# Patient Record
Sex: MALE | Race: WHITE | Employment: FULL TIME | URBAN - METROPOLITAN AREA
[De-identification: names, ages, dates, MRNs, and addresses within clinical notes are randomized per-mention and may not be internally consistent; named-entity substitution may affect disease eponyms.]

---

## 2019-12-30 ENCOUNTER — HOSPITAL ENCOUNTER (OUTPATIENT)
Dept: ULTRASOUND IMAGING | Age: 49
Discharge: HOME | End: 2019-12-30
Attending: INTERNAL MEDICINE
Payer: COMMERCIAL

## 2019-12-30 DIAGNOSIS — N18.4 CHRONIC KIDNEY DISEASE, STAGE IV (SEVERE) (HCC): ICD-10-CM

## 2019-12-30 PROCEDURE — 76770 US EXAM ABDO BACK WALL COMP: CPT

## 2019-12-30 RX ORDER — SODIUM CHLORIDE 9 MG/ML
25 INJECTION, SOLUTION INTRAVENOUS CONTINUOUS
Status: DISPENSED | OUTPATIENT
Start: 2020-01-06 | End: 2020-01-06

## 2020-01-06 ENCOUNTER — HOSPITAL ENCOUNTER (OUTPATIENT)
Dept: INFUSION THERAPY | Age: 50
Discharge: HOME | End: 2020-01-06
Payer: COMMERCIAL

## 2020-01-07 RX ORDER — SODIUM CHLORIDE 9 MG/ML
25 INJECTION, SOLUTION INTRAVENOUS CONTINUOUS
Status: DISCONTINUED | OUTPATIENT
Start: 2020-01-08 | End: 2020-01-09 | Stop reason: HOSPADM

## 2020-01-08 ENCOUNTER — HOSPITAL ENCOUNTER (OUTPATIENT)
Dept: INFUSION THERAPY | Age: 50
Discharge: HOME | End: 2020-01-08
Payer: COMMERCIAL

## 2020-01-08 VITALS
DIASTOLIC BLOOD PRESSURE: 87 MMHG | SYSTOLIC BLOOD PRESSURE: 177 MMHG | HEART RATE: 77 BPM | TEMPERATURE: 98 F | RESPIRATION RATE: 18 BRPM

## 2020-01-08 PROCEDURE — 96365 THER/PROPH/DIAG IV INF INIT: CPT

## 2020-01-08 PROCEDURE — 74011250636 HC RX REV CODE- 250/636: Performed by: INTERNAL MEDICINE

## 2020-01-08 RX ADMIN — IRON SUCROSE 300 MG: 20 INJECTION, SOLUTION INTRAVENOUS at 14:38

## 2020-01-08 RX ADMIN — SODIUM CHLORIDE 25 ML/HR: 900 INJECTION, SOLUTION INTRAVENOUS at 14:38

## 2020-01-08 NOTE — PROGRESS NOTES
1350 Pt admit to [facility] for Venofer ambulatory in stable condition. Assessment completed. No new concerns voiced. Peripheral IV established with positive blood return. Visit Vitals  /87   Pulse 77   Temp 98 °F (36.7 °C)   Resp 18       Medications:  Medications Administered     0.9% sodium chloride infusion     Admin Date  01/08/2020 Action  New Bag Dose  25 mL/hr Rate  25 mL/hr Route  IntraVENous Administered By  Vaibhav Dumont RN          iron sucrose (VENOFER) 300 mg in 0.9% sodium chloride 250 mL IVPB, overfill volume 25 mL     Admin Date  01/08/2020 Action  New Bag Dose  300 mg Rate  193.3 mL/hr Route  IntraVENous Administered By  Vaibhav Dumont, AKASH                  1630 Pt tolerated treatment well. Peripheral IV maintained positive blood return throughout treatment, flushed with positive blood return and removed at conclusion. D/c home ambulatory in no distress. Pt aware of next [facility] appointment scheduled for 1/20/20.

## 2020-01-17 RX ORDER — SODIUM CHLORIDE 9 MG/ML
25 INJECTION, SOLUTION INTRAVENOUS CONTINUOUS
Status: DISCONTINUED | OUTPATIENT
Start: 2020-01-22 | End: 2020-01-23 | Stop reason: HOSPADM

## 2020-01-20 ENCOUNTER — HOSPITAL ENCOUNTER (OUTPATIENT)
Dept: INFUSION THERAPY | Age: 50
End: 2020-01-20
Payer: COMMERCIAL

## 2020-01-22 ENCOUNTER — HOSPITAL ENCOUNTER (OUTPATIENT)
Dept: INFUSION THERAPY | Age: 50
Discharge: HOME | End: 2020-01-22
Payer: COMMERCIAL

## 2020-01-22 VITALS
SYSTOLIC BLOOD PRESSURE: 130 MMHG | HEART RATE: 86 BPM | DIASTOLIC BLOOD PRESSURE: 72 MMHG | RESPIRATION RATE: 18 BRPM | TEMPERATURE: 98.2 F

## 2020-01-22 PROCEDURE — 74011250636 HC RX REV CODE- 250/636: Performed by: INTERNAL MEDICINE

## 2020-01-22 PROCEDURE — 96365 THER/PROPH/DIAG IV INF INIT: CPT

## 2020-01-22 RX ADMIN — SODIUM CHLORIDE 25 ML/HR: 900 INJECTION, SOLUTION INTRAVENOUS at 15:30

## 2020-01-22 RX ADMIN — IRON SUCROSE 300 MG: 20 INJECTION, SOLUTION INTRAVENOUS at 15:40

## 2020-01-22 NOTE — PROGRESS NOTES
Outpatient Infusion Center Progress Note    1500 Pt admit to [facility] for Venofer ambulatory in stable condition. Assessment completed. No new concerns voiced, PIV started in right [facility] with good blood return. Visit Vitals  /72   Pulse 86   Temp 98.2 °F (36.8 °C)   Resp 18       Medications:  Medications Administered     0.9% sodium chloride infusion     Admin Date  01/22/2020 Action  New Bag Dose  25 mL/hr Rate  25 mL/hr Route  IntraVENous Administered By  Lyly CASTANEDA          iron sucrose (VENOFER) 300 mg in 0.9% sodium chloride 250 mL IVPB     Admin Date  01/22/2020 Action  New Bag Dose  300 mg Rate  193.3 mL/hr Route  IntraVENous Administered By  Dominic Blind Pt tolerated treatment well, pt declines post infusion observation, PIV flushed and removed, D/c home ambulatory in no distress. Pt aware of next appointment scheduled for 2/5/20.

## 2020-01-31 RX ORDER — SODIUM CHLORIDE 9 MG/ML
25 INJECTION, SOLUTION INTRAVENOUS CONTINUOUS
Status: DISCONTINUED | OUTPATIENT
Start: 2020-02-05 | End: 2020-02-06 | Stop reason: HOSPADM

## 2020-02-03 ENCOUNTER — APPOINTMENT (OUTPATIENT)
Dept: INFUSION THERAPY | Age: 50
End: 2020-02-03
Payer: COMMERCIAL

## 2020-02-05 ENCOUNTER — HOSPITAL ENCOUNTER (OUTPATIENT)
Dept: INFUSION THERAPY | Age: 50
Discharge: HOME | End: 2020-02-05
Payer: COMMERCIAL

## 2020-02-12 ENCOUNTER — HOSPITAL ENCOUNTER (OUTPATIENT)
Dept: INFUSION THERAPY | Age: 50
Discharge: HOME | End: 2020-02-12
Payer: COMMERCIAL

## 2020-02-12 VITALS
SYSTOLIC BLOOD PRESSURE: 158 MMHG | HEART RATE: 81 BPM | TEMPERATURE: 98.2 F | DIASTOLIC BLOOD PRESSURE: 92 MMHG | RESPIRATION RATE: 18 BRPM

## 2020-02-12 PROCEDURE — 96365 THER/PROPH/DIAG IV INF INIT: CPT

## 2020-02-12 PROCEDURE — 74011250636 HC RX REV CODE- 250/636: Performed by: INTERNAL MEDICINE

## 2020-02-12 PROCEDURE — 96366 THER/PROPH/DIAG IV INF ADDON: CPT

## 2020-02-12 RX ADMIN — IRON SUCROSE 400 MG: 20 INJECTION, SOLUTION INTRAVENOUS at 15:26

## 2020-02-12 NOTE — PROGRESS NOTES
Outpatient Infusion Center Progress Note    1440 Pt admit to [facility] for Venofer 3/3 ambulatory in stable condition. Assessment completed. No new concerns voiced. Patient reports a significant improvement in energy level since starting the iron infusions. Peripheral IV established in the LEFT AC with positive blood return. Medications ordered from pharmacy and IV capped while waiting for medications. Visit Vitals  /82   Pulse 82   Temp 98.2 °F (36.8 °C)   Resp 18       Medications Administered     iron sucrose (VENOFER) 400 mg in 0.9% sodium chloride 250 mL IVPB w/ 25 mL overfill volume      Admin Date  02/12/2020 Action  New Bag Dose  400 mg Rate  118 mL/hr Route  IntraVENous Administered By  Memory Listen                1800 Pt tolerated treatment well. He refused the 30 minute post infusion observation period. IV maintained blood return throughout the course of treatment and was removed at the conclusion. D/c home ambulatory in no distress. No  Further appointments as this is last iron infusion for patient.       Jessica Laguna RN

## 2020-02-19 ENCOUNTER — HOSPITAL ENCOUNTER (OUTPATIENT)
Dept: INFUSION THERAPY | Age: 50
End: 2020-02-19
Payer: COMMERCIAL

## 2020-08-19 ENCOUNTER — OFFICE VISIT (OUTPATIENT)
Dept: SURGERY | Age: 50
End: 2020-08-19
Payer: COMMERCIAL

## 2020-08-19 VITALS
BODY MASS INDEX: 38.36 KG/M2 | SYSTOLIC BLOOD PRESSURE: 163 MMHG | RESPIRATION RATE: 18 BRPM | WEIGHT: 315 LBS | TEMPERATURE: 97.2 F | DIASTOLIC BLOOD PRESSURE: 87 MMHG | HEART RATE: 51 BPM | OXYGEN SATURATION: 96 % | HEIGHT: 76 IN

## 2020-08-19 DIAGNOSIS — E66.01 OBESITY, MORBID (HCC): ICD-10-CM

## 2020-08-19 DIAGNOSIS — N18.6 CKD (CHRONIC KIDNEY DISEASE) STAGE V REQUIRING CHRONIC DIALYSIS (HCC): Primary | ICD-10-CM

## 2020-08-19 DIAGNOSIS — Z99.2 CKD (CHRONIC KIDNEY DISEASE) STAGE V REQUIRING CHRONIC DIALYSIS (HCC): Primary | ICD-10-CM

## 2020-08-19 PROCEDURE — 99202 OFFICE O/P NEW SF 15 MIN: CPT | Performed by: SURGERY

## 2020-08-19 RX ORDER — CARVEDILOL 25 MG/1
25 TABLET ORAL 2 TIMES DAILY WITH MEALS
COMMUNITY
End: 2020-09-23

## 2020-08-19 RX ORDER — OMEPRAZOLE 40 MG/1
40 CAPSULE, DELAYED RELEASE ORAL DAILY
COMMUNITY

## 2020-08-19 RX ORDER — DULAGLUTIDE 0.75 MG/.5ML
0.75 INJECTION, SOLUTION SUBCUTANEOUS
COMMUNITY

## 2020-08-19 RX ORDER — INSULIN GLARGINE 300 U/ML
40 INJECTION, SOLUTION SUBCUTANEOUS DAILY
Status: ON HOLD | COMMUNITY
End: 2020-09-22 | Stop reason: SDUPTHER

## 2020-08-19 RX ORDER — BUMETANIDE 2 MG/1
2 TABLET ORAL 2 TIMES DAILY
COMMUNITY

## 2020-08-19 RX ORDER — LANOLIN ALCOHOL/MO/W.PET/CERES
400 CREAM (GRAM) TOPICAL DAILY
COMMUNITY
End: 2021-11-30

## 2020-08-19 NOTE — PROGRESS NOTES
Surgery History and Physical    Subjective:      Mala Silva is a 53 y.o. male who is referred by Dr. Gwen Abraham regarding peritoneal dialysis access. The patient is not presently on dialysis. The patient does notice quite a bit of leg swelling. He does take Bumex and has fluid restriction. Patient has history of laparotomy for perforated appendicitis in the early 2000's. He has had no other abdominal surgery. The patient has history of cardiomyopathy and reports his ejection fraction is around 30%. He is able to lie in bed at night on his side without being short of breath. He has no history of MI or coronary intervention. 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 He does find he gets fatigued more easily now. Patient has history of diabetes mellitus. Blood sugars are well controlled. Medications include aspirin. Past Medical History:   Diagnosis Date    Allergic rhinitis 11/19/2009    Anemia     CAD (coronary artery disease)     Chronic kidney disease     Congestive heart failure ([facility] [address]     DM (diabetes mellitus) ([facility] 11/19/2009    GERD (gastroesophageal reflux disease)     HTN (hypertension), benign 11/19/2009    Hypercholesteremia 11/19/2009    Obesity 11/19/2009    Tobacco abuse 11/19/2009     Past Surgical History:   Procedure Laterality Date    HX APPENDECTOMY        Family History   Problem Relation Age of Onset    Hypertension Father     Heart Disease Father     Hypertension Mother      Social History     Tobacco Use    Smoking status: Current Every Day Smoker     Packs/day: 1.50     Years: 25.00     Pack years: 37.50     Types: Cigarettes    Smokeless tobacco: Former User   Substance Use Topics    Alcohol use: Not Currently     Alcohol/week: 35.0 standard drinks     Types: 42 Cans of beer per week     Comment: patient says he quit a year ago      Prior to Admission medications    Medication Sig Start Date End Date Taking?  Authorizing Provider   omeprazole (PRILOSEC) 40 mg capsule Take 40 mg by mouth daily. Yes Provider, Historical   carvediloL (COREG) 25 mg tablet Take 25 mg by mouth two (2) times daily (with meals). Yes Provider, Historical   insulin glargine U-300 conc (Toujeo Max U-300 SoloStar) 300 unit/mL (3 mL) inpn by SubCUTAneous route. Yes Provider, Historical   dulaglutide (Trulicity) 3.05 PJ/1.9 mL sub-q pen 0.75 mg by SubCUTAneous route every seven (7) days. Yes Provider, Historical   bumetanide (BUMEX) 2 mg tablet Take 2 mg by mouth daily. Yes Provider, Historical   magnesium oxide (MAG-OX) 400 mg tablet Take 400 mg by mouth daily. Yes Provider, Historical   fenofibrate (TRICOR) 160 mg tablet TAKE ONE TABLET BY MOUTH EVERY DAY 3/31/13  Yes Jane Millan MD   Insulin Needles, Disposable, (BD INSULIN PEN NEEDLE UF SHORT) 31 X 5/16 \" Ndle Use as directed with insulin pen 3/15/13  Yes Jane Millan MD   Insulin Lisp & Lisp Prot, Hum, (HUMALOG MIX 75-25 KWIKPEN) 100 unit/mL (75-25) flexpen Inject  38 units subcutaneously at breakfast and  Inject 30 units subcutaneously at dinner 3/4/13  Yes Jane Millan MD   Blood-Glucose Meter monitoring kit Test blood sugar four times daily. 12/12/12  Yes Jane Millan MD   glucose blood VI test strips (ASCENSIA AUTODISC VI, ONE TOUCH ULTRA TEST VI) strip Test four times daily 12/12/12  Yes Jane Millan MD   Lancets Misc Test four times daily 12/12/12  Yes Jane Millan MD   rosuvastatin (CRESTOR) 10 mg tablet Take 1 Tab by mouth nightly. 3/4/13   Jane Millan MD   oxyCODONE-acetaminophen (PERCOCET 10)  mg per tablet Take 1 Tab by mouth every six (6) hours as needed for Pain. 2/22/13   Elisabet Farmer MD   [facility] ACID/EPA (FISH OIL PO) Take  by mouth daily. Provider, Historical   benazepril (LOTENSIN) 40 mg tablet Take 1 Tab by mouth daily.  12/20/12   Jane Millan MD      Allergies   Allergen Reactions    Amlodipine Other (comments) Bloated      Crestor [Rosuvastatin] Myalgia    Lotensin Hct [Benazepril-Hydrochlorothiazide] Other (comments)     Dizziness with increased dose of 2 pills daily - some dizziness on 1 tab but not as bad as taking 2 tabs    Prednisone Other (comments)     Cramps in legs       Review of Systems:  Pertinent items are noted in the History of Present Illness. Objective:         Physical Exam:  The patient is an overweight male in no acute distress. Weight 358 pounds, height 64 inches  Head and neck examination no jaundice mass or thyromegaly. Lungs are clear bilaterally without rales rhonchi or wheezes. Heart is regular without murmur gallop or rub. The patient is alert and oriented. He moves all extremities equally. Facial movement is symmetrical.  Speech is normal.  The patient's abdomen is soft and nontender. He wears his belt approximately 5 inches below the umbilicus. There is a healed midline abdominal scar. No hernias noted. Lower extremities have 1-2+ edema bilaterally. Assessment:     CKD 5    Plan:     I explained to the patient that his prior laparotomy may potentially interfere with our ability to place a peritoneal dialysis catheter. I reviewed with the patient the typical operative and postoperative course for laparoscopic insertion of peritoneal dialysis catheter. I reviewed with the patient the risks versus benefits of laparoscopic insertion of peritoneal dialysis catheter under general anesthesia as an outpatient. Risks would include bleeding, infection, failure of catheter function, injury to abdominal organs, risks associated with general anesthesia, etc.    I explained to the patient that if he were to contract COVID-19 in the perioperative period, he could have a more severe case of the illness then would have occurred if he had not had surgery. The patient understands and wishes to proceed.       Atiya Malone MD      Copy:    Dr. Urias Beams

## 2020-08-19 NOTE — PROGRESS NOTES
Surgery Instruction Sheet    You have been scheduled for surgery on Tuesday 09/01/2020 at 7:30 am at [facility]. Please report to the Surgery Center at 5:45 am, this is approximately 2 hours prior to your surgery time. The Surgery Center is located on the [facility] [address] side of the hospital, just next to the Emergency Room. Reserved parking is available and  parking if lot is full. You will not need to have a pre-op visit before surgery, but the Pre-op Nurse will call you before surgery. The Pre-op nurse will review your medical history, medications and give you additional instructions. They will also confirm your arrival time. Call your physician immediately if you notice a change in your health between the time you saw your physician and the day of surgery. If you take a blood thinner, please let us know. Call your ordering Doctor to make sure you can stop taking it prior to your surgery. STOP YOUR ASPIRIN 5 DAYS PRIOR TO SURGERY. TAKE LAST DOSE ASPIRIN 08/26/2020    DO NOT TAKE  IBUPROFEN, ADVIL, MOTRIN, ALEVE, EXCEDRIN, BC POWDER, GOODIES, FISH OIL OR ANY MEDICATION CONTAINING ASPIRIN 5 DAYS PRIOR TO YOUR SURGERY. MAY TAKE TYLENOL. Eat a light dinner the evening before your surgery. DO NOT EAT OR DRINK ANYTHING AFTER MIDNIGHT THE NIGHT BEFORE YOUR SURGERY. This includes water, chewing gum, lifesavers, etc.  The Pre op nurse will check with you about any medication that you may need to take the morning of surgery. Shower with a new bar of anti-bacterial soap (Dial, Safeguard) or solution given to you by Pre-op, the night before surgery. Do not use lotion, powder, deodorant on the skin after showering.   Wear loose, comfortable clothing the day of surgery and bring a container to store your contacts, eyeglasses, dentures, hearing aid, etc.  Do not bring money, valuables, jewelry, etc. to the hospital.      If you are having outpatient surgery, someone must come with you the morning of surgery to drive you home. You can not drive for 24 hours after any anesthesia. Sometimes it is necessary to stay overnight and leave the next morning. This is still considered outpatient for most insurance deductibles. Someone will still need to drive you home. If you have questions or concerns, please feel free to call Dr Drew Kumar at [phone]. If you need to cancel your surgery, please call as soon as possible.

## 2020-08-19 NOTE — PROGRESS NOTES
Chief Complaint   Patient presents with    New Patient     seen as requested by Dr. Paco Thurman for discussion of PD cath placement

## 2020-08-24 NOTE — PERIOP NOTES
TNAA for Hammad Mckeon and Dr. Estela Álvarez office re: patient does not have an EKG and asked for her to please put in an OP order and have the patient go and get one done.

## 2020-08-25 RX ORDER — SACUBITRIL AND VALSARTAN 49; 51 MG/1; MG/1
1 TABLET, FILM COATED ORAL 2 TIMES DAILY
COMMUNITY
End: 2021-11-30

## 2020-08-25 RX ORDER — CYANOCOBALAMIN (VITAMIN B-12) 1000 MCG
1 TABLET, EXTENDED RELEASE ORAL DAILY
COMMUNITY
End: 2021-11-30

## 2020-08-25 RX ORDER — ASPIRIN 81 MG/1
81 TABLET ORAL DAILY
COMMUNITY

## 2020-08-25 RX ORDER — ALLOPURINOL 100 MG/1
300 TABLET ORAL DAILY
COMMUNITY
End: 2021-11-30

## 2020-08-25 NOTE — ADVANCED PRACTICE NURSE
MARVIN 6 in PAT phone assessment. Pt admits to loud snoring, witnessed apnea while sleeping but has not previously been evaluated for sleep apnea. BMI 41, neck size > 17\". + HTN, CKD 4. Results faxed to [facility] for FU/further recommendations regarding sleep apnea evaluation.

## 2020-08-25 NOTE — PERIOP NOTES
[facility]  Preoperative Instructions        Surgery Date 09/01/20          Time of Arrival 0585    1. On the day of your surgery, please report to the Surgical Services Registration Desk and sign in at your designated time. The Surgery Center is located to the right of the Emergency Room. 2. You must have someone with you to drive you home. You should not drive a car for 24 hours following surgery. Please make arrangements for a friend or family member to stay with you for the first 24 hours after your surgery. 3. Do not have anything to eat or drink (including water, gum, mints, coffee, juice) after midnight 08/31/20. ? This may not apply to medications prescribed by your physician. ?(Please note below the special instructions with medications to take the morning of your procedure.)    4. We recommend you do not drink any alcoholic beverages for 24 hours before and after your surgery. 5. Contact your surgeons office for instructions on the following medications: non-steroidal anti-inflammatory drugs (i.e. Advil, Aleve), vitamins, and supplements. (Some surgeons will want you to stop these medications prior to surgery and others may allow you to take them)  **If you are currently taking Plavix, Coumadin, Aspirin and/or other blood-thinning agents, contact your surgeon for instructions. ** Your surgeon will partner with the physician prescribing these medications to determine if it is safe to stop or if you need to continue taking. Please do not stop taking these medications without instructions from your surgeon    6. Wear comfortable clothes. Wear glasses instead of contacts. Do not bring any money or jewelry. Please bring picture ID, insurance card, and any prearranged co-payment or hospital payment. Do not wear make-up, particularly mascara the morning of your surgery. Do not wear nail polish, particularly if you are having foot /hand surgery.   Wear your hair loose or down, no ponytails, buns, tamara pins or clips. All body piercings must be removed. Please shower with antibacterial soap for three consecutive days before and on the morning of surgery, but do not apply any lotions, powders or deodorants after the shower on the day of surgery. Please use a fresh towels after each shower. Please sleep in clean clothes and change bed linens the night before surgery. Please do not shave for 48 hours prior to surgery. Shaving of the face is acceptable. 7. You should understand that if you do not follow these instructions your surgery may be cancelled. If your physical condition changes (I.e. fever, cold or flu) please contact your surgeon as soon as possible. 8. It is important that you be on time. If a situation occurs where you may be late, please call [phone] (OR Holding Area). 9. If you have any questions and or problems, please call [phone] (Pre-admission Testing). 10. Your surgery time may be subject to change. You will receive a phone call the evening prior if your time changes. 11.  If having outpatient surgery, you must have someone to drive you here, stay with you during the duration of your stay, and to drive you home at time of discharge. Special Instructions:     TAKE ALL MEDICATIONS DAY OF SURGERY EXCEPT: diabetic meds      I understand a pre-operative phone call will be made to verify my surgery time. In the event that I am not available, I give permission for a message to be left on my answering service and/or with another person?   Yes [phone]         ___________________      __________   _________    (Signature of Patient)             (Witness)                (Date and Time)

## 2020-08-25 NOTE — PERIOP NOTES
Gave Darwin Ward, NP, pts chart to review cardiac notes,EKG and made her aware of MARVIN score of 6 and not treated for sleep apnea, educated pt about potential damage/complications that can occur if he has sleep apnea and not treating it. Pt verbalizes understanding. Called for pulm. Notes from Dr. Leroy Lane and labwork that pt stated he had done a couple of weeks ago.

## 2020-08-25 NOTE — PROGRESS NOTES
LM for Dr Rosalind Bazzi office for them to fax last office note and most recent lab work. Fax and phone number provided.

## 2020-08-28 ENCOUNTER — HOSPITAL ENCOUNTER (OUTPATIENT)
Dept: PREADMISSION TESTING | Age: 50
Discharge: HOME | End: 2020-08-28
Payer: COMMERCIAL

## 2020-08-28 PROCEDURE — 87635 SARS-COV-2 COVID-19 AMP PRB: CPT

## 2020-08-29 LAB
HEALTH STATUS, XMCV2T: NORMAL
SARS-COV-2, COV2NT: NOT DETECTED
SOURCE, COVRS: NORMAL
SPECIMEN SOURCE, FCOV2M: NORMAL
SPECIMEN TYPE, XMCV1T: NORMAL

## 2020-08-31 ENCOUNTER — ANESTHESIA EVENT (OUTPATIENT)
Dept: SURGERY | Age: 50
End: 2020-08-31
Payer: COMMERCIAL

## 2020-08-31 NOTE — ANESTHESIA PREPROCEDURE EVALUATION
Relevant Problems   No relevant active problems       Anesthetic History   No history of anesthetic complications            Review of Systems / Medical History  Patient summary reviewed, nursing notes reviewed and pertinent labs reviewed    Pulmonary    COPD    Sleep apnea  Shortness of breath and undiagnosed apnea    Pertinent negatives: No smoker     Neuro/Psych   Within defined limits           Cardiovascular    Hypertension      CHF: NYHA Classification III, orthopnea, dyspnea on exertion  Dysrhythmias   CAD and hyperlipidemia    Exercise tolerance: <4 METS  Comments:   LBBB    TTE:  LVEF 30% reportedly   GI/Hepatic/Renal     GERD    Renal disease: ESRD    Pertinent negatives: No liver disease   Endo/Other    Diabetes: using insulin    Morbid obesity    Comments: polycythemia Other Findings   Comments: Previous alcohol abuse         Physical Exam    Airway  Mallampati: III  TM Distance: 4 - 6 cm  Neck ROM: normal range of motion       Comments: Small mouth opening Cardiovascular    Rhythm: regular  Rate: normal         Dental  No notable dental hx       Pulmonary  Breath sounds clear to auscultation            Pertinent negatives: No rhonchi and decreased breath sounds   Abdominal  GI exam deferred       Other Findings            Anesthetic Plan    ASA: 4  Anesthesia type: general    Monitoring Plan: BIS      Induction: Intravenous  Anesthetic plan and risks discussed with: Patient

## 2020-09-01 ENCOUNTER — ANESTHESIA (OUTPATIENT)
Dept: SURGERY | Age: 50
End: 2020-09-01
Payer: COMMERCIAL

## 2020-09-01 ENCOUNTER — HOSPITAL ENCOUNTER (OUTPATIENT)
Age: 50
Setting detail: OUTPATIENT SURGERY
Discharge: HOME | End: 2020-09-01
Attending: SURGERY | Admitting: SURGERY
Payer: COMMERCIAL

## 2020-09-01 VITALS
TEMPERATURE: 97.7 F | WEIGHT: 315 LBS | RESPIRATION RATE: 18 BRPM | OXYGEN SATURATION: 94 % | SYSTOLIC BLOOD PRESSURE: 153 MMHG | DIASTOLIC BLOOD PRESSURE: 45 MMHG | HEIGHT: 76 IN | HEART RATE: 85 BPM | BODY MASS INDEX: 38.36 KG/M2

## 2020-09-01 DIAGNOSIS — N18.5 STAGE 5 CHRONIC KIDNEY DISEASE NOT ON CHRONIC DIALYSIS (HCC): ICD-10-CM

## 2020-09-01 DIAGNOSIS — L76.82 PAIN AT SURGICAL INCISION: Primary | ICD-10-CM

## 2020-09-01 LAB
ANION GAP BLD CALC-SCNC: 16 MMOL/L (ref 10–20)
BUN BLD-MCNC: 97 MG/DL (ref 9–20)
CA-I BLD-MCNC: 1.16 MMOL/L (ref 1.12–1.32)
CHLORIDE BLD-SCNC: 112 MMOL/L (ref 98–107)
CO2 BLD-SCNC: 18 MMOL/L (ref 21–32)
CREAT BLD-MCNC: 4.7 MG/DL (ref 0.6–1.3)
GLUCOSE BLD STRIP.AUTO-MCNC: 175 MG/DL (ref 65–100)
GLUCOSE BLD-MCNC: 157 MG/DL (ref 65–100)
HCT VFR BLD CALC: 24 % (ref 36.6–50.3)
POTASSIUM BLD-SCNC: 5.3 MMOL/L (ref 3.5–5.1)
SERVICE CMNT-IMP: ABNORMAL
SERVICE CMNT-IMP: ABNORMAL
SODIUM BLD-SCNC: 139 MMOL/L (ref 136–145)

## 2020-09-01 PROCEDURE — 76010000131 HC OR TIME 2 TO 2.5 HR: Performed by: SURGERY

## 2020-09-01 PROCEDURE — 74011250637 HC RX REV CODE- 250/637: Performed by: SURGERY

## 2020-09-01 PROCEDURE — 77030002986 HC SUT PROL J&J -A: Performed by: SURGERY

## 2020-09-01 PROCEDURE — 76210000006 HC OR PH I REC 0.5 TO 1 HR: Performed by: SURGERY

## 2020-09-01 PROCEDURE — 74011250636 HC RX REV CODE- 250/636: Performed by: ANESTHESIOLOGY

## 2020-09-01 PROCEDURE — 76210000021 HC REC RM PH II 0.5 TO 1 HR: Performed by: SURGERY

## 2020-09-01 PROCEDURE — 49324 LAP INSERT TUNNEL IP CATH: CPT | Performed by: SURGERY

## 2020-09-01 PROCEDURE — 77030018673: Performed by: SURGERY

## 2020-09-01 PROCEDURE — 82962 GLUCOSE BLOOD TEST: CPT

## 2020-09-01 PROCEDURE — 77030009932 HC PRB FT CTRL J&J -B: Performed by: SURGERY

## 2020-09-01 PROCEDURE — 76060000035 HC ANESTHESIA 2 TO 2.5 HR: Performed by: SURGERY

## 2020-09-01 PROCEDURE — 77030012770 HC TRCR OPT FX AMR -B: Performed by: SURGERY

## 2020-09-01 PROCEDURE — 77030008606 HC TRCR ENDOSC KII AMR -B: Performed by: SURGERY

## 2020-09-01 PROCEDURE — 77030011640 HC PAD GRND REM COVD -A: Performed by: SURGERY

## 2020-09-01 PROCEDURE — 77030040361 HC SLV COMPR DVT MDII -B: Performed by: SURGERY

## 2020-09-01 PROCEDURE — 74011250636 HC RX REV CODE- 250/636: Performed by: NURSE ANESTHETIST, CERTIFIED REGISTERED

## 2020-09-01 PROCEDURE — 77030008684 HC TU ET CUF COVD -B: Performed by: ANESTHESIOLOGY

## 2020-09-01 PROCEDURE — 74011000250 HC RX REV CODE- 250: Performed by: NURSE ANESTHETIST, CERTIFIED REGISTERED

## 2020-09-01 PROCEDURE — 77030020255 HC SOL INJ LR 1000ML BG: Performed by: SURGERY

## 2020-09-01 PROCEDURE — 77030020829: Performed by: SURGERY

## 2020-09-01 PROCEDURE — 77030002933 HC SUT MCRYL J&J -A: Performed by: SURGERY

## 2020-09-01 PROCEDURE — 77030011280 HC ELECTRD MPLR J&J -B: Performed by: SURGERY

## 2020-09-01 PROCEDURE — 77030004495 HC ADPT PERI DLYS BAXT -C: Performed by: SURGERY

## 2020-09-01 PROCEDURE — 77030040922 HC BLNKT HYPOTHRM STRY -A

## 2020-09-01 PROCEDURE — 80047 BASIC METABLC PNL IONIZED CA: CPT

## 2020-09-01 PROCEDURE — 77030031139 HC SUT VCRL2 J&J -A: Performed by: SURGERY

## 2020-09-01 PROCEDURE — 74011250636 HC RX REV CODE- 250/636: Performed by: SURGERY

## 2020-09-01 PROCEDURE — 74011000258 HC RX REV CODE- 258: Performed by: NURSE ANESTHETIST, CERTIFIED REGISTERED

## 2020-09-01 PROCEDURE — C1750 CATH, HEMODIALYSIS,LONG-TERM: HCPCS | Performed by: SURGERY

## 2020-09-01 PROCEDURE — 77030002888 HC SUT CHRMC J&J -A: Performed by: SURGERY

## 2020-09-01 PROCEDURE — 74011250637 HC RX REV CODE- 250/637

## 2020-09-01 DEVICE — IMPLANTABLE DEVICE: Type: IMPLANTABLE DEVICE | Site: ABDOMEN | Status: FUNCTIONAL

## 2020-09-01 RX ORDER — SODIUM CHLORIDE 0.9 % (FLUSH) 0.9 %
5-40 SYRINGE (ML) INJECTION AS NEEDED
Status: DISCONTINUED | OUTPATIENT
Start: 2020-09-01 | End: 2020-09-01 | Stop reason: HOSPADM

## 2020-09-01 RX ORDER — SODIUM CHLORIDE 0.9 % (FLUSH) 0.9 %
5-40 SYRINGE (ML) INJECTION EVERY 8 HOURS
Status: DISCONTINUED | OUTPATIENT
Start: 2020-09-01 | End: 2020-09-01 | Stop reason: HOSPADM

## 2020-09-01 RX ORDER — EPHEDRINE SULFATE/0.9% NACL/PF 50 MG/5 ML
SYRINGE (ML) INTRAVENOUS AS NEEDED
Status: DISCONTINUED | OUTPATIENT
Start: 2020-09-01 | End: 2020-09-01 | Stop reason: HOSPADM

## 2020-09-01 RX ORDER — ALBUTEROL SULFATE 0.83 MG/ML
SOLUTION RESPIRATORY (INHALATION) AS NEEDED
Status: DISCONTINUED | OUTPATIENT
Start: 2020-09-01 | End: 2020-09-01 | Stop reason: HOSPADM

## 2020-09-01 RX ORDER — ONDANSETRON 2 MG/ML
INJECTION INTRAMUSCULAR; INTRAVENOUS AS NEEDED
Status: DISCONTINUED | OUTPATIENT
Start: 2020-09-01 | End: 2020-09-01 | Stop reason: HOSPADM

## 2020-09-01 RX ORDER — MIDAZOLAM HYDROCHLORIDE 1 MG/ML
INJECTION, SOLUTION INTRAMUSCULAR; INTRAVENOUS AS NEEDED
Status: DISCONTINUED | OUTPATIENT
Start: 2020-09-01 | End: 2020-09-01 | Stop reason: HOSPADM

## 2020-09-01 RX ORDER — HYDROCODONE BITARTRATE AND ACETAMINOPHEN 5; 325 MG/1; MG/1
TABLET ORAL
Status: COMPLETED
Start: 2020-09-01 | End: 2020-09-01

## 2020-09-01 RX ORDER — PROPOFOL 10 MG/ML
INJECTION, EMULSION INTRAVENOUS AS NEEDED
Status: DISCONTINUED | OUTPATIENT
Start: 2020-09-01 | End: 2020-09-01 | Stop reason: HOSPADM

## 2020-09-01 RX ORDER — PHENYLEPHRINE HCL IN 0.9% NACL 0.4MG/10ML
SYRINGE (ML) INTRAVENOUS AS NEEDED
Status: DISCONTINUED | OUTPATIENT
Start: 2020-09-01 | End: 2020-09-01 | Stop reason: HOSPADM

## 2020-09-01 RX ORDER — HYDROCODONE BITARTRATE AND ACETAMINOPHEN 5; 325 MG/1; MG/1
1 TABLET ORAL
Qty: 18 TAB | Refills: 0 | Status: SHIPPED | OUTPATIENT
Start: 2020-09-01 | End: 2020-09-04

## 2020-09-01 RX ORDER — FENTANYL CITRATE 50 UG/ML
INJECTION, SOLUTION INTRAMUSCULAR; INTRAVENOUS AS NEEDED
Status: DISCONTINUED | OUTPATIENT
Start: 2020-09-01 | End: 2020-09-01 | Stop reason: HOSPADM

## 2020-09-01 RX ORDER — NEOSTIGMINE METHYLSULFATE 1 MG/ML
INJECTION, SOLUTION INTRAVENOUS AS NEEDED
Status: DISCONTINUED | OUTPATIENT
Start: 2020-09-01 | End: 2020-09-01 | Stop reason: HOSPADM

## 2020-09-01 RX ORDER — HYDROCODONE BITARTRATE AND ACETAMINOPHEN 5; 325 MG/1; MG/1
1 TABLET ORAL ONCE
Status: COMPLETED | OUTPATIENT
Start: 2020-09-01 | End: 2020-09-01

## 2020-09-01 RX ORDER — HYDROCODONE BITARTRATE AND ACETAMINOPHEN 5; 325 MG/1; MG/1
TABLET ORAL
Status: ON HOLD | COMMUNITY
End: 2020-09-01 | Stop reason: SDUPTHER

## 2020-09-01 RX ORDER — PHENYLEPHRINE 10 MG/250 ML(40 MCG/ML)IN 0.9 % SOD.CHLORIDE INTRAVENOUS
Status: DISCONTINUED | OUTPATIENT
Start: 2020-09-01 | End: 2020-09-01 | Stop reason: HOSPADM

## 2020-09-01 RX ORDER — LIDOCAINE HYDROCHLORIDE 20 MG/ML
INJECTION, SOLUTION EPIDURAL; INFILTRATION; INTRACAUDAL; PERINEURAL AS NEEDED
Status: DISCONTINUED | OUTPATIENT
Start: 2020-09-01 | End: 2020-09-01 | Stop reason: HOSPADM

## 2020-09-01 RX ORDER — FENTANYL CITRATE 50 UG/ML
25 INJECTION, SOLUTION INTRAMUSCULAR; INTRAVENOUS
Status: COMPLETED | OUTPATIENT
Start: 2020-09-01 | End: 2020-09-01

## 2020-09-01 RX ORDER — PREDNISONE 10 MG/1
TABLET ORAL SEE ADMIN INSTRUCTIONS
COMMUNITY
End: 2021-11-30

## 2020-09-01 RX ORDER — OXYCODONE HYDROCHLORIDE 5 MG/1
5 TABLET ORAL AS NEEDED
Status: DISCONTINUED | OUTPATIENT
Start: 2020-09-01 | End: 2020-09-01 | Stop reason: HOSPADM

## 2020-09-01 RX ORDER — ROCURONIUM BROMIDE 10 MG/ML
INJECTION, SOLUTION INTRAVENOUS AS NEEDED
Status: DISCONTINUED | OUTPATIENT
Start: 2020-09-01 | End: 2020-09-01 | Stop reason: HOSPADM

## 2020-09-01 RX ORDER — SODIUM CHLORIDE 9 MG/ML
25 INJECTION, SOLUTION INTRAVENOUS CONTINUOUS
Status: DISCONTINUED | OUTPATIENT
Start: 2020-09-01 | End: 2020-09-01 | Stop reason: HOSPADM

## 2020-09-01 RX ORDER — GLYCOPYRROLATE 0.2 MG/ML
INJECTION INTRAMUSCULAR; INTRAVENOUS AS NEEDED
Status: DISCONTINUED | OUTPATIENT
Start: 2020-09-01 | End: 2020-09-01 | Stop reason: HOSPADM

## 2020-09-01 RX ORDER — ONDANSETRON 2 MG/ML
4 INJECTION INTRAMUSCULAR; INTRAVENOUS AS NEEDED
Status: DISCONTINUED | OUTPATIENT
Start: 2020-09-01 | End: 2020-09-01 | Stop reason: HOSPADM

## 2020-09-01 RX ADMIN — Medication 12.5 MG: at 07:36

## 2020-09-01 RX ADMIN — CALCIUM CHLORIDE 1 G: 100 INJECTION, SOLUTION INTRAVENOUS at 09:34

## 2020-09-01 RX ADMIN — FENTANYL CITRATE 25 MCG: 50 INJECTION, SOLUTION INTRAMUSCULAR; INTRAVENOUS at 10:20

## 2020-09-01 RX ADMIN — ALBUTEROL SULFATE 1.25 MG: 2.5 SOLUTION RESPIRATORY (INHALATION) at 07:32

## 2020-09-01 RX ADMIN — Medication 5 MG: at 09:11

## 2020-09-01 RX ADMIN — ONDANSETRON HYDROCHLORIDE 8 MG: 2 INJECTION, SOLUTION INTRAMUSCULAR; INTRAVENOUS at 09:05

## 2020-09-01 RX ADMIN — SODIUM CHLORIDE 25 ML/HR: 900 INJECTION, SOLUTION INTRAVENOUS at 07:00

## 2020-09-01 RX ADMIN — FENTANYL CITRATE 25 MCG: 50 INJECTION, SOLUTION INTRAMUSCULAR; INTRAVENOUS at 10:15

## 2020-09-01 RX ADMIN — HYDROCODONE BITARTRATE AND ACETAMINOPHEN 1 TABLET: 5; 325 TABLET ORAL at 10:36

## 2020-09-01 RX ADMIN — FENTANYL CITRATE 25 MCG: 50 INJECTION, SOLUTION INTRAMUSCULAR; INTRAVENOUS at 10:30

## 2020-09-01 RX ADMIN — LIDOCAINE HYDROCHLORIDE 100 MG: 20 INJECTION, SOLUTION INTRAVENOUS at 07:34

## 2020-09-01 RX ADMIN — GLYCOPYRROLATE 1 MG: 0.2 INJECTION, SOLUTION INTRAMUSCULAR; INTRAVENOUS at 09:11

## 2020-09-01 RX ADMIN — PROPOFOL 200 MG: 10 INJECTION, EMULSION INTRAVENOUS at 07:34

## 2020-09-01 RX ADMIN — Medication 12.5 MG: at 07:34

## 2020-09-01 RX ADMIN — ROCURONIUM BROMIDE 30 MG: 10 INJECTION INTRAVENOUS at 07:34

## 2020-09-01 RX ADMIN — Medication 3 AMPULE: at 07:15

## 2020-09-01 RX ADMIN — FENTANYL CITRATE 50 MCG: 50 INJECTION, SOLUTION INTRAMUSCULAR; INTRAVENOUS at 07:53

## 2020-09-01 RX ADMIN — Medication 12.5 MG: at 09:14

## 2020-09-01 RX ADMIN — PHENYLEPHRINE HYDROCHLORIDE 40 MCG/MIN: 10 INJECTION INTRAMUSCULAR; INTRAVENOUS; SUBCUTANEOUS at 07:42

## 2020-09-01 RX ADMIN — MIDAZOLAM HYDROCHLORIDE 2 MG: 1 INJECTION, SOLUTION INTRAMUSCULAR; INTRAVENOUS at 07:24

## 2020-09-01 RX ADMIN — FENTANYL CITRATE 50 MCG: 50 INJECTION, SOLUTION INTRAMUSCULAR; INTRAVENOUS at 08:25

## 2020-09-01 RX ADMIN — ALBUTEROL SULFATE 2.5 MG: 2.5 SOLUTION RESPIRATORY (INHALATION) at 07:33

## 2020-09-01 RX ADMIN — CEFAZOLIN 3 G: 10 INJECTION, POWDER, FOR SOLUTION INTRAVENOUS; PARENTERAL at 07:48

## 2020-09-01 RX ADMIN — FENTANYL CITRATE 25 MCG: 50 INJECTION, SOLUTION INTRAMUSCULAR; INTRAVENOUS at 10:25

## 2020-09-01 RX ADMIN — Medication 40 MCG: at 09:14

## 2020-09-01 RX ADMIN — VASOPRESSIN 0.4 UNITS: 20 INJECTION INTRAVENOUS at 09:44

## 2020-09-01 NOTE — BRIEF OP NOTE
Brief Postoperative Note    Patient: Panda Blunt  YOB: 1970  MRN: [id number]    Date of Procedure: 9/1/2020     Pre-Op Diagnosis: CHRONIC KIDNEY DISEASE STAGE 5    Post-Op Diagnosis: Same as preoperative diagnosis. Procedure(s):  LAPAROSCOPIC INSERTION OF PERITONEAL DIALYSIS CATHETER    Surgeon(s):  Cristiana Hernandez MD    Surgical Assistant: None    Anesthesia: General     Estimated Blood Loss (mL): 15 ml    Complications: None    Specimens: * No specimens in log *     Implants:   Implant Name Type Inv. Item Serial No.  Lot No. LRB No. Used Action   CATH PD 14FR PLST ADLT LT SWAN - SNA  CATH PD 14FR PLST ADLT LT SWAN [facility] [id number] N/A 1 Implanted       Drains: * No LDAs found *    Findings: Extensive adhesions in mid abdomen. Left abdomen and pelvis open. PD catheter placed in mid lower pelvis.     Electronically Signed by Byron Lamas MD on 9/1/2020 at 9:44 AM

## 2020-09-01 NOTE — PROGRESS NOTES
Patient discharged to home. Iv removed. Discharge instructions, scripts, and medication education done with patient and significant other.

## 2020-09-01 NOTE — DISCHARGE INSTRUCTIONS
Discharge Instructions After Insertion of Peritoneal Dialysis Catheter        Keep dressing in place until first office visit. Keep surgical site dry for two weeks. No lifting over fifteen pounds for 1 month. See Dr Stephen Dela Cruz in the office on Thursday 9/3/2020  -  call to confirm appointment  -  [phone]    Use pain medication as prescribed for pain or use Tylenol. Do not drive for three days. Do not drive while taking narcotic pain medication. For any problems call Dr. Stephen Dela Cruz    [phone] or [phone] (after office hours). Cecil Regan MD        TO PREVENT AN INFECTION      1. [address] YOUR HANDS     To prevent infection, good handwashing is the most important thing you or your caregiver can do.  Wash your hands with soap and water or use the hand  we gave you before you touch any wounds. 2. SHOWER     Use the antibacterial soap we gave you when you take a shower.  Shower with this soap until your wounds are healed.  To reach all areas of your body, you may need someone to help you.  Dont forget to clean your belly button with every shower. 3.  USE CLEAN SHEETS     Use freshly cleaned sheets on your bed after surgery.  To keep the surgery site clean, do not allow pets to sleep with you while your wound is still healing. 4. STOP SMOKING     Stop smoking, or at least cut back on smoking     Smoking slows your healing. 5.  CONTROL YOUR BLOOD SUGAR     High blood sugars slow wound healing. If you are diabetic, control your blood sugar levels before and after your surgery.  DISCHARGE SUMMARY from Nurse    The following personal items are in your possession at time of discharge:    Dental Appliances: None  Visual Aid: None  Home Medications: None  Jewelry: None  Clothing: None (left in in pt. room)  Other Valuables: None             PATIENT INSTRUCTIONS:    After general anesthesia or intravenous sedation, for 24 hours or while taking prescription Narcotics:  Limit your activities  Do not drive and operate hazardous machinery  Do not make important personal or business decisions  Do  not drink alcoholic beverages  If you have not urinated within 8 hours after discharge, please contact your surgeon on call. Report the following to your surgeon:  Excessive pain, swelling, redness or odor of or around the surgical area  Temperature over 100.5  Nausea and vomiting lasting longer than 4 hours or if unable to take medications  Any signs of decreased circulation or nerve impairment to extremity: change in color, persistent  numbness, tingling, coldness or increase pain  Any questions    To prevent infection remember to refer to your handout on handwashing given to you by your nurse. *  Please give a list of your current medications to your Primary Care Provider. *  Please update this list whenever your medications are discontinued, doses are      changed, or new medications (including over-the-counter products) are added. *  Please carry medication information at all times in case of emergency situations. These are general instructions for a healthy lifestyle:    No smoking/ No tobacco products/ Avoid exposure to second hand smoke    Surgeon General's Warning:  Quitting smoking now greatly reduces serious risk to your health. Obesity, smoking, and sedentary lifestyle greatly increases your risk for illness    A healthy diet, regular physical exercise & weight monitoring are important for maintaining a healthy lifestyle    You may be retaining fluid if you have a history of heart failure or if you experience any of the following symptoms:  Weight gain of 3 pounds or more overnight or 5 pounds in a week, increased swelling in our hands or feet or shortness of breath while lying flat in bed. Please call your doctor as soon as you notice any of these symptoms; do not wait until your next office visit.     Recognize signs and symptoms of STROKE:    F-face looks uneven    A-arms unable to move or move unevenly    S-speech slurred or non-existent    T-time-call 911 as soon as signs and symptoms begin-DO NOT go       Back to bed or wait to see if you get better-TIME IS BRAIN. The discharge information has been reviewed with the patient. The patient verbalized understanding. Patient Education      Hydrocodone/Acetaminophen (Vicodin, Norco, Lortab) - (By mouth)   Why this medicine is used:   Treats pain. Contact a nurse or doctor right away if you have:  Blistering, peeling, red skin rash  Fast or slow heartbeat, shallow breathing, blue lips, fingernails, or skin  Anxiety, restlessness, muscle spasms, twitching, seeing or hearing things that are not there  Dark urine or pale stools, yellow skin or eyes  Extreme weakness, sweating, seizures, cold or clammy skin  Lightheadedness, dizziness, fainting, fever, sweating     Common side effects:  Constipation, nausea, vomiting, loss of appetite, stomach pain  Tiredness or sleepiness  © 2017 [employer] Information is for End User's use only and may not be sold, redistributed or otherwise used for commercial purposes.

## 2020-09-01 NOTE — ROUTINE PROCESS
Patient: Thom Villa MRN: [id number]  SSN: [id number] YOB: 1970  Age: 53 y.o. Sex: male Patient is status post Procedure(s): LAPAROSCOPIC INSERTION OF PERITONEAL DIALYSIS CATHETER. Surgeon(s) and Role: Carmita Schulte MD - Primary Local/Dose/Irrigation:  none Peripheral IV 09/01/20 Left Hand (Active) Site Assessment Clean, dry, & intact 09/01/20 0185 Phlebitis Assessment 0 09/01/20 0717 Infiltration Assessment 0 09/01/20 0717 Dressing Status Clean, dry, & intact 09/01/20 0115 Dressing Type Tape;Transparent 09/01/20 0717 Hub Color/Line Status Pink; Infusing 09/01/20 0717 Airway - Endotracheal Tube 09/01/20 Oral (Active) Line Zeyad Lips 09/01/20 0000 Dressing/Packing:  Wound Abdomen Left-Dressing Type: 4 x 4;Adhesive wound closure strips (Steri-Strips); Adhesive wound dressing (Mastisol)(hypafix tape) (09/01/20 0844) Splint/Cast:  ] Other:  none

## 2020-09-01 NOTE — ANESTHESIA POSTPROCEDURE EVALUATION
Procedure(s):  LAPAROSCOPIC INSERTION OF PERITONEAL DIALYSIS CATHETER. general    Anesthesia Post Evaluation      Multimodal analgesia: multimodal analgesia used between 6 hours prior to anesthesia start to PACU discharge  Patient location during evaluation: bedside  Patient participation: complete - patient participated  Level of consciousness: awake  Pain management: adequate  Airway patency: patent  Anesthetic complications: no  Cardiovascular status: acceptable  Respiratory status: acceptable  Hydration status: acceptable  Post anesthesia nausea and vomiting:  controlled  Final Post Anesthesia Temperature Assessment:  Normothermia (36.0-37.5 degrees C)      INITIAL Post-op Vital signs:   Vitals Value Taken Time   /46 9/1/2020 10:30 AM   Temp 36.5 °C (97.7 °F) 9/1/2020 10:01 AM   Pulse 89 9/1/2020 10:40 AM   Resp 19 9/1/2020 10:40 AM   SpO2 92 % 9/1/2020 10:40 AM   Vitals shown include unvalidated device data.

## 2020-09-01 NOTE — PERIOP NOTES
Handoff Report from Operating Room to PACU    Report received from ROJELIO Isabel CRNA and Royer Cazares RN regarding Jose Alberto Solares. Surgeon(s):  Wilton Anderson MD  And Procedure(s) (LRB):  LAPAROSCOPIC INSERTION OF PERITONEAL DIALYSIS CATHETER (N/A)  confirmed   with allergies, drains and dressings discussed. Anesthesia type, drugs, patient history, complications, estimated blood loss, vital signs, intake and output, and last pain medication, lines, reversal medications and temperature were reviewed.

## 2020-09-02 PROBLEM — N18.5 STAGE 5 CHRONIC KIDNEY DISEASE NOT ON CHRONIC DIALYSIS (HCC): Status: ACTIVE | Noted: 2020-09-02

## 2020-09-02 NOTE — OP NOTES
Operative Report    CPT [id number]        Laparoscopic Insertion of Peritoneal Dialysis Catheter        Patient:  Izabel Dexter    Nephrologist:  Dr Jaci Oneill    Date of Surgery:  9/2/2020    Preoperative diagnosis: CKD 5    Postoperative Diagnosis: Same    Anesthesia:  General    Surgeon:  Sabas Dumont     Procedure:  Laparoscopic Insertion of Peritoneal Dialysis Catheter    Operative Summary:   The patient was taken to the operating room and placed on the operating table in the supine position. General anesthesia was induced. The patient's abdomen was prepared and steriley draped. The patient had been noted preoperatively to wear his waistline at a level 5 inches below the umbilicus. The patient had a history of appendicitis in the past and had had a lower midline incision. An incision was made above and to the left of the umbilicus. This incision was carried down through the subcutaneous tissue to the fascia. The anterior rectus sheath was opened. There was fat in th erectus sheath. The underlying posterior sheath was opened for about 1 cm. The underlying layer looked thicker than usual, and it was not opened. A site was selected to have centimeters below the costal margin at the mid left clavicular line and a 2 half centimeter longitudinal incision was made. This was carried down through subcutaneous tissue to the rectus sheath. The rectus sheath was opened longitudinally and underlying rectus muscle was split. The underlying posterior rectus sheath was opened as was the underlying peritoneum for about 1 cm. A 5 mm port with balloon was placed there. The patient's abdomen was then insufflated with carbon dioxide and a 5 mm laparoscope was introduced. It was noted that there were extensive adhesions in the midline of the abdomen but there was sparing of the left abdomen and of the pelvis.     It was seen that the site where the original incision had been made was free of any adhesions. The patient's abdomen was then deflated. I then opened up the original incision site the somewhat thickened peritoneum for 1 cm distance and placed the pursestring suture of 4-0 Prolene. Under visualization and using a long straight catheter guide a [place] Curl Cath peritoneal dialysis catheter was passed into the abdominal cavity and positioned in the pelvis. The guide was removed. The abdomen was then deflated. Inflow and oputflow through the catheter were assessed and were good. The deeper of the dacron cuffs lay within the rectus sheath. The purse string suture was tied just below the deeper cuff. The anterior rectus sheath was then closed with running 2-0 prolene. A separate stab wound was made to the side and slightly below the skin incision and the catheter was brought out through this site. The more superficial cuff lay within the subcutaneous tissue. The metal adaptor was attached to the catheter and the catheter was flushed with heparinized saline. There was easy inflow and outflow from the catheter. The catheter was flushed with heparinized saline and capped. The 5 mm port in the left upper quadrant was removed after deflation of the abdominal cavity. The poasterior rectus sheath there was closed with 2-0 Vicryl. The anterior sheath there was closed with 2-0 Vicryl. The subcutaneous tissue at each incision was closed with 3-0 chromic and the skin was closed with intracuticular 4-0 Monocryl. Steri strips and dressings were applied. The patient tolerated the procedure well and was taken to the recovery room in stable condition. Specimen - none    Drain - none    Estimated blood loss - 15 ml    Complications - none    Implants:   Implant Name Type Inv.  Item Serial No.  Lot No. LRB No. Used Action   CATH PD 14FR PLST ADLT LT SWAN - SNA   CATH PD 14FR PLST ADLT LT Ignacio Rosen MD

## 2020-09-03 ENCOUNTER — OFFICE VISIT (OUTPATIENT)
Dept: SURGERY | Age: 50
End: 2020-09-03
Payer: COMMERCIAL

## 2020-09-03 DIAGNOSIS — Z09 POSTOPERATIVE EXAMINATION: ICD-10-CM

## 2020-09-03 DIAGNOSIS — L76.82 PAIN AT SURGICAL INCISION: Primary | ICD-10-CM

## 2020-09-03 PROCEDURE — 99024 POSTOP FOLLOW-UP VISIT: CPT | Performed by: SURGERY

## 2020-09-03 RX ORDER — OXYCODONE AND ACETAMINOPHEN 5; 325 MG/1; MG/1
1 TABLET ORAL
Qty: 25 TAB | Refills: 0 | Status: SHIPPED | OUTPATIENT
Start: 2020-09-03 | End: 2020-09-08

## 2020-09-03 NOTE — PROGRESS NOTES
The patient is status post insertion of peritoneal dialysis catheter on 9/1/2020. He has a good deal of incisional pain. He initially had difficulty passing urine postoperatively but did eventually start passing urine to his satisfaction. Patient's abdomen is benign. The exit site and incisions look good. New dry dressing was applied. The patient is going tomorrow to see his peritoneal dialysis nurse and will have the catheter flushed and dressing changed. He will then receive ongoing instructions from his peritoneal dialysis nurse. The patient can see me as needed.       Final diagnosis status post insertion peritoneal dialysis catheter postop visit         copy to Dr. Marybel Claire

## 2020-09-16 ENCOUNTER — HOSPITAL ENCOUNTER (INPATIENT)
Dept: INTERVENTIONAL RADIOLOGY/VASCULAR | Age: 50
Discharge: HOME | DRG: 673 | End: 2020-09-16
Attending: INTERNAL MEDICINE
Payer: COMMERCIAL

## 2020-09-16 ENCOUNTER — APPOINTMENT (OUTPATIENT)
Dept: CT IMAGING | Age: 50
DRG: 673 | End: 2020-09-16
Attending: NURSE PRACTITIONER
Payer: COMMERCIAL

## 2020-09-16 ENCOUNTER — APPOINTMENT (OUTPATIENT)
Dept: VASCULAR SURGERY | Age: 50
DRG: 673 | End: 2020-09-16
Attending: NURSE PRACTITIONER
Payer: COMMERCIAL

## 2020-09-16 ENCOUNTER — HOSPITAL ENCOUNTER (INPATIENT)
Age: 50
LOS: 7 days | Discharge: HOME | DRG: 673 | End: 2020-09-23
Attending: EMERGENCY MEDICINE | Admitting: FAMILY MEDICINE
Payer: COMMERCIAL

## 2020-09-16 ENCOUNTER — APPOINTMENT (OUTPATIENT)
Dept: GENERAL RADIOLOGY | Age: 50
DRG: 673 | End: 2020-09-16
Attending: STUDENT IN AN ORGANIZED HEALTH CARE EDUCATION/TRAINING PROGRAM
Payer: COMMERCIAL

## 2020-09-16 DIAGNOSIS — N17.0 ACUTE RENAL FAILURE WITH ACUTE TUBULAR NECROSIS SUPERIMPOSED ON STAGE 4 CHRONIC KIDNEY DISEASE (HCC): ICD-10-CM

## 2020-09-16 DIAGNOSIS — J96.01 ACUTE RESPIRATORY FAILURE WITH HYPOXIA (HCC): ICD-10-CM

## 2020-09-16 DIAGNOSIS — M54.42 CHRONIC BILATERAL LOW BACK PAIN WITH BILATERAL SCIATICA: ICD-10-CM

## 2020-09-16 DIAGNOSIS — Z71.89 GOALS OF CARE, COUNSELING/DISCUSSION: ICD-10-CM

## 2020-09-16 DIAGNOSIS — R53.1 WEAKNESS GENERALIZED: ICD-10-CM

## 2020-09-16 DIAGNOSIS — R53.1 WEAKNESS: Primary | ICD-10-CM

## 2020-09-16 DIAGNOSIS — N18.6 STAGE 5 CHRONIC KIDNEY DISEASE ON CHRONIC DIALYSIS (HCC): ICD-10-CM

## 2020-09-16 DIAGNOSIS — Z99.2 STAGE 5 CHRONIC KIDNEY DISEASE ON CHRONIC DIALYSIS (HCC): ICD-10-CM

## 2020-09-16 DIAGNOSIS — I10 BENIGN ESSENTIAL HTN: ICD-10-CM

## 2020-09-16 DIAGNOSIS — Z71.89 ADVANCED CARE PLANNING/COUNSELING DISCUSSION: ICD-10-CM

## 2020-09-16 DIAGNOSIS — N18.4 ACUTE RENAL FAILURE WITH ACUTE TUBULAR NECROSIS SUPERIMPOSED ON STAGE 4 CHRONIC KIDNEY DISEASE (HCC): ICD-10-CM

## 2020-09-16 DIAGNOSIS — Z71.89 DNR (DO NOT RESUSCITATE) DISCUSSION: ICD-10-CM

## 2020-09-16 DIAGNOSIS — D64.9 ANEMIA, UNSPECIFIED TYPE: ICD-10-CM

## 2020-09-16 DIAGNOSIS — M54.41 CHRONIC BILATERAL LOW BACK PAIN WITH BILATERAL SCIATICA: ICD-10-CM

## 2020-09-16 DIAGNOSIS — G89.29 CHRONIC BILATERAL LOW BACK PAIN WITH BILATERAL SCIATICA: ICD-10-CM

## 2020-09-16 PROBLEM — N17.9 ACUTE ON CHRONIC RENAL FAILURE (HCC): Status: ACTIVE | Noted: 2020-09-16

## 2020-09-16 PROBLEM — N18.9 ACUTE ON CHRONIC RENAL FAILURE (HCC): Status: ACTIVE | Noted: 2020-09-16

## 2020-09-16 LAB
ALBUMIN SERPL-MCNC: 2.7 G/DL (ref 3.5–5)
ALBUMIN SERPL-MCNC: 2.8 G/DL (ref 3.5–5)
ALBUMIN/GLOB SERPL: 0.6 {RATIO} (ref 1.1–2.2)
ALP SERPL-CCNC: 220 U/L (ref 45–117)
ALT SERPL-CCNC: 15 U/L (ref 12–78)
ANION GAP SERPL CALC-SCNC: 15 MMOL/L (ref 5–15)
ANION GAP SERPL CALC-SCNC: 18 MMOL/L (ref 5–15)
AST SERPL-CCNC: 15 U/L (ref 15–37)
ATRIAL RATE: 81 BPM
BASOPHILS # BLD: 0 K/UL (ref 0–0.1)
BASOPHILS NFR BLD: 0 % (ref 0–1)
BILIRUB SERPL-MCNC: 0.8 MG/DL (ref 0.2–1)
BUN SERPL-MCNC: 176 MG/DL (ref 6–20)
BUN SERPL-MCNC: 184 MG/DL (ref 6–20)
BUN/CREAT SERPL: 13 (ref 12–20)
BUN/CREAT SERPL: 14 (ref 12–20)
CALCIUM SERPL-MCNC: 6.5 MG/DL (ref 8.5–10.1)
CALCIUM SERPL-MCNC: 6.6 MG/DL (ref 8.5–10.1)
CALCULATED R AXIS, ECG10: -14 DEGREES
CALCULATED T AXIS, ECG11: 118 DEGREES
CHLORIDE SERPL-SCNC: 107 MMOL/L (ref 97–108)
CHLORIDE SERPL-SCNC: 108 MMOL/L (ref 97–108)
CO2 SERPL-SCNC: 11 MMOL/L (ref 21–32)
CO2 SERPL-SCNC: 13 MMOL/L (ref 21–32)
COMMENT, HOLDF: NORMAL
COMMENT, HOLDF: NORMAL
CREAT SERPL-MCNC: 13.2 MG/DL (ref 0.7–1.3)
CREAT SERPL-MCNC: 13.2 MG/DL (ref 0.7–1.3)
DIAGNOSIS, 93000: NORMAL
DIFFERENTIAL METHOD BLD: ABNORMAL
EOSINOPHIL # BLD: 0 K/UL (ref 0–0.4)
EOSINOPHIL NFR BLD: 0 % (ref 0–7)
ERYTHROCYTE [DISTWIDTH] IN BLOOD BY AUTOMATED COUNT: 16.8 % (ref 11.5–14.5)
EST. AVERAGE GLUCOSE BLD GHB EST-MCNC: ABNORMAL MG/DL
GLOBULIN SER CALC-MCNC: 4.2 G/DL (ref 2–4)
GLUCOSE SERPL-MCNC: 129 MG/DL (ref 65–100)
GLUCOSE SERPL-MCNC: 134 MG/DL (ref 65–100)
HBA1C MFR BLD: <3.8 % (ref 4–5.6)
HBV SURFACE AG SER QL: 0.19 INDEX
HBV SURFACE AG SER QL: NEGATIVE
HCT VFR BLD AUTO: 23.4 % (ref 36.6–50.3)
HGB BLD-MCNC: 7.3 G/DL (ref 12.1–17)
IMM GRANULOCYTES # BLD AUTO: 0 K/UL
IMM GRANULOCYTES NFR BLD AUTO: 0 %
LYMPHOCYTES # BLD: 0.5 K/UL (ref 0.8–3.5)
LYMPHOCYTES NFR BLD: 5 % (ref 12–49)
MCH RBC QN AUTO: 30.9 PG (ref 26–34)
MCHC RBC AUTO-ENTMCNC: 31.2 G/DL (ref 30–36.5)
MCV RBC AUTO: 99.2 FL (ref 80–99)
MONOCYTES # BLD: 0.5 K/UL (ref 0–1)
MONOCYTES NFR BLD: 5 % (ref 5–13)
MYELOCYTES NFR BLD MANUAL: 1 %
NEUTS BAND NFR BLD MANUAL: 1 % (ref 0–6)
NEUTS SEG # BLD: 8.2 K/UL (ref 1.8–8)
NEUTS SEG NFR BLD: 88 % (ref 32–75)
NRBC # BLD: 0 K/UL (ref 0–0.01)
NRBC BLD-RTO: 0 PER 100 WBC
P-R INTERVAL, ECG05: 204 MS
PHOSPHATE SERPL-MCNC: 10.4 MG/DL (ref 2.6–4.7)
PLATELET # BLD AUTO: 132 K/UL (ref 150–400)
PMV BLD AUTO: 11.3 FL (ref 8.9–12.9)
POTASSIUM SERPL-SCNC: 5.4 MMOL/L (ref 3.5–5.1)
POTASSIUM SERPL-SCNC: 5.4 MMOL/L (ref 3.5–5.1)
PROT SERPL-MCNC: 6.9 G/DL (ref 6.4–8.2)
Q-T INTERVAL, ECG07: 454 MS
QRS DURATION, ECG06: 176 MS
QTC CALCULATION (BEZET), ECG08: 527 MS
RBC # BLD AUTO: 2.36 M/UL (ref 4.1–5.7)
RBC MORPH BLD: ABNORMAL
SAMPLES BEING HELD,HOLD: NORMAL
SAMPLES BEING HELD,HOLD: NORMAL
SODIUM SERPL-SCNC: 136 MMOL/L (ref 136–145)
SODIUM SERPL-SCNC: 136 MMOL/L (ref 136–145)
TROPONIN I SERPL-MCNC: <0.05 NG/ML
VENTRICULAR RATE, ECG03: 81 BPM
WBC # BLD AUTO: 9.2 K/UL (ref 4.1–11.1)

## 2020-09-16 PROCEDURE — 74011000636 HC RX REV CODE- 636: Performed by: RADIOLOGY

## 2020-09-16 PROCEDURE — 80053 COMPREHEN METABOLIC PANEL: CPT

## 2020-09-16 PROCEDURE — P9047 ALBUMIN (HUMAN), 25%, 50ML: HCPCS | Performed by: NURSE PRACTITIONER

## 2020-09-16 PROCEDURE — 2709999900 HC NON-CHARGEABLE SUPPLY

## 2020-09-16 PROCEDURE — 36415 COLL VENOUS BLD VENIPUNCTURE: CPT

## 2020-09-16 PROCEDURE — 74011250636 HC RX REV CODE- 250/636: Performed by: NURSE PRACTITIONER

## 2020-09-16 PROCEDURE — 87340 HEPATITIS B SURFACE AG IA: CPT

## 2020-09-16 PROCEDURE — 74011000250 HC RX REV CODE- 250: Performed by: STUDENT IN AN ORGANIZED HEALTH CARE EDUCATION/TRAINING PROGRAM

## 2020-09-16 PROCEDURE — 65660000001 HC RM ICU INTERMED STEPDOWN

## 2020-09-16 PROCEDURE — 85025 COMPLETE CBC W/AUTO DIFF WBC: CPT

## 2020-09-16 PROCEDURE — 83036 HEMOGLOBIN GLYCOSYLATED A1C: CPT

## 2020-09-16 PROCEDURE — 93970 EXTREMITY STUDY: CPT

## 2020-09-16 PROCEDURE — 93005 ELECTROCARDIOGRAM TRACING: CPT

## 2020-09-16 PROCEDURE — 84484 ASSAY OF TROPONIN QUANT: CPT

## 2020-09-16 PROCEDURE — 36555 INSERT NON-TUNNEL CV CATH: CPT

## 2020-09-16 PROCEDURE — C1894 INTRO/SHEATH, NON-LASER: HCPCS

## 2020-09-16 PROCEDURE — 74011250637 HC RX REV CODE- 250/637: Performed by: EMERGENCY MEDICINE

## 2020-09-16 PROCEDURE — 87040 BLOOD CULTURE FOR BACTERIA: CPT

## 2020-09-16 PROCEDURE — 74011250636 HC RX REV CODE- 250/636: Performed by: INTERNAL MEDICINE

## 2020-09-16 PROCEDURE — 86900 BLOOD TYPING SEROLOGIC ABO: CPT

## 2020-09-16 PROCEDURE — 71275 CT ANGIOGRAPHY CHEST: CPT

## 2020-09-16 PROCEDURE — 99285 EMERGENCY DEPT VISIT HI MDM: CPT

## 2020-09-16 PROCEDURE — 80069 RENAL FUNCTION PANEL: CPT

## 2020-09-16 PROCEDURE — 71045 X-RAY EXAM CHEST 1 VIEW: CPT

## 2020-09-16 PROCEDURE — 74011000258 HC RX REV CODE- 258: Performed by: RADIOLOGY

## 2020-09-16 PROCEDURE — 86923 COMPATIBILITY TEST ELECTRIC: CPT

## 2020-09-16 RX ORDER — HEPARIN 100 UNIT/ML
300 SYRINGE INTRAVENOUS AS NEEDED
Status: DISCONTINUED | OUTPATIENT
Start: 2020-09-16 | End: 2020-09-20 | Stop reason: HOSPADM

## 2020-09-16 RX ORDER — LANOLIN ALCOHOL/MO/W.PET/CERES
400 CREAM (GRAM) TOPICAL DAILY
Status: DISCONTINUED | OUTPATIENT
Start: 2020-09-17 | End: 2020-09-18

## 2020-09-16 RX ORDER — ACETAMINOPHEN 650 MG/1
650 SUPPOSITORY RECTAL
Status: DISCONTINUED | OUTPATIENT
Start: 2020-09-16 | End: 2020-09-23 | Stop reason: HOSPADM

## 2020-09-16 RX ORDER — HEPARIN SODIUM 1000 [USP'U]/ML
INJECTION, SOLUTION INTRAVENOUS; SUBCUTANEOUS
Status: DISCONTINUED
Start: 2020-09-16 | End: 2020-09-17 | Stop reason: HOSPADM

## 2020-09-16 RX ORDER — LANOLIN ALCOHOL/MO/W.PET/CERES
325 CREAM (GRAM) TOPICAL
Status: DISCONTINUED | OUTPATIENT
Start: 2020-09-18 | End: 2020-09-23 | Stop reason: HOSPADM

## 2020-09-16 RX ORDER — CARVEDILOL 12.5 MG/1
25 TABLET ORAL 2 TIMES DAILY WITH MEALS
Status: DISCONTINUED | OUTPATIENT
Start: 2020-09-16 | End: 2020-09-17

## 2020-09-16 RX ORDER — MAGNESIUM SULFATE 100 %
4 CRYSTALS MISCELLANEOUS AS NEEDED
Status: DISCONTINUED | OUTPATIENT
Start: 2020-09-16 | End: 2020-09-23 | Stop reason: HOSPADM

## 2020-09-16 RX ORDER — POLYETHYLENE GLYCOL 3350 17 G/17G
17 POWDER, FOR SOLUTION ORAL DAILY PRN
Status: DISCONTINUED | OUTPATIENT
Start: 2020-09-16 | End: 2020-09-23 | Stop reason: HOSPADM

## 2020-09-16 RX ORDER — PROMETHAZINE HYDROCHLORIDE 25 MG/1
12.5 TABLET ORAL
Status: DISCONTINUED | OUTPATIENT
Start: 2020-09-16 | End: 2020-09-23 | Stop reason: HOSPADM

## 2020-09-16 RX ORDER — LIDOCAINE HYDROCHLORIDE 20 MG/ML
20 INJECTION, SOLUTION INFILTRATION; PERINEURAL ONCE
Status: COMPLETED | OUTPATIENT
Start: 2020-09-16 | End: 2020-09-16

## 2020-09-16 RX ORDER — ONDANSETRON 2 MG/ML
4 INJECTION INTRAMUSCULAR; INTRAVENOUS
Status: DISCONTINUED | OUTPATIENT
Start: 2020-09-16 | End: 2020-09-23 | Stop reason: HOSPADM

## 2020-09-16 RX ORDER — IPRATROPIUM BROMIDE AND ALBUTEROL SULFATE 2.5; .5 MG/3ML; MG/3ML
3 SOLUTION RESPIRATORY (INHALATION)
Status: DISCONTINUED | OUTPATIENT
Start: 2020-09-16 | End: 2020-09-23 | Stop reason: HOSPADM

## 2020-09-16 RX ORDER — IBUPROFEN 200 MG
1 TABLET ORAL EVERY 24 HOURS
Status: DISCONTINUED | OUTPATIENT
Start: 2020-09-16 | End: 2020-09-23 | Stop reason: HOSPADM

## 2020-09-16 RX ORDER — SODIUM CHLORIDE 0.9 % (FLUSH) 0.9 %
5-40 SYRINGE (ML) INJECTION AS NEEDED
Status: DISCONTINUED | OUTPATIENT
Start: 2020-09-16 | End: 2020-09-23 | Stop reason: HOSPADM

## 2020-09-16 RX ORDER — DEXTROSE MONOHYDRATE 100 MG/ML
0-250 INJECTION, SOLUTION INTRAVENOUS AS NEEDED
Status: DISCONTINUED | OUTPATIENT
Start: 2020-09-16 | End: 2020-09-23 | Stop reason: HOSPADM

## 2020-09-16 RX ORDER — ASPIRIN 81 MG/1
81 TABLET ORAL DAILY
Status: DISCONTINUED | OUTPATIENT
Start: 2020-09-17 | End: 2020-09-23 | Stop reason: HOSPADM

## 2020-09-16 RX ORDER — ACETAMINOPHEN 325 MG/1
650 TABLET ORAL
Status: DISCONTINUED | OUTPATIENT
Start: 2020-09-16 | End: 2020-09-23 | Stop reason: HOSPADM

## 2020-09-16 RX ORDER — IPRATROPIUM BROMIDE AND ALBUTEROL SULFATE 2.5; .5 MG/3ML; MG/3ML
3 SOLUTION RESPIRATORY (INHALATION)
Status: ACTIVE | OUTPATIENT
Start: 2020-09-16 | End: 2020-09-17

## 2020-09-16 RX ORDER — ALLOPURINOL 100 MG/1
300 TABLET ORAL DAILY
Status: DISCONTINUED | OUTPATIENT
Start: 2020-09-17 | End: 2020-09-23 | Stop reason: HOSPADM

## 2020-09-16 RX ORDER — ACETAMINOPHEN 500 MG
1000 TABLET ORAL ONCE
Status: COMPLETED | OUTPATIENT
Start: 2020-09-16 | End: 2020-09-16

## 2020-09-16 RX ORDER — INSULIN LISPRO 100 [IU]/ML
INJECTION, SOLUTION INTRAVENOUS; SUBCUTANEOUS
Status: DISCONTINUED | OUTPATIENT
Start: 2020-09-16 | End: 2020-09-23 | Stop reason: HOSPADM

## 2020-09-16 RX ORDER — HEPARIN SODIUM 1000 [USP'U]/ML
1000 INJECTION, SOLUTION INTRAVENOUS; SUBCUTANEOUS
Status: DISCONTINUED | OUTPATIENT
Start: 2020-09-16 | End: 2020-09-17

## 2020-09-16 RX ORDER — SODIUM CHLORIDE 0.9 % (FLUSH) 0.9 %
5-40 SYRINGE (ML) INJECTION EVERY 8 HOURS
Status: DISCONTINUED | OUTPATIENT
Start: 2020-09-16 | End: 2020-09-23 | Stop reason: HOSPADM

## 2020-09-16 RX ORDER — SODIUM CHLORIDE 0.9 % (FLUSH) 0.9 %
10 SYRINGE (ML) INJECTION
Status: COMPLETED | OUTPATIENT
Start: 2020-09-16 | End: 2020-09-16

## 2020-09-16 RX ORDER — ALBUMIN HUMAN 250 G/1000ML
25 SOLUTION INTRAVENOUS EVERY 6 HOURS
Status: COMPLETED | OUTPATIENT
Start: 2020-09-16 | End: 2020-09-17

## 2020-09-16 RX ORDER — INSULIN GLARGINE 100 [IU]/ML
10 INJECTION, SOLUTION SUBCUTANEOUS
Status: DISCONTINUED | OUTPATIENT
Start: 2020-09-16 | End: 2020-09-23 | Stop reason: HOSPADM

## 2020-09-16 RX ADMIN — HEPARIN SODIUM 1000 UNITS: 1000 INJECTION INTRAVENOUS; SUBCUTANEOUS at 22:53

## 2020-09-16 RX ADMIN — SODIUM CHLORIDE 100 ML: 900 INJECTION, SOLUTION INTRAVENOUS at 21:13

## 2020-09-16 RX ADMIN — LIDOCAINE HYDROCHLORIDE 5 ML: 20 INJECTION, SOLUTION INFILTRATION; PERINEURAL at 22:54

## 2020-09-16 RX ADMIN — IOPAMIDOL 90 ML: 755 INJECTION, SOLUTION INTRAVENOUS at 21:12

## 2020-09-16 RX ADMIN — ACETAMINOPHEN 1000 MG: 500 TABLET ORAL at 16:05

## 2020-09-16 RX ADMIN — Medication 10 ML: at 21:13

## 2020-09-16 RX ADMIN — ALBUMIN (HUMAN) 25 G: 0.25 INJECTION, SOLUTION INTRAVENOUS at 18:01

## 2020-09-16 NOTE — H&P
[address] [facility]'s Adult  Hospitalist Group  History and Physical    Primary Care Provider: None  Date of Service:  9/16/2020    Subjective:     Chivo Diehl is a 53 y.o. male who presented to ED via EMS from home with complaint of increasing weakness x2 weeks after having insertion of PD catheter at [facility] on 9/1. Patient also reported that he had not been eating very well. He reports over the last 2 years he has been getting progressively weaker but in the last 2 weeks this has gotten much worse and today he was unable to get up out of his recliner on his own. He has not yet started HD as he has not attended any of the teaching appointments which she states is due to his weakness. Work-up in ED included CBC with normal WBC count, hemoglobin of 7.3, thrombocytopenia with platelets of 601, chemistry with potassium 5.4, CO2 13, creatinine 13.2, calcium 6.5, albumin 2.7. He had cover testing on 8/20/2020 which was negative. Patient seen by nephrology in ED. Plan is for IR to place a Danny and patient to have emergent dialysis today. He will need dialysis for couple of days in a row. Plan is to transition to PD once more medically stable. Patient seen in the ED in no acute distress but does appear ill. He is super morbidly obese. Patient is alert and oriented x4. States that he overall feels poorly and weak but specifically he denies HA, dizziness, visual changes, cough, SOB, CP, abd pain, n/v/d or dysuria. Discussed with Dr. Lupe Sinclair. Patient BP downtrending. On admission was 150/80 and oxygen saturation 96 to 90% on room air. Most recently nurse has had difficulty obtaining blood pressure and has taken it with several different types of cuffs, taken on both arms as well as manually. His systolic is now downtrending to 537 and diastolic is in the 73A to 30s. Dr. Lucho Hilliard in the ED did a bedside echo and stated there was no acute effusion or concern.   Patient is also now hypoxic 87% on room air requiring oxygen. Also thrombocytopenic with platelets of 164, previously is platelets are normal.  Concern for PE and so we will go ahead and get a CTA of the chest as patient is going to be dialyzed today. Discussed with Dr. Dayton Cogan and Dr. Dallin Corado. Review of Systems:    A comprehensive review of systems was negative except for that written in the History of Present Illness. Past Medical History:   Diagnosis Date    Allergic rhinitis 11/19/2009    Anemia     CAD (coronary artery disease)     Chronic kidney disease     stage 4    Chronic obstructive pulmonary disease (HCC)     Congestive heart failure (HCC)     chronic left sided congestive heart failure    DM (diabetes mellitus) ([facility] 11/19/2009    type 2    GERD (gastroesophageal reflux disease)     HTN (hypertension), benign 11/19/2009    Hypercholesteremia 11/19/2009    LBBB (left bundle branch block)     Obesity 11/19/2009    Pulmonary HTN ([facility] [address] 06/2020    mild     Tobacco abuse 11/19/2009    Weakness generalized       Past Surgical History:   Procedure Laterality Date    CARDIAC SURG PROCEDURE UNLIST  06/11/2020    echo ef 25-30% down from 40% on 04/19    HX APPENDECTOMY  2004     Prior to Admission medications    Medication Sig Start Date End Date Taking? Authorizing Provider   predniSONE (STERAPRED DS) 10 mg dose pack Take  by mouth See Admin Instructions. See administration instruction per 10mg dose pack    Provider, Historical   iron, carbonyl (FEOSOL) 45 mg tab Take 1 Tab by mouth daily. Provider, Historical   allopurinoL (ZYLOPRIM) 100 mg tablet Take 300 mg by mouth daily. Provider, Historical   sacubitriL-valsartan (Entresto) 49-51 mg tab tablet Take 1 Tab by mouth two (2) times a day. Provider, Historical   aspirin delayed-release 81 mg tablet Take 81 mg by mouth daily. Provider, Historical   omeprazole (PRILOSEC) 40 mg capsule Take 40 mg by mouth daily.     Provider, Historical   carvediloL (COREG) 25 mg tablet Take 25 mg by mouth two (2) times daily (with meals). Provider, Historical   insulin glargine U-300 conc (Toujeo Max U-300 SoloStar) 300 unit/mL (3 mL) inpn 40 Units by SubCUTAneous route daily. Provider, Historical   dulaglutide (Trulicity) 9.36 WR/8.3 mL sub-q pen 0.75 mg by SubCUTAneous route every seven (7) days. Provider, Historical   bumetanide (BUMEX) 2 mg tablet Take 2 mg by mouth daily. Provider, Historical   magnesium oxide (MAG-OX) 400 mg tablet Take 400 mg by mouth daily. Provider, Historical   Insulin Needles, Disposable, (BD INSULIN PEN NEEDLE UF SHORT) 31 X 5/16 \" Ndle Use as directed with insulin pen 3/15/13   Dessa Boeck, MD   Blood-Glucose Meter monitoring kit Test blood sugar four times daily. 12/12/12   Dessa Boeck, MD   glucose blood VI test strips (ASCENSIA AUTODISC VI, ONE TOUCH ULTRA TEST VI) strip Test four times daily 12/12/12   Dessa Boeck, MD   Lancets Misc Test four times daily 12/12/12   Dessa Boeck, MD     No Known Allergies   Family History   Problem Relation Age of Onset    Hypertension Father     Heart Disease Father     Hypertension Mother         SOCIAL HISTORY:  Patient resides at home  Patient ambulates with walking   Smoking history: Current, ever day - counseled for cessation; nicotine patch ordered  Alcohol history: Former - Sober since 10/2019    Objective:       Physical Exam:   General:  Alert, cooperative, no distress, appears stated age, super morbidly obese   Head:  Normocephalic, without obvious abnormality, atraumatic. Eyes:  Conjunctivae/corneas clear. Pupils equal, round, reactive to light. Lungs:   Expiratory wheeze throughout, good effort, no accessory muscle use   Heart:  Regular rate and rhythm, S1, S2 normal, no murmur, click, rub, or gallop; heart sounds distant d/t body habitus   Abdomen:   Soft, non-tender/non-distended.  Bowel sounds normal; PD catheter; exam limited d/t body habitus   Extremities: Extremities normal, atraumatic, no cyanosis. BLE 3-4+ pitting edema   Pulses: 1+ and symmetric all extremities. Skin: Skin color, texture, turgor normal. No rashes or lesions. Neurologic: CNII-XII grossly intact. Equal strength. A&Ox4     ECG: Ordered    Data Review: All diagnostic labs and studies have been reviewed.     Assessment:     Principal Problem:    Acute on chronic renal failure ([facility] (9/16/2020)    Active Problems:    Tobacco abuse (11/19/2009)      HTN (hypertension), benign (11/19/2009)      DM (diabetes mellitus) ([facility] (11/19/2009)      Hypercholesteremia (11/19/2009)      Obesity, morbid ([facility] (8/19/2020)      Congestive heart failure (HCC) ()      Overview: chronic left sided congestive heart failure      Chronic obstructive pulmonary disease (HCC) ()      Anemia ()      Weakness generalized ()      Thrombocytopenia (HCC) ()      Acute respiratory failure with hypoxia (HCC) ()        Plan:     Acute on Chronic Renal Failure  -PD cath placed 9/1, not yet used-didn't attend training  -Seen in ED by nephro, plan for Danny by IR and emergent HD  -Avoid nephrotoxins if possible  -Monitor in intermediate care for now  -monitor renal function  -BP becoming soft; start albumin IV x3 doses  -BLE edema 3-4+ - will get dopplers to r/o DVT  -Appreciate nephrology input  -Consult palliative care    Acute respiratory failure with hypoxia  -PTA was 98% on RA; now requiring 3LNC  -likely related in part to need HD, which he is getting  -will get CTA chest to r/o PE (immobile x2 weeks, thrombocytopenia) - ok with nephrology  -O2 to keep Sats greater than 90%  -current smoker; counseled for cessation - nicotine patch    Thrombocytopenia  -132; usually NL  - check CTA chest for PE, check BLE for dopplers  - monitor labs    Generalized Weakness: largely immobile x2 weeks  -suspect d/t severe uremia  -PT/OT consult; will likely need HH PT/OT    Hx CHF: EF 30% per records  -daily weights  -getting HD today  -Continue home coreg, entresto  -hold Bumex for now-defer to nephrology  -EKG shows SR w/ 1st degree AVB and LBBB; no previous ekg to compare  -check echo  -consult cardiology in AM    Hx HTN  -BP soft  -start albumin IV x3 doses  -Trend Bps    Hx COPD  -Not on home meds  -give duo-neb x1 now  -PRN duonebs  -Keep O2 greater than 90%    DMII: last A1c over 10  -check A1c  -Start lantus 10u qhs  -AC/HS accuchecks with SSI  -trend BS    Anemia: d/t ESRD  -Currently 7.3  -for HD tonight  -no s/sx bleeding at this time  -monitor HH, transfuse for less than 7    Hx HLD  -not on statin  -continue ASA  -check lipid panel    Tobacco Use d/o  -1PPD x 33 years  -counseled for cessation  -nicotine patch ordered    DVTppx: SCDs; hold off on AC d/t anemia pending CTA and BLE doppler results  Gippx: Pepcid  Code Status: Full code - palliative care consulted  Diet: Carb Consistent/renal  Activity: OOB to chair TID and PRN  Discharge: TBD        Signed By: Westley Oliveira NP     September 16, 2020

## 2020-09-16 NOTE — ED NOTES
[facility] for admission at this time. Pt was repositioned at this time.

## 2020-09-16 NOTE — ACP (ADVANCE CARE PLANNING)
Advance Care Planning Note    Name: Hua Basilio  YOB: 1970  MRN: [id number]  Admission Date: 9/16/2020  1:47 PM    Date of discussion: 9/16/2020    Active Diagnoses:    Hospital Problems  Date Reviewed: 9/16/2020          Codes Class Noted POA    * (Principal) Acute on chronic renal failure ([facility] ICD-10-CM: N17.9, N18.9  ICD-9-CM: 584.9, 585.9  9/16/2020 Yes        Congestive heart failure ([facility] ICD-10-CM: I50.9  ICD-9-CM: 428.0  Unknown Yes    Overview Signed 9/16/2020  6:11 PM by Aimee Chambers NP     chronic left sided congestive heart failure             Chronic obstructive pulmonary disease ([facility] ICD-10-CM: J44.9  ICD-9-CM: 496  Unknown Yes        Anemia ICD-10-CM: D64.9  ICD-9-CM: 285. 9  Unknown Yes        Weakness generalized ICD-10-CM: R53.1  ICD-9-CM: 780.79  Unknown Yes        Thrombocytopenia (HCC) ICD-10-CM: D69.6  ICD-9-CM: 287.5  Unknown Yes        Acute respiratory failure with hypoxia (HCC) ICD-10-CM: J96.01  ICD-9-CM: 518.81  Unknown Yes        Obesity, morbid ([facility] ICD-10-CM: E66.01  ICD-9-CM: 278.01  8/19/2020 Yes        Tobacco abuse ICD-10-CM: Z72.0  ICD-9-CM: 305.1  11/19/2009 Yes        HTN (hypertension), benign ICD-10-CM: I10  ICD-9-CM: 401.1  11/19/2009 Yes        DM (diabetes mellitus) ([facility] ICD-10-CM: E11.9  ICD-9-CM: 250.00  11/19/2009 Yes        Hypercholesteremia ICD-10-CM: E78.00  ICD-9-CM: 272.0  11/19/2009 Yes               These active diagnoses are of sufficient risk that focused discussion on advance care planning is indicated in order to allow the patient to thoughtfully consider personal goals of care, and if situations arise that prevent the ability to personally give input, to ensure appropriate representation of their personal desires for different levels and aggressiveness of care.      Discussion:     Persons present and participating in discussion: Erin Oliveros NP    Discussion: Pt does not have AMD, declines to do one at this time. Has significant health issues at young age of 53. Recent steep decline in health. Discussed code status including full vs partial vs DNR in detail. Also discussed resuscitation efforts including CPR, defibrillation, intubation and emergency drugs. All patient questions answered. At this time, the patient wishes to remain full code. Will consult palliative care for [address]. Time Spent:     Total time spent face-to-face in education and discussion: 8 minutes.      Lui Hernandez NP  9/16/2020  7:08 PM

## 2020-09-16 NOTE — CONSULTS
Patient name: Sisto Spatz MRN: [id number]      [facility]  Initial Consult Note  CCF:0/27/5388  DOS: 9/16/2020  Requested by: Dr. Izaiah Combs  Reason: Evaluation and management of SULEMAN/CKD  Source: pt/GF/Chart review and discussion with Dr. Joyce November    Assessment:  SULEMAN on CKD-4/5>>Pre-ESRD (Dr. Joyce November)  Hyperkalemia  Severe Azotemia  Metabolic acidosis  Uremia  Anemia with component of ACKD  Chronic S-CHF; EF of 30%  DM-2  HTN  Obesity  Gout  NRP- 8 grams on last check; due to DN and Obesity    9-16- pt has advance CKD with severe azotemia, acidosis and high K. He is uremic. He recently got PD catheter placed and did not come for training. Subsequently got more worse clinically; now has advance renal failure with uremia. Has PD cath in place but has not been trained. Will need to start HD. Discussed r/b of catheter placement and HD process. Pt/GF understands and agrees to proceed    Plan/Recommendations:  Consult IR for [employer] HD today- 2 hr Rx, 200 QB, UF of 0.5 Kg  Repeat HD for few days in a row  Check Iron profile  Will need MARINO  Will eventually transition to PD, once he is more stable clinically    HPI: Sisto Spatz is a 53 y.o. male with PMH significant for DM-2, HTN, HL, Obesity, Gout, CKD-4/5, CHF>>comes in with c/o weakness, poor appetite, nausea and in ability to move much. He recently god PD catheter placed in preparation for starting dialysis; he did not come for PD training.  He was having \"Pain all over\" after PD catheter  Subsequently developed \"sores\" on his butt from lying down; was supposed to come to ER yesterday for evaluation but refused to come. Suffered a fall at home- denies LOC or hitting head. Unwitnessed. Was told to come ER by Dr. Janki Doe today    Initial labs notable for severe azotemia, high K, severe metabolic acidosis and Cr of 13    No blood in stool. Denies vomiting or diarrhea. Mild constipation. +generalized pain/HA's. Focused on pain. Feeling \"cold\" all the time. No dizzy or LH. No hiccups. +leg edema, +GAVIN. No chest pain. No cough or hemoptysis. PMH:    Past Medical History:   Diagnosis Date    Allergic rhinitis 11/19/2009    Anemia     CAD (coronary artery disease)     Chronic kidney disease     stage 4    Chronic obstructive pulmonary disease (HCC)     Congestive heart failure (HCC)     chronic left sided congestive heart failure    DM (diabetes mellitus) ([facility] [address] 11/19/2009    type 2    GERD (gastroesophageal reflux disease)     HTN (hypertension), benign 11/19/2009    Hypercholesteremia 11/19/2009    LBBB (left bundle branch block)     Obesity 11/19/2009    Pulmonary HTN ([facility] [address] 06/2020    mild     Tobacco abuse 11/19/2009       PSH:  Past Surgical History:   Procedure Laterality Date    CARDIAC SURG PROCEDURE UNLIST  06/11/2020    echo ef 25-30% down from 40% on 04/19    HX APPENDECTOMY  2004       Allergies   Allergen Reactions    Crestor [Rosuvastatin] Myalgia     PT DENIES     Prior to Admission Medications   Prescriptions Last Dose Informant Taking? Blood-Glucose Meter monitoring kit   No   Sig: Test blood sugar four times daily. Insulin Needles, Disposable, (BD INSULIN PEN NEEDLE UF SHORT) 31 X 5/16 \" Ndle   No   Sig: Use as directed with insulin pen   Lancets Misc   No   Sig: Test four times daily   allopurinoL (ZYLOPRIM) 100 mg tablet   No   Sig: Take 300 mg by mouth daily. aspirin delayed-release 81 mg tablet   No   Sig: Take 81 mg by mouth daily.    bumetanide (BUMEX) 2 mg tablet   No   Sig: Take 2 mg by mouth daily. carvediloL (COREG) 25 mg tablet   No   Sig: Take 25 mg by mouth two (2) times daily (with meals). dulaglutide (Trulicity) 6.74 IN/8.3 mL sub-q pen   No   Si.75 mg by SubCUTAneous route every seven (7) days. glucose blood VI test strips (ASCENSIA AUTODISC VI, ONE TOUCH ULTRA TEST VI) strip   No   Sig: Test four times daily   insulin glargine U-300 conc (Toujeo Max U-300 SoloStar) 300 unit/mL (3 mL) inpn   No   Si Units by SubCUTAneous route daily. iron, carbonyl (FEOSOL) 45 mg tab   No   Sig: Take 1 Tab by mouth daily. magnesium oxide (MAG-OX) 400 mg tablet   No   Sig: Take 400 mg by mouth daily. omeprazole (PRILOSEC) 40 mg capsule   No   Sig: Take 40 mg by mouth daily. predniSONE (STERAPRED DS) 10 mg dose pack   No   Sig: Take  by mouth See Admin Instructions. See administration instruction per 10mg dose pack   sacubitriL-valsartan (Entresto) 49-51 mg tab tablet   No   Sig: Take 1 Tab by mouth two (2) times a day.       Facility-Administered Medications: None       Social History     Tobacco Use    Smoking status: Current Every Day Smoker     Packs/day: 0.50     Years: 25.00     Pack years: 12.50     Types: Cigarettes    Smokeless tobacco: Former User     Quit date: 1995   Substance Use Topics    Alcohol use: Not Currently     Comment: pt quit  10/19    Drug use: Not Currently         Family history:  No family history of CKD or ESRD    Review of Systems: as noted in HPI; remainder 13 point ROS obtained and negative    Physical Exam:  Visit Vitals  BP (!) 156/80 (BP 1 Location: Left arm, BP Patient Position: At rest)   Pulse 85   Temp 97.8 °F (36.6 °C)   Resp 18   SpO2 98%       WB/WN obese, ill appearing in NAD  AT/NC, no icterus, mmm  Thick neck circumference; diff to assess JVD  B/l rhochi+, no distress  RRR, distant, unable to appreciate murmur or rub  Soft, obese, PD cath in place with exit site covered with drsg, NT, BS+  +peripheral edema  AOx3  No asterixis or myoclonus  Buttocks not examined  No agitation or hallucination    Labs/Data:   Lab Results   Component Value Date/Time    WBC 9.2 09/16/2020 02:08 PM    HGB 7.3 (L) 09/16/2020 02:08 PM    Hematocrit (POC) 24 (L) 09/01/2020 07:11 AM    HCT 23.4 (L) 09/16/2020 02:08 PM    PLATELET 404 (L) 58/09/8947 02:08 PM    MCV 99.2 (H) 09/16/2020 02:08 PM       Lab Results   Component Value Date/Time    Sodium 136 09/16/2020 02:08 PM    Potassium 5.4 (H) 09/16/2020 02:08 PM    Chloride 108 09/16/2020 02:08 PM    CO2 13 (LL) 09/16/2020 02:08 PM    Anion gap 15 09/16/2020 02:08 PM    Glucose 134 (H) 09/16/2020 02:08 PM     (H) 09/16/2020 02:08 PM    Creatinine 13.20 (H) 09/16/2020 02:08 PM    BUN/Creatinine ratio 13 09/16/2020 02:08 PM    GFR est AA 5 (L) 09/16/2020 02:08 PM    GFR est non-AA 4 (L) 09/16/2020 02:08 PM    Calcium 6.5 (L) 09/16/2020 02:08 PM       No intake or output data in the 24 hours ending 09/16/20 1613    Wt Readings from Last 3 Encounters:   09/01/20 (!) 161.7 kg (356 lb 7.7 oz)   08/19/20 (!) 162.8 kg (358 lb 12.8 oz)   03/04/13 144 kg (317 lb 8 oz)       Renal US: NA  UA: none this admit. Has had Nephrotic range proteinuria in past    Patient seen and examined. Chart reviewed. Labs, data and other pertinent notes reviewed from admit    Discussed with pt/GF/Dr. Saeid Winchester and Dr. Dustin Berman; Justina Obando [phone] notified; HD orders placed.  Danny order placed for IR    Signed by:  Herve Saravia MD  Nephrology and HTN

## 2020-09-16 NOTE — ED PROVIDER NOTES
53 yr old w CAD, DM, and ESRD w recent placement of peritoneal dialysis catheter presents with weakness since his surgery. Unable to manage ADL's and has not been able to attend dialysis teaching. No fevers or chills, no SOB. Pt has also had a rash to his groin that his girlfriend described as \"blisters\" and fluid on his leg. The history is provided by the patient.         Past Medical History:   Diagnosis Date    Allergic rhinitis 11/19/2009    Anemia     CAD (coronary artery disease)     Chronic kidney disease     stage 4    Chronic obstructive pulmonary disease (HCC)     Congestive heart failure (HCC)     chronic left sided congestive heart failure    DM (diabetes mellitus) ([facility]) 11/19/2009    type 2    GERD (gastroesophageal reflux disease)     HTN (hypertension), benign 11/19/2009    Hypercholesteremia 11/19/2009    LBBB (left bundle branch block)     Obesity 11/19/2009    Pulmonary HTN ([facility]) 06/2020    mild     Tobacco abuse 11/19/2009       Past Surgical History:   Procedure Laterality Date    CARDIAC SURG PROCEDURE UNLIST  06/11/2020    echo ef 25-30% down from 40% on 04/19    HX APPENDECTOMY  2004         Family History:   Problem Relation Age of Onset    Hypertension Father     Heart Disease Father     Hypertension Mother        Social History     Socioeconomic History    Marital status: SINGLE     Spouse name: Not on file    Number of children: Not on file    Years of education: Not on file    Highest education level: Not on file   Occupational History    Not on file   Social Needs    Financial resource strain: Not on file    Food insecurity     Worry: Not on file     Inability: Not on file    Transportation needs     Medical: Not on file     Non-medical: Not on file   Tobacco Use    Smoking status: Current Every Day Smoker     Packs/day: 0.50     Years: 25.00     Pack years: 12.50     Types: Cigarettes    Smokeless tobacco: Former User     Quit date: 8/25/1995 Substance and Sexual Activity    Alcohol use: Not Currently     Comment: pt quit  10/19    Drug use: Not Currently    Sexual activity: Not on file   Lifestyle    Physical activity     Days per week: Not on file     Minutes per session: Not on file    Stress: Not on file   Relationships    Social connections     Talks on phone: Not on file     Gets together: Not on file     Attends Sabianism service: Not on file     Active member of club or organization: Not on file     Attends meetings of clubs or organizations: Not on file     Relationship status: Not on file    Intimate partner violence     Fear of current or ex partner: Not on file     Emotionally abused: Not on file     Physically abused: Not on file     Forced sexual activity: Not on file   Other Topics Concern     Service Not Asked    Blood Transfusions Not Asked    Caffeine Concern Not Asked    Occupational Exposure Not Asked   Verle Haggis Hazards Not Asked    Sleep Concern Not Asked    Stress Concern Not Asked    Weight Concern Not Asked    Special Diet Not Asked    Back Care Not Asked    Exercise Not Asked    Bike Helmet Not Asked   [address] Not Asked    Self-Exams Not Asked   Social History Narrative    Not on file         ALLERGIES: Amlodipine; Crestor [rosuvastatin]; Lotensin hct [benazepril-hydrochlorothiazide]; and Prednisone    Review of Systems   Constitutional: Positive for fatigue. Negative for chills and fever. Respiratory: Negative for shortness of breath. Cardiovascular: Negative for chest pain. Gastrointestinal: Negative for abdominal pain, constipation, diarrhea and vomiting. Skin: Positive for rash and wound. Neurological: Negative for dizziness and light-headedness. All other systems reviewed and are negative. Vitals:    09/16/20 1402   BP: (!) 156/80   Pulse: 85   Resp: 18   Temp: 97.8 °F (36.6 °C)   SpO2: 98%            Physical Exam  Vitals signs and nursing note reviewed.    Constitutional: Appearance: He is well-developed. HENT:      Head: Normocephalic and atraumatic. Eyes:      General: No scleral icterus. Neck:      Musculoskeletal: Normal range of motion. Cardiovascular:      Rate and Rhythm: Normal rate. Pulmonary:      Effort: Pulmonary effort is normal.   Abdominal:      General: There is no distension. Musculoskeletal:      Right lower leg: Edema present. Left lower leg: Edema present. Skin:     General: Skin is warm and dry. Findings: Rash present. No erythema. Comments: Candidal rash at groin, chronic venous stasis skin changes to bilateral lower extremities, incisions clean, dry, and intact   Neurological:      General: No focal deficit present. Mental Status: He is alert and oriented to person, place, and time. MDM       Procedures      Patient is being admitted to the hospital.  The results of their tests and reasons for their admission have been discussed with them and/or available family. They convey agreement and understanding for the need to be admitted and for their admission diagnosis. Consultation will be made now with the inpatient physician for hospitalization. Perfect Serve Consult for Admission  3:06 PM    ED Room Number: ER08/08  Patient Name and age:  Leon Kc 53 y.o.  male  Working Diagnosis:   1. Weakness    2.  Stage 5 chronic kidney disease on chronic dialysis ([facility] [address]        COVID-19 Suspicion:  no  Sepsis present:  no  Reassessment needed: no  Code Status:  Full Code  Readmission: no  Isolation Requirements:  no  Recommended Level of Care:  telemetry  Department:[facility] Adult ED - 21   Other:  Needs dialysis, consult to Dr. Mary Jane Lopez pending

## 2020-09-16 NOTE — ED TRIAGE NOTES
TRIAGE NOTE:  Patient arrives with by EMS from home with c/o increasing weakness x 2 weeks after having insertion of peritoneal dialysis catheter placed at [facility] on 9/1. Patient reports hasn't been eating right.

## 2020-09-16 NOTE — ED NOTES
Spoke with NP regarding pt dystolic bp being low, taking bp in multiple locations with different size cuffs and dyastolic was still reading low. First attempts at manual, RN unable to hear dyastolic pressure. ED doc to bedside to perform bedside echo. New PIV placed and albumin hung and running. BP stable at this time.  reassessed manual pressure and found to be 147/67

## 2020-09-17 ENCOUNTER — APPOINTMENT (OUTPATIENT)
Dept: NON INVASIVE DIAGNOSTICS | Age: 50
DRG: 673 | End: 2020-09-17
Attending: NURSE PRACTITIONER
Payer: COMMERCIAL

## 2020-09-17 LAB
ALBUMIN SERPL-MCNC: 2.9 G/DL (ref 3.5–5)
ALBUMIN/GLOB SERPL: 0.8 {RATIO} (ref 1.1–2.2)
ALP SERPL-CCNC: 204 U/L (ref 45–117)
ALT SERPL-CCNC: 15 U/L (ref 12–78)
ANION GAP SERPL CALC-SCNC: 17 MMOL/L (ref 5–15)
APPEARANCE UR: CLEAR
AST SERPL-CCNC: 10 U/L (ref 15–37)
ATRIAL RATE: 82 BPM
BACTERIA URNS QL MICRO: NEGATIVE /HPF
BASOPHILS # BLD: 0 K/UL (ref 0–0.1)
BASOPHILS NFR BLD: 0 % (ref 0–1)
BILIRUB SERPL-MCNC: 0.8 MG/DL (ref 0.2–1)
BILIRUB UR QL: NEGATIVE
BUN SERPL-MCNC: 169 MG/DL (ref 6–20)
BUN/CREAT SERPL: 14 (ref 12–20)
CALCIUM SERPL-MCNC: 6.4 MG/DL (ref 8.5–10.1)
CALCULATED P AXIS, ECG09: -3 DEGREES
CALCULATED R AXIS, ECG10: -4 DEGREES
CALCULATED T AXIS, ECG11: 113 DEGREES
CHLORIDE SERPL-SCNC: 106 MMOL/L (ref 97–108)
CO2 SERPL-SCNC: 14 MMOL/L (ref 21–32)
COLOR UR: ABNORMAL
CREAT SERPL-MCNC: 11.9 MG/DL (ref 0.7–1.3)
DIAGNOSIS, 93000: NORMAL
DIFFERENTIAL METHOD BLD: ABNORMAL
ECHO AO ROOT DIAM: 3.16 CM
ECHO AV AREA PEAK VELOCITY: 1.52 CM2
ECHO AV AREA VTI: 1.64 CM2
ECHO AV AREA/BSA PEAK VELOCITY: 0.5 CM2/M2
ECHO AV AREA/BSA VTI: 0.6 CM2/M2
ECHO AV MEAN GRADIENT: 19.78 MMHG
ECHO AV PEAK GRADIENT: 36.07 MMHG
ECHO AV PEAK VELOCITY: 300.27 CM/S
ECHO AV VTI: 59.64 CM
ECHO LV INTERNAL DIMENSION DIASTOLIC: 6.51 CM (ref 4.2–5.9)
ECHO LV INTERNAL DIMENSION SYSTOLIC: 3.82 CM
ECHO LV IVSD: 0.89 CM (ref 0.6–1)
ECHO LV MASS 2D: 285.6 G (ref 88–224)
ECHO LV MASS INDEX 2D: 101.6 G/M2 (ref 49–115)
ECHO LV POSTERIOR WALL DIASTOLIC: 1.12 CM (ref 0.6–1)
ECHO LVOT DIAM: 2.2 CM
ECHO LVOT PEAK GRADIENT: 5.78 MMHG
ECHO LVOT PEAK VELOCITY: 120.17 CM/S
ECHO LVOT SV: 97.5 ML
ECHO LVOT VTI: 25.67 CM
ECHO MV A VELOCITY: 109.25 CM/S
ECHO MV AREA PHT: 3.5 CM2
ECHO MV E DECELERATION TIME (DT): 0.22 S
ECHO MV E VELOCITY: 109.25 CM/S
ECHO MV E/A RATIO: 1
ECHO MV PRESSURE HALF TIME (PHT): 0.06 S
ECHO PV PEAK INSTANTANEOUS GRADIENT SYSTOLIC: 10.06 MMHG
ECHO TV REGURGITANT MAX VELOCITY: 323.52 CM/S
ECHO TV REGURGITANT PEAK GRADIENT: 41.13 MMHG
ECHO TV REGURGITANT PEAK GRADIENT: 41.87 MMHG
ECHO TV REGURGITANT PEAK GRADIENT: 41.87 MMHG
EOSINOPHIL # BLD: 0.2 K/UL (ref 0–0.4)
EOSINOPHIL NFR BLD: 4 % (ref 0–7)
EPITH CASTS URNS QL MICRO: ABNORMAL /LPF
ERYTHROCYTE [DISTWIDTH] IN BLOOD BY AUTOMATED COUNT: 16.3 % (ref 11.5–14.5)
FERRITIN SERPL-MCNC: 264 NG/ML (ref 26–388)
GLOBULIN SER CALC-MCNC: 3.8 G/DL (ref 2–4)
GLUCOSE BLD STRIP.AUTO-MCNC: 101 MG/DL (ref 65–100)
GLUCOSE BLD STRIP.AUTO-MCNC: 126 MG/DL (ref 65–100)
GLUCOSE BLD STRIP.AUTO-MCNC: 134 MG/DL (ref 65–100)
GLUCOSE BLD STRIP.AUTO-MCNC: 82 MG/DL (ref 65–100)
GLUCOSE BLD STRIP.AUTO-MCNC: 83 MG/DL (ref 65–100)
GLUCOSE SERPL-MCNC: 87 MG/DL (ref 65–100)
GLUCOSE UR STRIP.AUTO-MCNC: NEGATIVE MG/DL
HCT VFR BLD AUTO: 20.6 % (ref 36.6–50.3)
HGB BLD-MCNC: 6.4 G/DL (ref 12.1–17)
HGB UR QL STRIP: NEGATIVE
HISTORY CHECKED?,CKHIST: NORMAL
HYALINE CASTS URNS QL MICRO: ABNORMAL /LPF (ref 0–5)
IMM GRANULOCYTES # BLD AUTO: 0.1 K/UL (ref 0–0.04)
IMM GRANULOCYTES NFR BLD AUTO: 2 % (ref 0–0.5)
IRON SATN MFR SERPL: 30 % (ref 20–50)
IRON SERPL-MCNC: 45 UG/DL (ref 35–150)
KETONES UR QL STRIP.AUTO: NEGATIVE MG/DL
LEUKOCYTE ESTERASE UR QL STRIP.AUTO: NEGATIVE
LYMPHOCYTES # BLD: 0.5 K/UL (ref 0.8–3.5)
LYMPHOCYTES NFR BLD: 8 % (ref 12–49)
MCH RBC QN AUTO: 30.5 PG (ref 26–34)
MCHC RBC AUTO-ENTMCNC: 31.1 G/DL (ref 30–36.5)
MCV RBC AUTO: 98.1 FL (ref 80–99)
MONOCYTES # BLD: 0.4 K/UL (ref 0–1)
MONOCYTES NFR BLD: 6 % (ref 5–13)
NEUTS SEG # BLD: 4.8 K/UL (ref 1.8–8)
NEUTS SEG NFR BLD: 80 % (ref 32–75)
NITRITE UR QL STRIP.AUTO: NEGATIVE
NRBC # BLD: 0 K/UL (ref 0–0.01)
NRBC BLD-RTO: 0 PER 100 WBC
P-R INTERVAL, ECG05: 226 MS
PH UR STRIP: 5 [PH] (ref 5–8)
PLATELET # BLD AUTO: 104 K/UL (ref 150–400)
PMV BLD AUTO: 9.5 FL (ref 8.9–12.9)
POTASSIUM SERPL-SCNC: 5.1 MMOL/L (ref 3.5–5.1)
PROCALCITONIN SERPL-MCNC: 0.36 NG/ML
PROT SERPL-MCNC: 6.7 G/DL (ref 6.4–8.2)
PROT UR STRIP-MCNC: 100 MG/DL
Q-T INTERVAL, ECG07: 450 MS
QRS DURATION, ECG06: 174 MS
QTC CALCULATION (BEZET), ECG08: 525 MS
RBC # BLD AUTO: 2.1 M/UL (ref 4.1–5.7)
RBC #/AREA URNS HPF: ABNORMAL /HPF (ref 0–5)
RBC MORPH BLD: ABNORMAL
SERVICE CMNT-IMP: ABNORMAL
SERVICE CMNT-IMP: NORMAL
SERVICE CMNT-IMP: NORMAL
SODIUM SERPL-SCNC: 137 MMOL/L (ref 136–145)
SP GR UR REFRACTOMETRY: 1.01 (ref 1–1.03)
TIBC SERPL-MCNC: 152 UG/DL (ref 250–450)
UROBILINOGEN UR QL STRIP.AUTO: 0.2 EU/DL (ref 0.2–1)
VENTRICULAR RATE, ECG03: 82 BPM
WBC # BLD AUTO: 6 K/UL (ref 4.1–11.1)
WBC URNS QL MICRO: ABNORMAL /HPF (ref 0–4)

## 2020-09-17 PROCEDURE — 74011636637 HC RX REV CODE- 636/637: Performed by: NURSE PRACTITIONER

## 2020-09-17 PROCEDURE — 82728 ASSAY OF FERRITIN: CPT

## 2020-09-17 PROCEDURE — 93306 TTE W/DOPPLER COMPLETE: CPT | Performed by: INTERNAL MEDICINE

## 2020-09-17 PROCEDURE — P9047 ALBUMIN (HUMAN), 25%, 50ML: HCPCS | Performed by: NURSE PRACTITIONER

## 2020-09-17 PROCEDURE — P9016 RBC LEUKOCYTES REDUCED: HCPCS

## 2020-09-17 PROCEDURE — 87040 BLOOD CULTURE FOR BACTERIA: CPT

## 2020-09-17 PROCEDURE — 74011250637 HC RX REV CODE- 250/637: Performed by: NURSE PRACTITIONER

## 2020-09-17 PROCEDURE — 74011250636 HC RX REV CODE- 250/636: Performed by: INTERNAL MEDICINE

## 2020-09-17 PROCEDURE — 90935 HEMODIALYSIS ONE EVALUATION: CPT

## 2020-09-17 PROCEDURE — 84145 PROCALCITONIN (PCT): CPT

## 2020-09-17 PROCEDURE — 87086 URINE CULTURE/COLONY COUNT: CPT

## 2020-09-17 PROCEDURE — 74011250636 HC RX REV CODE- 250/636: Performed by: NURSE PRACTITIONER

## 2020-09-17 PROCEDURE — 85025 COMPLETE CBC W/AUTO DIFF WBC: CPT

## 2020-09-17 PROCEDURE — 81001 URINALYSIS AUTO W/SCOPE: CPT

## 2020-09-17 PROCEDURE — 99254 IP/OBS CNSLTJ NEW/EST MOD 60: CPT | Performed by: INTERNAL MEDICINE

## 2020-09-17 PROCEDURE — 83540 ASSAY OF IRON: CPT

## 2020-09-17 PROCEDURE — 65660000000 HC RM CCU STEPDOWN

## 2020-09-17 PROCEDURE — 36415 COLL VENOUS BLD VENIPUNCTURE: CPT

## 2020-09-17 PROCEDURE — 74011000258 HC RX REV CODE- 258: Performed by: INTERNAL MEDICINE

## 2020-09-17 PROCEDURE — 80053 COMPREHEN METABOLIC PANEL: CPT

## 2020-09-17 PROCEDURE — 74011000258 HC RX REV CODE- 258: Performed by: NURSE PRACTITIONER

## 2020-09-17 PROCEDURE — C8929 TTE W OR WO FOL WCON,DOPPLER: HCPCS

## 2020-09-17 PROCEDURE — 36430 TRANSFUSION BLD/BLD COMPNT: CPT

## 2020-09-17 PROCEDURE — 74011250637 HC RX REV CODE- 250/637: Performed by: INTERNAL MEDICINE

## 2020-09-17 PROCEDURE — 82962 GLUCOSE BLOOD TEST: CPT

## 2020-09-17 RX ORDER — HEPARIN SODIUM 1000 [USP'U]/ML
2500 INJECTION, SOLUTION INTRAVENOUS; SUBCUTANEOUS
Status: DISCONTINUED | OUTPATIENT
Start: 2020-09-17 | End: 2020-09-23 | Stop reason: HOSPADM

## 2020-09-17 RX ORDER — OXYCODONE HYDROCHLORIDE 5 MG/1
5 TABLET ORAL
Status: DISCONTINUED | OUTPATIENT
Start: 2020-09-17 | End: 2020-09-23 | Stop reason: HOSPADM

## 2020-09-17 RX ORDER — CARVEDILOL 12.5 MG/1
12.5 TABLET ORAL 2 TIMES DAILY WITH MEALS
Status: DISCONTINUED | OUTPATIENT
Start: 2020-09-17 | End: 2020-09-23 | Stop reason: HOSPADM

## 2020-09-17 RX ORDER — ALBUMIN HUMAN 250 G/1000ML
12.5 SOLUTION INTRAVENOUS
Status: DISCONTINUED | OUTPATIENT
Start: 2020-09-17 | End: 2020-09-23 | Stop reason: HOSPADM

## 2020-09-17 RX ORDER — SODIUM CHLORIDE 9 MG/ML
250 INJECTION, SOLUTION INTRAVENOUS AS NEEDED
Status: DISCONTINUED | OUTPATIENT
Start: 2020-09-17 | End: 2020-09-23 | Stop reason: HOSPADM

## 2020-09-17 RX ORDER — VANCOMYCIN 2 GRAM/500 ML IN 0.9 % SODIUM CHLORIDE INTRAVENOUS
2000 ONCE
Status: COMPLETED | OUTPATIENT
Start: 2020-09-17 | End: 2020-09-17

## 2020-09-17 RX ADMIN — ASPIRIN 81 MG: 81 TABLET, COATED ORAL at 10:40

## 2020-09-17 RX ADMIN — INSULIN GLARGINE 10 UNITS: 100 INJECTION, SOLUTION SUBCUTANEOUS at 21:56

## 2020-09-17 RX ADMIN — CALCIUM GLUCONATE 2 G: 98 INJECTION, SOLUTION INTRAVENOUS at 10:01

## 2020-09-17 RX ADMIN — CARVEDILOL 25 MG: 12.5 TABLET, FILM COATED ORAL at 10:40

## 2020-09-17 RX ADMIN — SACUBITRIL AND VALSARTAN 1 TABLET: 49; 51 TABLET, FILM COATED ORAL at 18:14

## 2020-09-17 RX ADMIN — Medication 10 ML: at 14:00

## 2020-09-17 RX ADMIN — ALBUMIN (HUMAN) 25 G: 0.25 INJECTION, SOLUTION INTRAVENOUS at 00:39

## 2020-09-17 RX ADMIN — Medication 10 ML: at 21:56

## 2020-09-17 RX ADMIN — ALBUMIN (HUMAN) 25 G: 0.25 INJECTION, SOLUTION INTRAVENOUS at 05:48

## 2020-09-17 RX ADMIN — OXYCODONE 5 MG: 5 TABLET ORAL at 21:56

## 2020-09-17 RX ADMIN — SACUBITRIL AND VALSARTAN 1 TABLET: 49; 51 TABLET, FILM COATED ORAL at 10:00

## 2020-09-17 RX ADMIN — OXYCODONE 5 MG: 5 TABLET ORAL at 10:11

## 2020-09-17 RX ADMIN — MAGNESIUM OXIDE 400 MG: 400 TABLET ORAL at 10:40

## 2020-09-17 RX ADMIN — OXYCODONE 5 MG: 5 TABLET ORAL at 13:28

## 2020-09-17 RX ADMIN — PIPERACILLIN AND TAZOBACTAM 3.38 G: 3; .375 INJECTION, POWDER, LYOPHILIZED, FOR SOLUTION INTRAVENOUS at 03:14

## 2020-09-17 RX ADMIN — VANCOMYCIN HYDROCHLORIDE 2000 MG: 10 INJECTION, POWDER, LYOPHILIZED, FOR SOLUTION INTRAVENOUS at 03:17

## 2020-09-17 RX ADMIN — ALLOPURINOL 300 MG: 300 TABLET ORAL at 13:27

## 2020-09-17 RX ADMIN — EPOETIN ALFA-EPBX 10000 UNITS: 10000 INJECTION, SOLUTION INTRAVENOUS; SUBCUTANEOUS at 21:56

## 2020-09-17 RX ADMIN — PERFLUTREN 2 ML: 6.52 INJECTION, SUSPENSION INTRAVENOUS at 11:36

## 2020-09-17 RX ADMIN — HEPARIN SODIUM 2500 UNITS: 1000 INJECTION INTRAVENOUS; SUBCUTANEOUS at 02:52

## 2020-09-17 RX ADMIN — PIPERACILLIN AND TAZOBACTAM 3.38 G: 3; .375 INJECTION, POWDER, LYOPHILIZED, FOR SOLUTION INTRAVENOUS at 15:40

## 2020-09-17 NOTE — PROGRESS NOTES
Day #2 of Vancomycin  Indication:  ?soft pressures  -recent PD catheter placement  -anemia with hgb down to ~6, receiving blood and to be started on MARINO per nephrology  -s/p albumin 75g  Current regimen:  vanc dosing post-HD  Abx regimen:  vanc/pip-tazo  ID Following ?: NO  Inpatient dialysis schedule:  TBD  - daily for now    Recent Labs     20  0133 20  1408 20  1402   WBC 6.0 9.2  --    CREA 11.90* 13.20* 13.20*   * 176* 184*     Est CrCl: ESRD on HD  Temp (24hrs), Av.5 °F (36.9 °C), Min:98.2 °F (36.8 °C), Max:98.7 °F (37.1 °C)    Cultures:    urine: in process; of note, UA (-)ve for pyuria and bacteria   blood: in process; prelim      Goal trough = 20 - 25 mcg/mL for therapeutic goal of 15 - 20 mcg/mL (assuming ~35% removal by dialysis)    Date                Dialysis (Yes/No)       Pre-HD Level              Dose    Y   N/a   2g load post-HD    Reminder staff message entered (if needed): N/A    Plan: Continue current regimen. Procalcitonin 0.36 even in advanced renal failure. No leukocytosis or fever though some neutrophilia. No identified source of infection. Will monitor.      Vancomycin Process in Hemodialysis    Jordan Vogt

## 2020-09-17 NOTE — PROGRESS NOTES
LENI Plan:  · Anticipated discharge: TBD; Disposition pending medical progress and care recommendations. Reason for Admission:  Acute on Chronic Renal Failure                   RUR Score: 23%- MOD                 PCP: First and Last name: Cisco Steiner PA-C   Name of Practice: [facility]   Are you a current patient: Yes/No: Yes   Approximate date of last visit: 06/12/2020   Can you participate in a virtual visit if needed: Yes    Do you (patient/family) have any concerns for transition/discharge? Pt reports no concerns for discharge at this time. Plan for utilizing home health: TBD      Transition of Care Plan: CM met with pt at bedside to discuss CM role and to assess pt needs. CM verified demographics including insurance and emergency contact information. Pt lives with girlfriend, Faviola Walker (ph#: [phone]), in a private residence; there are 3-4 steps to enter home. Transportation: Girlfriend to provide transportation home after discharge. Pt reports no DME use and IADLs prior to admission. Pt reports he was scheduled to begin home PD on Wednesday but did not; pt unsure as to why PD did not begin. Pt uses [facility] [place] [facility] [address] and reports no concerns with getting medications. PCP verified. Pt reports no concerns for discharge at this time. CM to follow and assist with disposition needs as they arise. Care Management Interventions  PCP Verified by CM: Yes  Last Visit to PCP: 06/12/20  Palliative Care Criteria Met (RRAT>21 & CHF Dx)?: Yes  Palliative Consult Recommended?: (FYI placed on chart for MD)  Mode of Transport at Discharge: Other (see comment)(Joan Gomez)  Transition of Care Consult (CM Consult): Discharge Planning(Initial Asessment)  MyChart Signup: No  Discharge Durable Medical Equipment: No  Physical Therapy Consult: No  Occupational Therapy Consult: No  Speech Therapy Consult: No  Current Support Network:  Other, Own Home  Confirm Follow Up Transport: Friends  Discharge Location  Discharge Placement: Home(Disposition TBD/subject to change pending care recommendations)      Susi Ayala RN, BSN  Care Management Department

## 2020-09-17 NOTE — ED NOTES
Spoke with Claudia Payan from [place] [id number] who stated she would contact Dr. Dallin Corado but that she didn't think he would be receiving dialysis tonight

## 2020-09-17 NOTE — ACP (ADVANCE CARE PLANNING)
Current Advanced Directive/Advance Care Plan:      Pt does not have an AMD. Pt would not like further information at this time.      Code Status: FULL    Patti Mccullough RN, BSN  Care Management Department

## 2020-09-17 NOTE — PROGRESS NOTES
Orders received and chart reviewed. Patient received L antecubital fossa blood being transfused. So unable to perform ADLs at this time, patient at high risk flexing elbow due to girth, bed, and room. Session aborted. Patient stating independent with all ADLs yesterday, weak today and agreeable to mobilize s/p blood. Will f/u if still indicated s/p blood, after nurse mobilizes and PT. Recommend with nursing patient to complete as able in order to maintain strength, endurance and independence: ADLs with supervision/setup, once Egress Test completed OOB to chair 3x/day and mobilizing to the bathroom for toileting with assist. Thank you for your assistance.

## 2020-09-17 NOTE — CONSULTS
Cardiology Consult Note      Patient Name: Ashlyn Rogers  : 1970 MRN: [id number]  Date: 2020  Time: 10:42 AM    Admit Diagnosis: Acute on chronic renal failure ([facility] [address] [N17.9, N18.9]    Primary Cardiologist: Dr. Orlando Arguello MD ([facility])   Consulting Cardiologist: Pretty Duarte. Izaiah Rod M.D.   Duong Ye for Consult: CHF, abnormal EKG    Requesting MD: Prerna Cr NP    HPI:  Ashlyn Rogers is a 53 y.o. male admitted on 2020  for Acute on chronic renal failure ([facility] [address] [address] [N17.9, N18.9]. Has PMH of Systolic CHF (EF 47-88% by echo 20), CKD recently started on peritoneal dialysis, HTN, HLD, HLD, LBBB, DM, pulmonary HTN, COPD, obesity, tobacco abuse, GERD. Reports that he was diagnosed with systolic CHF 2-3 years ago and had [facility] around that time which he says was \"clean as a whistle. \"     Presents to ER yesterday with chief c/o 2 week history of increased generalized weakness. States he felt weak and fell yesterday and couldn't get up so called EMS. Denies having any dizziness or lightheadedness. Reports he has chronic SOB although not on home O2. Yesterday SOB was a little worse. He has had poor appetite. He had however been drinking fluids. Believes he has lost weight but does not weigh daily. He was found to have acute on chronic renal failure and has been started on HD (500 mls removed yesterday). He is also noted to be severely anemic with Hgb 6.4. and PRBC transfusion administered. Denies blood in stool or black tarry stools. No history of chest pain. O2 sats dropped yesterday to 88% on RA but recovered with O2 NC 3L. Reports history of intermittent wheezing. Troponin is negative. SH: former heavy ETOH use. Quit in 2019. Smoker since age 13, 1 ppd. FH: Father had CAD in his 52's. Subjective:  Currently denies chest pain and SOB (is laying nearly flat). Denies dizziness.  Reports he is uncomfortable in the stretcher. He did not sleep well last night. Assessment and Plan     1. Cardiomyopathy/Systolic CHF, chronic   -EF 25-30% 6/2020   -Repeat echo was ordered by primary team and is in process per chart   -Suspect CM is Nonischemic given pt reported hx of normal cath 2-3 years ago. -Troponin negative.   -Continue Coreg and Entresto   -No overt pulmonary edema on CXR or CT chest.   -Diuretics  held (on bumex PTA)- defer diuretic management to renal   -On appropriate GDMT. 2. LBBB:     -NSR with 1st degree AV block and LBBB noted on current EKG. -Pt has history of LBBB- noted on prior EKG from 6/2020 from his cardiologists office.   -Reports hx of NL cath 2-3 years ago.    -no further cardiac testing. 3. BLE edema:     -US BLE no DVT but incomplete imaging of the mid femoral vein bilaterally, isolated vein thrombus cannot be excluded   -suspect worsened renal dysfunction contributing     4. Hx of HTN   -BP elevated today   -continue current meds. HD per renal    5. Anemia, chronic disease   -Hgb 6.4   -Transfused PRBC x 1 unit. -Management per primary team.   -suspect contributed to SOB     6. Hx of pulmonary HTN, mild:     -PAS 40 mmHg on Echo 6/5/20    Other comorbids  7. COPD  8. DM   9. Tobacco abuse  10. Morbid obesity:Body mass index is 44.75 kg/m². 53 y.o. male with PMH of systolic CHF (EF 05-34% by echo 6/2020), LBBB, chronic renal failure on PD presents with 2-3 week history of worsened weakness. Found to have acute on chronic renal failure and severe anemia. No pulmonary edema on CT chest and suspect that his SOB prior to admission as well as current BLE edema are more related to severe anemia and renal failure requiring PRBC transfusion and initiation of HD. Do not suspect CHF exacerbation. EKG reviewed- pt has history of LBBB. Will follow up echo ordered by primary team but no further cardiac testing needed. He is on appropriate GDMT.     Saw and evaluated pt and agree with above assessment and plan. Pt with h/o non ischemic cardiomyopathy (no CAD) in past. Echo today EF has normalized EF 55-60%. No obvious cardiac contribution to current presentation. Address anemia, renal failure, etc as is being done. No additional cardiac evaluation indicated at this time and will sign off. Outpt cardiac follow up with primary cardiologist Dr. Hunter Kim. Heather Sahu MD      Cardiac testing history:  Echo 6/5/20: EF 25-30%, severe LA enlargement, mod RA enlargement, Mildly dilated RV with low NL systolic function, Mild MAC, Trace MR, Tr TR, calcified aortic valve without stenosis, PAS 40 mmHg. Echo 4/2019: EF 40%  Review of Symptoms:  Constitutional: No fevers or chills. +decreased appetite  HEET: No trauma. No visual changes. No hearing loss. No sore throat. Neck: No adenopathy or swelling. Heart: No chest pain or palpitations. Lungs: }+SOB prior to arrival. No SOB now. No cough. Abdomen: No pain. No nausea of vomiting. No BRBPR or melena. No diarrhea. Urology: No dysuria or hematuria. Ext: +BLE edema  Skin: No acute rashes or ulcers. Endocrine: No heat or cold intolerance. Neuro: No transient neurologic deficit    Previous treatment/evaluation includes echocardiogram and cardiac catheterization . Cardiac risk factors: smoking/ tobacco exposure, family history, diabetes mellitus, obesity, sedentary life style, male gender, hypertension.     Past Medical History:   Diagnosis Date    Allergic rhinitis 11/19/2009    Anemia     CAD (coronary artery disease)     Chronic kidney disease     stage 4    Chronic obstructive pulmonary disease (HCC)     Congestive heart failure (HCC)     chronic left sided congestive heart failure    DM (diabetes mellitus) ([facility] 11/19/2009    type 2    GERD (gastroesophageal reflux disease)     HTN (hypertension), benign 11/19/2009    Hypercholesteremia 11/19/2009    LBBB (left bundle branch block)     Obesity 11/19/2009    Pulmonary HTN ([facility] 06/2020    mild     Tobacco abuse 11/19/2009    Weakness generalized      Past Surgical History:   Procedure Laterality Date    CARDIAC SURG PROCEDURE UNLIST  06/11/2020    echo ef 25-30% down from 40% on 04/19    HX APPENDECTOMY  2004    IR INSERT NON TUNL CVC LESS THAN 5 YR  9/16/2020     Current Facility-Administered Medications   Medication Dose Route Frequency    piperacillin-tazobactam (ZOSYN) 3.375 g in 0.9% sodium chloride (MBP/ADV) 100 mL  3.375 g IntraVENous Q12H    Vancomycin Pharmacy Dosing-dialysis pt   Other Rx Dosing/Monitoring    heparin (porcine) 1,000 unit/mL injection 2,500 Units  2,500 Units Hemodialysis DIALYSIS PRN    0.9% sodium chloride infusion 250 mL  250 mL IntraVENous PRN    calcium gluconate 2 g in 0.9% sodium chloride 100 mL IVPB  2 g IntraVENous ONCE    oxyCODONE IR (ROXICODONE) tablet 5 mg  5 mg Oral Q4H PRN    sodium chloride (NS) flush 5-40 mL  5-40 mL IntraVENous Q8H    sodium chloride (NS) flush 5-40 mL  5-40 mL IntraVENous PRN    acetaminophen (TYLENOL) tablet 650 mg  650 mg Oral Q6H PRN    Or    acetaminophen (TYLENOL) suppository 650 mg  650 mg Rectal Q6H PRN    polyethylene glycol (MIRALAX) packet 17 g  17 g Oral DAILY PRN    promethazine (PHENERGAN) tablet 12.5 mg  12.5 mg Oral Q6H PRN    Or    ondansetron (ZOFRAN) injection 4 mg  4 mg IntraVENous Q6H PRN    glucose chewable tablet 16 g  4 Tab Oral PRN    glucagon (GLUCAGEN) injection 1 mg  1 mg IntraMUSCular PRN    dextrose 10% infusion 0-250 mL  0-250 mL IntraVENous PRN    insulin glargine (LANTUS) injection 10 Units  10 Units SubCUTAneous QHS    insulin lispro (HUMALOG) injection   SubCUTAneous AC&HS    allopurinoL (ZYLOPRIM) tablet 300 mg  300 mg Oral DAILY    aspirin delayed-release tablet 81 mg  81 mg Oral DAILY    carvediloL (COREG) tablet 25 mg  25 mg Oral BID WITH MEALS    . PHARMACY TO SUBSTITUTE PER PROTOCOL (Reordered from: iron, carbonyl (FEOSOL) 45 mg tab)    Per Protocol    magnesium oxide (MAG-OX) tablet 400 mg  400 mg Oral DAILY    sacubitriL-valsartan (ENTRESTO) 49-51 mg tablet 1 Tab  1 Tab Oral BID    nicotine (NICODERM CQ) 14 mg/24 hr patch 1 Patch  1 Patch TransDERmal Q24H    albuterol-ipratropium (DUO-NEB) 2.5 MG-0.5 MG/3 ML  3 mL Nebulization Q4H PRN     Current Outpatient Medications   Medication Sig    predniSONE (STERAPRED DS) 10 mg dose pack Take  by mouth See Admin Instructions. See administration instruction per 10mg dose pack    iron, carbonyl (FEOSOL) 45 mg tab Take 1 Tab by mouth daily.  allopurinoL (ZYLOPRIM) 100 mg tablet Take 300 mg by mouth daily.  sacubitriL-valsartan (Entresto) 49-51 mg tab tablet Take 1 Tab by mouth two (2) times a day.  aspirin delayed-release 81 mg tablet Take 81 mg by mouth daily.  omeprazole (PRILOSEC) 40 mg capsule Take 40 mg by mouth daily.  carvediloL (COREG) 25 mg tablet Take 25 mg by mouth two (2) times daily (with meals).  insulin glargine U-300 conc (Toujeo Max U-300 SoloStar) 300 unit/mL (3 mL) inpn 40 Units by SubCUTAneous route daily.  dulaglutide (Trulicity) 2.98 XB/8.7 mL sub-q pen 0.75 mg by SubCUTAneous route every seven (7) days.  bumetanide (BUMEX) 2 mg tablet Take 2 mg by mouth daily.  magnesium oxide (MAG-OX) 400 mg tablet Take 400 mg by mouth daily.  Insulin Needles, Disposable, (BD INSULIN PEN NEEDLE UF SHORT) 31 X 5/16 \" Ndle Use as directed with insulin pen    Blood-Glucose Meter monitoring kit Test blood sugar four times daily.     glucose blood VI test strips (ASCENSIA AUTODISC VI, ONE TOUCH ULTRA TEST VI) strip Test four times daily    Lancets Misc Test four times daily     Facility-Administered Medications Ordered in Other Encounters   Medication Dose Route Frequency    heparin (porcine) pf 300 Units  300 Units InterCATHeter PRN    heparin (porcine) 1,000 unit/mL injection           No Known Allergies   Family History   Problem Relation Age of Onset    Hypertension Father    [employer] Father     Hypertension Mother       Social History     Socioeconomic History    Marital status: SINGLE     Spouse name: Not on file    Number of children: Not on file    Years of education: Not on file    Highest education level: Not on file   Tobacco Use    Smoking status: Current Every Day Smoker     Packs/day: 0.50     Years: 25.00     Pack years: 12.50     Types: Cigarettes    Smokeless tobacco: Former User     Quit date: 8/25/1995   Substance and Sexual Activity    Alcohol use: Not Currently     Comment: pt quit  10/19    Drug use: Never    Sexual activity: Yes     Partners: Female   Other Topics Concern       Objective:    Physical Exam    Vitals:   Vitals:    09/17/20 0945 09/17/20 1000 09/17/20 1015 09/17/20 1030   BP:  (!) 147/48 (!) 156/34    Pulse: 82 82 83 81   Resp: 17 19 20 18   Temp:    98.7 °F (37.1 °C)   SpO2: 96% 96% 95% 94%   Weight:       Height:           General:    Alert, cooperative, no distress, appears stated age. Neck:   Supple,    Back:     Not examined,    Lungs:     Wheezes noted bilaterally. No use of accessory muscles. Heart[de-identified]    Regular rate and rhythm, S1, S2 normal, grade II/VI systolic murmur best at LUSB/LLSB     Abdomen:     Soft, obese. Left sided PD catheter in place. +bowel sounds noted. Extremities:   Extremities 2-3+ BLE edema   Vascular:   Pulses - 2+   Skin:  Vascular skin changes BLE   Neurologic:   BONILLA,         Telemetry: NSR, BBB    ECG:   Media Information            Document Information       Data Review:     Radiology:  XR Results (most recent):  Results from Hospital Encounter encounter on 09/16/20   XR CHEST PORT    Narrative INDICATION: Line placement    EXAM: Portable chest 2312 hours . Comparison earlier same day. FINDINGS: A right neck central venous catheter has been placed. Tip overlies the  superior vena cava. Cardiac silhouette remains enlarged. Mild interstitial  prominence but no pneumothorax. No pleural effusion. Impression IMPRESSION:  1. No complicating features post line placement. CT Results (most recent):  Results from Hospital Encounter encounter on 09/16/20   CTA CHEST W OR W WO CONT    Narrative EXAM:  CTA CHEST W OR W WO CONT  INDICATION:  hypoxia, ? PE. Pt is having HD this evening  Additional history:  COMPARISON: None. Limitations: Evaluation for pulmonary embolus is markedly limited by bolus  timing. Respiratory motion. .  TECHNIQUE:   Precontrast  images were obtained to localize the volume for acquisition. Multislice helical CT arteriography was performed from the diaphragm to the  thoracic inlet during uneventful rapid bolus intravenous contrast  administration. Lung and soft tissue windows were generated. Coronal and  sagittal images were generated and 3D post processing consisting of coronal  maximum intensity images was performed. CT dose reduction was achieved through use of a standardized protocol tailored  for this examination and automatic exposure control for dose modulation. Manuel De Leon FINDINGS:  CHEST:  Chest wall/thoracic inlet: Left-sided gynecomastia. Thyroid: Within normal limits. Mediastinum/elizabeth: Patulous esophagus. There are prominent mediastinal lymph  nodes which may be reactive. Heart/vessels: There is no large pulmonary embolus visible. Calcifications about  the aortic valve. Calcifications in the coronary arteries. Cardiomegaly  Lungs/Pleura: Dependent atelectasis in the right upper lobe, right middle lobe  and right lower lobe. .  INCIDENTALLY IMAGED ABDOMEN:  Splenules below the left hemidiaphragm with what appears to be a posttraumatic  appearance of the partially imaged spleen   . MSK:   Obesity. .    Impression IMPRESSION: Limited exam.  1. No visualized large pulmonary embolus. 2. Dependent atelectasis in the right upper lobe, right middle lobe and right  lower lobe. Developing airspace disease cannot be entirely excluded.   3. Incidental findings as above Recent Labs     09/16/20  1402   TROIQ <0.05     Recent Labs     09/17/20  0133 09/16/20  1408 09/16/20  1402    136 136   K 5.1 5.4* 5.4*    108 107   CO2 14* 13* 11*   * 176* 184*   CREA 11.90* 13.20* 13.20*   GLU 87 134* 129*   PHOS  --   --  10.4*   CA 6.4* 6.5* 6.6*     Recent Labs     09/17/20  0133 09/16/20  1408   WBC 6.0 9.2   HGB 6.4* 7.3*   HCT 20.6* 23.4*   * 132*     Recent Labs     09/17/20 0133 09/16/20  1408   * 220*     No results for input(s): CHOL, LDLC in the last 72 hours. No lab exists for component: TGL, HDLC,  HBA1C  No results for input(s): CRP, TSH, TSHEXT in the last 72 hours. No lab exists for component: ESR    Thank you very much for this referral. I appreciate the opportunity to participate in this patient's care. I will follow along with above stated plan. Jodi Pinzon.  MAE Traylor         [facility] [address] [place] [address],[address] [place] [address]     [phone]    CC:Ethan Ayon [place], [place]

## 2020-09-17 NOTE — ED NOTES
Spoke with Dr. Gely Hays on call for nephrology. He is unsure as to why the pt did not go to IR for diaylsis catheter as of yet. This RN placed a verbal order for IR placement of non-tunneled catheter. Dr. Gely Hays is activating IR team at this time. Pt taken for CTA at this time.

## 2020-09-17 NOTE — PROGRESS NOTES
Patient has been having quite labile blood pressures, specifically with low diastolic pressures. Per nursing, even with manual blood pressure reading it is difficult to hear the diastolic reading. Requested critical care consult for recommendations and spoke with Dr. Wang Garcia. Patient already receiving IV albumin. Patient is mentating well. Abdomen is nontender and nondistended. PD catheter site does not appear infected. Patient does not have any leukocytosis or fever but will go ahead and check paired blood cultures, UA and culture, sputum cultureif able to produce, procalcitonin, and start on broad-spectrum antibiotics.

## 2020-09-17 NOTE — PROGRESS NOTES
Physical Therapy - defer  Order received, chart reviewed. Noted Hgb 6.1, cc progressing weakness, and plan for RBC today. Therapy will defer OOB mobility assessment s/p transfusion. Will check back later today or tomorrow to complete assessment as medically appropriate.   Thank you for this referral.

## 2020-09-17 NOTE — PROGRESS NOTES
Hospitalist Progress Note  Shirin Miller MD  Answering service: [phone] from in house phone      Date of Service:  2020  NAME:  Darshan Buckley  :  1970  MRN:  [id number]    Admission Summary:   50M p/w Weakness. New ESRD   Interval history / Subjective:   Patient seen and examined at bedside, feels weak, cold. His HB low this am. Will need transfusions. Got danny cath for HD. Assessment & Plan:     Acute on Chronic Renal Failure  -PD cath placed , not yet used-didn't attend training  -Seen in ED by nephro, plan for Danny by IR and emergent HD  -Avoid nephrotoxins-Appreciate nephrology input-Consult palliative care    Anemia of chronic disease: d/t ESRD-no s/sx bleeding-monitor HH, transfuse for <7  - Hb dropped to 6.4- will transfuse 1 unit RBC. Check folate, b12. Acute respiratory failure with hypoxia- requiring 3LNC  - CTA chest: No PE, Duplex LEs: no DVTs-O2 to keep Sats greater than 90%     Thrombocytopenia-132; usually NL- monitor  Generalized Weakness: largely immobile x2 weeks-suspect d/t severe uremia  -PT/OT consult; will likely need HH PT/OT     CHF: EF 30% per records- not decompensated.  -daily weights-Continue home coreg, entresto  -hold Bumex for now-defer to nephrology  -EKG shows SR w/ 1st degree AVB and LBBB; no previous ekg to compare  -check echo-consult cardiology in AM    HTN-BP soft-start albumin IV x3 doses- Monitor   COPD-PRN duonebs-Keep O2 greater than 90%  DMII: last A1c over 10-check A1c-Start lantus-AC/HS accuchecks with SSI   HLD-not on statin-check lipid panel  Tobacco Use-1PPD x 33 years-counseled for cessation-nicotine patch ordered  #.  HypoCalcemia: replace, monitor    Code status: Full  DVT prophylaxis: SCDs  Care Plan discussed with: Patient/Family and Nurse  Disposition: TBD     Hospital Problems  Date Reviewed: 2020          Codes Class Noted POA    * (Principal) Acute on chronic renal failure (HCC) ICD-10-CM: N17.9, N18.9  ICD-9-CM: 584.9, 585.9  9/16/2020 Yes        Congestive heart failure (HCC) ICD-10-CM: I50.9  ICD-9-CM: 428.0  Unknown Yes    Overview Signed 9/16/2020  6:11 PM by Percy Whitfield NP     chronic left sided congestive heart failure             Chronic obstructive pulmonary disease ([facility] ICD-10-CM: J44.9  ICD-9-CM: 496  Unknown Yes        Anemia ICD-10-CM: D64.9  ICD-9-CM: 285. 9  Unknown Yes        Weakness generalized ICD-10-CM: R53.1  ICD-9-CM: 780.79  Unknown Yes        Thrombocytopenia (HCC) ICD-10-CM: D69.6  ICD-9-CM: 287.5  Unknown Yes        Acute respiratory failure with hypoxia (HCC) ICD-10-CM: J96.01  ICD-9-CM: 518.81  Unknown Yes        Obesity, morbid ([facility] ICD-10-CM: E66.01  ICD-9-CM: 278.01  8/19/2020 Yes        Tobacco abuse ICD-10-CM: Z72.0  ICD-9-CM: 305.1  11/19/2009 Yes        HTN (hypertension), benign ICD-10-CM: I10  ICD-9-CM: 401.1  11/19/2009 Yes        DM (diabetes mellitus) ([facility] ICD-10-CM: E11.9  ICD-9-CM: 250.00  11/19/2009 Yes        Hypercholesteremia ICD-10-CM: E78.00  ICD-9-CM: 272.0  11/19/2009 Yes            Review of Systems:   Pertinent items are mentioned in interval history. Vital Signs:    Last 24hrs VS reviewed since prior progress note.  Most recent are:  Visit Vitals  BP (!) 136/45   Pulse 81   Temp 98.5 °F (36.9 °C)   Resp 17   Ht 6' 3\" (1.905 m)   Wt (!) 162.5 kg (358 lb 4 oz)   SpO2 96%   BMI 44.78 kg/m²         Intake/Output Summary (Last 24 hours) at 9/17/2020 0815  Last data filed at 9/17/2020 0300  Gross per 24 hour   Intake    Output 500 ml   Net -500 ml        Physical Examination:   General:  Alert, oriented, No acute distress, obese  Card:  S1, S2 without murmurs, good peripheral perfusion  Resp:  No accessory muscle use, Good AE, no wheezes, no rhonchi  Abd:  Soft, non-tender, non-distended, BS+  Extremities:  No cyanosis or clubbing, significant edema b/l LEs  Neuro:  Grossly normal, no focal neuro deficits, follows commands   Psych:  Good insight, not agitated. Data Review:    Review and/or order of clinical lab test  Review and/or order of tests in the radiology section of CPT  Review and/or order of tests in the medicine section of CPT  Labs:     Recent Labs     09/17/20 0133 09/16/20 1408   WBC 6.0 9.2   HGB 6.4* 7.3*   HCT 20.6* 23.4*   * 132*     Recent Labs     09/17/20 0133 09/16/20 1408 09/16/20  1402    136 136   K 5.1 5.4* 5.4*    108 107   CO2 14* 13* 11*   * 176* 184*   CREA 11.90* 13.20* 13.20*   GLU 87 134* 129*   CA 6.4* 6.5* 6.6*   PHOS  --   --  10.4*     Recent Labs     09/17/20 0133 09/16/20 1408 09/16/20  1402   ALT 15 15  --    * 220*  --    TBILI 0.8 0.8  --    TP 6.7 6.9  --    ALB 2.9* 2.7* 2.8*   GLOB 3.8 4.2*  --      No results for input(s): INR, PTP, APTT, INREXT in the last 72 hours. Recent Labs     09/17/20 0133   TIBC 152*   PSAT 30   FERR 264      No results found for: FOL, RBCF   No results for input(s): PH, PCO2, PO2 in the last 72 hours.   Recent Labs     09/16/20 1402   TROIQ <0.05     Lab Results   Component Value Date/Time    Cholesterol, total 284 (H) 02/22/2013 08:21 AM    HDL Cholesterol 39 (L) 02/22/2013 08:21 AM    LDL, calculated Comment 02/22/2013 08:21 AM    Triglyceride 788 (HH) 02/22/2013 08:21 AM     Lab Results   Component Value Date/Time    Glucose (POC) 83 09/17/2020 01:36 AM    Glucose (POC) 175 (H) 09/01/2020 10:00 AM    Glucose (POC) 157 (H) 09/01/2020 07:11 AM    Glucose POC 9.0 03/04/2013 04:50 PM     Lab Results   Component Value Date/Time    Color YELLOW/STRAW 09/17/2020 01:33 AM    Appearance CLEAR 09/17/2020 01:33 AM    Specific gravity 1.015 09/17/2020 01:33 AM    pH (UA) 5.0 09/17/2020 01:33 AM    Protein 100 (A) 09/17/2020 01:33 AM    Glucose Negative 09/17/2020 01:33 AM    Ketone Negative 09/17/2020 01:33 AM    Bilirubin Negative 09/17/2020 01:33 AM    Urobilinogen 0.2 09/17/2020 01:33 AM    Nitrites Negative 09/17/2020 01:33 AM    Leukocyte Esterase Negative 09/17/2020 01:33 AM    Epithelial cells FEW 09/17/2020 01:33 AM    Bacteria Negative 09/17/2020 01:33 AM    WBC 0-4 09/17/2020 01:33 AM    RBC 0-5 09/17/2020 01:33 AM     Medications Reviewed:     Current Facility-Administered Medications   Medication Dose Route Frequency    piperacillin-tazobactam (ZOSYN) 3.375 g in 0.9% sodium chloride (MBP/ADV) 100 mL  3.375 g IntraVENous Q12H    Vancomycin Pharmacy Dosing-dialysis pt   Other Rx Dosing/Monitoring    heparin (porcine) 1,000 unit/mL injection 2,500 Units  2,500 Units Hemodialysis DIALYSIS PRN    sodium chloride (NS) flush 5-40 mL  5-40 mL IntraVENous Q8H    sodium chloride (NS) flush 5-40 mL  5-40 mL IntraVENous PRN    acetaminophen (TYLENOL) tablet 650 mg  650 mg Oral Q6H PRN    Or    acetaminophen (TYLENOL) suppository 650 mg  650 mg Rectal Q6H PRN    polyethylene glycol (MIRALAX) packet 17 g  17 g Oral DAILY PRN    promethazine (PHENERGAN) tablet 12.5 mg  12.5 mg Oral Q6H PRN    Or    ondansetron (ZOFRAN) injection 4 mg  4 mg IntraVENous Q6H PRN    glucose chewable tablet 16 g  4 Tab Oral PRN    glucagon (GLUCAGEN) injection 1 mg  1 mg IntraMUSCular PRN    dextrose 10% infusion 0-250 mL  0-250 mL IntraVENous PRN    insulin glargine (LANTUS) injection 10 Units  10 Units SubCUTAneous QHS    insulin lispro (HUMALOG) injection   SubCUTAneous AC&HS    allopurinoL (ZYLOPRIM) tablet 300 mg  300 mg Oral DAILY    aspirin delayed-release tablet 81 mg  81 mg Oral DAILY    carvediloL (COREG) tablet 25 mg  25 mg Oral BID WITH MEALS    . PHARMACY TO SUBSTITUTE PER PROTOCOL (Reordered from: iron, carbonyl (FEOSOL) 45 mg tab)    Per Protocol    magnesium oxide (MAG-OX) tablet 400 mg  400 mg Oral DAILY    sacubitriL-valsartan (ENTRESTO) 49-51 mg tablet 1 Tab  1 Tab Oral BID    nicotine (NICODERM CQ) 14 mg/24 hr patch 1 Patch  1 Patch TransDERmal Q24H    albuterol-ipratropium (DUO-NEB) 2.5 MG-0.5 MG/3 ML  3 mL Nebulization Q4H PRN     Current Outpatient Medications   Medication Sig    predniSONE (STERAPRED DS) 10 mg dose pack Take  by mouth See Admin Instructions. See administration instruction per 10mg dose pack    iron, carbonyl (FEOSOL) 45 mg tab Take 1 Tab by mouth daily.  allopurinoL (ZYLOPRIM) 100 mg tablet Take 300 mg by mouth daily.  sacubitriL-valsartan (Entresto) 49-51 mg tab tablet Take 1 Tab by mouth two (2) times a day.  aspirin delayed-release 81 mg tablet Take 81 mg by mouth daily.  omeprazole (PRILOSEC) 40 mg capsule Take 40 mg by mouth daily.  carvediloL (COREG) 25 mg tablet Take 25 mg by mouth two (2) times daily (with meals).  insulin glargine U-300 conc (Toujeo Max U-300 SoloStar) 300 unit/mL (3 mL) inpn 40 Units by SubCUTAneous route daily.  dulaglutide (Trulicity) 1.29 ZJ/2.6 mL sub-q pen 0.75 mg by SubCUTAneous route every seven (7) days.  bumetanide (BUMEX) 2 mg tablet Take 2 mg by mouth daily.  magnesium oxide (MAG-OX) 400 mg tablet Take 400 mg by mouth daily.  Insulin Needles, Disposable, (BD INSULIN PEN NEEDLE UF SHORT) 31 X 5/16 \" Ndle Use as directed with insulin pen    Blood-Glucose Meter monitoring kit Test blood sugar four times daily.     glucose blood VI test strips (ASCENSIA AUTODISC VI, ONE TOUCH ULTRA TEST VI) strip Test four times daily    Lancets Misc Test four times daily     Facility-Administered Medications Ordered in Other Encounters   Medication Dose Route Frequency    heparin (porcine) pf 300 Units  300 Units InterCATHeter PRN    heparin (porcine) 1,000 unit/mL injection       ______________________________________________________________________  EXPECTED LENGTH OF STAY: - - -  ACTUAL LENGTH OF STAY:          1               Wen Lunsford MD

## 2020-09-17 NOTE — ED NOTES
RN attempted to notify hospitalist NP of critical CO2, NP paged twice, still no return call. Addendum @ [phone]: Call returned after I was finished with RRT on the floor. Per hospital policy, everyone is expected to utilize [address] [place] [address] for communication in lieu of paging system.     Lizeth Haines NP  9/16/20

## 2020-09-17 NOTE — PROGRESS NOTES
Problem: Falls - Risk of  Goal: *Absence of Falls  Description: Document Tianna Nesbitt Fall Risk and appropriate interventions in the flowsheet.   9/17/2020 1803 by Lokesh Gonzales  Outcome: Progressing Towards Goal  Note: Fall Risk Interventions:  Mobility Interventions: Communicate number of staff needed for ambulation/transfer, OT consult for ADLs, Patient to call before getting OOB, PT Consult for mobility concerns, PT Consult for assist device competence, Strengthening exercises (ROM-active/passive)    Mentation Interventions: Adequate sleep, hydration, pain control, Door open when patient unattended, Evaluate medications/consider consulting pharmacy, Increase mobility, More frequent rounding, Reorient patient, Toileting rounds, Update white board    Medication Interventions: Evaluate medications/consider consulting pharmacy, Patient to call before getting OOB, Teach patient to arise slowly    Elimination Interventions: Call light in reach, Elevated toilet seat, Patient to call for help with toileting needs, Toilet paper/wipes in reach, Toileting schedule/hourly rounds    History of Falls Interventions: Consult care management for discharge planning, Door open when patient unattended, Evaluate medications/consider consulting pharmacy, Investigate reason for fall, Assess for delayed presentation/identification of injury for 48 hrs (comment for end date), Vital signs minimum Q4HRs X 24 hrs (comment for end date)      9/17/2020 1800 by Lokesh Gonzales  Outcome: Progressing Towards Goal  Note: Fall Risk Interventions:  Mobility Interventions: Communicate number of staff needed for ambulation/transfer, OT consult for ADLs, Patient to call before getting OOB, PT Consult for mobility concerns, PT Consult for assist device competence, Strengthening exercises (ROM-active/passive)    Mentation Interventions: Adequate sleep, hydration, pain control, Door open when patient unattended, Evaluate medications/consider consulting pharmacy, Increase mobility, More frequent rounding, Reorient patient, Toileting rounds, Update white board    Medication Interventions: Evaluate medications/consider consulting pharmacy, Patient to call before getting OOB, Teach patient to arise slowly    Elimination Interventions: Call light in reach, Elevated toilet seat, Patient to call for help with toileting needs, Toilet paper/wipes in reach, Toileting schedule/hourly rounds    History of Falls Interventions: Consult care management for discharge planning, Door open when patient unattended, Evaluate medications/consider consulting pharmacy, Investigate reason for fall, Assess for delayed presentation/identification of injury for 48 hrs (comment for end date), Vital signs minimum Q4HRs X 24 hrs (comment for end date)         Problem: Patient Education: Go to Patient Education Activity  Goal: Patient/Family Education  9/17/2020 1803 by Inder Squires  Outcome: Progressing Towards Goal  9/17/2020 1800 by Indre Squires  Outcome: Progressing Towards Goal     Problem: Pressure Injury - Risk of  Goal: *Prevention of pressure injury  Description: Document Justo Scale and appropriate interventions in the flowsheet. 9/17/2020 1803 by Inder Squires  Outcome: Progressing Towards Goal  Note: Pressure Injury Interventions:  Sensory Interventions: Assess changes in LOC, Assess need for specialty bed, Check visual cues for pain, Discuss PT/OT consult with provider, Float heels, Keep linens dry and wrinkle-free, Minimize linen layers, Turn and reposition approx.  every two hours (pillows and wedges if needed), Pressure redistribution bed/mattress (bed type)    Moisture Interventions: Absorbent underpads, Apply protective barrier, creams and emollients, Limit adult briefs, Maintain skin hydration (lotion/cream), Moisture barrier, Minimize layers, Offer toileting Q_hr, Contain wound drainage, Check for incontinence Q2 hours and as needed    Activity Interventions: Assess need for specialty bed, Increase time out of bed, Pressure redistribution bed/mattress(bed type), PT/OT evaluation    Mobility Interventions: Assess need for specialty bed, Float heels, HOB 30 degrees or less, Pressure redistribution bed/mattress (bed type), PT/OT evaluation, Turn and reposition approx. every two hours(pillow and wedges)    Nutrition Interventions: Document food/fluid/supplement intake    Friction and Shear Interventions: Feet elevated on foot rest, Minimize layers, Lift sheet             9/17/2020 1800 by Ryan Fulton  Outcome: Progressing Towards Goal  Note: Pressure Injury Interventions:  Sensory Interventions: Assess changes in LOC, Assess need for specialty bed, Check visual cues for pain, Discuss PT/OT consult with provider, Float heels, Keep linens dry and wrinkle-free, Minimize linen layers, Turn and reposition approx. every two hours (pillows and wedges if needed), Pressure redistribution bed/mattress (bed type)    Moisture Interventions: Absorbent underpads, Apply protective barrier, creams and emollients, Limit adult briefs, Maintain skin hydration (lotion/cream), Moisture barrier, Minimize layers, Offer toileting Q_hr, Contain wound drainage, Check for incontinence Q2 hours and as needed    Activity Interventions: Assess need for specialty bed, Increase time out of bed, Pressure redistribution bed/mattress(bed type), PT/OT evaluation    Mobility Interventions: Assess need for specialty bed, Float heels, HOB 30 degrees or less, Pressure redistribution bed/mattress (bed type), PT/OT evaluation, Turn and reposition approx.  every two hours(pillow and wedges)    Nutrition Interventions: Document food/fluid/supplement intake    Friction and Shear Interventions: Feet elevated on foot rest, Minimize layers, Lift sheet                Problem: Patient Education: Go to Patient Education Activity  Goal: Patient/Family Education  9/17/2020 1803 by Ryan Fulton  Outcome: Progressing Towards Goal  9/17/2020 1800 by Martha James  Outcome: Progressing Towards Goal     Problem: Pain  Goal: *Control of Pain  Outcome: Progressing Towards Goal     Problem: Patient Education: Go to Patient Education Activity  Goal: Patient/Family Education  Outcome: Progressing Towards Goal     Problem: Chronic Renal Failure  Goal: *Fluid and electrolytes stabilized  Outcome: Progressing Towards Goal     Problem: Patient Education: Go to Patient Education Activity  Goal: Patient/Family Education  Outcome: Progressing Towards Goal

## 2020-09-17 NOTE — PROGRESS NOTES
Spiritual Care Assessment/Progress Note  [facility]      NAME: Hua Basilio      MRN: [id number]  AGE: 53 y.o. SEX: male  Advent Affiliation: No preference   Language: English     9/17/2020     Total Time (in minutes): 5     Spiritual Assessment begun in [address] [facility] [address] through conversation with:         []Patient        [] Family    [] Friend(s)        Reason for Consult: Palliative Care, Initial/Spiritual Assessment     Spiritual beliefs: (Please include comment if needed)     [] Identifies with a sangeeta tradition:         [] Supported by a sangeeta community:            [] Claims no spiritual orientation:           [] Seeking spiritual identity:                [] Adheres to an individual form of spirituality:           [x] Not able to assess:                           Identified resources for coping:      [] Prayer                               [] Music                  [] Guided Imagery     [] Family/friends                 [] Pet visits     [] Devotional reading                         [x] Unknown     [] Other                                              Interventions offered during this visit: (See comments for more details)    Patient Interventions: Initial visit           Plan of Care:     [] Support spiritual and/or cultural needs    [] Support AMD and/or advance care planning process      [] Support grieving process   [] Coordinate Rites and/or Rituals    [] Coordination with community clergy   [] No spiritual needs identified at this time   [] Detailed Plan of Care below (See Comments)  [] Make referral to Music Therapy  [] Make referral to Pet Therapy     [] Make referral to Addiction services  [] Make referral to [facility]  [] Make referral to Spiritual Care Partner  [] No future visits requested        [x] Follow up visits as needed     Comments: Pt's chart reviewed after palliative consult received.   [address] [place] [address] will attempt to visit pt tomorrow as able for initial spiritual assessment.     Amy Merlinda Kaufmann, Palliative

## 2020-09-17 NOTE — WOUND CARE
WOCN Note:  
 
New consult for buttocks and abdomen Seen in ER Chart shows: 
Admitted for weakness History of CAD, DM, recent PD catheter placement 9/1, smoker 358lbs Assessment:  
Appropriately conversational but drowsy. Able to partially turn himself and reports sitting for long periods of time at home. Continent. Bilateral heel and sacral skin intact and without erythema. Heels offloaded with pillows. 1. POA, bilateral buttocks with most likely friction damage to lower buttocks with irregular erosions. Both are ~3 x 2 x 0.1 cm; left is 100% pink and right is 50% pink and 50% yellow. Barrier cream applied. Abdomen with steri strips intact to 2 sites. No exudate or redness. PD catheter to LLQ. Recommendations:   
Buttocks: protect with z-guard barrier cream 
Turn/reposition approximately every 2 hours and offload heels with pillows at all times in bed. Bariatric bed delivered to his room. Transition of Care: Plan to follow weekly and as needed while admitted to hospital.   
 
AALIYAH Read, RN, [facility] Certified Wound, Ostomy, Continence Nurse 
office [phone] 
pager [phone] or call  to page

## 2020-09-17 NOTE — DIALYSIS
[facility]  [phone]    Vitals   Pre   Post   Assessment   Pre   Post     Temp  Temp: 98.5 °F (36.9 °C) (09/17/20 0100)  98.2 LOC  Alert and oriented x4 Alert and oriented x4   HR   Pulse (Heart Rate): 80 (09/17/20 0100) 79 Lungs   Diminished on 5lnc  diminished on 5lnc   B/P   BP: (!) 117/34 (09/17/20 0100) 157/43 Cardiac   B/p wnl with albumin infusing  b/p wnl   Resp   Resp Rate: 14 (09/17/20 0100) 16 Skin   intact  intact   Pain level   0 0 Edema  +1/+2 generalized     +1+2 genealized   Orders:    Duration:   Start:    0104 End:    7029 Total:   2hrs   Dialyzer:   Dialyzer/Set Up Inspection: Revaclear (09/17/20 0100)   K Bath:   Dialysate K (mEq/L): 2 (09/17/20 0100)   Ca Bath:   Dialysate CA (mEq/L): 2.5 (09/17/20 0100)   Na/Bicarb:   Dialysate NA (mEq/L): 140 (09/17/20 0100)   Target Fluid Removal:   Goal/Amount of Fluid to Remove (mL): 500 mL (09/17/20 0100)   Access     Type & Location:   Rt IJ moses placement confirmed with chest xray. Dressing c/d/i. Each catheter limb disinfected per p&p, caps removed, hubs disinfected per p&p. Blood aspirated and lines flushed without any issues.  No s/s of infection noted       Labs     Obtained/Reviewed   Critical Results Called   Date when labs were drawn-  Hgb-    HGB   Date Value Ref Range Status   09/16/2020 7.3 (L) 12.1 - 17.0 g/dL Final     K-    Potassium   Date Value Ref Range Status   09/16/2020 5.4 (H) 3.5 - 5.1 mmol/L Final     Ca-   Calcium   Date Value Ref Range Status   09/16/2020 6.5 (L) 8.5 - 10.1 MG/DL Final     Bun-   BUN   Date Value Ref Range Status   09/16/2020 176 (H) 6 - 20 MG/DL Final     Creat-   Creatinine   Date Value Ref Range Status   09/16/2020 13.20 (H) 0.70 - 1.30 MG/DL Final        Medications/ Blood Products Given     Name   Dose   Route and Time     heparin 2500 units  1100 in arterial, 1400in venous             Blood Volume Processed (BVP):    22.5 Net Fluid   Removed:  500ml   Comments   Time Out Done: 0025  Primary Nurse Rpt Pre:Kirti Rust RN  Primary Nurse Rpt Post: Delphine Mora RN  Pt Education:ARF, what to expect with 1st dialysis treatment  Care Plan:ARF  Tx Summary:  0104 Dialysis started after consent obtained  0305 Dialysis completed and blood rinsed back. Each dialysis catheter limb disinfected per p&p, blood returned per p&p, each dialysis hub disinfected per p&p, post dialysis catheter dwell with heparin instilled per order, and caps applied. Pt stable    Admiting Diagnosis:Acute on chronic renal failure  Pt's previous clinic- Was going to [facility]  Consent signed - Informed Consent Verified: Yes (09/17/20 0100)  Hepatitis Status- AG Neg 9/16/20  Machine #- Machine Number: [id number] (09/17/20 0100)  Telemetry status-NSR  Pre-dialysis wt. -

## 2020-09-17 NOTE — ED NOTES
Paged NP several times with no answer, paged second NP on call and notified León Sun NP of critical values. Also asked to hold Lantus due to pt's blood sugar. Will begin antibiotics now that dialysis is complete for the night.

## 2020-09-17 NOTE — PROGRESS NOTES
Name: Toshia Cagle   MRN: [id number]  : 1970  Assessment:  SULEMAN on CKD-4/5>>Pre-ESRD (Dr. Carlos Moore)- STARTED HD  (1st Rx via R Danny)  Hyperkalemia  Severe Azotemia  Metabolic acidosis  Uremia  Anemia with component of ACKD  Chronic S-CHF; EF of 30%  DM-2  HTN  Obesity  Gout  NRP- 8 grams on last check; due to DN and Obesity     - CTA chest did not show PE; got 1st HD yesterday. Acidosis marginally better. BP is low and will limit how much UF we can do with HD; hgb is low and 1 unit PRBC ordered    - pt has advance CKD with severe azotemia, acidosis and high K. He is uremic. He recently got PD catheter placed and did not come for training. Subsequently got more worse clinically; now has advance renal failure with uremia. Has PD cath in place but has not been trained. Will need to start HD. Discussed r/b of catheter placement and HD process. Pt/GF understands and agrees to proceed     Plan/Recommendations:  2nd HD today and 3rd HD Rx tomm  3 hrs, 250 QB, UF of 1 kg as erik, 40 CO2, 2K  Reduce Coreg to 12.5 mg BID with hold parameters  Start EPO  Will eventually transition to PD, once he is more stable clinically  Will need outpt HD unit spot and Permacath before d/c     Subjective:  Resting in ER. Awaiting bed.  Got 1st HD yesterday and tolerated well    ROS:   No nausea, no vomiting  No chest pain, + shortness of breath    Exam:  Visit Vitals  BP (!) 118/40   Pulse 76   Temp 98.6 °F (37 °C)   Resp 20   Ht 6' 3\" (1.905 m)   Wt (!) 162.4 kg (358 lb)   SpO2 95%   BMI 44.75 kg/m²     WB/WN in NAD  AT/NC  R Danny in place  B/l rhochi+  RRR, distant  +edema  AOx3    Current Facility-Administered Medications   Medication Dose Route Frequency Last Dose    piperacillin-tazobactam (ZOSYN) 3.375 g in 0.9% sodium chloride (MBP/ADV) 100 mL  3.375 g IntraVENous Q12H  Vancomycin Pharmacy Dosing-dialysis pt   Other Rx Dosing/Monitoring      heparin (porcine) 1,000 unit/mL injection 2,500 Units  2,500 Units Hemodialysis DIALYSIS PRN 2,500 Units at 09/17/20 0252    0.9% sodium chloride infusion 250 mL  250 mL IntraVENous PRN      oxyCODONE IR (ROXICODONE) tablet 5 mg  5 mg Oral Q4H PRN 5 mg at 09/17/20 1328    carvediloL (COREG) tablet 12.5 mg  12.5 mg Oral BID WITH MEALS      epoetin brad-epbx (RETACRIT) injection 10,000 Units  10,000 Units SubCUTAneous Q TUE, THU & SAT      sodium chloride (NS) flush 5-40 mL  5-40 mL IntraVENous Q8H 10 mL at 09/17/20 1400    sodium chloride (NS) flush 5-40 mL  5-40 mL IntraVENous PRN      acetaminophen (TYLENOL) tablet 650 mg  650 mg Oral Q6H PRN      Or    acetaminophen (TYLENOL) suppository 650 mg  650 mg Rectal Q6H PRN      polyethylene glycol (MIRALAX) packet 17 g  17 g Oral DAILY PRN      promethazine (PHENERGAN) tablet 12.5 mg  12.5 mg Oral Q6H PRN      Or    ondansetron (ZOFRAN) injection 4 mg  4 mg IntraVENous Q6H PRN      glucose chewable tablet 16 g  4 Tab Oral PRN      glucagon (GLUCAGEN) injection 1 mg  1 mg IntraMUSCular PRN      dextrose 10% infusion 0-250 mL  0-250 mL IntraVENous PRN      insulin glargine (LANTUS) injection 10 Units  10 Units SubCUTAneous QHS Stopped at 09/16/20 2200    insulin lispro (HUMALOG) injection   SubCUTAneous AC&HS Stopped at 09/16/20 2200    allopurinoL (ZYLOPRIM) tablet 300 mg  300 mg Oral DAILY 300 mg at 09/17/20 1327    aspirin delayed-release tablet 81 mg  81 mg Oral DAILY 81 mg at 09/17/20 1040    . PHARMACY TO SUBSTITUTE PER PROTOCOL (Reordered from: iron, carbonyl (FEOSOL) 45 mg tab)    Per Protocol      magnesium oxide (MAG-OX) tablet 400 mg  400 mg Oral DAILY 400 mg at 09/17/20 1040    sacubitriL-valsartan (ENTRESTO) 49-51 mg tablet 1 Tab  1 Tab Oral BID 1 Tab at 09/17/20 1000    nicotine (NICODERM CQ) 14 mg/24 hr patch 1 Patch  1 Patch TransDERmal Q24H      albuterol-ipratropium (DUO-NEB) 2.5 MG-0.5 MG/3 ML  3 mL Nebulization Q4H PRN       Current Outpatient Medications   Medication Sig Dispense    predniSONE (STERAPRED DS) 10 mg dose pack Take  by mouth See Admin Instructions. See administration instruction per 10mg dose pack     iron, carbonyl (FEOSOL) 45 mg tab Take 1 Tab by mouth daily.  allopurinoL (ZYLOPRIM) 100 mg tablet Take 300 mg by mouth daily.  sacubitriL-valsartan (Entresto) 49-51 mg tab tablet Take 1 Tab by mouth two (2) times a day.  aspirin delayed-release 81 mg tablet Take 81 mg by mouth daily.  omeprazole (PRILOSEC) 40 mg capsule Take 40 mg by mouth daily.  carvediloL (COREG) 25 mg tablet Take 25 mg by mouth two (2) times daily (with meals).  insulin glargine U-300 conc (Toujeo Max U-300 SoloStar) 300 unit/mL (3 mL) inpn 40 Units by SubCUTAneous route daily.  dulaglutide (Trulicity) 4.61 AY/9.4 mL sub-q pen 0.75 mg by SubCUTAneous route every seven (7) days.  bumetanide (BUMEX) 2 mg tablet Take 2 mg by mouth daily.  magnesium oxide (MAG-OX) 400 mg tablet Take 400 mg by mouth daily.  Insulin Needles, Disposable, (BD INSULIN PEN NEEDLE UF SHORT) 31 X 5/16 \" Ndle Use as directed with insulin pen 100 Package    Blood-Glucose Meter monitoring kit Test blood sugar four times daily.  1 Kit    glucose blood VI test strips (ASCENSIA AUTODISC VI, ONE TOUCH ULTRA TEST VI) strip Test four times daily 1 Package    Lancets Misc Test four times daily 1 Package     Facility-Administered Medications Ordered in Other Encounters   Medication Dose Route Frequency Last Dose    heparin (porcine) pf 300 Units  300 Units InterCATHeter PRN         Labs/Data:    Lab Results   Component Value Date/Time    WBC 6.0 09/17/2020 01:33 AM    HGB 6.4 (L) 09/17/2020 01:33 AM    Hematocrit (POC) 24 (L) 09/01/2020 07:11 AM    HCT 20.6 (L) 09/17/2020 01:33 AM    PLATELET 659 (L) 32/25/6625 01:33 AM    MCV 98.1 09/17/2020 01:33 AM       Lab Results   Component Value Date/Time    Sodium 137 09/17/2020 01:33 AM    Potassium 5.1 09/17/2020 01:33 AM    Chloride 106 09/17/2020 01:33 AM    CO2 14 (LL) 09/17/2020 01:33 AM    Anion gap 17 (H) 09/17/2020 01:33 AM    Glucose 87 09/17/2020 01:33 AM     (H) 09/17/2020 01:33 AM    Creatinine 11.90 (H) 09/17/2020 01:33 AM    BUN/Creatinine ratio 14 09/17/2020 01:33 AM    GFR est AA 6 (L) 09/17/2020 01:33 AM    GFR est non-AA 5 (L) 09/17/2020 01:33 AM    Calcium 6.4 (LL) 09/17/2020 01:33 AM       Wt Readings from Last 3 Encounters:   09/17/20 (!) 162.4 kg (358 lb)   09/01/20 (!) 161.7 kg (356 lb 7.7 oz)   08/19/20 (!) 162.8 kg (358 lb 12.8 oz)         Intake/Output Summary (Last 24 hours) at 9/17/2020 1509  Last data filed at 9/17/2020 0300  Gross per 24 hour   Intake    Output 500 ml   Net -500 ml       Patient seen and examined. Chart reviewed. Labs, data and other pertinent notes reviewed in last 24 hrs.     PMH/SH/FH reviewed and unchanged compared to H&P    Discussed with pt      Laine Jim MD

## 2020-09-18 LAB
ALBUMIN SERPL-MCNC: 2.8 G/DL (ref 3.5–5)
ANION GAP SERPL CALC-SCNC: 10 MMOL/L (ref 5–15)
BACTERIA SPEC CULT: NORMAL
BASOPHILS # BLD: 0 K/UL (ref 0–0.1)
BASOPHILS NFR BLD: 0 % (ref 0–1)
BUN SERPL-MCNC: 95 MG/DL (ref 6–20)
BUN/CREAT SERPL: 13 (ref 12–20)
CALCIUM SERPL-MCNC: 6.7 MG/DL (ref 8.5–10.1)
CHLORIDE SERPL-SCNC: 106 MMOL/L (ref 97–108)
CO2 SERPL-SCNC: 21 MMOL/L (ref 21–32)
CREAT SERPL-MCNC: 7.15 MG/DL (ref 0.7–1.3)
DIFFERENTIAL METHOD BLD: ABNORMAL
EOSINOPHIL # BLD: 0.2 K/UL (ref 0–0.4)
EOSINOPHIL NFR BLD: 4 % (ref 0–7)
ERYTHROCYTE [DISTWIDTH] IN BLOOD BY AUTOMATED COUNT: 18.2 % (ref 11.5–14.5)
FOLATE SERPL-MCNC: 9.6 NG/ML (ref 5–21)
GLUCOSE BLD STRIP.AUTO-MCNC: 105 MG/DL (ref 65–100)
GLUCOSE BLD STRIP.AUTO-MCNC: 120 MG/DL (ref 65–100)
GLUCOSE BLD STRIP.AUTO-MCNC: 123 MG/DL (ref 65–100)
GLUCOSE BLD STRIP.AUTO-MCNC: 128 MG/DL (ref 65–100)
GLUCOSE SERPL-MCNC: 80 MG/DL (ref 65–100)
HCT VFR BLD AUTO: 20.5 % (ref 36.6–50.3)
HGB BLD-MCNC: 6.4 G/DL (ref 12.1–17)
HISTORY CHECKED?,CKHIST: NORMAL
IMM GRANULOCYTES # BLD AUTO: 0.1 K/UL (ref 0–0.04)
IMM GRANULOCYTES NFR BLD AUTO: 1 % (ref 0–0.5)
LYMPHOCYTES # BLD: 0.3 K/UL (ref 0.8–3.5)
LYMPHOCYTES NFR BLD: 6 % (ref 12–49)
MAGNESIUM SERPL-MCNC: 2 MG/DL (ref 1.6–2.4)
MCH RBC QN AUTO: 30 PG (ref 26–34)
MCHC RBC AUTO-ENTMCNC: 31.2 G/DL (ref 30–36.5)
MCV RBC AUTO: 96.2 FL (ref 80–99)
MONOCYTES # BLD: 0.4 K/UL (ref 0–1)
MONOCYTES NFR BLD: 8 % (ref 5–13)
NEUTS SEG # BLD: 4.5 K/UL (ref 1.8–8)
NEUTS SEG NFR BLD: 81 % (ref 32–75)
NRBC # BLD: 0 K/UL (ref 0–0.01)
NRBC BLD-RTO: 0 PER 100 WBC
PHOSPHATE SERPL-MCNC: 5.8 MG/DL (ref 2.6–4.7)
PLATELET # BLD AUTO: 95 K/UL (ref 150–400)
PMV BLD AUTO: 10.5 FL (ref 8.9–12.9)
POTASSIUM SERPL-SCNC: 3.6 MMOL/L (ref 3.5–5.1)
RBC # BLD AUTO: 2.13 M/UL (ref 4.1–5.7)
RBC MORPH BLD: ABNORMAL
SERVICE CMNT-IMP: ABNORMAL
SERVICE CMNT-IMP: NORMAL
SODIUM SERPL-SCNC: 137 MMOL/L (ref 136–145)
VIT B12 SERPL-MCNC: 708 PG/ML (ref 193–986)
WBC # BLD AUTO: 5.5 K/UL (ref 4.1–11.1)

## 2020-09-18 PROCEDURE — 74011250637 HC RX REV CODE- 250/637: Performed by: INTERNAL MEDICINE

## 2020-09-18 PROCEDURE — 82607 VITAMIN B-12: CPT

## 2020-09-18 PROCEDURE — 77010033678 HC OXYGEN DAILY

## 2020-09-18 PROCEDURE — 74011250636 HC RX REV CODE- 250/636: Performed by: NURSE PRACTITIONER

## 2020-09-18 PROCEDURE — 82962 GLUCOSE BLOOD TEST: CPT

## 2020-09-18 PROCEDURE — 36430 TRANSFUSION BLD/BLD COMPNT: CPT

## 2020-09-18 PROCEDURE — 82746 ASSAY OF FOLIC ACID SERUM: CPT

## 2020-09-18 PROCEDURE — 83735 ASSAY OF MAGNESIUM: CPT

## 2020-09-18 PROCEDURE — P9016 RBC LEUKOCYTES REDUCED: HCPCS

## 2020-09-18 PROCEDURE — 85025 COMPLETE CBC W/AUTO DIFF WBC: CPT

## 2020-09-18 PROCEDURE — 94760 N-INVAS EAR/PLS OXIMETRY 1: CPT

## 2020-09-18 PROCEDURE — 74011000258 HC RX REV CODE- 258: Performed by: NURSE PRACTITIONER

## 2020-09-18 PROCEDURE — 5A1D70Z PERFORMANCE OF URINARY FILTRATION, INTERMITTENT, LESS THAN 6 HOURS PER DAY: ICD-10-PCS | Performed by: INTERNAL MEDICINE

## 2020-09-18 PROCEDURE — 36415 COLL VENOUS BLD VENIPUNCTURE: CPT

## 2020-09-18 PROCEDURE — 90935 HEMODIALYSIS ONE EVALUATION: CPT

## 2020-09-18 PROCEDURE — 65660000000 HC RM CCU STEPDOWN

## 2020-09-18 PROCEDURE — 74011250636 HC RX REV CODE- 250/636: Performed by: INTERNAL MEDICINE

## 2020-09-18 PROCEDURE — P9047 ALBUMIN (HUMAN), 25%, 50ML: HCPCS | Performed by: INTERNAL MEDICINE

## 2020-09-18 PROCEDURE — 80069 RENAL FUNCTION PANEL: CPT

## 2020-09-18 PROCEDURE — 74011636637 HC RX REV CODE- 636/637: Performed by: NURSE PRACTITIONER

## 2020-09-18 PROCEDURE — 74011250637 HC RX REV CODE- 250/637: Performed by: NURSE PRACTITIONER

## 2020-09-18 RX ORDER — SODIUM CHLORIDE 9 MG/ML
250 INJECTION, SOLUTION INTRAVENOUS AS NEEDED
Status: DISCONTINUED | OUTPATIENT
Start: 2020-09-18 | End: 2020-09-23 | Stop reason: HOSPADM

## 2020-09-18 RX ADMIN — ALBUMIN (HUMAN) 12.5 G: 0.25 INJECTION, SOLUTION INTRAVENOUS at 00:00

## 2020-09-18 RX ADMIN — CARVEDILOL 12.5 MG: 12.5 TABLET, FILM COATED ORAL at 18:26

## 2020-09-18 RX ADMIN — INSULIN GLARGINE 10 UNITS: 100 INJECTION, SOLUTION SUBCUTANEOUS at 21:55

## 2020-09-18 RX ADMIN — MAGNESIUM OXIDE 400 MG: 400 TABLET ORAL at 08:40

## 2020-09-18 RX ADMIN — ACETAMINOPHEN 650 MG: 325 TABLET ORAL at 21:55

## 2020-09-18 RX ADMIN — ALLOPURINOL 300 MG: 300 TABLET ORAL at 08:40

## 2020-09-18 RX ADMIN — SACUBITRIL AND VALSARTAN 1 TABLET: 49; 51 TABLET, FILM COATED ORAL at 18:26

## 2020-09-18 RX ADMIN — FERROUS SULFATE TAB 325 MG (65 MG ELEMENTAL FE) 325 MG: 325 (65 FE) TAB at 08:41

## 2020-09-18 RX ADMIN — VANCOMYCIN HYDROCHLORIDE 1000 MG: 1 INJECTION, POWDER, LYOPHILIZED, FOR SOLUTION INTRAVENOUS at 05:11

## 2020-09-18 RX ADMIN — ALBUMIN (HUMAN) 12.5 G: 0.25 INJECTION, SOLUTION INTRAVENOUS at 01:00

## 2020-09-18 RX ADMIN — VANCOMYCIN HYDROCHLORIDE 1000 MG: 1 INJECTION, POWDER, LYOPHILIZED, FOR SOLUTION INTRAVENOUS at 21:54

## 2020-09-18 RX ADMIN — Medication 10 ML: at 22:00

## 2020-09-18 RX ADMIN — ASPIRIN 81 MG: 81 TABLET, COATED ORAL at 08:41

## 2020-09-18 RX ADMIN — PIPERACILLIN AND TAZOBACTAM 3.38 G: 3; .375 INJECTION, POWDER, LYOPHILIZED, FOR SOLUTION INTRAVENOUS at 15:54

## 2020-09-18 RX ADMIN — HEPARIN SODIUM 2500 UNITS: 1000 INJECTION INTRAVENOUS; SUBCUTANEOUS at 03:56

## 2020-09-18 RX ADMIN — PIPERACILLIN AND TAZOBACTAM 3.38 G: 3; .375 INJECTION, POWDER, LYOPHILIZED, FOR SOLUTION INTRAVENOUS at 05:11

## 2020-09-18 NOTE — PROGRESS NOTES
Occupational Therapy  Pt orders acknowledged and chart reviewed. Pt continues to be at 6.4 Hgb, RN reports blood will be given tonight with HD. Pt very symptomatic, awaiting transfusion prior to therapy participation or MD clearance to work with pt in this range. Will retry for OT eval tomorrow.  Frances Benjamin OTR/L

## 2020-09-18 NOTE — PROGRESS NOTES
Pt orders acknowledged and chart reviewed. Pt continues to be at 6.4 Hgb, RN reports blood will be given tonight with HD. Pt very symptomatic, awaiting transfusion prior to therapy participation or MD clearance to work with pt in this range.      Alma Mcdonnell, DPT, PT

## 2020-09-18 NOTE — PROGRESS NOTES
Bedside and Verbal shift change report given to [address] (oncoming nurse) by [address] (offgoing nurse). Report included the following information SBAR, Kardex, MAR, Recent Results, Cardiac Rhythm NSR and Quality Measures.

## 2020-09-18 NOTE — DIALYSIS
[facility]  [phone]    Vitals   Pre   Post   Assessment   Pre   Post     Temp  Temp: 97.7 °F (36.5 °C) (09/17/20 2350)  97.8 LOC  Medicated, responds to questions appropriately Easily aroused   HR   Pulse (Heart Rate): 75 (09/17/20 2350) 79 Lungs   CTA, 2 L n/c  Clear, sats 91%   B/P   BP: (!) 95/23 (09/17/20 2350) 112/45 Cardiac   Irregular  Irregular   Resp   Resp Rate: 14 (09/17/20 2350) 20 Skin   W/D  W/D   Pain level  Pain Intensity 1: 0 (09/17/20 2310) Back pain 5/10 Edema    Generalized Generalized   Orders:    Duration:   Start:    2350 End:    0255 Total:   3 hours   Dialyzer:   Dialyzer/Set Up Inspection: Grace Cardona (09/17/20 0100)   K Bath:   Dialysate K (mEq/L): 2 (09/17/20 2350)   Ca Bath:   Dialysate CA (mEq/L): 2.5 (09/17/20 2350)   Na/Bicarb:   Dialysate NA (mEq/L): 140 (09/17/20 2350)   Target Fluid Removal:   Goal/Amount of Fluid to Remove (mL): 1000 mL (09/17/20 2350)   Access     Type & Location:   R IJ non tunneled CVC, no drainage nor s/sx infection. Biopatch is visible. Both ports are patent. Dsg changed per policy and dated for 71/40/36.    Labs     Obtained/Reviewed   Critical Results Called   Date when labs were drawn-  Hgb-    HGB   Date Value Ref Range Status   09/17/2020 6.4 (L) 12.1 - 17.0 g/dL Final     K-    Potassium   Date Value Ref Range Status   09/17/2020 5.1 3.5 - 5.1 mmol/L Final     Ca-   Calcium   Date Value Ref Range Status   09/17/2020 6.4 (LL) 8.5 - 10.1 MG/DL Final     Comment:     RESULTS VERIFIED, PHONED TO AND READ BACK BY  [email or web address]       Bun-   BUN   Date Value Ref Range Status   09/17/2020 169 (H) 6 - 20 MG/DL Final     Creat-   Creatinine   Date Value Ref Range Status   09/17/2020 11.90 (H) 0.70 - 1.30 MG/DL Final        Medications/ Blood Products Given     Name   Dose   Route and Time     Albumin 12.5g 25g  IV @ 0000, 0100   Heparin 2500 units total IC @ 0356        Blood Volume Processed (BVP):    43.5 Net Fluid Removed:  0   Comments   Time Out Done: 2965  Primary Nurse Rpt Zander Evans, RN  Primary Nurse Rpt Post:AJamshid Centeno, RN  Pt [address] Plan:BP support   Tx Summary:Tx completed. During tx pt's DBP as low as 10's. Pt was hypotensive and  did not respond well to Albumin support. Remained stable with UF off for most of tx. Pt offered no c/o except for back pain. All blood was returned AET, lines were rinsed, ports packed with heparin post labs draw. Pt was repositioned in bed and left in NAD. Admiting Diagnosis:  Pt's previous clinic-N/A  Consent signed - Informed Consent Verified: Yes (09/17/20 2350)  Matiasita Consent -   Hepatitis Status- Ag neg on 09/16/20  Machine #- Machine Number: [id number] (09/17/20 2350)  Telemetry status-Bedside/Remote tele  Pre-dialysis wt. - Pre-Dialysis Weight: 162.4 kg (358 lb 0.4 oz) (09/17/20 2350)

## 2020-09-18 NOTE — PROGRESS NOTES
4482  Paged hospitalist about pt hgb being 6.4  Spoke to tiffanie hospitalist and was instructed to continue to monitor until nephrology is able to see pt. Paging Dr. Isak Mobley to notify about pt's low diastolic pressure. Also confirming for dayshift RN if he would like blood administration w/ dialysis or routine administration. Awaiting callback. Spoke with Dr. Isak Mobley and made him aware about low DBP. Also inquired if he was planning to have pt do dialysis today. I was told that it was okay for dayshift nurse to defer blood administration until dialysis. Passed onto dayshift [employer]. Bedside and Verbal shift change report given to calista (oncoming nurse) by Guy Klinefelter (offgoing nurse). Report included the following information SBAR, Kardex, Intake/Output, MAR, Recent Results and Cardiac Rhythm NSR.

## 2020-09-18 NOTE — PROGRESS NOTES
2330  Pt DBP in the 30s MAP below 65. Dialysis nurse at the bedside and made aware. It was reported to me during shift change that this is not abnormal for the pt and that doctor was spoken to earlier in the day. Pt is asymptomatic. Hospitalist, Edda Gnozalez NP, on the unit. Was instructed to continue to monitor. Orders for albumin were placed for dialysis use based on comment from Dr. Jenn Murray in his order set.

## 2020-09-18 NOTE — PROGRESS NOTES
LENI PLAN:    RUR-24%    Patient was admitted from home and will be discharged home in care of his significant other    Patient needs outpatient HD set up. Ist dialysis started on 9/16/20 via a Danny cath    Clinical notes including, labs, HD flowsheets, demographics and CXR result faxed to [phone]    CM to call Addie Suarez with [address] at [phone] on Monday to follow up. Patient will need a Perma-Cath placed prior to discharge    Patient's girlfriend will provide transportation home    YJamshid Hernandez MSA, RN,CRM

## 2020-09-18 NOTE — PROGRESS NOTES
Hospitalist Progress Note  Baldomero Meigs, MD  Answering service: [phone] from in house phone      Date of Service:  2020  NAME:  Hayden Alvarez  :  1970  MRN:  [id number]    Admission Summary:   50M p/w Weakness. New ESRD   Interval history / Subjective:   Patient seen and examined at bedside, feels ok. No acute complaints. No fever/chills. No issues overnight. Assessment & Plan:     ESRD: - PD cath placed , not yet used-didn't attend training  Thrombocytopenia- likely 2nd to ESRD- monitor  Generalized Weakness: largely immobile x2 weeks-suspect d/t severe uremia- PT/OT   - S/p Danny. started HD-Avoid nephrotoxins-Appreciate nephrology - Palliative eval  - will need HD chair/ peraCath prior to dc. BCs NGTD- dc Abx if still negative     Anemia of chronic disease: d/t ESRD-no s/sx bleeding  - s/p Transfusions. monitor Hb, transfuse for <7. Folate wnl. vit b12 wnl. Hemoccult pending    Acute respiratory failure with hypoxia- requiring 3L NC O2- weaning off  - CTA chest: No PE, Duplex LEs: no DVTs    CHF: EF 30% per records- not decompensated.-Continue home coreg, entresto  - Bumex-defer to nephrology- TTE: ef 50%. Mod pHTN    HTN-BP soft-start albumin IV x3 doses- Monitor   COPD-PRN duonebs-Keep O2 greater than 90%  DMII: last A1c over 10-check A1c-Start lantus-AC/HS accuchecks with SSI   HLD-not on statin-check lipid panel  Tobacco Use-1PPD x 33 years-counseled for cessation-nicotine patch ordered  #.  HypoCalcemia: replace, monitor    Code status: Full  DVT prophylaxis: SCDs  Care Plan discussed with: Patient/Family and Nurse  Disposition: TBD     Hospital Problems  Date Reviewed: 2020          Codes Class Noted POA    * (Principal) Acute on chronic renal failure ([facility] ICD-10-CM: N17.9, N18.9  ICD-9-CM: 584.9, 585.9  2020 Yes        Congestive heart failure ([facility] ICD-10-CM: I50.9  ICD-9-CM: 428.0  Unknown Yes Overview Signed 9/16/2020  6:11 PM by Jennifer Perry NP     chronic left sided congestive heart failure             Chronic obstructive pulmonary disease ([facility] ICD-10-CM: J44.9  ICD-9-CM: 496  Unknown Yes        Anemia ICD-10-CM: D64.9  ICD-9-CM: 285. 9  Unknown Yes        Weakness generalized ICD-10-CM: R53.1  ICD-9-CM: 780.79  Unknown Yes        Thrombocytopenia (HCC) ICD-10-CM: D69.6  ICD-9-CM: 287.5  Unknown Yes        Acute respiratory failure with hypoxia (HCC) ICD-10-CM: J96.01  ICD-9-CM: 518.81  Unknown Yes        Obesity, morbid ([facility] ICD-10-CM: E66.01  ICD-9-CM: 278.01  8/19/2020 Yes        Tobacco abuse ICD-10-CM: Z72.0  ICD-9-CM: 305.1  11/19/2009 Yes        HTN (hypertension), benign ICD-10-CM: I10  ICD-9-CM: 401.1  11/19/2009 Yes        DM (diabetes mellitus) ([facility] ICD-10-CM: E11.9  ICD-9-CM: 250.00  11/19/2009 Yes        Hypercholesteremia ICD-10-CM: E78.00  ICD-9-CM: 272.0  11/19/2009 Yes            Review of Systems:   Pertinent items are mentioned in interval history. Vital Signs:    Last 24hrs VS reviewed since prior progress note. Most recent are:  Visit Vitals  BP (!) 155/32   Pulse 75   Temp 97.9 °F (36.6 °C)   Resp 20   Ht 6' 3\" (1.905 m)   Wt (!) 162.4 kg (358 lb)   SpO2 96%   BMI 44.75 kg/m²         Intake/Output Summary (Last 24 hours) at 9/18/2020 0754  Last data filed at 9/18/2020 0300  Gross per 24 hour   Intake    Output 0 ml   Net 0 ml        Physical Examination:   General:  Alert, oriented, No acute distress, obese  Card:  S1, S2 without murmurs, good peripheral perfusion  Resp:  No accessory muscle use, Good AE, no wheezes, no rhonchi  Abd:  Soft, non-tender, non-distended, BS+  Extremities:  No cyanosis or clubbing, significant edema b/l LEs  Neuro:  Grossly normal, no focal neuro deficits, follows commands   Psych:  Good insight, not agitated.     Data Review:    Review and/or order of clinical lab test  Review and/or order of tests in the radiology section of CPT  Review and/or order of tests in the medicine section of [facility]  Labs:     Recent Labs     09/18/20 0400 09/17/20 0133   WBC 5.5 6.0   HGB 6.4* 6.4*   HCT 20.5* 20.6*   PLT 95* 104*     Recent Labs     09/18/20  0400 09/17/20 0133 09/16/20  1408 09/16/20  1402    137 136 136   K 3.6 5.1 5.4* 5.4*    106 108 107   CO2 21 14* 13* 11*   BUN 95* 169* 176* 184*   CREA 7.15* 11.90* 13.20* 13.20*   GLU 80 87 134* 129*   CA 6.7* 6.4* 6.5* 6.6*   MG 2.0  --   --   --    PHOS 5.8*  --   --  10.4*     Recent Labs     09/18/20 0400 09/17/20 0133 09/16/20  1408   ALT  --  15 15   AP  --  204* 220*   TBILI  --  0.8 0.8   TP  --  6.7 6.9   ALB 2.8* 2.9* 2.7*   GLOB  --  3.8 4.2*     No results for input(s): INR, PTP, APTT, INREXT, INREXT in the last 72 hours. Recent Labs     09/17/20 0133   TIBC 152*   PSAT 30   FERR 264      Lab Results   Component Value Date/Time    Folate 9.6 09/18/2020 04:00 AM      No results for input(s): PH, PCO2, PO2 in the last 72 hours.   Recent Labs     09/16/20  1402   TROIQ <0.05     Lab Results   Component Value Date/Time    Cholesterol, total 284 (H) 02/22/2013 08:21 AM    HDL Cholesterol 39 (L) 02/22/2013 08:21 AM    LDL, calculated Comment 02/22/2013 08:21 AM    Triglyceride 788 (HH) 02/22/2013 08:21 AM     Lab Results   Component Value Date/Time    Glucose (POC) 123 (H) 09/18/2020 06:46 AM    Glucose (POC) 134 (H) 09/17/2020 09:34 PM    Glucose (POC) 126 (H) 09/17/2020 04:13 PM    Glucose (POC) 101 (H) 09/17/2020 12:35 PM    Glucose (POC) 82 09/17/2020 08:26 AM     Lab Results   Component Value Date/Time    Color YELLOW/STRAW 09/17/2020 01:33 AM    Appearance CLEAR 09/17/2020 01:33 AM    Specific gravity 1.015 09/17/2020 01:33 AM    pH (UA) 5.0 09/17/2020 01:33 AM    Protein 100 (A) 09/17/2020 01:33 AM    Glucose Negative 09/17/2020 01:33 AM    Ketone Negative 09/17/2020 01:33 AM    Bilirubin Negative 09/17/2020 01:33 AM    Urobilinogen 0.2 09/17/2020 01:33 AM    Nitrites Negative 09/17/2020 01:33 AM    Leukocyte Esterase Negative 09/17/2020 01:33 AM    Epithelial cells FEW 09/17/2020 01:33 AM    Bacteria Negative 09/17/2020 01:33 AM    WBC 0-4 09/17/2020 01:33 AM    RBC 0-5 09/17/2020 01:33 AM     Medications Reviewed:     Current Facility-Administered Medications   Medication Dose Route Frequency    0.9% sodium chloride infusion 250 mL  250 mL IntraVENous PRN    piperacillin-tazobactam (ZOSYN) 3.375 g in 0.9% sodium chloride (MBP/ADV) 100 mL  3.375 g IntraVENous Q12H    Vancomycin Pharmacy Dosing-dialysis pt   Other Rx Dosing/Monitoring    heparin (porcine) 1,000 unit/mL injection 2,500 Units  2,500 Units Hemodialysis DIALYSIS PRN    0.9% sodium chloride infusion 250 mL  250 mL IntraVENous PRN    oxyCODONE IR (ROXICODONE) tablet 5 mg  5 mg Oral Q4H PRN    carvediloL (COREG) tablet 12.5 mg  12.5 mg Oral BID WITH MEALS    epoetin brad-epbx (RETACRIT) injection 10,000 Units  10,000 Units SubCUTAneous Q TUE, THU & SAT    albumin human 25% (BUMINATE) solution 12.5 g  12.5 g IntraVENous DIALYSIS PRN    sodium chloride (NS) flush 5-40 mL  5-40 mL IntraVENous Q8H    sodium chloride (NS) flush 5-40 mL  5-40 mL IntraVENous PRN    acetaminophen (TYLENOL) tablet 650 mg  650 mg Oral Q6H PRN    Or    acetaminophen (TYLENOL) suppository 650 mg  650 mg Rectal Q6H PRN    polyethylene glycol (MIRALAX) packet 17 g  17 g Oral DAILY PRN    promethazine (PHENERGAN) tablet 12.5 mg  12.5 mg Oral Q6H PRN    Or    ondansetron (ZOFRAN) injection 4 mg  4 mg IntraVENous Q6H PRN    glucose chewable tablet 16 g  4 Tab Oral PRN    glucagon (GLUCAGEN) injection 1 mg  1 mg IntraMUSCular PRN    dextrose 10% infusion 0-250 mL  0-250 mL IntraVENous PRN    insulin glargine (LANTUS) injection 10 Units  10 Units SubCUTAneous QHS    insulin lispro (HUMALOG) injection   SubCUTAneous AC&HS    allopurinoL (ZYLOPRIM) tablet 300 mg  300 mg Oral DAILY    aspirin delayed-release tablet 81 mg  81 mg Oral DAILY    ferrous sulfate tablet 325 mg  325 mg Oral DAILY WITH BREAKFAST    magnesium oxide (MAG-OX) tablet 400 mg  400 mg Oral DAILY    sacubitriL-valsartan (ENTRESTO) 49-51 mg tablet 1 Tab  1 Tab Oral BID    nicotine (NICODERM CQ) 14 mg/24 hr patch 1 Patch  1 Patch TransDERmal Q24H    albuterol-ipratropium (DUO-NEB) 2.5 MG-0.5 MG/3 ML  3 mL Nebulization Q4H PRN     Facility-Administered Medications Ordered in Other Encounters   Medication Dose Route Frequency    heparin (porcine) pf 300 Units  300 Units InterCATHeter PRN   ______________________________________________________________________  EXPECTED LENGTH OF STAY: - - -  ACTUAL LENGTH OF STAY:          2               Chiquita Campa MD

## 2020-09-18 NOTE — PROGRESS NOTES
Spiritual Care Assessment/Progress Note  [facility]      NAME: Eric Gonzalez      MRN: [id number]  AGE: 53 y.o.  SEX: male  [employer]ar[employer] Affiliation: No preference   Language: English     9/18/2020     Total Time (in minutes): 10     [facility] conversation with:         [x]Patient        [] Family    [x] Friend(s)        Reason for Consult: Initial/Spiritual assessment, patient floor     Spiritual beliefs: (Please include comment if needed)     [] Identifies with a sangeeta tradition:         [] Supported by a sangeeta community:            [] Claims no spiritual orientation:           [] Seeking spiritual identity:                [] Adheres to an individual form of spirituality:           [x] Not able to assess:                           Identified resources for coping:      [] Prayer                               [] Music                  [] Guided Imagery     [] Family/friends                 [] Pet visits     [] Devotional reading                         [x] Unknown     [] Other                                              Interventions offered during this visit: (See comments for more details)    Patient Interventions: Affirmation of emotions/emotional suffering, Prayer (assurance of)     Family/Friend(s): Prayer (assurance of)     Plan of Care:     [] Support spiritual and/or cultural needs    [] Support AMD and/or advance care planning process      [] Support grieving process   [] Coordinate Rites and/or Rituals    [] Coordination with community clergy   [] No spiritual needs identified at this time   [] Detailed Plan of Care below (See Comments)  [] Make referral to Music Therapy  [] Make referral to Pet Therapy     [] Make referral to Addiction services  [] Make referral to [facility]  [] Make referral to Spiritual Care Partner  [] No future visits requested        [x] Follow up visits as needed     Comments: Visited Mr. Jeannine Nguyen and his significant other, Joan, who was present at bedside. Mr. Jose A Arroyo was experiencing some discomfort, so interaction was limited with him at this time. Joan shared that pt has improved since being admitted. Offered assurance of prayers to [employer]. Unable to fully assess pt at this time regarding spiritual needs and resources for coping.     Jane Longoria, Palliative

## 2020-09-18 NOTE — PROGRESS NOTES
Name: Leon Kc   MRN: [id number]  : 1970  Assessment:  SULEMAN on CKD-4/5>>Pre-ESRD (Dr. Caridad Muller)- STARTED HD  (1st Rx via R Danny)  Metabolic acidosis  Uremia  Anemia with component of ACKD  Chronic S-CHF; EF of 30%  DM-2  HTN  Obesity  Gout  NRP- 8 grams on last check; due to DN and Obesity    : Got his 2nd HD late last nigh and finished in AM; 3rd Rx today; seen on HD; 3 hr Rx, 300 QB, 2 Kg UF as erik. BP stable. Getting a unit of PRBC with HD     - CTA chest did not show PE; got 1st HD yesterday. Acidosis marginally better. BP is low and will limit how much UF we can do with HD; hgb is low and 1 unit PRBC ordered    - pt has advance CKD with severe azotemia, acidosis and high K. He is uremic. He recently got PD catheter placed and did not come for training. Subsequently got more worse clinically; now has advance renal failure with uremia. Has PD cath in place but has not been trained. Will need to start HD. Discussed r/b of catheter placement and HD process. Pt/GF understands and agrees to proceed     Plan/Recommendations:  Plan next HD tomm and then MWF from next week  Will need Permacath and outpt HD unit before d/c- which can be planned on Mon/Tue  Consult CM for outpt HD unit set up- orders placed  Ct Coreg/Entresto  Ct EPO  Will eventually transition to PD, once he is more stable clinically  PT consult     Subjective:  Tired of laying in bed. Getting HD.  Tolerating well    ROS:   No nausea, no vomiting  No chest pain, + shortness of breath-improving    Exam:  Visit Vitals  BP (!) 139/52   Pulse 76   Temp 97.9 °F (36.6 °C)   Resp 20   Ht 6' 3\" (1.905 m)   Wt (!) 162.4 kg (358 lb)   SpO2 94%   BMI 44.75 kg/m²     WB/WN in NAD  AT/NC  R Danny in place  Clear  RRR, distant  +edema  AOx3    Current Facility-Administered Medications   Medication Dose Route Frequency Last Dose    0.9% sodium chloride infusion 250 mL  250 mL IntraVENous PRN      piperacillin-tazobactam (ZOSYN) 3.375 g in 0.9% sodium chloride (MBP/ADV) 100 mL  3.375 g IntraVENous Q12H 3.375 g at 09/18/20 0511    Vancomycin Pharmacy Dosing-dialysis pt   Other Rx Dosing/Monitoring      heparin (porcine) 1,000 unit/mL injection 2,500 Units  2,500 Units Hemodialysis DIALYSIS PRN 2,500 Units at 09/18/20 0356    0.9% sodium chloride infusion 250 mL  250 mL IntraVENous PRN      oxyCODONE IR (ROXICODONE) tablet 5 mg  5 mg Oral Q4H PRN 5 mg at 09/17/20 2156    carvediloL (COREG) tablet 12.5 mg  12.5 mg Oral BID WITH MEALS Stopped at 09/17/20 1700    epoetin brad-epbx (RETACRIT) injection 10,000 Units  10,000 Units SubCUTAneous Q TUE, THU & SAT 10,000 Units at 09/17/20 2156    albumin human 25% (BUMINATE) solution 12.5 g  12.5 g IntraVENous DIALYSIS PRN 12.5 g at 09/18/20 0100    sodium chloride (NS) flush 5-40 mL  5-40 mL IntraVENous Q8H 10 mL at 09/17/20 2156    sodium chloride (NS) flush 5-40 mL  5-40 mL IntraVENous PRN      acetaminophen (TYLENOL) tablet 650 mg  650 mg Oral Q6H PRN      Or    acetaminophen (TYLENOL) suppository 650 mg  650 mg Rectal Q6H PRN      polyethylene glycol (MIRALAX) packet 17 g  17 g Oral DAILY PRN      promethazine (PHENERGAN) tablet 12.5 mg  12.5 mg Oral Q6H PRN      Or    ondansetron (ZOFRAN) injection 4 mg  4 mg IntraVENous Q6H PRN      glucose chewable tablet 16 g  4 Tab Oral PRN      glucagon (GLUCAGEN) injection 1 mg  1 mg IntraMUSCular PRN      dextrose 10% infusion 0-250 mL  0-250 mL IntraVENous PRN      insulin glargine (LANTUS) injection 10 Units  10 Units SubCUTAneous QHS 10 Units at 09/17/20 2156    insulin lispro (HUMALOG) injection   SubCUTAneous AC&HS Stopped at 09/16/20 2200    allopurinoL (ZYLOPRIM) tablet 300 mg  300 mg Oral DAILY 300 mg at 09/18/20 0840    aspirin delayed-release tablet 81 mg  81 mg Oral DAILY 81 mg at 09/18/20 2776    ferrous sulfate tablet 325 mg  325 mg Oral DAILY WITH BREAKFAST 325 mg at 09/18/20 0841    sacubitriL-valsartan (ENTRESTO) 49-51 mg tablet 1 Tab  1 Tab Oral BID Stopped at 09/18/20 0854    nicotine (NICODERM CQ) 14 mg/24 hr patch 1 Patch  1 Patch TransDERmal Q24H      albuterol-ipratropium (DUO-NEB) 2.5 MG-0.5 MG/3 ML  3 mL Nebulization Q4H PRN       Facility-Administered Medications Ordered in Other Encounters   Medication Dose Route Frequency Last Dose    heparin (porcine) pf 300 Units  300 Units InterCATHeter PRN         Labs/Data:    Lab Results   Component Value Date/Time    WBC 5.5 09/18/2020 04:00 AM    HGB 6.4 (L) 09/18/2020 04:00 AM    Hematocrit (POC) 24 (L) 09/01/2020 07:11 AM    HCT 20.5 (L) 09/18/2020 04:00 AM    PLATELET 95 (L) 33/28/0972 04:00 AM    MCV 96.2 09/18/2020 04:00 AM       Lab Results   Component Value Date/Time    Sodium 137 09/18/2020 04:00 AM    Potassium 3.6 09/18/2020 04:00 AM    Chloride 106 09/18/2020 04:00 AM    CO2 21 09/18/2020 04:00 AM    Anion gap 10 09/18/2020 04:00 AM    Glucose 80 09/18/2020 04:00 AM    BUN 95 (H) 09/18/2020 04:00 AM    Creatinine 7.15 (H) 09/18/2020 04:00 AM    BUN/Creatinine ratio 13 09/18/2020 04:00 AM    GFR est AA 10 (L) 09/18/2020 04:00 AM    GFR est non-AA 8 (L) 09/18/2020 04:00 AM    Calcium 6.7 (L) 09/18/2020 04:00 AM       Wt Readings from Last 3 Encounters:   09/17/20 (!) 162.4 kg (358 lb)   09/01/20 (!) 161.7 kg (356 lb 7.7 oz)   08/19/20 (!) 162.8 kg (358 lb 12.8 oz)         Intake/Output Summary (Last 24 hours) at 9/18/2020 1426  Last data filed at 9/18/2020 0843  Gross per 24 hour   Intake 120 ml   Output 0 ml   Net 120 ml       Patient seen and examined. Chart reviewed. Labs, data and other pertinent notes reviewed in last 24 hrs.     PMH/SH/FH reviewed and unchanged compared to H&P    Discussed with pt/HD RN      Frantz Ramírez MD

## 2020-09-18 NOTE — PROGRESS NOTES
10:25  Received call from [facility] RN) - confirmed with nephrologist to repeat dialysis. Will perform dialysis between 11:00-12:00 today. Will administer ordered unit of blood with dialysis. Notified MD.    11:37  Notified MD diastolic BP continues to remain low - 125/37, HR 72. No new orders/instructions received.

## 2020-09-18 NOTE — PROCEDURES
[facility]  [phone]    Vitals   Pre   Post   Assessment   Pre   Post     Temp  Temp: 97.7 °F (36.5 °C) (09/18/20 1159)  97.7 LOC  Lethargic but arousable, oriented x 4 Alert and oriented x 4   HR   Pulse (Heart Rate): 71 (09/18/20 1159) 72 Lungs   diminished  same   B/P   BP: (!) 134/45 (09/18/20 1159) 148/68 Cardiac   Irregular, monitored at bedside  same   Resp   Resp Rate: 12 (09/18/20 1159) 16 Skin   Dry and intact same   Pain level  Pain Intensity 1: 7 (09/18/20 1104) 5 Edema    +1 BLE   same   Orders:    Duration:   Start:    1200 End:    1500 Total:   3 hours   Dialyzer:   Dialyzer/Set Up Inspection: Saray Villagran (09/18/20 1159)   K Bath:   Dialysate K (mEq/L): 3 (09/18/20 1159)   Ca Bath:   Dialysate CA (mEq/L): 2.5 (09/18/20 1159)   Na/Bicarb:   Dialysate NA (mEq/L): 140 (09/18/20 1159)   Target Fluid Removal:   Goal/Amount of Fluid to Remove (mL): 2000 mL (09/18/20 1159)   Access     Type & Location:   RIJ none tunneled cvc, with no s/s of infection, patent blood flow, Each catheter limb disinfected per p&p, caps removed, hubs disinfected per p&p.        Labs     Obtained/Reviewed   Critical Results Called   Date when labs were drawn-  Hgb-    HGB   Date Value Ref Range Status   09/18/2020 6.4 (L) 12.1 - 17.0 g/dL Final     K-    Potassium   Date Value Ref Range Status   09/18/2020 3.6 3.5 - 5.1 mmol/L Final     Comment:     INVESTIGATED PER DELTA CHECK PROTOCOL     Ca-   Calcium   Date Value Ref Range Status   09/18/2020 6.7 (L) 8.5 - 10.1 MG/DL Final     Bun-   BUN   Date Value Ref Range Status   09/18/2020 95 (H) 6 - 20 MG/DL Final     Comment:     INVESTIGATED PER DELTA CHECK PROTOCOL     Creat-   Creatinine   Date Value Ref Range Status   09/18/2020 7.15 (H) 0.70 - 1.30 MG/DL Final     Comment:     INVESTIGATED PER DELTA CHECK PROTOCOL        Medications/ Blood Products Given     Name   Dose   Route and Time     Heparin                Blood Volume Processed (BVP): 50.3 Net Fluid   Removed:  2000 ml   Comments   Time Out Done: 1115  Primary Nurse Rpt Pre:Stephany RN  Primary Nurse Gill Spatz, RN  Pt Education:Procedural and transfusion   Care Plan:Continue HD plan of care  Tx Summary:1200- Labs, medication and orders were reviewed. HD was started using RIJ cvc without any issues. 1 Unit of PRBC was started. 1300- 1 unit of PRBC was infused  1330-Dr. Whitlock at the at bedside assessing him  1500-HD was completed and pt tolerated it well. All possible blood was returned without difficulty. Primary nurse was given a report. Each dialysis catheter limb disinfected per p&p, blood returned per p&p, each dialysis hub disinfected per p&p, post dialysis catheter dwell instilled per order, and caps applied. Admiting Diagnosis:ESRD  Pt's previous clinic- TBD  Consent signed - Informed Consent Verified: Yes (09/18/20 1159)  [facility] Consent - Verified  Hepatitis Status- Negative/ Susceptible AG/AB  Machine #- Machine Number: [id number] (09/18/20 1159)  Telemetry status- Monitored at bedside  Pre-dialysis wt. - Pre-Dialysis Weight: 162.4 kg (358 lb 0.4 oz) (09/17/20 7166)

## 2020-09-18 NOTE — PROGRESS NOTES
Problem: Falls - Risk of  Goal: *Absence of Falls  Description: Document Palak Chávez Fall Risk and appropriate interventions in the flowsheet. Outcome: Progressing Towards Goal  Note: Fall Risk Interventions:  Mobility Interventions: Communicate number of staff needed for ambulation/transfer    Mentation Interventions: Adequate sleep, hydration, pain control    Medication Interventions: Patient to call before getting OOB    Elimination Interventions: Call light in reach    History of Falls Interventions: Door open when patient unattended         Problem: Patient Education: Go to Patient Education Activity  Goal: Patient/Family Education  Outcome: Progressing Towards Goal     Problem: Pressure Injury - Risk of  Goal: *Prevention of pressure injury  Description: Document Justo Scale and appropriate interventions in the flowsheet.   Outcome: Not Progressing Towards Goal  Note: Pressure Injury Interventions:  Sensory Interventions: Assess changes in LOC    Moisture Interventions: Absorbent underpads    Activity Interventions: Increase time out of bed, Pressure redistribution bed/mattress(bed type)    Mobility Interventions: Pressure redistribution bed/mattress (bed type)    Nutrition Interventions: Document food/fluid/supplement intake    Friction and Shear Interventions: Apply protective barrier, creams and emollients                Problem: Pain  Goal: *Control of Pain  Outcome: Progressing Towards Goal     Problem: Chronic Renal Failure  Goal: *Fluid and electrolytes stabilized  Outcome: Not Progressing Towards Goal

## 2020-09-19 LAB
ABO + RH BLD: NORMAL
BASOPHILS # BLD: 0 K/UL (ref 0–0.1)
BASOPHILS NFR BLD: 0 % (ref 0–1)
BLD PROD TYP BPU: NORMAL
BLD PROD TYP BPU: NORMAL
BLOOD GROUP ANTIBODIES SERPL: NORMAL
BPU ID: NORMAL
BPU ID: NORMAL
CROSSMATCH RESULT,%XM: NORMAL
CROSSMATCH RESULT,%XM: NORMAL
DIFFERENTIAL METHOD BLD: ABNORMAL
EOSINOPHIL # BLD: 0.3 K/UL (ref 0–0.4)
EOSINOPHIL NFR BLD: 4 % (ref 0–7)
ERYTHROCYTE [DISTWIDTH] IN BLOOD BY AUTOMATED COUNT: 17.9 % (ref 11.5–14.5)
GLUCOSE BLD STRIP.AUTO-MCNC: 102 MG/DL (ref 65–100)
GLUCOSE BLD STRIP.AUTO-MCNC: 113 MG/DL (ref 65–100)
GLUCOSE BLD STRIP.AUTO-MCNC: 144 MG/DL (ref 65–100)
GLUCOSE BLD STRIP.AUTO-MCNC: 86 MG/DL (ref 65–100)
HCT VFR BLD AUTO: 23.7 % (ref 36.6–50.3)
HGB BLD-MCNC: 7.2 G/DL (ref 12.1–17)
IMM GRANULOCYTES # BLD AUTO: 0.1 K/UL (ref 0–0.04)
IMM GRANULOCYTES NFR BLD AUTO: 1 % (ref 0–0.5)
LYMPHOCYTES # BLD: 0.5 K/UL (ref 0.8–3.5)
LYMPHOCYTES NFR BLD: 7 % (ref 12–49)
MCH RBC QN AUTO: 29.9 PG (ref 26–34)
MCHC RBC AUTO-ENTMCNC: 30.4 G/DL (ref 30–36.5)
MCV RBC AUTO: 98.3 FL (ref 80–99)
MONOCYTES # BLD: 0.8 K/UL (ref 0–1)
MONOCYTES NFR BLD: 12 % (ref 5–13)
NEUTS SEG # BLD: 4.9 K/UL (ref 1.8–8)
NEUTS SEG NFR BLD: 76 % (ref 32–75)
NRBC # BLD: 0 K/UL (ref 0–0.01)
NRBC BLD-RTO: 0 PER 100 WBC
PLATELET # BLD AUTO: 94 K/UL (ref 150–400)
PMV BLD AUTO: 10.5 FL (ref 8.9–12.9)
RBC # BLD AUTO: 2.41 M/UL (ref 4.1–5.7)
RBC MORPH BLD: ABNORMAL
RBC MORPH BLD: ABNORMAL
SERVICE CMNT-IMP: ABNORMAL
SERVICE CMNT-IMP: NORMAL
SPECIMEN EXP DATE BLD: NORMAL
STATUS OF UNIT,%ST: NORMAL
STATUS OF UNIT,%ST: NORMAL
UNIT DIVISION, %UDIV: 0
UNIT DIVISION, %UDIV: 0
WBC # BLD AUTO: 6.6 K/UL (ref 4.1–11.1)

## 2020-09-19 PROCEDURE — 77010033678 HC OXYGEN DAILY

## 2020-09-19 PROCEDURE — 74011636637 HC RX REV CODE- 636/637: Performed by: NURSE PRACTITIONER

## 2020-09-19 PROCEDURE — 97535 SELF CARE MNGMENT TRAINING: CPT

## 2020-09-19 PROCEDURE — 90935 HEMODIALYSIS ONE EVALUATION: CPT

## 2020-09-19 PROCEDURE — 74011000258 HC RX REV CODE- 258: Performed by: NURSE PRACTITIONER

## 2020-09-19 PROCEDURE — 74011250636 HC RX REV CODE- 250/636: Performed by: NURSE PRACTITIONER

## 2020-09-19 PROCEDURE — 74011250637 HC RX REV CODE- 250/637: Performed by: NURSE PRACTITIONER

## 2020-09-19 PROCEDURE — 36415 COLL VENOUS BLD VENIPUNCTURE: CPT

## 2020-09-19 PROCEDURE — 82962 GLUCOSE BLOOD TEST: CPT

## 2020-09-19 PROCEDURE — 97530 THERAPEUTIC ACTIVITIES: CPT | Performed by: PHYSICAL THERAPIST

## 2020-09-19 PROCEDURE — 74011250637 HC RX REV CODE- 250/637: Performed by: INTERNAL MEDICINE

## 2020-09-19 PROCEDURE — 97165 OT EVAL LOW COMPLEX 30 MIN: CPT

## 2020-09-19 PROCEDURE — 74011636637 HC RX REV CODE- 636/637: Performed by: INTERNAL MEDICINE

## 2020-09-19 PROCEDURE — 65270000029 HC RM PRIVATE

## 2020-09-19 PROCEDURE — 74011250636 HC RX REV CODE- 250/636: Performed by: INTERNAL MEDICINE

## 2020-09-19 PROCEDURE — 97530 THERAPEUTIC ACTIVITIES: CPT

## 2020-09-19 PROCEDURE — 97161 PT EVAL LOW COMPLEX 20 MIN: CPT | Performed by: PHYSICAL THERAPIST

## 2020-09-19 PROCEDURE — 94760 N-INVAS EAR/PLS OXIMETRY 1: CPT

## 2020-09-19 PROCEDURE — 85025 COMPLETE CBC W/AUTO DIFF WBC: CPT

## 2020-09-19 RX ORDER — LANOLIN ALCOHOL/MO/W.PET/CERES
3 CREAM (GRAM) TOPICAL
Status: DISCONTINUED | OUTPATIENT
Start: 2020-09-19 | End: 2020-09-23 | Stop reason: HOSPADM

## 2020-09-19 RX ORDER — FENTANYL CITRATE 50 UG/ML
12.5 INJECTION, SOLUTION INTRAMUSCULAR; INTRAVENOUS ONCE
Status: COMPLETED | OUTPATIENT
Start: 2020-09-19 | End: 2020-09-19

## 2020-09-19 RX ORDER — PREDNISONE 20 MG/1
40 TABLET ORAL
Status: COMPLETED | OUTPATIENT
Start: 2020-09-19 | End: 2020-09-23

## 2020-09-19 RX ADMIN — SACUBITRIL AND VALSARTAN 1 TABLET: 49; 51 TABLET, FILM COATED ORAL at 09:21

## 2020-09-19 RX ADMIN — PIPERACILLIN AND TAZOBACTAM 3.38 G: 3; .375 INJECTION, POWDER, LYOPHILIZED, FOR SOLUTION INTRAVENOUS at 03:17

## 2020-09-19 RX ADMIN — FERROUS SULFATE TAB 325 MG (65 MG ELEMENTAL FE) 325 MG: 325 (65 FE) TAB at 09:20

## 2020-09-19 RX ADMIN — OXYCODONE 5 MG: 5 TABLET ORAL at 11:04

## 2020-09-19 RX ADMIN — ALLOPURINOL 300 MG: 300 TABLET ORAL at 09:20

## 2020-09-19 RX ADMIN — Medication 10 ML: at 13:09

## 2020-09-19 RX ADMIN — SACUBITRIL AND VALSARTAN 1 TABLET: 49; 51 TABLET, FILM COATED ORAL at 17:16

## 2020-09-19 RX ADMIN — ASPIRIN 81 MG: 81 TABLET, COATED ORAL at 09:21

## 2020-09-19 RX ADMIN — Medication 20 ML: at 21:34

## 2020-09-19 RX ADMIN — Medication 10 ML: at 07:12

## 2020-09-19 RX ADMIN — OXYCODONE 5 MG: 5 TABLET ORAL at 21:25

## 2020-09-19 RX ADMIN — HEPARIN SODIUM 2500 UNITS: 1000 INJECTION INTRAVENOUS; SUBCUTANEOUS at 19:15

## 2020-09-19 RX ADMIN — CARVEDILOL 12.5 MG: 12.5 TABLET, FILM COATED ORAL at 09:22

## 2020-09-19 RX ADMIN — INSULIN GLARGINE 10 UNITS: 100 INJECTION, SOLUTION SUBCUTANEOUS at 23:02

## 2020-09-19 RX ADMIN — PREDNISONE 40 MG: 20 TABLET ORAL at 09:22

## 2020-09-19 RX ADMIN — Medication 3 MG: at 00:24

## 2020-09-19 RX ADMIN — OXYCODONE 5 MG: 5 TABLET ORAL at 17:16

## 2020-09-19 RX ADMIN — CARVEDILOL 12.5 MG: 12.5 TABLET, FILM COATED ORAL at 17:16

## 2020-09-19 RX ADMIN — EPOETIN ALFA-EPBX 10000 UNITS: 10000 INJECTION, SOLUTION INTRAVENOUS; SUBCUTANEOUS at 21:27

## 2020-09-19 RX ADMIN — FENTANYL CITRATE 12.5 MCG: 50 INJECTION, SOLUTION INTRAMUSCULAR; INTRAVENOUS at 00:24

## 2020-09-19 NOTE — PROGRESS NOTES
Hospitalist Progress Note  Mary Anne Calderon MD  Answering service: [phone] from in house phone      Date of Service:  2020  NAME:  Chivo Diehl  :  1970  MRN:  [id number]    Admission Summary:   50M p/w Weakness. New ESRD   Interval history / Subjective:   Patient seen and examined at bedside, feels better, has more energy, c/o pain in knees L>R gets gout flares frequently feels similar. Assessment & Plan:     ESRD: - PD cath placed , not yet used-didn't attend training  Thrombocytopenia- likely 2nd to ESRD- monitor  Generalized Weakness: largely immobile x2 weeks-suspect d/t severe uremia- PT/OT   - S/p Danny. started HD-Avoid nephrotoxins-Appreciate nephrology - Palliative eval  - will need HD chair/ peraCath prior to dc. BCs NGTD- dc Abx if still negative     Anemia of chronic disease: d/t ESRD-no s/sx bleeding  - s/p Transfusions. monitor Hb, transfuse for <7. Folate wnl. vit b12 wnl. Hemoccult pending    Acute respiratory failure with hypoxia- requiring 3L NC O2- weaning off  - CTA chest: No PE, Duplex LEs: no DVTs    CHF: EF 30% per records- not decompensated.-Continue home coreg, entresto  - Bumex-defer to nephrology- TTE: ef 50%. Mod pHTN    #. Gout: with acute flare- started with HD. Analgesia, short prednisone course  HTN-BP soft-start albumin IV x3 doses- Monitor   COPD-PRN duonebs-Keep O2 greater than 90%  DMII: last A1c over 10-check A1c-Start lantus-AC/HS accuchecks with SSI   HLD-not on statin-check lipid panel  Tobacco Use-1PPD x 33 years-counseled for cessation-nicotine patch ordered  #.  HypoCalcemia: replace, monitor    Code status: Full  DVT prophylaxis: SCDs  Care Plan discussed with: Patient/Family and Nurse  Disposition: TBD     Hospital Problems  Date Reviewed: 2020          Codes Class Noted POA    * (Principal) Acute on chronic renal failure ([facility] ICD-10-CM: N17.9, N18.9  ICD-9-CM: 584.9, 585.9  9/16/2020 Yes        Congestive heart failure ([facility] ICD-10-CM: I50.9  ICD-9-CM: 428.0  Unknown Yes    Overview Signed 9/16/2020  6:11 PM by Aimee Chambers NP     chronic left sided congestive heart failure             Chronic obstructive pulmonary disease ([facility] ICD-10-CM: J44.9  ICD-9-CM: 496  Unknown Yes        Anemia ICD-10-CM: D64.9  ICD-9-CM: 285. 9  Unknown Yes        Weakness generalized ICD-10-CM: R53.1  ICD-9-CM: 780.79  Unknown Yes        Thrombocytopenia (HCC) ICD-10-CM: D69.6  ICD-9-CM: 287.5  Unknown Yes        Acute respiratory failure with hypoxia (HCC) ICD-10-CM: J96.01  ICD-9-CM: 518.81  Unknown Yes        Obesity, morbid ([facility] ICD-10-CM: E66.01  ICD-9-CM: 278.01  8/19/2020 Yes        Tobacco abuse ICD-10-CM: Z72.0  ICD-9-CM: 305.1  11/19/2009 Yes        HTN (hypertension), benign ICD-10-CM: I10  ICD-9-CM: 401.1  11/19/2009 Yes        DM (diabetes mellitus) ([facility] ICD-10-CM: E11.9  ICD-9-CM: 250.00  11/19/2009 Yes        Hypercholesteremia ICD-10-CM: E78.00  ICD-9-CM: 272.0  11/19/2009 Yes            Review of Systems:   Pertinent items are mentioned in interval history. Vital Signs:    Last 24hrs VS reviewed since prior progress note.  Most recent are:  Visit Vitals  BP (!) 142/20 (BP 1 Location: Right arm, BP Patient Position: At rest)   Pulse 74   Temp 98.5 °F (36.9 °C)   Resp 16   Ht 6' 3\" (1.905 m)   Wt (!) 159 kg (350 lb 8.5 oz)   SpO2 97%   BMI 43.81 kg/m²         Intake/Output Summary (Last 24 hours) at 9/19/2020 0807  Last data filed at 9/18/2020 1846  Gross per 24 hour   Intake 420 ml   Output 2000 ml   Net -1580 ml        Physical Examination:   General:  Alert, oriented, No acute distress, obese  Card:  S1, S2 without murmurs, good peripheral perfusion  Resp:  No accessory muscle use, Good AE, no wheezes, no rhonchi  Abd:  Soft, non-tender, non-distended, BS+  Extremities:  No cyanosis or clubbing, significant edema b/l LEs  Neuro:  Grossly normal, no focal neuro deficits, follows commands   Psych:  Good insight, not agitated. Data Review:    Review and/or order of clinical lab test  Review and/or order of tests in the radiology section of CPT  Review and/or order of tests in the medicine section of CPT  Labs:     Recent Labs     09/19/20  0320 09/18/20  0400   WBC 6.6 5.5   HGB 7.2* 6.4*   HCT 23.7* 20.5*   PLT 94* 95*     Recent Labs     09/18/20  0400 09/17/20  0133 09/16/20  1408 09/16/20  1402    137 136 136   K 3.6 5.1 5.4* 5.4*    106 108 107   CO2 21 14* 13* 11*   BUN 95* 169* 176* 184*   CREA 7.15* 11.90* 13.20* 13.20*   GLU 80 87 134* 129*   CA 6.7* 6.4* 6.5* 6.6*   MG 2.0  --   --   --    PHOS 5.8*  --   --  10.4*     Recent Labs     09/18/20  0400 09/17/20  0133 09/16/20  1408   ALT  --  15 15   AP  --  204* 220*   TBILI  --  0.8 0.8   TP  --  6.7 6.9   ALB 2.8* 2.9* 2.7*   GLOB  --  3.8 4.2*     No results for input(s): INR, PTP, APTT, INREXT, INREXT in the last 72 hours. Recent Labs     09/17/20 0133   TIBC 152*   PSAT 30   FERR 264      Lab Results   Component Value Date/Time    Folate 9.6 09/18/2020 04:00 AM      No results for input(s): PH, PCO2, PO2 in the last 72 hours.   Recent Labs     09/16/20  1402   TROIQ <0.05     Lab Results   Component Value Date/Time    Cholesterol, total 284 (H) 02/22/2013 08:21 AM    HDL Cholesterol 39 (L) 02/22/2013 08:21 AM    LDL, calculated Comment 02/22/2013 08:21 AM    Triglyceride 788 (HH) 02/22/2013 08:21 AM     Lab Results   Component Value Date/Time    Glucose (POC) 86 09/19/2020 07:02 AM    Glucose (POC) 105 (H) 09/18/2020 09:29 PM    Glucose (POC) 120 (H) 09/18/2020 04:21 PM    Glucose (POC) 128 (H) 09/18/2020 11:34 AM    Glucose (POC) 123 (H) 09/18/2020 06:46 AM     Lab Results   Component Value Date/Time    Color YELLOW/STRAW 09/17/2020 01:33 AM    Appearance CLEAR 09/17/2020 01:33 AM    Specific gravity 1.015 09/17/2020 01:33 AM    pH (UA) 5.0 09/17/2020 01:33 AM    Protein 100 (A) 09/17/2020 01:33 AM Glucose Negative 09/17/2020 01:33 AM    Ketone Negative 09/17/2020 01:33 AM    Bilirubin Negative 09/17/2020 01:33 AM    Urobilinogen 0.2 09/17/2020 01:33 AM    Nitrites Negative 09/17/2020 01:33 AM    Leukocyte Esterase Negative 09/17/2020 01:33 AM    Epithelial cells FEW 09/17/2020 01:33 AM    Bacteria Negative 09/17/2020 01:33 AM    WBC 0-4 09/17/2020 01:33 AM    RBC 0-5 09/17/2020 01:33 AM     Medications Reviewed:     Current Facility-Administered Medications   Medication Dose Route Frequency    melatonin tablet 3 mg  3 mg Oral QHS PRN    0.9% sodium chloride infusion 250 mL  250 mL IntraVENous PRN    piperacillin-tazobactam (ZOSYN) 3.375 g in 0.9% sodium chloride (MBP/ADV) 100 mL  3.375 g IntraVENous Q12H    Vancomycin Pharmacy Dosing-dialysis pt   Other Rx Dosing/Monitoring    heparin (porcine) 1,000 unit/mL injection 2,500 Units  2,500 Units Hemodialysis DIALYSIS PRN    0.9% sodium chloride infusion 250 mL  250 mL IntraVENous PRN    oxyCODONE IR (ROXICODONE) tablet 5 mg  5 mg Oral Q4H PRN    carvediloL (COREG) tablet 12.5 mg  12.5 mg Oral BID WITH MEALS    epoetin brad-epbx (RETACRIT) injection 10,000 Units  10,000 Units SubCUTAneous Q TUE, THU & SAT    albumin human 25% (BUMINATE) solution 12.5 g  12.5 g IntraVENous DIALYSIS PRN    sodium chloride (NS) flush 5-40 mL  5-40 mL IntraVENous Q8H    sodium chloride (NS) flush 5-40 mL  5-40 mL IntraVENous PRN    acetaminophen (TYLENOL) tablet 650 mg  650 mg Oral Q6H PRN    Or    acetaminophen (TYLENOL) suppository 650 mg  650 mg Rectal Q6H PRN    polyethylene glycol (MIRALAX) packet 17 g  17 g Oral DAILY PRN    promethazine (PHENERGAN) tablet 12.5 mg  12.5 mg Oral Q6H PRN    Or    ondansetron (ZOFRAN) injection 4 mg  4 mg IntraVENous Q6H PRN    glucose chewable tablet 16 g  4 Tab Oral PRN    glucagon (GLUCAGEN) injection 1 mg  1 mg IntraMUSCular PRN    dextrose 10% infusion 0-250 mL  0-250 mL IntraVENous PRN    insulin glargine (LANTUS) injection 10 Units  10 Units SubCUTAneous QHS    insulin lispro (HUMALOG) injection   SubCUTAneous AC&HS    allopurinoL (ZYLOPRIM) tablet 300 mg  300 mg Oral DAILY    aspirin delayed-release tablet 81 mg  81 mg Oral DAILY    ferrous sulfate tablet 325 mg  325 mg Oral DAILY WITH BREAKFAST    sacubitriL-valsartan (ENTRESTO) 49-51 mg tablet 1 Tab  1 Tab Oral BID    nicotine (NICODERM CQ) 14 mg/24 hr patch 1 Patch  1 Patch TransDERmal Q24H    albuterol-ipratropium (DUO-NEB) 2.5 MG-0.5 MG/3 ML  3 mL Nebulization Q4H PRN     Facility-Administered Medications Ordered in Other Encounters   Medication Dose Route Frequency    heparin (porcine) pf 300 Units  300 Units InterCATHeter PRN   ______________________________________________________________________  EXPECTED LENGTH OF STAY: 4d 7h  ACTUAL LENGTH OF STAY:          [address], [place]

## 2020-09-19 NOTE — PROGRESS NOTES
0000  Pt has been complaining of generalized pain. We have provided tylenol to help with pain but due to low DBP we have been holding narcotic pain medications. I spoke with hospitalist Pam Lauren, to see if there was pain medications that could be given without effecting BP. One time dose of fentanyl was ordered. I also requested to have a CBC added to am labs due to pt receiving 1 unit PRBC during day shift and a repeat hgb has not yet been completed.

## 2020-09-19 NOTE — PROGRESS NOTES
Problem: Self Care Deficits Care Plan (Adult)  Goal: *Acute Goals and Plan of Care (Insert Text)  Description:   FUNCTIONAL STATUS PRIOR TO ADMISSION: Patient was independent and active without use of DME.     HOME SUPPORT: The patient lived with girlfriend but did not require assist.    Occupational Therapy Goals  Initiated 9/19/2020  1. Patient will perform upper body dressing with minimal assistance/contact guard assist within 7 day(s). 2.  Patient will perform lower body dressing with moderate assistance  within 7 day(s). 3.  Patient will perform toileting with minimal assistance/contact guard assist within 7 day(s). 4.  Patient will perform toilet transfers with minimal assistance  within 7 day(s). 5.  Patient will perform all aspects of toileting with minimal assistance  within 7 day(s). Outcome: Progressing Towards Goal      OCCUPATIONAL THERAPY EVALUATION  Patient: Hannah Zambrano (24 y.o. male)  Date: 9/19/2020  Primary Diagnosis: Acute on chronic renal failure (HCC) [N17.9, N18.9]        Precautions:      ASSESSMENT  Based on the objective data described below, the patient presents with fatigue and reduced endurance, strength, and decreased functional reach. Pt able to scoot and roll in bed with supervision. Required Min A x2 to transfer from supine to sitting and sat EOB for 10+ minutes. Able to complete UB ADLs seated EOB, but requires assistance with LB functional reach. Patient would benefit from skilled OT treatment to address functional mobility (fine and gross motor) and ADLs. Inpatient rehab is recommended at this time. Current Level of Function Impacting Discharge (ADLs/self-care): Upper body: Supervision-CGA; Lower body: Max A; Functional Mobility: supervision-Max A     Functional Outcome Measure:   The patient scored Total: 40/100 on the Barthel Index outcome measure which is indicative of 40% impaired ability to care for basic self needs/dependency on others; inferred 60% dependency on others for instrumental ADLs. Other factors to consider for discharge: Pt lives with girlfriend, independent at baseline      Patient will benefit from skilled therapy intervention to address the above noted impairments. PLAN :  Recommendations and Planned Interventions: self care training and functional mobility training    Frequency/Duration: Patient will be followed by occupational therapy 5 times a week to address goals. Recommendation for discharge: (in order for the patient to meet his/her long term goals)  Therapy 3 hours per day 5-7 days per week, pending progress, may consider home health    This discharge recommendation:  Has not yet been discussed the attending provider and/or case management    IF patient discharges home will need the following DME: AE: long handled bathing, AE: long handled dressing, bedside commode, shower chair, and walker: rolling       SUBJECTIVE:   Patient stated I've never felt so weak.     OBJECTIVE DATA SUMMARY:   HISTORY:   Past Medical History:   Diagnosis Date    Allergic rhinitis 11/19/2009    Anemia     CAD (coronary artery disease)     Chronic kidney disease     stage 4    Chronic obstructive pulmonary disease (HCC)     Congestive heart failure (HCC)     chronic left sided congestive heart failure    DM (diabetes mellitus) ([address] 11/19/2009    type 2    GERD (gastroesophageal reflux disease)     HTN (hypertension), benign 11/19/2009    Hypercholesteremia 11/19/2009    LBBB (left bundle branch block)     Obesity 11/19/2009    Pulmonary HTN ([address] 06/2020    mild     Tobacco abuse 11/19/2009    Weakness generalized      Past Surgical History:   Procedure Laterality Date    CARDIAC SURG PROCEDURE UNLIST  06/11/2020    echo ef 25-30% down from 40% on 04/19    HX APPENDECTOMY  2004    IR INSERT NON TUNL CVC LESS THAN 5 YR  9/16/2020       Expanded or extensive additional review of patient history:     Home Situation  Home Environment: Private residence  # Steps to Enter: 3  Rails to Enter: Yes  One/Two Story Residence: One story  Living Alone: No  Support Systems: Spouse/Significant Other/Partner  Patient Expects to be Discharged to[de-identified] Private residence  Current DME Used/Available at Home: None  Tub or Shower Type: Shower        EXAMINATION OF PERFORMANCE DEFICITS:  Cognitive/Behavioral Status:  Neurologic State: Alert  Orientation Level: Oriented X4  Cognition: Appropriate decision making  Perception: Appears intact  Perseveration: No perseveration noted  Safety/Judgement: Awareness of environment    Skin: intact    Edema: noted in feet     Hearing: Auditory  Auditory Impairment: None    Vision/Perceptual:                                Corrective Lenses: (no glasses, but noted vision problems )    Range of Motion:    AROM: Generally decreased, functional  PROM: Generally decreased, functional                      Strength:    Strength: Generally decreased, functional                Coordination:  Coordination: Within functional limits  Fine Motor Skills-Upper: Left Impaired;Right Impaired    Gross Motor Skills-Upper: Left Impaired;Right Impaired    Tone & Sensation:    Tone: Normal  Sensation: Intact                      Balance:  Sitting: Intact  Standing: Impaired  Standing - Static: Fair  Standing - Dynamic : Fair    Functional Mobility and Transfers for ADLs:  Bed Mobility:  Rolling: Supervision  Supine to Sit: Minimum assistance  Sit to Supine: Minimum assistance  Scooting: Supervision    Transfers:  Sit to Stand: Supervision  Stand to Sit: Supervision    ADL Assessment:  Feeding: Independent    Oral Facial Hygiene/Grooming: Setup    Bathing: Moderate assistance    Upper Body Dressing: Contact guard assistance    Lower Body Dressing: Maximum assistance    Toileting: Maximum assistance                ADL Intervention and task modifications:     Pt received supine in bed and wanted to sit on EOB.  Pt able to scoot and roll in bed with supervision and completed transfer from supine to EOB Min A x 2. Pt sat EOB 10+ min. Completed ADL task of brushing teeth with set up. Pt completed sitting to supine transfer Min A x 1. Grooming  Brushing Teeth: Set-up                             Cognitive Retraining  Safety/Judgement: Awareness of environment      Functional Measure:  Barthel Index:    Bathin  Bladder: 10  Bowels: 10  Groomin  Dressin  Feeding: 10  Mobility: 0  Stairs: 0  Toilet Use: 0  Transfer (Bed to Chair and Back): 0  Total: 40/100        The Barthel ADL Index: Guidelines  1. The index should be used as a record of what a patient does, not as a record of what a patient could do. 2. The main aim is to establish degree of independence from any help, physical or verbal, however minor and for whatever reason. 3. The need for supervision renders the patient not independent. 4. A patient's performance should be established using the best available evidence. Asking the patient, friends/relatives and nurses are the usual sources, but direct observation and common sense are also important. However direct testing is not needed. 5. Usually the patient's performance over the preceding 24-48 hours is important, but occasionally longer periods will be relevant. 6. Middle categories imply that the patient supplies over 50 per cent of the effort. 7. Use of aids to be independent is allowed. Saumya Mohamud., Barthel, D.W. (1582). Functional evaluation: the Barthel Index. [address] [facility] (14)2. FRANKLIN Mario, Ana Mendoza., Negro Thompson., [facility], [address] [place] [address] (). Measuring the change indisability after inpatient rehabilitation; comparison of the responsiveness of the Barthel Index and Functional [place] Measure. Journal of Neurology, Neurosurgery, and Psychiatry, 66(4), 994-551. Deangelo Vu, N.J.A, RONNY Patten, & Nicolle Kan MJamshidA. (2004.) Assessment of post-stroke quality of life in cost-effectiveness studies:  The usefulness of the Barthel Index and the EuroQoL-5D. Quality of Life Research, 15, 095-17         Occupational Therapy Evaluation Charge Determination   History Examination Decision-Making   LOW Complexity : Brief history review  LOW Complexity : 1-3 performance deficits relating to physical, cognitive , or psychosocial skils that result in activity limitations and / or participation restrictions  LOW Complexity : No comorbidities that affect functional and no verbal or physical assistance needed to complete eval tasks       Based on the above components, the patient evaluation is determined to be of the following complexity level: LOW   Pain Rating:  Back and knee pain reported. Activity Tolerance:   Fair  Please refer to the flowsheet for vital signs taken during this treatment. After treatment patient left in no apparent distress:    Supine in bed and Call bell within reach    COMMUNICATION/EDUCATION:   The patients plan of care was discussed with: Physical therapist and Registered nurse. Home safety education was provided and the patient/caregiver indicated understanding., Patient/family have participated as able in goal setting and plan of care. , and Patient/family agree to work toward stated goals and plan of care. This patients plan of care is appropriate for delegation to [facility]. Thank you for this referral.  [facility]    Regarding student involvement in patient care:  A student participated in this treatment session. Per CMS Medicare statements and AOTA guidelines I certify that the following was true:  1. I was present and directly observed the entire session. 2. I made all skilled judgments and clinical decisions regarding care. 3. I am the practitioner responsible for assessment, treatment, and documentation.

## 2020-09-19 NOTE — PROGRESS NOTES
Problem: Mobility Impaired (Adult and Pediatric)  Goal: *Acute Goals and Plan of Care (Insert Text)  Description: FUNCTIONAL STATUS PRIOR TO ADMISSION: Patient was independent and active without use of DME.    HOME SUPPORT PRIOR TO ADMISSION: The patient lived with girlfriend but did not require assist.    Physical Therapy Goals  Initiated 9/19/2020  1. Patient will move from supine to sit and sit to supine , scoot up and down, and roll side to side in bed with supervision/set-up within 7 day(s). 2.  Patient will transfer from bed to chair and chair to bed with supervision/set-up using the least restrictive device within 7 day(s). 3.  Patient will perform sit to stand with supervision/set-up within 7 day(s). 4.  Patient will ambulate with supervision/set-up for 150 feet with the least restrictive device within 7 day(s). 5.  Patient will ascend/descend 3 stairs with 1 handrail(s) with supervision/set-up within 7 day(s). Outcome: Progressing Towards Goal  PHYSICAL THERAPY EVALUATION  Patient: Mati Gupta (5 y.o. male)  Date: 9/19/2020  Primary Diagnosis: Acute on chronic renal failure (HCC) [N17.9, N18.9]        Precautions: fall       ASSESSMENT  Based on the objective data described below, the patient presents with generalized debility. Patient is 6'4\" and 350 pounds. He is rather weak but was able to come to sitting EOB with minimal assist of 2. Once up his sitting balance was good and he was able to sit for nearly 20 minutes. He stood a few times to adjust his position in sitting and then stood and ambulated with a wide based gait/side stepping to the head of the bed. I have a bariatric RW in the room but he declined its use today. He required minimal assist for 1 leg to get back up on the bed. The bed was then placed in the semi reclined chair position. Patient is cooperative and motivated.       Current Level of Function Impacting Discharge (mobility/balance): not yet able to ambulate or sit in a chair. Functional Outcome Measure: The patient scored 40 on the Barthel outcome measure which is indicative of 60% debility. Other factors to consider for discharge: none. Patient will benefit from skilled therapy intervention to address the above noted impairments. PLAN :  Recommendations and Planned Interventions: transfer training, gait training, and therapeutic exercises      Frequency/Duration: Patient will be followed by physical therapy:  5 times a week to address goals. Recommendation for discharge: (in order for the patient to meet his/her long term goals)  To be determined: depending on progress. This discharge recommendation:  Has not yet been discussed the attending provider and/or case management    IF patient discharges home will need the following DME: to be determined (TBD)         SUBJECTIVE:   Patient stated I am just so weak.     OBJECTIVE DATA SUMMARY:   HISTORY:    Past Medical History:   Diagnosis Date    Allergic rhinitis 11/19/2009    Anemia     CAD (coronary artery disease)     Chronic kidney disease     stage 4    Chronic obstructive pulmonary disease (HCC)     Congestive heart failure (HCC)     chronic left sided congestive heart failure    DM (diabetes mellitus) ([facility] 11/19/2009    type 2    GERD (gastroesophageal reflux disease)     HTN (hypertension), benign 11/19/2009    Hypercholesteremia 11/19/2009    LBBB (left bundle branch block)     Obesity 11/19/2009    Pulmonary HTN ([address] 06/2020    mild     Tobacco abuse 11/19/2009    Weakness generalized      Past Surgical History:   Procedure Laterality Date    CARDIAC SURG PROCEDURE UNLIST  06/11/2020    echo ef 25-30% down from 40% on 04/19    HX APPENDECTOMY  2004    IR INSERT NON TUNL CVC LESS THAN 5 YR  9/16/2020       Personal factors and/or comorbidities impacting plan of care: obesity.      Home Situation  Home Environment: Private residence  # Steps to Enter: 3  Rails to Enter: Yes  One/Two Story Residence: One story  Living Alone: No  Support Systems: Spouse/Significant Other/Partner  Patient Expects to be Discharged to[de-identified] Private residence  Current DME Used/Available at Home: None  Tub or Shower Type: Shower    EXAMINATION/PRESENTATION/DECISION MAKING:   Critical Behavior:  Neurologic State: Alert  Orientation Level: Oriented X4  Cognition: Appropriate decision making  Safety/Judgement: Awareness of environment  Hearing: Auditory  Auditory Impairment: None  Skin:  per nursing. Edema: patient has edema of bilat UE and LE's. Range Of Motion:  AROM: Generally decreased, functional           PROM: Generally decreased, functional           Strength:    Strength: Generally decreased, functional                    Tone & Sensation:   Tone: Normal              Sensation: Intact               Coordination:  Coordination: Within functional limits  Vision:   Corrective Lenses: (no glasses, but noted vision problems )  Functional Mobility:  Bed Mobility:  Rolling: Supervision  Supine to Sit: Minimum assistance  Sit to Supine: Minimum assistance  Scooting: Supervision  Transfers:  Sit to Stand: Supervision  Stand to Sit: Supervision                       Balance:   Sitting: Intact  Standing: Impaired  Standing - Static: Fair  Standing - Dynamic : Fair  Ambulation/Gait Training:  Distance (ft): 4 Feet (ft)     Ambulation - Level of Assistance: Contact guard assistance           Right Side Weight Bearing: Full  Left Side Weight Bearing: Full  Base of Support: Widened     Speed/Nancy: Pace decreased (<100 feet/min)  Step Length: Left shortened;Right shortened                    Therapeutic Exercises:   He is able to do one LAQ and one march. Functional Measure:  Barthel Index:    Bathin  Bladder: 10  Bowels: 10  Groomin  Dressin  Feeding: 10  Mobility: 0  Stairs: 0  Toilet Use: 0  Transfer (Bed to Chair and Back): 0  Total: 40/100       The Barthel ADL Index: Guidelines  1.  The index should be used as a record of what a patient does, not as a record of what a patient could do. 2. The main aim is to establish degree of independence from any help, physical or verbal, however minor and for whatever reason. 3. The need for supervision renders the patient not independent. 4. A patient's performance should be established using the best available evidence. Asking the patient, friends/relatives and nurses are the usual sources, but direct observation and common sense are also important. However direct testing is not needed. 5. Usually the patient's performance over the preceding 24-48 hours is important, but occasionally longer periods will be relevant. 6. Middle categories imply that the patient supplies over 50 per cent of the effort. 7. Use of aids to be independent is allowed. Tiny Stoner., Barthel, D.W. (4140). Functional evaluation: the Barthel Index. [address] [facility] (14)2. Saida Thomas ranulfo JIMENA BurkF, Selma Fatima., Eldon Langley., Mildred Lomax, [address] (1999). Measuring the change indisability after inpatient rehabilitation; comparison of the responsiveness of the Barthel Index and Functional [place] Measure. Journal of Neurology, Neurosurgery, and Psychiatry, 66(4), 363-888. Dante Braxton, N.J.A, Gi Regan,  W.J.M, & Taz Wilkinson MDONALDO. (2004.) Assessment of post-stroke quality of life in cost-effectiveness studies: The usefulness of the Barthel Index and the EuroQoL-5D.  Quality of Life Research, 15, 564-56           Physical Therapy Evaluation Charge Determination   History Examination Presentation Decision-Making   LOW Complexity : Zero comorbidities / personal factors that will impact the outcome / POC LOW Complexity : 1-2 Standardized tests and measures addressing body structure, function, activity limitation and / or participation in recreation  LOW Complexity : Stable, uncomplicated  LOW Complexity : FOTO score of       Based on the above components, the patient evaluation is determined to be of the following complexity level: LOW         Activity Tolerance:   Good  Please refer to the flowsheet for vital signs taken during this treatment. After treatment patient left in no apparent distress:   Supine in bed and Call bell within reach    COMMUNICATION/EDUCATION:   The patients plan of care was discussed with: Occupational therapist and Registered nurse. Fall prevention education was provided and the patient/caregiver indicated understanding., Patient/family have participated as able in goal setting and plan of care. , and Patient/family agree to work toward stated goals and plan of care.     Thank you for this referral.  Karen Conner, PT   Time Calculation: 40 mins

## 2020-09-19 NOTE — PROGRESS NOTES
1230 Scanned bladder >780 ml.  1247 Performed straight cath, output 900 ml, yahaira, clear, no odor, pt tolerated well.

## 2020-09-19 NOTE — PROGRESS NOTES
Name: Param Patton   MRN: [id number]  : 1970  Assessment:  SULEMAN on CKD-4/5>>Pre-ESRD (Dr. Jayant Owens)- STARTED HD - (1st Rx via R Danny)  Metabolic acidosis  Uremia  Anemia with component of ACKD  Chronic S-CHF; EF of 30%  DM-2  HTN  Obesity  Gout  NRP- 8 grams on last check; due to DN and Obesity       Plan/Recommendations:    Plan next HD today and then MWF from next week  Will need Permacath and outpt HD unit before d/c- which can be planned on Mon/Tue  Consult CM for outpt HD unit set up- orders placed  Ct Coreg/Entresto  Ct EPO  Will eventually transition to PD, once he is more stable clinically  PT consult    Will see again on Monday     Subjective:  Feeling better. No specific complaint. No questions. We discussed the above.      ROS:   No nausea, no vomiting  No chest pain, no shortness of breath    Exam:  Visit Vitals  BP (!) 142/20 (BP 1 Location: Right arm, BP Patient Position: At rest)   Pulse 74   Temp 98.5 °F (36.9 °C)   Resp 16   Ht 6' 3\" (1.905 m)   Wt (!) 159 kg (350 lb 8.5 oz)   SpO2 97%   BMI 43.81 kg/m²     WB/WN in NAD  AT/NC  R Danny in place  Clear  RRR, distant  +edema  AOx3    Current Facility-Administered Medications   Medication Dose Route Frequency Last Dose    melatonin tablet 3 mg  3 mg Oral QHS PRN 3 mg at 20 0024    0.9% sodium chloride infusion 250 mL  250 mL IntraVENous PRN      piperacillin-tazobactam (ZOSYN) 3.375 g in 0.9% sodium chloride (MBP/ADV) 100 mL  3.375 g IntraVENous Q12H 3.375 g at 20 0317    Vancomycin Pharmacy Dosing-dialysis pt   Other Rx Dosing/Monitoring      heparin (porcine) 1,000 unit/mL injection 2,500 Units  2,500 Units Hemodialysis DIALYSIS PRN 2,500 Units at 20 0356    0.9% sodium chloride infusion 250 mL  250 mL IntraVENous PRN      oxyCODONE IR (ROXICODONE) tablet 5 mg  5 mg Oral Q4H PRN 5 mg at 09/17/20 2156    carvediloL (COREG) tablet 12.5 mg  12.5 mg Oral BID WITH MEALS 12.5 mg at 09/18/20 1826    epoetin brad-epbx (RETACRIT) injection 10,000 Units  10,000 Units SubCUTAneous Q TUE, THU & SAT 10,000 Units at 09/17/20 2156    albumin human 25% (BUMINATE) solution 12.5 g  12.5 g IntraVENous DIALYSIS PRN 12.5 g at 09/18/20 0100    sodium chloride (NS) flush 5-40 mL  5-40 mL IntraVENous Q8H 10 mL at 09/19/20 9523    sodium chloride (NS) flush 5-40 mL  5-40 mL IntraVENous PRN      acetaminophen (TYLENOL) tablet 650 mg  650 mg Oral Q6H  mg at 09/18/20 2155    Or    acetaminophen (TYLENOL) suppository 650 mg  650 mg Rectal Q6H PRN      polyethylene glycol (MIRALAX) packet 17 g  17 g Oral DAILY PRN      promethazine (PHENERGAN) tablet 12.5 mg  12.5 mg Oral Q6H PRN      Or    ondansetron (ZOFRAN) injection 4 mg  4 mg IntraVENous Q6H PRN      glucose chewable tablet 16 g  4 Tab Oral PRN      glucagon (GLUCAGEN) injection 1 mg  1 mg IntraMUSCular PRN      dextrose 10% infusion 0-250 mL  0-250 mL IntraVENous PRN      insulin glargine (LANTUS) injection 10 Units  10 Units SubCUTAneous QHS 10 Units at 09/18/20 2155    insulin lispro (HUMALOG) injection   SubCUTAneous AC&HS Stopped at 09/16/20 2200    allopurinoL (ZYLOPRIM) tablet 300 mg  300 mg Oral DAILY 300 mg at 09/18/20 0840    aspirin delayed-release tablet 81 mg  81 mg Oral DAILY 81 mg at 09/18/20 0841    ferrous sulfate tablet 325 mg  325 mg Oral DAILY WITH BREAKFAST 325 mg at 09/18/20 0841    sacubitriL-valsartan (ENTRESTO) 49-51 mg tablet 1 Tab  1 Tab Oral BID 1 Tab at 09/18/20 1826    nicotine (NICODERM CQ) 14 mg/24 hr patch 1 Patch  1 Patch TransDERmal Q24H      albuterol-ipratropium (DUO-NEB) 2.5 MG-0.5 MG/3 ML  3 mL Nebulization Q4H PRN       Facility-Administered Medications Ordered in Other Encounters   Medication Dose Route Frequency Last Dose    heparin (porcine) pf 300 Units  300 Units InterCATHeter PRN Labs/Data:    Lab Results   Component Value Date/Time    WBC 6.6 09/19/2020 03:20 AM    HGB 7.2 (L) 09/19/2020 03:20 AM    Hematocrit (POC) 24 (L) 09/01/2020 07:11 AM    HCT 23.7 (L) 09/19/2020 03:20 AM    PLATELET 94 (L) 78/67/7571 03:20 AM    MCV 98.3 09/19/2020 03:20 AM       Lab Results   Component Value Date/Time    Sodium 137 09/18/2020 04:00 AM    Potassium 3.6 09/18/2020 04:00 AM    Chloride 106 09/18/2020 04:00 AM    CO2 21 09/18/2020 04:00 AM    Anion gap 10 09/18/2020 04:00 AM    Glucose 80 09/18/2020 04:00 AM    BUN 95 (H) 09/18/2020 04:00 AM    Creatinine 7.15 (H) 09/18/2020 04:00 AM    BUN/Creatinine ratio 13 09/18/2020 04:00 AM    GFR est AA 10 (L) 09/18/2020 04:00 AM    GFR est non-AA 8 (L) 09/18/2020 04:00 AM    Calcium 6.7 (L) 09/18/2020 04:00 AM       Wt Readings from Last 3 Encounters:   09/19/20 (!) 159 kg (350 lb 8.5 oz)   09/01/20 (!) 161.7 kg (356 lb 7.7 oz)   08/19/20 (!) 162.8 kg (358 lb 12.8 oz)         Intake/Output Summary (Last 24 hours) at 9/19/2020 0726  Last data filed at 9/18/2020 1846  Gross per 24 hour   Intake 420 ml   Output 2000 ml   Net -1580 ml       Patient seen and examined. Chart reviewed. Labs, data and other pertinent notes reviewed in last 24 hrs.     PMH/SH/FH reviewed and unchanged compared to H&P    Discussed with pt/HD RN      Loli Mcelroy MD

## 2020-09-19 NOTE — PROCEDURES
[facility]  [phone]    Vitals   Pre   Post   Assessment   Pre   Post     Temp  Temp: 98.7 °F (37.1 °C) (09/19/20 1537)  98.6 LOC  Lethargic. Easy to arouse. A&Ox4 Same   HR   Pulse (Heart Rate): 76 (09/19/20 1537) 70 Lungs   Clear Same    B/P   BP: 134/70 (09/19/20 1537) 175/60 Cardiac   Regular Same    Resp   Resp Rate: 20 (09/19/20 1537) 20 Skin   Intact Same    Pain level  0 0 Edema  3+     Same   Orders:    Duration:   Start:    1381 End:    1910 Total:   3.5 hours   Dialyzer:   Dialyzer/Set Up Inspection: Nikhil Burgess (09/19/20 1537)   K Bath:   Dialysate K (mEq/L): 3 (09/19/20 1537)   Ca Bath:   Dialysate CA (mEq/L): 2.5 (09/19/20 1537)   Na/Bicarb:   Dialysate NA (mEq/L): 140 (09/19/20 1537)   Target Fluid Removal:   Goal/Amount of Fluid to Remove (mL): 3000 mL (09/19/20 1537)   Access     Type & Location:   RIJ CVC: Dressing CDI. No s/s of infection. Both lumens aspirate & flush well. Running well at .     Labs     Obtained/Reviewed   Critical Results Called   Date when labs were drawn-  Hgb-    HGB   Date Value Ref Range Status   09/19/2020 7.2 (L) 12.1 - 17.0 g/dL Final     K-    Potassium   Date Value Ref Range Status   09/18/2020 3.6 3.5 - 5.1 mmol/L Final     Comment:     INVESTIGATED PER DELTA CHECK PROTOCOL     Ca-   Calcium   Date Value Ref Range Status   09/18/2020 6.7 (L) 8.5 - 10.1 MG/DL Final     Bun-   BUN   Date Value Ref Range Status   09/18/2020 95 (H) 6 - 20 MG/DL Final     Comment:     INVESTIGATED PER DELTA CHECK PROTOCOL     Creat-   Creatinine   Date Value Ref Range Status   09/18/2020 7.15 (H) 0.70 - 1.30 MG/DL Final     Comment:     INVESTIGATED PER DELTA CHECK PROTOCOL        Medications/ Blood Products Given     Name   Dose   Route and Time     Heparin dwell 2500 units IV @ 1915             Blood Volume Processed (BVP):    78.5L Net Fluid   Removed:  3000 mL   Comments   Time Out Done: 0344  Primary Nurse Rpt Jacqueline Harding RN  Primary Nurse Rpt Post: Justo Garcia RN  Pt Education: Procedural  Care Plan: Continue with HD per MD.   Tx Summary:    SBAR received from Primary RN. Pt arrived to HD suite A&Ox4. Consent signed & on file. 1537: Each catheter limb disinfected per p&p, caps removed, hubs disinfected per p&p. Each lumen aspirated for blood return and flushed with Normal Saline per policy. VSS. Dialysis Tx initiated. 1910: Tx ended. VSS. Each dialysis catheter limb disinfected per p&p, all possible blood returned per p&p, and each dialysis hub disinfected per p&p. Each lumen flushed, post dialysis catheter Heparin dwell instilled per order, and caps applied. Bed locked and in the lowest position, call bell and belongings in reach. SBAR given to Primary, RN. Patient is stable at time of their departure. All Dialysis related medications have been reviewed. Admiting Diagnosis: ESRD  Pt's previous clinic- n/a  Consent signed - Informed Consent Verified: Yes (09/19/20 1537)  Hepatitis Status- neg/susc 9/16/20  Machine #- Machine Number: [id number] (09/19/20 1537)  Telemetry status- None  Pre-dialysis wt. - Pre-Dialysis Weight: 162.4 kg (358 lb 0.4 oz) (09/17/20 9960)

## 2020-09-19 NOTE — PROGRESS NOTES
Problem: Falls - Risk of  Goal: *Absence of Falls  Description: Document Nelly Sahni Fall Risk and appropriate interventions in the flowsheet. Outcome: Progressing Towards Goal  Note: Fall Risk Interventions:  Mobility Interventions: Communicate number of staff needed for ambulation/transfer    Mentation Interventions: Adequate sleep, hydration, pain control    Medication Interventions: Patient to call before getting OOB    Elimination Interventions: Call light in reach    History of Falls Interventions: Door open when patient unattended         Problem: Patient Education: Go to Patient Education Activity  Goal: Patient/Family Education  Outcome: Progressing Towards Goal     Problem: Pressure Injury - Risk of  Goal: *Prevention of pressure injury  Description: Document Justo Scale and appropriate interventions in the flowsheet.   Outcome: Progressing Towards Goal  Note: Pressure Injury Interventions:  Sensory Interventions: Assess changes in LOC    Moisture Interventions: Absorbent underpads    Activity Interventions: Increase time out of bed    Mobility Interventions: Pressure redistribution bed/mattress (bed type)    Nutrition Interventions: Document food/fluid/supplement intake    Friction and Shear Interventions: Minimize layers                Problem: Patient Education: Go to Patient Education Activity  Goal: Patient/Family Education  Outcome: Progressing Towards Goal     Problem: Pain  Goal: *Control of Pain  Outcome: Progressing Towards Goal  Goal: *PALLIATIVE CARE:  Alleviation of Pain  Outcome: Progressing Towards Goal     Problem: Patient Education: Go to Patient Education Activity  Goal: Patient/Family Education  Outcome: Progressing Towards Goal     Problem: Chronic Renal Failure  Goal: *Fluid and electrolytes stabilized  Outcome: Progressing Towards Goal     Problem: Patient Education: Go to Patient Education Activity  Goal: Patient/Family Education  Outcome: Progressing Towards Goal

## 2020-09-19 NOTE — PROGRESS NOTES
TRANSFER - OUT REPORT:    Verbal report given to Kathy(name) on Giovani Pellet  being transferred to [facility](unit) for routine progression of care       Report consisted of patients Situation, Background, Assessment and   Recommendations(SBAR). Information from the following report(s) SBAR, Kardex, Intake/Output, MAR and Cardiac Rhythm SR was reviewed with the receiving nurse. Lines:   Peripheral IV 09/16/20 Right Hand (Active)   Site Assessment Clean, dry, & intact 09/19/20 1206   Phlebitis Assessment 0 09/19/20 1206   Infiltration Assessment 0 09/19/20 1206   Dressing Status Clean, dry, & intact 09/19/20 1206   Dressing Type Transparent;Tape 09/19/20 1206   Hub Color/Line Status Pink;Capped 09/19/20 1206   Action Taken Open ports on tubing capped 09/19/20 1206   Alcohol Cap Used Yes 09/19/20 1206       Peripheral IV 09/16/20 Left Antecubital (Active)   Site Assessment Clean, dry, & intact 09/19/20 1206   Phlebitis Assessment 0 09/19/20 1206   Infiltration Assessment 0 09/19/20 1206   Dressing Status Clean, dry, & intact 09/19/20 1206   Dressing Type Transparent;Tape 09/19/20 1206   Hub Color/Line Status Green;Capped 09/19/20 1206   Action Taken Open ports on tubing capped 09/19/20 1206   Alcohol Cap Used Yes 09/19/20 1206        Opportunity for questions and clarification was provided.       Patient transported with:   [employer]

## 2020-09-19 NOTE — CONSULTS
Palliative Medicine Consult  Geovanny: [phone] ([phone])    Patient Name: Sherri Ingram  YOB: 1970    Date of Initial Consult: 9/18/2020  Reason for Consult: Goals of care discussion  Requesting Provider: Dr. Kylee Orozco  Primary Care Physician: Nancy Jacobo PA-C     SUMMARY:   Sherri Ingram is a 53 y.o. with a past history of DM 2, CKD 4/5 s/p PD catheter placement (placed 9/1/2020, PD not initiated, patient no-show for training), HTN, HLD, anemia secondary to CKD, obesity, gout, chronic systolic CHF with EF 61%, pulmonary HTN,  + active tobacco use who was admitted on 9/16/2020 from home with a diagnosis of severe metabolic acidosis, renal failure and acute toxic respiratory failure. Current medical issues leading to Palliative Medicine involvement include: Goals of care discussion with a 72-year-old man with multiple advanced co morbidities, including recent  progression into renal failure in setting of full code and no HD. Patient presented to the ER with CC of increasing weakness and poor appetite x2 weeks s/p peritoneal dialysis catheter placement. Patient also reported unwitnessed fall at home, denied LOC or hitting head. In ER patient alert and oriented x4, BP 150s/80 with O2 >90% on room air. However his systolic BP became more labile with diastolic droppin significantly. His O2 became hypoxic at 87%, requiring oxygen via nasal cannula. Initial labs significant for severe azotemia, severe metabolic acidosis, creatinine 13 and high K. He was also thrombocytopenic, platelets 141. + uremic.  nephrology consulted, ordered Danny to be placed for emergent HD. CTA chest negative PE, + dependent atelectasis right upper, middle, lower lobes. Patient admitted      Course of hospitalization: Duplex negative DVT.   Patient underwent initial HD on 9/16, nephrology hopeful patient will stabilize after several HD sessions and will be able to transition to PD.  BP has remained labile, continues to require supplemental O2.  9/17 echo with EF 55-60%, mild to moderate AV stenosis and mild to moderate pulmonary HTN. Thrombocytopenia has declined. Psychosocial history: Single, has a girlfriend, he has 1 child, son Jason Chavez:   1. Advance care planning discussion  2. DNR discussion  3. Goals of care discussion  4. Weakness, generalized  5. Morbid obesity       PLAN:   1. Prior to visit completed extensive chart review, including her current hospitalization documentation and results of labs and other diagnostics, as well as recent outpatient records. 2. I also spoke with patient's nurse Zander Landin RN, prior to visit, she had stated he had returned from dialysis. 3. I met with the patient, no family at bedside. Patient was in bed, appeared to be sleeping, did wake fairly easily however remained drowsy, requiring continuous prompting to stay awake. 4. I introduced myself gave a brief description of palliative medicine. Assessed his understanding of hospitalization, though patient was oriented, most of his responses were either yes/no or limited to 2 or 3 word responses, due to drowsiness. 5. Advanced care planning discussion-no AMD in the EMR. Patient is single/unmarried. Patient denies having ever completed an AMD in the past.  However, patient stated that he would want his son Claudia Laird to be his healthcare decision-maker, though he did not know his phone number. 6. Patient is agreeable to completing an AMD, but declined to do so at that time. 7. Recommend having patient complete an AMD prior to discharge, if palliative team is unable to assist before then, recommend asking the 's for assistance. 8. ACP portion of EMR updated  9. DNR discussion-reiterated full CODE STATUS. 10. Goals of care discussion-wishes are for full restorative treatments and interventions, discharged back home with PD.   If patient is able to transition to PD during this hospitalization, if possible, PD teaching prior to discharge would be helpful. 11. Weakness, generalized- Renal failure, poor intake, s/p HD earlier in day. PT/OT working with patient, making progress. 12. Initial consult note routed to primary continuity provider and/or primary health care team members  13. Communicated plan of care with: Palliative [facility] Team, unit [facility] grigsby     GOALS OF CARE / TREATMENT PREFERENCES:     GOALS OF CARE:  Patient/Health Care Proxy Stated Goals: Prolong life    TREATMENT PREFERENCES:   Code Status: Full Code    Advance Care Planning:  [x] The [facility] Interdisciplinary Team has updated the ACP Navigator with Health Care Decision Maker and Patient Capacity      Advance Care Planning 9/19/2020   Patient's Healthcare Decision Maker is: Verbal statement (Legal Next of Kin remains as decision maker)   Confirm Advance Directive None   Patient Would Like to Complete Advance Directive Yes       Medical Interventions: Full interventions     Other Instructions: Other:    As far as possible, the palliative care team has discussed with patient / health care proxy about goals of care / treatment preferences for patient. HISTORY:     History obtained from: medical records.  Patient, nurse    CHIEF COMPLAINT: tired    HPI/SUBJECTIVE:    The patient is:   [x] Verbal and participatory  [] Non-participatory due to:   Patient stated that he is tired, having trouble staying awake, denied pain, denied SOB, denied any other complaints      Clinical Pain Assessment (nonverbal scale for severity on nonverbal patients):   Clinical Pain Assessment  Severity: 0          Duration: for how long has pt been experiencing pain (e.g., 2 days, 1 month, years)  Frequency: how often pain is an issue (e.g., several times per day, once every few days, constant)     FUNCTIONAL ASSESSMENT:     Palliative Performance Scale (PPS):  PPS: 40       PSYCHOSOCIAL/SPIRITUAL SCREENING: Palliative IDT has assessed this patient for cultural preferences / practices and a referral made as appropriate to needs (Cultural Services, Patient Advocacy, Ethics, etc.)    Any spiritual / Buddhist concerns:  [] Yes /  [x] No    Caregiver Burnout:  [] Yes /  [] No /  [x] No Caregiver Present      Anticipatory grief assessment:   [x] Normal  / [] Maladaptive       ESAS Anxiety: Anxiety: 0    ESAS Depression:          REVIEW OF SYSTEMS:     Positive and pertinent negative findings in ROS are noted above in HPI. The following systems were [x] reviewed / [] unable to be reviewed as noted in HPI  Other findings are noted below. Systems: constitutional, ears/nose/mouth/throat, respiratory, gastrointestinal, genitourinary, musculoskeletal, integumentary, neurologic, psychiatric, endocrine. Positive findings noted below. Modified ESAS Completed by: provider   Fatigue: 10 Drowsiness: 8     Pain: 0   Anxiety: 0 Nausea: 0   Anorexia: 1 Dyspnea: 0                    PHYSICAL EXAM:     From RN flowsheet:  Wt Readings from Last 3 Encounters:   09/19/20 (!) 350 lb 8.5 oz (159 kg)   09/01/20 (!) 356 lb 7.7 oz (161.7 kg)   08/19/20 (!) 358 lb 12.8 oz (162.8 kg)     Blood pressure (!) 152/107, pulse 78, temperature 98.7 °F (37.1 °C), temperature source Oral, resp. rate 20, height 6' 3\" (1.905 m), weight (!) 350 lb 8.5 oz (159 kg), SpO2 97 %.     Pain Scale 1: Numeric (0 - 10)  Pain Intensity 1: 7  Pain Onset 1: (chronic)  Pain Location 1: Leg  Pain Orientation 1: Lower, Left  Pain Description 1: Aching  Pain Intervention(s) 1: Repositioned  Last bowel movement, if known:     Constitutional: appears older than stated age, morbidly obese, drowsy, NAD  Eyes: pupils equal, anicteric  ENMT: no nasal discharge, moist mucous membranes  Cardiovascular: regular rhythm, distal pulses intact  Respiratory: breathing not labored, symmetric, nasal canula  Gastrointestinal: large round, soft non-tender, +bowel sounds  Musculoskeletal: no deformity, no tenderness to palpation  Skin: warm, dry  Neurologic: Drowsy, oriented, but difficult time staying awake, is able to follow commands, weak, but is able to move extremities  Psychiatric: Appropriate affect, no hallucinations  Other:       HISTORY:     Principal Problem:    Acute on chronic renal failure ([address] (9/16/2020)    Active Problems:    Tobacco abuse (11/19/2009)      HTN (hypertension), benign (11/19/2009)      DM (diabetes mellitus) ([address] (11/19/2009)      Hypercholesteremia (11/19/2009)      Obesity, morbid ([address] (8/19/2020)      Congestive heart failure (HCC) ()      Overview: chronic left sided congestive heart failure      Chronic obstructive pulmonary disease (HCC) ()      Anemia ()      Weakness generalized ()      Thrombocytopenia (HCC) ()      Acute respiratory failure with hypoxia (HCC) ()      Past Medical History:   Diagnosis Date    Allergic rhinitis 11/19/2009    Anemia     CAD (coronary artery disease)     Chronic kidney disease     stage 4    Chronic obstructive pulmonary disease (HCC)     Congestive heart failure (HCC)     chronic left sided congestive heart failure    DM (diabetes mellitus) ([address] 11/19/2009    type 2    GERD (gastroesophageal reflux disease)     HTN (hypertension), benign 11/19/2009    Hypercholesteremia 11/19/2009    LBBB (left bundle branch block)     Obesity 11/19/2009    Pulmonary HTN ([address] 06/2020    mild     Tobacco abuse 11/19/2009    Weakness generalized       Past Surgical History:   Procedure Laterality Date    CARDIAC SURG PROCEDURE UNLIST  06/11/2020    echo ef 25-30% down from 40% on 04/19    HX APPENDECTOMY  2004    IR INSERT NON TUNL CVC LESS THAN 5 YR  9/16/2020      Family History   Problem Relation Age of Onset    Hypertension Father     Heart Disease Father     Hypertension Mother       History reviewed, no pertinent family history.   Social History     Tobacco Use    Smoking status: Current Every Day Smoker Packs/day: 0.50     Years: 25.00     Pack years: 12.50     Types: Cigarettes    Smokeless tobacco: Former User     Quit date: 8/25/1995   Substance Use Topics    Alcohol use: Not Currently     Comment: pt quit  10/19     No Known Allergies   Current Facility-Administered Medications   Medication Dose Route Frequency    melatonin tablet 3 mg  3 mg Oral QHS PRN    predniSONE (DELTASONE) tablet 40 mg  40 mg Oral DAILY WITH BREAKFAST    0.9% sodium chloride infusion 250 mL  250 mL IntraVENous PRN    heparin (porcine) 1,000 unit/mL injection 2,500 Units  2,500 Units Hemodialysis DIALYSIS PRN    0.9% sodium chloride infusion 250 mL  250 mL IntraVENous PRN    oxyCODONE IR (ROXICODONE) tablet 5 mg  5 mg Oral Q4H PRN    carvediloL (COREG) tablet 12.5 mg  12.5 mg Oral BID WITH MEALS    epoetin brad-epbx (RETACRIT) injection 10,000 Units  10,000 Units SubCUTAneous Q TUE, THU & SAT    albumin human 25% (BUMINATE) solution 12.5 g  12.5 g IntraVENous DIALYSIS PRN    sodium chloride (NS) flush 5-40 mL  5-40 mL IntraVENous Q8H    sodium chloride (NS) flush 5-40 mL  5-40 mL IntraVENous PRN    acetaminophen (TYLENOL) tablet 650 mg  650 mg Oral Q6H PRN    Or    acetaminophen (TYLENOL) suppository 650 mg  650 mg Rectal Q6H PRN    polyethylene glycol (MIRALAX) packet 17 g  17 g Oral DAILY PRN    promethazine (PHENERGAN) tablet 12.5 mg  12.5 mg Oral Q6H PRN    Or    ondansetron (ZOFRAN) injection 4 mg  4 mg IntraVENous Q6H PRN    glucose chewable tablet 16 g  4 Tab Oral PRN    glucagon (GLUCAGEN) injection 1 mg  1 mg IntraMUSCular PRN    dextrose 10% infusion 0-250 mL  0-250 mL IntraVENous PRN    insulin glargine (LANTUS) injection 10 Units  10 Units SubCUTAneous QHS    insulin lispro (HUMALOG) injection   SubCUTAneous AC&HS    allopurinoL (ZYLOPRIM) tablet 300 mg  300 mg Oral DAILY    aspirin delayed-release tablet 81 mg  81 mg Oral DAILY    ferrous sulfate tablet 325 mg  325 mg Oral DAILY WITH BREAKFAST    sacubitriL-valsartan (ENTRESTO) 49-51 mg tablet 1 Tab  1 Tab Oral BID    nicotine (NICODERM CQ) 14 mg/24 hr patch 1 Patch  1 Patch TransDERmal Q24H    albuterol-ipratropium (DUO-NEB) 2.5 MG-0.5 MG/3 ML  3 mL Nebulization Q4H PRN     Facility-Administered Medications Ordered in Other Encounters   Medication Dose Route Frequency    heparin (porcine) pf 300 Units  300 Units InterCATHeter PRN          LAB AND IMAGING FINDINGS:     Lab Results   Component Value Date/Time    WBC 6.6 09/19/2020 03:20 AM    HGB 7.2 (L) 09/19/2020 03:20 AM    PLATELET 94 (L) 80/16/2283 03:20 AM     Lab Results   Component Value Date/Time    Sodium 137 09/18/2020 04:00 AM    Potassium 3.6 09/18/2020 04:00 AM    Chloride 106 09/18/2020 04:00 AM    CO2 21 09/18/2020 04:00 AM    BUN 95 (H) 09/18/2020 04:00 AM    Creatinine 7.15 (H) 09/18/2020 04:00 AM    Calcium 6.7 (L) 09/18/2020 04:00 AM    Magnesium 2.0 09/18/2020 04:00 AM    Phosphorus 5.8 (H) 09/18/2020 04:00 AM      Lab Results   Component Value Date/Time    Alk. phosphatase 204 (H) 09/17/2020 01:33 AM    Protein, total 6.7 09/17/2020 01:33 AM    Albumin 2.8 (L) 09/18/2020 04:00 AM    Globulin 3.8 09/17/2020 01:33 AM     No results found for: INR, PTMR, PTP, PT1, PT2, APTT, INREXT, INREXT   Lab Results   Component Value Date/Time    Iron 45 09/17/2020 01:33 AM    TIBC 152 (L) 09/17/2020 01:33 AM    Iron % saturation 30 09/17/2020 01:33 AM    Ferritin 264 09/17/2020 01:33 AM      No results found for: PH, PCO2, PO2  No components found for: GLPOC   No results found for: CPK, CKMB             Total time: 70 min  Counseling / coordination time, spent as noted above: 50 min  > 50% counseling / coordination?: yes    Prolonged service was provided for  []30 min   []75 min in face to face time in the presence of the patient, spent as noted above. Time Start:   Time End:   Note: this can only be billed with 77285 (initial) or 13412 (follow up).   If multiple start / stop times, list each separately.

## 2020-09-20 LAB
ALBUMIN SERPL-MCNC: 2.8 G/DL (ref 3.5–5)
ANION GAP SERPL CALC-SCNC: 5 MMOL/L (ref 5–15)
BUN SERPL-MCNC: 43 MG/DL (ref 6–20)
BUN/CREAT SERPL: 9 (ref 12–20)
CALCIUM SERPL-MCNC: 7 MG/DL (ref 8.5–10.1)
CHLORIDE SERPL-SCNC: 104 MMOL/L (ref 97–108)
CO2 SERPL-SCNC: 26 MMOL/L (ref 21–32)
CREAT SERPL-MCNC: 4.67 MG/DL (ref 0.7–1.3)
GLUCOSE BLD STRIP.AUTO-MCNC: 113 MG/DL (ref 65–100)
GLUCOSE BLD STRIP.AUTO-MCNC: 148 MG/DL (ref 65–100)
GLUCOSE BLD STRIP.AUTO-MCNC: 153 MG/DL (ref 65–100)
GLUCOSE BLD STRIP.AUTO-MCNC: 171 MG/DL (ref 65–100)
GLUCOSE SERPL-MCNC: 97 MG/DL (ref 65–100)
PHOSPHATE SERPL-MCNC: 3.8 MG/DL (ref 2.6–4.7)
POTASSIUM SERPL-SCNC: 3.7 MMOL/L (ref 3.5–5.1)
SERVICE CMNT-IMP: ABNORMAL
SODIUM SERPL-SCNC: 135 MMOL/L (ref 136–145)

## 2020-09-20 PROCEDURE — 94760 N-INVAS EAR/PLS OXIMETRY 1: CPT

## 2020-09-20 PROCEDURE — 74011250637 HC RX REV CODE- 250/637: Performed by: INTERNAL MEDICINE

## 2020-09-20 PROCEDURE — 65270000029 HC RM PRIVATE

## 2020-09-20 PROCEDURE — 74011636637 HC RX REV CODE- 636/637: Performed by: INTERNAL MEDICINE

## 2020-09-20 PROCEDURE — 74011250637 HC RX REV CODE- 250/637: Performed by: NURSE PRACTITIONER

## 2020-09-20 PROCEDURE — 74011636637 HC RX REV CODE- 636/637: Performed by: NURSE PRACTITIONER

## 2020-09-20 PROCEDURE — 77010033678 HC OXYGEN DAILY

## 2020-09-20 PROCEDURE — 36415 COLL VENOUS BLD VENIPUNCTURE: CPT

## 2020-09-20 PROCEDURE — 80069 RENAL FUNCTION PANEL: CPT

## 2020-09-20 PROCEDURE — 82962 GLUCOSE BLOOD TEST: CPT

## 2020-09-20 RX ORDER — GENTAMICIN SULFATE 1 MG/G
CREAM TOPICAL DAILY
Status: DISCONTINUED | OUTPATIENT
Start: 2020-09-20 | End: 2020-09-23 | Stop reason: HOSPADM

## 2020-09-20 RX ORDER — PANTOPRAZOLE SODIUM 40 MG/1
40 TABLET, DELAYED RELEASE ORAL
Status: DISCONTINUED | OUTPATIENT
Start: 2020-09-21 | End: 2020-09-23 | Stop reason: HOSPADM

## 2020-09-20 RX ADMIN — INSULIN LISPRO 2 UNITS: 100 INJECTION, SOLUTION INTRAVENOUS; SUBCUTANEOUS at 12:08

## 2020-09-20 RX ADMIN — ASPIRIN 81 MG: 81 TABLET, COATED ORAL at 09:18

## 2020-09-20 RX ADMIN — SACUBITRIL AND VALSARTAN 1 TABLET: 49; 51 TABLET, FILM COATED ORAL at 09:13

## 2020-09-20 RX ADMIN — PREDNISONE 40 MG: 20 TABLET ORAL at 07:44

## 2020-09-20 RX ADMIN — CARVEDILOL 12.5 MG: 12.5 TABLET, FILM COATED ORAL at 18:21

## 2020-09-20 RX ADMIN — INSULIN GLARGINE 10 UNITS: 100 INJECTION, SOLUTION SUBCUTANEOUS at 22:45

## 2020-09-20 RX ADMIN — CARVEDILOL 12.5 MG: 12.5 TABLET, FILM COATED ORAL at 07:44

## 2020-09-20 RX ADMIN — Medication 10 ML: at 07:44

## 2020-09-20 RX ADMIN — SACUBITRIL AND VALSARTAN 1 TABLET: 49; 51 TABLET, FILM COATED ORAL at 18:41

## 2020-09-20 RX ADMIN — Medication 10 ML: at 22:46

## 2020-09-20 RX ADMIN — ALLOPURINOL 300 MG: 300 TABLET ORAL at 09:14

## 2020-09-20 RX ADMIN — INSULIN LISPRO 2 UNITS: 100 INJECTION, SOLUTION INTRAVENOUS; SUBCUTANEOUS at 16:41

## 2020-09-20 RX ADMIN — GENTAMICIN SULFATE: 1 CREAM TOPICAL at 18:21

## 2020-09-20 RX ADMIN — Medication 10 ML: at 14:04

## 2020-09-20 RX ADMIN — ACETAMINOPHEN 650 MG: 325 TABLET ORAL at 15:29

## 2020-09-20 RX ADMIN — FERROUS SULFATE TAB 325 MG (65 MG ELEMENTAL FE) 325 MG: 325 (65 FE) TAB at 07:44

## 2020-09-20 NOTE — PROGRESS NOTES
Primary Nurse Noble Ruiz RN and AKASH Weiss performed a dual skin assessment on this patient Impairment noted- see wound doc flow sheet  Justo score is 17    Wound to B/l Buttocks.

## 2020-09-20 NOTE — PROGRESS NOTES
Problem: Falls - Risk of  Goal: *Absence of Falls  Description: Document Katy Luoon Fall Risk and appropriate interventions in the flowsheet. Outcome: Progressing Towards Goal  Note: Fall Risk Interventions:  Mobility Interventions: Communicate number of staff needed for ambulation/transfer, Strengthening exercises (ROM-active/passive), Utilize gait belt for transfers/ambulation    Mentation Interventions: Adequate sleep, hydration, pain control, Door open when patient unattended, Gait belt with transfers/ambulation    Medication Interventions: Evaluate medications/consider consulting pharmacy, Patient to call before getting OOB, Utilize gait belt for transfers/ambulation    Elimination Interventions: Call light in reach    History of Falls Interventions: Bed/chair exit alarm         Problem: Pressure Injury - Risk of  Goal: *Prevention of pressure injury  Description: Document Justo Scale and appropriate interventions in the flowsheet. Outcome: Progressing Towards Goal  Note: Pressure Injury Interventions:  Sensory Interventions: Assess changes in LOC    Moisture Interventions: Absorbent underpads, Minimize layers, Moisture barrier    Activity Interventions: Pressure redistribution bed/mattress(bed type), PT/OT evaluation    Mobility Interventions: Pressure redistribution bed/mattress (bed type), PT/OT evaluation, Turn and reposition approx.  every two hours(pillow and wedges)    Nutrition Interventions: Offer support with meals,snacks and hydration, Document food/fluid/supplement intake    Friction and Shear Interventions: Minimize layers, Apply protective barrier, creams and emollients

## 2020-09-20 NOTE — PROGRESS NOTES
Pt's Bariatric bed constantly beeping with different  Messages each time. No bed available at [facility] untill now. Spoke with Dimple Ham and Supervisor. Advised to order Bariatric bed from [address]. Order confirmation # [id number].

## 2020-09-20 NOTE — PROGRESS NOTES
Hospitalist Progress Note  Leslie Banda MD  Answering service: [phone] from in house phone      Date of Service:  2020  NAME:  Karle Kussmaul  :  1970  MRN:  [id number]    Admission Summary:   50M p/w Weakness. New ESRD   Interval history / Subjective:   Patient seen and examined at bedside, feels well. Thinks he is getting better every day, got up to bathroom today. Assessment & Plan:     ESRD: - PD cath placed , not yet used-didn't attend training  Thrombocytopenia- likely 2nd to ESRD- monitor  Generalized Weakness: largely immobile x2 weeks-suspect d/t severe uremia- PT/OT   - S/p Danny. started HD-Avoid nephrotoxins-Appreciate nephrology - Palliative eval  - will need HD chair/ peraCath prior to dc. BCs NGTD- dc Abx if still negative     Anemia of chronic disease: d/t ESRD-no s/sx bleeding  - s/p Transfusions. monitor Hb, transfuse for <7. Folate wnl. vit b12 wnl. Hemoccult pending    Acute respiratory failure with hypoxia- requiring 3L NC O2- weaning off  - CTA chest: No PE, Duplex LEs: no DVTs    CHF: EF 30% per records- not decompensated.-Continue home coreg, entresto  - Bumex-defer to nephrology- TTE: ef 50%. Mod pHTN    #. Gout: with acute flare- started with HD. Analgesia, short prednisone course  HTN-BP soft-start albumin IV x3 doses- Monitor   COPD-PRN duonebs-Keep O2 greater than 90%  DMII: last A1c over 10-check A1c-Start lantus-AC/HS accuchecks with SSI   HLD-not on statin-check lipid panel  Tobacco Use-1PPD x 33 years-counseled for cessation-nicotine patch ordered  #.  HypoCalcemia: replace, monitor    Code status: Full  DVT prophylaxis: SCDs  Care Plan discussed with: Patient/Family and Nurse  Disposition: TBD     Hospital Problems  Date Reviewed: 2020          Codes Class Noted POA    * (Principal) Acute on chronic renal failure ([facility] ICD-10-CM: N17.9, N18.9  ICD-9-CM: 584.9, 585.9  2020 Yes        Congestive heart failure ([facility] ICD-10-CM: I50.9  ICD-9-CM: 428.0  Unknown Yes    Overview Signed 9/16/2020  6:11 PM by Coy De Jesus NP     chronic left sided congestive heart failure             Chronic obstructive pulmonary disease ([facility] ICD-10-CM: J44.9  ICD-9-CM: 496  Unknown Yes        Anemia ICD-10-CM: D64.9  ICD-9-CM: 285. 9  Unknown Yes        Weakness generalized ICD-10-CM: R53.1  ICD-9-CM: 780.79  Unknown Yes        Thrombocytopenia (HCC) ICD-10-CM: D69.6  ICD-9-CM: 287.5  Unknown Yes        Acute respiratory failure with hypoxia (HCC) ICD-10-CM: J96.01  ICD-9-CM: 518.81  Unknown Yes        Obesity, morbid ([facility] ICD-10-CM: E66.01  ICD-9-CM: 278.01  8/19/2020 Yes        Tobacco abuse ICD-10-CM: Z72.0  ICD-9-CM: 305.1  11/19/2009 Yes        HTN (hypertension), benign ICD-10-CM: I10  ICD-9-CM: 401.1  11/19/2009 Yes        DM (diabetes mellitus) ([facility] ICD-10-CM: E11.9  ICD-9-CM: 250.00  11/19/2009 Yes        Hypercholesteremia ICD-10-CM: E78.00  ICD-9-CM: 272.0  11/19/2009 Yes            Review of Systems:   Pertinent items are mentioned in interval history. Vital Signs:    Last 24hrs VS reviewed since prior progress note.  Most recent are:  Visit Vitals  BP (!) 169/83 (BP 1 Location: Right arm, BP Patient Position: At rest)   Pulse 80   Temp 98.8 °F (37.1 °C)   Resp 18   Ht 6' 3\" (1.905 m)   Wt 152.9 kg (337 lb 1.3 oz)   SpO2 94%   BMI 42.13 kg/m²         Intake/Output Summary (Last 24 hours) at 9/20/2020 0953  Last data filed at 9/19/2020 1915  Gross per 24 hour   Intake    Output 3000 ml   Net -3000 ml        Physical Examination:   General:  Alert, oriented, No acute distress, obese  Card:  S1, S2 without murmurs, good peripheral perfusion  Resp:  No accessory muscle use, Good AE, no wheezes, no rhonchi  Abd:  Soft, non-tender, non-distended, BS+  Extremities:  No cyanosis or clubbing, significant edema b/l LEs  Neuro:  Grossly normal, no focal neuro deficits, follows commands   Psych: Good insight, not agitated. Data Review:    Review and/or order of clinical lab test  Review and/or order of tests in the radiology section of CPT  Review and/or order of tests in the medicine section of CPT  Labs:     Recent Labs     09/19/20  0320 09/18/20  0400   WBC 6.6 5.5   HGB 7.2* 6.4*   HCT 23.7* 20.5*   PLT 94* 95*     Recent Labs     09/20/20  0733 09/18/20  0400   * 137   K 3.7 3.6    106   CO2 26 21   BUN 43* 95*   CREA 4.67* 7.15*   GLU 97 80   CA 7.0* 6.7*   MG  --  2.0   PHOS 3.8 5.8*     Recent Labs     09/20/20  0733 09/18/20  0400   ALB 2.8* 2.8*     No results for input(s): INR, PTP, APTT, INREXT, INREXT in the last 72 hours. No results for input(s): FE, TIBC, PSAT, FERR in the last 72 hours. Lab Results   Component Value Date/Time    Folate 9.6 09/18/2020 04:00 AM      No results for input(s): PH, PCO2, PO2 in the last 72 hours. No results for input(s): CPK, CKNDX, TROIQ in the last 72 hours.     No lab exists for component: CPKMB  Lab Results   Component Value Date/Time    Cholesterol, total 284 (H) 02/22/2013 08:21 AM    HDL Cholesterol 39 (L) 02/22/2013 08:21 AM    LDL, calculated Comment 02/22/2013 08:21 AM    Triglyceride 788 (HH) 02/22/2013 08:21 AM     Lab Results   Component Value Date/Time    Glucose (POC) 113 (H) 09/20/2020 06:12 AM    Glucose (POC) 144 (H) 09/19/2020 10:33 PM    Glucose (POC) 113 (H) 09/19/2020 05:58 PM    Glucose (POC) 102 (H) 09/19/2020 11:46 AM    Glucose (POC) 86 09/19/2020 07:02 AM     Lab Results   Component Value Date/Time    Color YELLOW/STRAW 09/17/2020 01:33 AM    Appearance CLEAR 09/17/2020 01:33 AM    Specific gravity 1.015 09/17/2020 01:33 AM    pH (UA) 5.0 09/17/2020 01:33 AM    Protein 100 (A) 09/17/2020 01:33 AM    Glucose Negative 09/17/2020 01:33 AM    Ketone Negative 09/17/2020 01:33 AM    Bilirubin Negative 09/17/2020 01:33 AM    Urobilinogen 0.2 09/17/2020 01:33 AM    Nitrites Negative 09/17/2020 01:33 AM    Leukocyte Esterase Negative 09/17/2020 01:33 AM    Epithelial cells FEW 09/17/2020 01:33 AM    Bacteria Negative 09/17/2020 01:33 AM    WBC 0-4 09/17/2020 01:33 AM    RBC 0-5 09/17/2020 01:33 AM     Medications Reviewed:     Current Facility-Administered Medications   Medication Dose Route Frequency    melatonin tablet 3 mg  3 mg Oral QHS PRN    predniSONE (DELTASONE) tablet 40 mg  40 mg Oral DAILY WITH BREAKFAST    0.9% sodium chloride infusion 250 mL  250 mL IntraVENous PRN    heparin (porcine) 1,000 unit/mL injection 2,500 Units  2,500 Units Hemodialysis DIALYSIS PRN    0.9% sodium chloride infusion 250 mL  250 mL IntraVENous PRN    oxyCODONE IR (ROXICODONE) tablet 5 mg  5 mg Oral Q4H PRN    carvediloL (COREG) tablet 12.5 mg  12.5 mg Oral BID WITH MEALS    epoetin brad-epbx (RETACRIT) injection 10,000 Units  10,000 Units SubCUTAneous Q TUE, THU & SAT    albumin human 25% (BUMINATE) solution 12.5 g  12.5 g IntraVENous DIALYSIS PRN    sodium chloride (NS) flush 5-40 mL  5-40 mL IntraVENous Q8H    sodium chloride (NS) flush 5-40 mL  5-40 mL IntraVENous PRN    acetaminophen (TYLENOL) tablet 650 mg  650 mg Oral Q6H PRN    Or    acetaminophen (TYLENOL) suppository 650 mg  650 mg Rectal Q6H PRN    polyethylene glycol (MIRALAX) packet 17 g  17 g Oral DAILY PRN    promethazine (PHENERGAN) tablet 12.5 mg  12.5 mg Oral Q6H PRN    Or    ondansetron (ZOFRAN) injection 4 mg  4 mg IntraVENous Q6H PRN    glucose chewable tablet 16 g  4 Tab Oral PRN    glucagon (GLUCAGEN) injection 1 mg  1 mg IntraMUSCular PRN    dextrose 10% infusion 0-250 mL  0-250 mL IntraVENous PRN    insulin glargine (LANTUS) injection 10 Units  10 Units SubCUTAneous QHS    insulin lispro (HUMALOG) injection   SubCUTAneous AC&HS    allopurinoL (ZYLOPRIM) tablet 300 mg  300 mg Oral DAILY    aspirin delayed-release tablet 81 mg  81 mg Oral DAILY    ferrous sulfate tablet 325 mg  325 mg Oral DAILY WITH BREAKFAST    sacubitriL-valsartan (ENTRESTO) 49-51 mg tablet 1 Tab  1 Tab Oral BID    nicotine (NICODERM CQ) 14 mg/24 hr patch 1 Patch  1 Patch TransDERmal Q24H    albuterol-ipratropium (DUO-NEB) 2.5 MG-0.5 MG/3 ML  3 mL Nebulization Q4H PRN   ______________________________________________________________________  EXPECTED LENGTH OF STAY: 4d 7h  ACTUAL LENGTH OF STAY:          Irene Nguyen MD

## 2020-09-21 LAB
ALBUMIN SERPL-MCNC: 2.7 G/DL (ref 3.5–5)
ANION GAP SERPL CALC-SCNC: 8 MMOL/L (ref 5–15)
BUN SERPL-MCNC: 54 MG/DL (ref 6–20)
BUN/CREAT SERPL: 10 (ref 12–20)
CALCIUM SERPL-MCNC: 6.7 MG/DL (ref 8.5–10.1)
CHLORIDE SERPL-SCNC: 101 MMOL/L (ref 97–108)
CO2 SERPL-SCNC: 26 MMOL/L (ref 21–32)
CREAT SERPL-MCNC: 5.47 MG/DL (ref 0.7–1.3)
ERYTHROCYTE [DISTWIDTH] IN BLOOD BY AUTOMATED COUNT: 17 % (ref 11.5–14.5)
GLUCOSE BLD STRIP.AUTO-MCNC: 107 MG/DL (ref 65–100)
GLUCOSE BLD STRIP.AUTO-MCNC: 160 MG/DL (ref 65–100)
GLUCOSE SERPL-MCNC: 130 MG/DL (ref 65–100)
HBV SURFACE AB SER QL: NONREACTIVE
HBV SURFACE AB SER-ACNC: <3.1 MIU/ML
HCT VFR BLD AUTO: 25 % (ref 36.6–50.3)
HGB BLD-MCNC: 7.6 G/DL (ref 12.1–17)
MCH RBC QN AUTO: 29.7 PG (ref 26–34)
MCHC RBC AUTO-ENTMCNC: 30.4 G/DL (ref 30–36.5)
MCV RBC AUTO: 97.7 FL (ref 80–99)
NRBC # BLD: 0 K/UL (ref 0–0.01)
NRBC BLD-RTO: 0 PER 100 WBC
PHOSPHATE SERPL-MCNC: 3.8 MG/DL (ref 2.6–4.7)
PLATELET # BLD AUTO: 111 K/UL (ref 150–400)
PMV BLD AUTO: 10.7 FL (ref 8.9–12.9)
POTASSIUM SERPL-SCNC: 3.7 MMOL/L (ref 3.5–5.1)
RBC # BLD AUTO: 2.56 M/UL (ref 4.1–5.7)
SERVICE CMNT-IMP: ABNORMAL
SERVICE CMNT-IMP: ABNORMAL
SODIUM SERPL-SCNC: 135 MMOL/L (ref 136–145)
WBC # BLD AUTO: 7.1 K/UL (ref 4.1–11.1)

## 2020-09-21 PROCEDURE — 74011250637 HC RX REV CODE- 250/637: Performed by: INTERNAL MEDICINE

## 2020-09-21 PROCEDURE — 65270000029 HC RM PRIVATE

## 2020-09-21 PROCEDURE — 74011250637 HC RX REV CODE- 250/637: Performed by: NURSE PRACTITIONER

## 2020-09-21 PROCEDURE — 80069 RENAL FUNCTION PANEL: CPT

## 2020-09-21 PROCEDURE — 86706 HEP B SURFACE ANTIBODY: CPT

## 2020-09-21 PROCEDURE — 74011250636 HC RX REV CODE- 250/636: Performed by: INTERNAL MEDICINE

## 2020-09-21 PROCEDURE — 74011636637 HC RX REV CODE- 636/637: Performed by: INTERNAL MEDICINE

## 2020-09-21 PROCEDURE — 36415 COLL VENOUS BLD VENIPUNCTURE: CPT

## 2020-09-21 PROCEDURE — 90935 HEMODIALYSIS ONE EVALUATION: CPT

## 2020-09-21 PROCEDURE — 97116 GAIT TRAINING THERAPY: CPT

## 2020-09-21 PROCEDURE — 85027 COMPLETE CBC AUTOMATED: CPT

## 2020-09-21 PROCEDURE — 82962 GLUCOSE BLOOD TEST: CPT

## 2020-09-21 PROCEDURE — 74011636637 HC RX REV CODE- 636/637: Performed by: NURSE PRACTITIONER

## 2020-09-21 RX ADMIN — ASPIRIN 81 MG: 81 TABLET, COATED ORAL at 11:24

## 2020-09-21 RX ADMIN — HEPARIN SODIUM 2500 UNITS: 1000 INJECTION INTRAVENOUS; SUBCUTANEOUS at 17:54

## 2020-09-21 RX ADMIN — FERROUS SULFATE TAB 325 MG (65 MG ELEMENTAL FE) 325 MG: 325 (65 FE) TAB at 07:31

## 2020-09-21 RX ADMIN — INSULIN GLARGINE 10 UNITS: 100 INJECTION, SOLUTION SUBCUTANEOUS at 20:47

## 2020-09-21 RX ADMIN — CARVEDILOL 12.5 MG: 12.5 TABLET, FILM COATED ORAL at 20:45

## 2020-09-21 RX ADMIN — Medication 10 ML: at 20:49

## 2020-09-21 RX ADMIN — CARVEDILOL 12.5 MG: 12.5 TABLET, FILM COATED ORAL at 11:24

## 2020-09-21 RX ADMIN — Medication 10 ML: at 07:21

## 2020-09-21 RX ADMIN — SACUBITRIL AND VALSARTAN 1 TABLET: 49; 51 TABLET, FILM COATED ORAL at 23:03

## 2020-09-21 RX ADMIN — OXYCODONE 5 MG: 5 TABLET ORAL at 23:09

## 2020-09-21 RX ADMIN — ALLOPURINOL 300 MG: 300 TABLET ORAL at 11:24

## 2020-09-21 RX ADMIN — INSULIN LISPRO 2 UNITS: 100 INJECTION, SOLUTION INTRAVENOUS; SUBCUTANEOUS at 20:45

## 2020-09-21 RX ADMIN — PREDNISONE 40 MG: 20 TABLET ORAL at 07:31

## 2020-09-21 RX ADMIN — SACUBITRIL AND VALSARTAN 1 TABLET: 49; 51 TABLET, FILM COATED ORAL at 12:00

## 2020-09-21 RX ADMIN — PANTOPRAZOLE SODIUM 40 MG: 40 TABLET, DELAYED RELEASE ORAL at 07:31

## 2020-09-21 NOTE — PROGRESS NOTES
Transition of Care Plan   RUR- 25 % Medium Risks    DISPOSITION: Patient presents from home and the disposition plan is TBD; pending medical progression, await GI eval and community HD chair set-up   Will await uniform recommendation from PT and OT, currently OT recommends IPR and PT awaits to recommend depending progress    Initial referral made to [facility] for community chair, they are full, referral forwarded to [facility], await acceptance    Transport: TBD at discharge   [address] Follow up: PCP/Specialist(s)     Reviewed chart for transitions of care,and discussed in rounds with the attending. Patient is progressing, await GI eval and community HD chair set-up. Called and spoke with Reyna Epps, , [phone], at [facility]; their new admission slot is full. Reyna Epps has forwarded the referral to [facility] for review, await decision on acceptance, likely have a decision by end of the day or tomorrow. CM will discuss disposition plan once therapy sees the patient later today, await updated/new recommendation. CM continues to follow.      CHANDAN Cole

## 2020-09-21 NOTE — DIALYSIS
[facility]  [phone]    Vitals   Pre   Post   Assessment   Pre   Post     Temp  Temp: 98.5 °F (36.9 °C) (09/21/20 1524)  98.3 LOC  Alert and oriented Alert and oriented   HR   Pulse (Heart Rate): 75 (09/21/20 1524) 77 Lungs   Unlabored, even clear to auscultation except diminished bases  unlabored, even   B/P   BP: (!) 152/87 (09/21/20 1524) 181/59 Cardiac   RRR  RRR   Resp   Resp Rate: 18 (09/21/20 1524) 18 Skin   Warm and dry   warm and dry    Pain level  Pain Intensity 1: 4 (09/20/20 1530) No verbal complaints Edema  Significant   Pitting BLE   Significant pitting edema    Orders:    Duration:   Start:    1524 End:    1815 Total:   3   Dialyzer:   Dialyzer/Set Up Inspection: Revaclear (09/21/20 1524)   K Bath:   Dialysate K (mEq/L): 3 (09/21/20 1524)   Ca Bath:   Dialysate CA (mEq/L): 2.5 (09/21/20 1524)   Na/Bicarb:   Dialysate NA (mEq/L): 140 (09/21/20 1524)   Target Fluid Removal:   Goal/Amount of Fluid to Remove (mL): 2500 mL (09/21/20 1524)   Access     Type & Location:   Left CVC, dressing clean, dry and intact, last dressing change 09/18/20, Each catheter limb disinfected per p&p, caps removed, hubs disinfected per p&p.        Labs     Obtained/Reviewed   Critical Results Called   Date when labs were drawn-  Hgb-    HGB   Date Value Ref Range Status   09/19/2020 7.2 (L) 12.1 - 17.0 g/dL Final     K-    Potassium   Date Value Ref Range Status   09/21/2020 3.7 3.5 - 5.1 mmol/L Final     Ca-   Calcium   Date Value Ref Range Status   09/21/2020 6.7 (L) 8.5 - 10.1 MG/DL Final     Bun-   BUN   Date Value Ref Range Status   09/21/2020 54 (H) 6 - 20 MG/DL Final     Creat-   Creatinine   Date Value Ref Range Status   09/21/2020 5.47 (H) 0.70 - 1.30 MG/DL Final        Medications/ Blood Products Given     Name   Dose   Route and Time     Heparin 1:1000 2500 1.1 arterial and 1.4 venous             Blood Volume Processed (BVP):    66 Net Fluid   Removed:  2500   Comments   Time Out Done: 1520  Primary Nurse Rpt Pre: Zev Nails RN   Primary Nurse Rpt Post: MARLENY Pratt RN   Pt Education: cvc infection control  Care Plan: continue current HD plan of care   Tx Summary:  2712 HD initiated as ordered. 36 Dr. Brendan Bill in to see patient on dialysis. 1815 treatment completed, all possible blood returned. Each dialysis catheter limb disinfected per p&p, blood returned per p&p, each dialysis hub disinfected per p&p, post dialysis catheter dwell instilled per order, and caps applied. All dialysis related medications have been reviewed. SBAR to primary nurse. Admiting Diagnosis: SULEMAN on CKD   Pt's [facility] clinic[facility] PD [facility]   Consent signed - Informed Consent Verified: Yes (09/21/20 1524)  Hepatitis Status-  09/16/20 antigen negative   Machine #- Machine Number: [id number] (09/21/20 1524)  Telemetry status- n/a  Pre-dialysis wt. - Pre-Dialysis Weight: 162.4 kg (358 lb 0.4 oz) (09/17/20 4570)

## 2020-09-21 NOTE — PROGRESS NOTES
Name: Bebeto Mitchell   MRN: [id number]  : 1970  Assessment:  SULEMAN on CKD-4/5>>Pre-ESRD (Dr. Annie Bergman)- STARTED HD 9-16 (1st Rx via R Danny)  Metabolic acidosis  Uremia  Anemia with component of ACKD  Chronic S-CHF; EF of 30% +Volume overload  DM-2  HTN  Obesity  Gout  NRP->8 grams on last check; due to DN and Obesity       Plan/Recommendations:  HD today  PUF tomorrow  Will need several more treatments to get volume status stabilized  Will need Permacath this week  Consult CM for outpt HD unit set up- at [address] [place] [address] (or Transitional Care unit at that unit)  Ct Coreg/Entresto  Ct EPO  Will eventually transition to PD (has catheter in place), once he is more stable clinically  PT consult       Subjective:  Feeling better overall. No longer confused. +LE edema    Addendum: Seen on HD at 4:38pm. VSS. Tolerating UF thus far.      ROS:   No nausea, no vomiting  No chest pain, no shortness of breath    Exam:  Visit Vitals  /79 (BP 1 Location: Left arm, BP Patient Position: At rest;Sitting)   Pulse 67   Temp 98 °F (36.7 °C)   Resp 18   Ht 6' 3\" (1.905 m)   Wt 152.4 kg (336 lb)   SpO2 97%   BMI 42.00 kg/m²     WB/WN in NAD  AT/NC  R Danny in place  Clear  RRR, distant  ++ edema  AOx3    Current Facility-Administered Medications   Medication Dose Route Frequency Last Dose    gentamicin (GARAMYCIN) 0.1 % cream   Topical DAILY      pantoprazole (PROTONIX) tablet 40 mg  40 mg Oral ACB 40 mg at 20 0731    melatonin tablet 3 mg  3 mg Oral QHS PRN 3 mg at 20 0024    predniSONE (DELTASONE) tablet 40 mg  40 mg Oral DAILY WITH BREAKFAST 40 mg at 20 0731    0.9% sodium chloride infusion 250 mL  250 mL IntraVENous PRN      heparin (porcine) 1,000 unit/mL injection 2,500 Units  2,500 Units Hemodialysis DIALYSIS PRN 2,500 Units at 20 191    0.9% sodium chloride infusion 250 mL  250 mL IntraVENous PRN      oxyCODONE IR (ROXICODONE) tablet 5 mg  5 mg Oral Q4H PRN 5 mg at 09/19/20 2125    carvediloL (COREG) tablet 12.5 mg  12.5 mg Oral BID WITH MEALS 12.5 mg at 09/21/20 1124    epoetin brad-epbx (RETACRIT) injection 10,000 Units  10,000 Units SubCUTAneous Q TUE, THU & SAT 10,000 Units at 09/19/20 2127    albumin human 25% (BUMINATE) solution 12.5 g  12.5 g IntraVENous DIALYSIS PRN 12.5 g at 09/18/20 0100    sodium chloride (NS) flush 5-40 mL  5-40 mL IntraVENous Q8H 10 mL at 09/21/20 0721    sodium chloride (NS) flush 5-40 mL  5-40 mL IntraVENous PRN 10 mL at 09/19/20 1309    acetaminophen (TYLENOL) tablet 650 mg  650 mg Oral Q6H  mg at 09/20/20 1529    Or    acetaminophen (TYLENOL) suppository 650 mg  650 mg Rectal Q6H PRN      polyethylene glycol (MIRALAX) packet 17 g  17 g Oral DAILY PRN      promethazine (PHENERGAN) tablet 12.5 mg  12.5 mg Oral Q6H PRN      Or    ondansetron (ZOFRAN) injection 4 mg  4 mg IntraVENous Q6H PRN      glucose chewable tablet 16 g  4 Tab Oral PRN      glucagon (GLUCAGEN) injection 1 mg  1 mg IntraMUSCular PRN      dextrose 10% infusion 0-250 mL  0-250 mL IntraVENous PRN      insulin glargine (LANTUS) injection 10 Units  10 Units SubCUTAneous QHS 10 Units at 09/20/20 2245    insulin lispro (HUMALOG) injection   SubCUTAneous AC&HS Stopped at 09/20/20 2200    allopurinoL (ZYLOPRIM) tablet 300 mg  300 mg Oral DAILY 300 mg at 09/21/20 1124    aspirin delayed-release tablet 81 mg  81 mg Oral DAILY 81 mg at 09/21/20 1124    ferrous sulfate tablet 325 mg  325 mg Oral DAILY WITH BREAKFAST 325 mg at 09/21/20 0731    sacubitriL-valsartan (ENTRESTO) 49-51 mg tablet 1 Tab  1 Tab Oral BID 1 Tab at 09/20/20 1841    nicotine (NICODERM CQ) 14 mg/24 hr patch 1 Patch  1 Patch TransDERmal Q24H      albuterol-ipratropium (DUO-NEB) 2.5 MG-0.5 MG/3 ML  3 mL Nebulization Q4H PRN         Labs/Data:    Lab Results   Component Value Date/Time    WBC 6.6 09/19/2020 03:20 AM    HGB 7.2 (L) 09/19/2020 03:20 AM    Hematocrit (POC) 24 (L) 09/01/2020 07:11 AM    HCT 23.7 (L) 09/19/2020 03:20 AM    PLATELET 94 (L) 59/47/1294 03:20 AM    MCV 98.3 09/19/2020 03:20 AM       Lab Results   Component Value Date/Time    Sodium 135 (L) 09/21/2020 12:52 AM    Potassium 3.7 09/21/2020 12:52 AM    Chloride 101 09/21/2020 12:52 AM    CO2 26 09/21/2020 12:52 AM    Anion gap 8 09/21/2020 12:52 AM    Glucose 130 (H) 09/21/2020 12:52 AM    BUN 54 (H) 09/21/2020 12:52 AM    Creatinine 5.47 (H) 09/21/2020 12:52 AM    BUN/Creatinine ratio 10 (L) 09/21/2020 12:52 AM    GFR est AA 14 (L) 09/21/2020 12:52 AM    GFR est non-AA 11 (L) 09/21/2020 12:52 AM    Calcium 6.7 (L) 09/21/2020 12:52 AM       Wt Readings from Last 3 Encounters:   09/21/20 152.4 kg (336 lb)   09/01/20 (!) 161.7 kg (356 lb 7.7 oz)   08/19/20 (!) 162.8 kg (358 lb 12.8 oz)       No intake or output data in the 24 hours ending 09/21/20 1239    Patient seen and examined. Chart reviewed. Labs, data and other pertinent notes reviewed in last 24 hrs.     PMH/SH/FH reviewed and unchanged compared to H&P    Discussed with pt      Ed Dennison MD   [facility]

## 2020-09-21 NOTE — PROGRESS NOTES
Hospitalist Progress Note  Michelle Collins MD  Answering service: [phone] from in house phone      Date of Service:  2020  NAME:  Jojo Milton  :  1970  MRN:  [id number]    Admission Summary:   50M p/w Weakness. New ESRD   Interval history / Subjective:   Patient seen and examined at bedside, feels well, getting about better, no acute events. Will check CBC today with dialysis. A/w GI eval. And needs HD chair      Assessment & Plan:     ESRD: - PD cath placed , not yet used-didn't attend training  Thrombocytopenia- likely 2nd to ESRD- monitor  Generalized Weakness: largely immobile x2 weeks-suspect d/t severe uremia- PT/OT   - S/p Danny. started HD-Avoid nephrotoxins-Appreciate nephrology - Palliative eval  - will need HD chair/ peraCath prior to dc. BCs -ve- dc Abx    Anemia of chronic disease: d/t ESRD-no s/sx bleeding  - s/p Transfusions. monitor Hb, transfuse for <7. Folate wnl. vit b12 wnl. Ferritin wnl   - Hemoccult pending, GI eval pending    Acute respiratory failure with hypoxia- requiring 3L NC O2- weaning off  - CTA chest: No PE, Duplex LEs: no DVTs    CHF: EF 30% per records- not decompensated.-Continue home coreg, entresto  - Bumex-defer to nephrology- TTE: ef 50%. Mod pHTN    #. Gout: with acute flare- started with HD. Analgesia, short prednisone course  HTN-BP soft-start albumin IV x3 doses- Monitor   COPD-PRN duonebs-Keep O2 greater than 90%  DMII: last A1c over 10-check A1c-Start lantus-AC/HS accuchecks with SSI   HLD-not on statin-check lipid panel  Tobacco Use-1PPD x 33 years-counseled for cessation-nicotine patch ordered  #.  HypoCalcemia: replace, monitor    Code status: Full  DVT prophylaxis: SCDs  Care Plan discussed with: Patient/Family and Nurse  Disposition: TBD     Hospital Problems  Date Reviewed: 2020          Codes Class Noted POA    * (Principal) Acute on chronic renal failure ([facility] [address] ICD-10-CM: N17.9, N18.9  ICD-9-CM: 584.9, 585.9  9/16/2020 Yes        Congestive heart failure ([facility] ICD-10-CM: I50.9  ICD-9-CM: 428.0  Unknown Yes    Overview Signed 9/16/2020  6:11 PM by Barbara Martínez NP     chronic left sided congestive heart failure             Chronic obstructive pulmonary disease ([facility] ICD-10-CM: J44.9  ICD-9-CM: 496  Unknown Yes        Anemia ICD-10-CM: D64.9  ICD-9-CM: 285. 9  Unknown Yes        Weakness generalized ICD-10-CM: R53.1  ICD-9-CM: 780.79  Unknown Yes        Thrombocytopenia (HCC) ICD-10-CM: D69.6  ICD-9-CM: 287.5  Unknown Yes        Acute respiratory failure with hypoxia (HCC) ICD-10-CM: J96.01  ICD-9-CM: 518.81  Unknown Yes        Obesity, morbid ([facility] ICD-10-CM: E66.01  ICD-9-CM: 278.01  8/19/2020 Yes        Tobacco abuse ICD-10-CM: Z72.0  ICD-9-CM: 305.1  11/19/2009 Yes        HTN (hypertension), benign ICD-10-CM: I10  ICD-9-CM: 401.1  11/19/2009 Yes        DM (diabetes mellitus) ([facility] ICD-10-CM: E11.9  ICD-9-CM: 250.00  11/19/2009 Yes        Hypercholesteremia ICD-10-CM: E78.00  ICD-9-CM: 272.0  11/19/2009 Yes            Review of Systems:   Pertinent items are mentioned in interval history. Vital Signs:    Last 24hrs VS reviewed since prior progress note.  Most recent are:  Visit Vitals  /79 (BP 1 Location: Left arm, BP Patient Position: At rest;Sitting)   Pulse 67   Temp 98 °F (36.7 °C)   Resp 18   Ht 6' 3\" (1.905 m)   Wt 152.4 kg (336 lb)   SpO2 97%   BMI 42.00 kg/m²         Intake/Output Summary (Last 24 hours) at 9/21/2020 1025  Last data filed at 9/20/2020 1146  Gross per 24 hour   Intake    Output 250 ml   Net -250 ml        Physical Examination:   General:  Alert, oriented, No acute distress, obese  Card:  S1, S2 without murmurs, good peripheral perfusion  Resp:  No accessory muscle use, Good AE, no wheezes, no rhonchi  Abd:  Soft, non-tender, non-distended, BS+  Extremities:  No cyanosis or clubbing, significant edema b/l LEs  Neuro:  Grossly normal, no focal neuro deficits, follows commands   Psych:  Good insight, not agitated. Data Review:    Review and/or order of clinical lab test  Review and/or order of tests in the radiology section of CPT  Review and/or order of tests in the medicine section of CPT  Labs:     Recent Labs     09/19/20  0320   WBC 6.6   HGB 7.2*   HCT 23.7*   PLT 94*     Recent Labs     09/21/20  0052 09/20/20  0733   * 135*   K 3.7 3.7    104   CO2 26 26   BUN 54* 43*   CREA 5.47* 4.67*   * 97   CA 6.7* 7.0*   PHOS 3.8 3.8     Recent Labs     09/21/20 0052 09/20/20  0733   ALB 2.7* 2.8*     No results for input(s): INR, PTP, APTT, INREXT, INREXT in the last 72 hours. No results for input(s): FE, TIBC, PSAT, FERR in the last 72 hours. Lab Results   Component Value Date/Time    Folate 9.6 09/18/2020 04:00 AM      No results for input(s): PH, PCO2, PO2 in the last 72 hours. No results for input(s): CPK, CKNDX, TROIQ in the last 72 hours.     No lab exists for component: CPKMB  Lab Results   Component Value Date/Time    Cholesterol, total 284 (H) 02/22/2013 08:21 AM    HDL Cholesterol 39 (L) 02/22/2013 08:21 AM    LDL, calculated Comment 02/22/2013 08:21 AM    Triglyceride 788 (HH) 02/22/2013 08:21 AM     Lab Results   Component Value Date/Time    Glucose (POC) 107 (H) 09/21/2020 07:18 AM    Glucose (POC) 153 (H) 09/20/2020 10:27 PM    Glucose (POC) 171 (H) 09/20/2020 04:35 PM    Glucose (POC) 148 (H) 09/20/2020 11:41 AM    Glucose (POC) 113 (H) 09/20/2020 06:12 AM     Lab Results   Component Value Date/Time    Color YELLOW/STRAW 09/17/2020 01:33 AM    Appearance CLEAR 09/17/2020 01:33 AM    Specific gravity 1.015 09/17/2020 01:33 AM    pH (UA) 5.0 09/17/2020 01:33 AM    Protein 100 (A) 09/17/2020 01:33 AM    Glucose Negative 09/17/2020 01:33 AM    Ketone Negative 09/17/2020 01:33 AM    Bilirubin Negative 09/17/2020 01:33 AM    Urobilinogen 0.2 09/17/2020 01:33 AM    Nitrites Negative 09/17/2020 01:33 AM    Leukocyte Esterase Negative 09/17/2020 01:33 AM    Epithelial cells FEW 09/17/2020 01:33 AM    Bacteria Negative 09/17/2020 01:33 AM    WBC 0-4 09/17/2020 01:33 AM    RBC 0-5 09/17/2020 01:33 AM     Medications Reviewed:     Current Facility-Administered Medications   Medication Dose Route Frequency    gentamicin (GARAMYCIN) 0.1 % cream   Topical DAILY    pantoprazole (PROTONIX) tablet 40 mg  40 mg Oral ACB    melatonin tablet 3 mg  3 mg Oral QHS PRN    predniSONE (DELTASONE) tablet 40 mg  40 mg Oral DAILY WITH BREAKFAST    0.9% sodium chloride infusion 250 mL  250 mL IntraVENous PRN    heparin (porcine) 1,000 unit/mL injection 2,500 Units  2,500 Units Hemodialysis DIALYSIS PRN    0.9% sodium chloride infusion 250 mL  250 mL IntraVENous PRN    oxyCODONE IR (ROXICODONE) tablet 5 mg  5 mg Oral Q4H PRN    carvediloL (COREG) tablet 12.5 mg  12.5 mg Oral BID WITH MEALS    epoetin brad-epbx (RETACRIT) injection 10,000 Units  10,000 Units SubCUTAneous Q TUE, THU & SAT    albumin human 25% (BUMINATE) solution 12.5 g  12.5 g IntraVENous DIALYSIS PRN    sodium chloride (NS) flush 5-40 mL  5-40 mL IntraVENous Q8H    sodium chloride (NS) flush 5-40 mL  5-40 mL IntraVENous PRN    acetaminophen (TYLENOL) tablet 650 mg  650 mg Oral Q6H PRN    Or    acetaminophen (TYLENOL) suppository 650 mg  650 mg Rectal Q6H PRN    polyethylene glycol (MIRALAX) packet 17 g  17 g Oral DAILY PRN    promethazine (PHENERGAN) tablet 12.5 mg  12.5 mg Oral Q6H PRN    Or    ondansetron (ZOFRAN) injection 4 mg  4 mg IntraVENous Q6H PRN    glucose chewable tablet 16 g  4 Tab Oral PRN    glucagon (GLUCAGEN) injection 1 mg  1 mg IntraMUSCular PRN    dextrose 10% infusion 0-250 mL  0-250 mL IntraVENous PRN    insulin glargine (LANTUS) injection 10 Units  10 Units SubCUTAneous QHS    insulin lispro (HUMALOG) injection   SubCUTAneous AC&HS    allopurinoL (ZYLOPRIM) tablet 300 mg  300 mg Oral DAILY    aspirin delayed-release tablet 81 mg  81 mg Oral DAILY    ferrous sulfate tablet 325 mg  325 mg Oral DAILY WITH BREAKFAST    sacubitriL-valsartan (ENTRESTO) 49-51 mg tablet 1 Tab  1 Tab Oral BID    nicotine (NICODERM CQ) 14 mg/24 hr patch 1 Patch  1 Patch TransDERmal Q24H    albuterol-ipratropium (DUO-NEB) 2.5 MG-0.5 MG/3 ML  3 mL Nebulization Q4H PRN   ______________________________________________________________________  EXPECTED LENGTH OF STAY: 4d 7h  ACTUAL LENGTH OF STAY:          Elvis Garrett MD

## 2020-09-21 NOTE — PROGRESS NOTES
Problem: Mobility Impaired (Adult and Pediatric)  Goal: *Acute Goals and Plan of Care (Insert Text)  Description: FUNCTIONAL STATUS PRIOR TO ADMISSION: Patient was independent and active without use of DME.    HOME SUPPORT PRIOR TO ADMISSION: The patient lived with girlfriend but did not require assist.    Physical Therapy Goals  Initiated 9/19/2020  1. Patient will move from supine to sit and sit to supine , scoot up and down, and roll side to side in bed with supervision/set-up within 7 day(s). 2.  Patient will transfer from bed to chair and chair to bed with supervision/set-up using the least restrictive device within 7 day(s). 3.  Patient will perform sit to stand with supervision/set-up within 7 day(s). 4.  Patient will ambulate with supervision/set-up for 150 feet with the least restrictive device within 7 day(s). 5.  Patient will ascend/descend 3 stairs with 1 handrail(s) with supervision/set-up within 7 day(s). Outcome: Progressing Towards Goal   PHYSICAL THERAPY TREATMENT  Patient: Marian Jacobo (57 y.o. male)  Date: 9/21/2020  Diagnosis: Acute on chronic renal failure (HCC) [N17.9, N18.9]   Acute on chronic renal failure [facility])       Precautions: Fall  Chart, physical therapy assessment, plan of care and goals were reviewed. ASSESSMENT  Patient continues with skilled PT services and is progressing towards goals. Pt presents with generalized weakness of BLE, chronic pain in bilateral ankles, knees, and hips, edematous BLE, decreased endurance, and decreased functional mobility below his baseline. Pt reports his \"gout\" pain has improved and able to tolerate ambulating to the bathroom earlier today. Pt stood with modified independence and ambulated without an assistive device with slow pace and wide CHRISTY. Pt complained of weakness and fatigue but making good gains today with increased activity tolerance. Anticipate pt will continue to progress well.      Current Level of Function Impacting Discharge (mobility/balance): transfers with modified independence, ambulation x 90 feet with CGA x 2    Other factors to consider for discharge:  pt reports he only ambulates short distances at baseline due to chronic BLE pain         PLAN :  Patient continues to benefit from skilled intervention to address the above impairments. Continue treatment per established plan of care. to address goals. Recommendation for discharge: (in order for the patient to meet his/her long term goals)  Pt may benefit from OP lymphedema consult    This discharge recommendation:  Has not yet been discussed the attending provider and/or case management    IF patient discharges home will need the following DME: none       SUBJECTIVE:   Patient stated I cannot wear the socks. They will make me fall.   Pt reports he is unable to wear his shoes due to edema. OBJECTIVE DATA SUMMARY:   Critical Behavior:  Neurologic State: Alert  Orientation Level: Oriented X4  Cognition: Follows commands  Safety/Judgement: Awareness of environment  Functional Mobility Training:  Bed Mobility:                    Transfers:  Sit to Stand: Modified independent  Stand to Sit: Modified independent                             Balance:  Sitting: Intact  Standing: Intact  Ambulation/Gait Training:  Distance (ft): 90 Feet (ft)     Ambulation - Level of Assistance: Contact guard assistance;Assist x2                 Base of Support: Widened     Speed/Nancy: Slow  Step Length: Right shortened;Left shortened              Pain Rating:  Chronic pain, reports gout pain improved today     Activity Tolerance:   Fair    After treatment patient left in no apparent distress:   Sitting in chair and Call bell within reach    COMMUNICATION/COLLABORATION:   The patients plan of care was discussed with: Registered nurse.      Jane Trevino   Time Calculation: 15 mins

## 2020-09-22 LAB
BACTERIA SPEC CULT: NORMAL
BACTERIA SPEC CULT: NORMAL
GLUCOSE BLD STRIP.AUTO-MCNC: 112 MG/DL (ref 65–100)
GLUCOSE BLD STRIP.AUTO-MCNC: 179 MG/DL (ref 65–100)
GLUCOSE BLD STRIP.AUTO-MCNC: 237 MG/DL (ref 65–100)
GLUCOSE BLD STRIP.AUTO-MCNC: 96 MG/DL (ref 65–100)
SERVICE CMNT-IMP: ABNORMAL
SERVICE CMNT-IMP: NORMAL

## 2020-09-22 PROCEDURE — 74011636637 HC RX REV CODE- 636/637: Performed by: NURSE PRACTITIONER

## 2020-09-22 PROCEDURE — 94760 N-INVAS EAR/PLS OXIMETRY 1: CPT

## 2020-09-22 PROCEDURE — 74011636637 HC RX REV CODE- 636/637: Performed by: INTERNAL MEDICINE

## 2020-09-22 PROCEDURE — 97116 GAIT TRAINING THERAPY: CPT

## 2020-09-22 PROCEDURE — 74011250637 HC RX REV CODE- 250/637: Performed by: NURSE PRACTITIONER

## 2020-09-22 PROCEDURE — 74011250637 HC RX REV CODE- 250/637: Performed by: INTERNAL MEDICINE

## 2020-09-22 PROCEDURE — 90935 HEMODIALYSIS ONE EVALUATION: CPT

## 2020-09-22 PROCEDURE — 74011250636 HC RX REV CODE- 250/636: Performed by: INTERNAL MEDICINE

## 2020-09-22 PROCEDURE — 82962 GLUCOSE BLOOD TEST: CPT

## 2020-09-22 PROCEDURE — 97530 THERAPEUTIC ACTIVITIES: CPT

## 2020-09-22 PROCEDURE — 65270000029 HC RM PRIVATE

## 2020-09-22 PROCEDURE — 97535 SELF CARE MNGMENT TRAINING: CPT

## 2020-09-22 RX ORDER — CARVEDILOL 12.5 MG/1
12.5 TABLET ORAL 2 TIMES DAILY WITH MEALS
Qty: 60 TAB | Refills: 0 | Status: SHIPPED | OUTPATIENT
Start: 2020-09-22 | End: 2020-10-22

## 2020-09-22 RX ORDER — INSULIN GLARGINE 300 U/ML
10 INJECTION, SOLUTION SUBCUTANEOUS DAILY
Qty: 1 ML | Refills: 0 | Status: SHIPPED
Start: 2020-09-22 | End: 2020-09-23 | Stop reason: SDUPTHER

## 2020-09-22 RX ADMIN — Medication 10 ML: at 06:47

## 2020-09-22 RX ADMIN — Medication 10 ML: at 21:02

## 2020-09-22 RX ADMIN — PANTOPRAZOLE SODIUM 40 MG: 40 TABLET, DELAYED RELEASE ORAL at 06:46

## 2020-09-22 RX ADMIN — CARVEDILOL 12.5 MG: 12.5 TABLET, FILM COATED ORAL at 16:55

## 2020-09-22 RX ADMIN — INSULIN LISPRO 3 UNITS: 100 INJECTION, SOLUTION INTRAVENOUS; SUBCUTANEOUS at 22:24

## 2020-09-22 RX ADMIN — CARVEDILOL 12.5 MG: 12.5 TABLET, FILM COATED ORAL at 10:16

## 2020-09-22 RX ADMIN — Medication 10 ML: at 14:50

## 2020-09-22 RX ADMIN — EPOETIN ALFA-EPBX 10000 UNITS: 10000 INJECTION, SOLUTION INTRAVENOUS; SUBCUTANEOUS at 20:58

## 2020-09-22 RX ADMIN — POLYETHYLENE GLYCOL 3350 17 G: 17 POWDER, FOR SOLUTION ORAL at 10:16

## 2020-09-22 RX ADMIN — INSULIN LISPRO 2 UNITS: 100 INJECTION, SOLUTION INTRAVENOUS; SUBCUTANEOUS at 16:56

## 2020-09-22 RX ADMIN — HEPARIN SODIUM 2500 UNITS: 1000 INJECTION INTRAVENOUS; SUBCUTANEOUS at 09:10

## 2020-09-22 RX ADMIN — FERROUS SULFATE TAB 325 MG (65 MG ELEMENTAL FE) 325 MG: 325 (65 FE) TAB at 10:16

## 2020-09-22 RX ADMIN — ASPIRIN 81 MG: 81 TABLET, COATED ORAL at 10:09

## 2020-09-22 RX ADMIN — SACUBITRIL AND VALSARTAN 1 TABLET: 49; 51 TABLET, FILM COATED ORAL at 19:20

## 2020-09-22 RX ADMIN — SACUBITRIL AND VALSARTAN 1 TABLET: 49; 51 TABLET, FILM COATED ORAL at 10:09

## 2020-09-22 RX ADMIN — INSULIN GLARGINE 10 UNITS: 100 INJECTION, SOLUTION SUBCUTANEOUS at 22:24

## 2020-09-22 RX ADMIN — ALLOPURINOL 300 MG: 300 TABLET ORAL at 10:09

## 2020-09-22 RX ADMIN — PREDNISONE 40 MG: 20 TABLET ORAL at 10:09

## 2020-09-22 RX ADMIN — GENTAMICIN SULFATE: 1 CREAM TOPICAL at 10:11

## 2020-09-22 NOTE — PROGRESS NOTES
Vitals:    09/22/20 1426 09/22/20 1431 09/22/20 1433 09/22/20 1436   BP: (!) 144/62 (!) 154/66 (!) 171/71 (!) 146/71   BP 1 Location: Left arm Left arm Left arm Left arm   BP Patient Position: Standing;Post activity Sitting;Post activity Standing Standing post amb   Pulse: 89 71 76 81

## 2020-09-22 NOTE — ADT AUTH CERT NOTES
Renal Failure, Chronic - Care Day 6 (9/21/2020) by Maty Vasquez RN  
 
   
Review Status  Review Entered Completed  9/21/2020 14:14   
   
Criteria Review Care Day: 6 Care Date: 9/21/2020 Level of Care: Inpatient Floor Guideline Day 4 Clinical Status   
(X) * Hemodynamic stability   
(X) * Nausea and vomiting absent or controlled and acceptable for next level of care   
(X) * Pulmonary edema absent or acceptable for next level of care   
(X) * Hypoxemia absent   
(X) * Tachypnea absent   
( ) * Electrolyte and acid-base status at baseline or acceptable for next level of care   
(X) * Mental status at baseline   
(X) * Diet tolerated   
(X) * Dialysis not needed or access and plan established ( ) * Discharge plans and education understood Activity ( ) * Ambulatory or acceptable for next level of care [H] Routes   
(X) * Oral hydration, medications, and diet 9/21/2020 14:14:16 EDT by Carlos Rolle   
  zyloprim 300mg po qd  asa 81mg po qd   coreg 12.5mg po bid   iron 325mg po qd     lantus 10 u qhs   Humalog ss ac and hs      melatonin 3mg po prn x     roxicodone 5mg po q4 prn x    deltasone 40mg po qd   entresto 1 po bid    protonix 40mg po qd (X) Renal diet Interventions (X) Possible dialysis (X) Monitor electrolytes, minerals, glucose, acid-base, and volume status 9/21/2020 14:14:16 EDT by Carlos Rolle   
  Sodium: 135 (L) Potassium: 3.7 Glucose: 130 (H) BUN: 54 (H) Creatinine: 5.47 (H) BUN/Creatinine ratio: 10 (L) Calcium: 6.7 (L) GFR est non-AA: 11 (L) 
GFR est AA: 14 (L) Albumin: 2.7 (L) Medications (X) Possible erythropoiesis-stimulating agent, calcium supplement, phosphate binder, bicarbonate, vitamin D, antihypertensive medication 9/21/2020 14:14:16 EDT by Kimberley Schaffer see below * Milestone Additional Notes 9/21/20  inpt medical   
98    68    18   138/47   95% ra Sodium: 135 (L) Potassium: 3.7 Glucose: 130 (H) BUN: 54 (H) Creatinine: 5.47 (H) BUN/Creatinine ratio: 10 (L) Calcium: 6.7 (L) GFR est non-AA: 11 (L)   
GFR est AA: 14 (L) Albumin: 2.7 (L) Meds:  zyloprim 300mg po qd  asa 81mg po qd   coreg 12.5mg po bid   iron 325mg po qd     lantus 10 u qhs   Humalog ss ac and hs      melatonin 3mg po prn x     roxicodone 5mg po q4 prn x    deltasone 40mg po qd   entresto 1 po bid   tylenol 650mg po q6 prn x     protonix 40mg po qd Orders:  renal diabetic diet   q dwt     SCD  ambulate Hospitalist note Interval history / Subjective:   
Patient seen and examined at bedside, feels well, getting about better, no acute events. Will check CBC today with dialysis. A/w GI eval. And needs HD chair   
    
Assessment & Plan:   
    
ESRD: - PD cath placed 9/1, not yet used-didn't attend training Thrombocytopenia- likely 2nd to ESRD- monitor Generalized Weakness: largely immobile x2 weeks-suspect d/t severe uremia- PT/OT   
- S/p Danny. started HD-Avoid nephrotoxins-Appreciate nephrology - Palliative eval   
- will need HD chair/ peraCath prior to dc. BCs -ve- dc Abx   
    
Anemia of chronic disease: d/t ESRD-no s/sx bleeding - s/p Transfusions. monitor Hb, transfuse for <7. Folate wnl. vit b12 wnl. Ferritin wnl   
- Hemoccult pending, GI eval pending   
    
Acute respiratory failure with hypoxia- requiring 3L NC O2- weaning off - CTA chest: No PE, Duplex LEs: no DVTs   
    
CHF: EF 30% per records- not decompensated.-Continue home coreg, entresto - Bumex-defer to nephrology- TTE: ef 50%. Mod pHTN   
    
#. Gout: with acute flare- started with HD. Analgesia, short prednisone course HTN-BP soft-start albumin IV x3 doses- Monitor COPD-PRN duonebs-Keep O2 greater than 90% DMII: last A1c over 10-check A1c-Start lantus-AC/HS accuchecks with SSI HLD-not on statin-check lipid panel Tobacco Use-1PPD x 33 years-counseled for cessation-nicotine patch ordered #. HypoCalcemia: replace, monitor   
    
Code status: Full DVT prophylaxis: SCDs Care Plan discussed with: Patient/Family and Nurse Disposition: TBD   
  
  
   
Renal Failure, Chronic - Care Day 5 (9/20/2020) by Tin Majano RN  
 
   
Review Status  Review Entered Completed  9/21/2020 14:08   
   
Criteria Review Care Day: 5 Care Date: 9/20/2020 Level of Care: Inpatient Floor Guideline Day 3 Level Of Care (X) Floor Clinical Status   
(X) * Mental status at baseline   
(X) * Pulmonary edema absent or improved Activity   
(X) * Ambulatory Routes   
(X) * Oral hydration, medications 9/21/2020 14:08:57 EDT by Kj Olson see below (X) Renal diet as tolerated Interventions (X) Possible dialysis (X) Monitor electrolytes, glucose, acid-base, and volume status 9/21/2020 14:08:57 EDT by Tamika Mitchell   
  Sodium: 135 (L) Potassium: 3.7 Glucose: 97 BUN: 43 (H) Creatinine: 4.67 (H) BUN/Creatinine ratio: 9 (L) Calcium: 7.0 (L) GFR est non-AA: 13 (L) 
GFR est AA: 16 (L) Albumin: 2.8 (L) Medications (X) Possible erythropoiesis-stimulating agent, calcium supplement, phosphate binder, bicarbonate, vitamin D, antihypertensive medication 9/21/2020 14:08:57 EDT by Kj Olson see below * Milestone Additional Notes 9/20/20  inpt medical   
98.8    80   20    164/95   94% ra Sodium: 135 (L) Potassium: 3.7 Glucose: 97 BUN: 43 (H) Creatinine: 4.67 (H) BUN/Creatinine ratio: 9 (L) Calcium: 7.0 (L) GFR est non-AA: 13 (L)   
GFR est AA: 16 (L) Albumin: 2.8 (L) Meds:  zyloprim 300mg po qd  asa 81mg po qd   coreg 12.5mg po bid   iron 325mg po qd     lantus 10 u qhs   Humalog ss ac and hs      melatonin 3mg po prn x     roxicodone 5mg po q4 prn x    deltasone 40mg po qd   entresto 1 po bid   tylenol 650mg po q6 prn x 1 Orders:  renal diabetic diet   q dwt     SCD  ambulate Hospitalist note Assessment & Plan:   
    
ESRD: - PD cath placed 9/1, not yet used-didn't attend training Thrombocytopenia- likely 2nd to ESRD- monitor Generalized Weakness: largely immobile x2 weeks-suspect d/t severe uremia- PT/OT   
- S/p Danny. started HD-Avoid nephrotoxins-Appreciate nephrology - Palliative eval   
- will need HD chair/ peraCath prior to dc. BCs NGTD- dc Abx if still negative 9/19   
    
Anemia of chronic disease: d/t ESRD-no s/sx bleeding - s/p Transfusions. monitor Hb, transfuse for <7. Folate wnl. vit b12 wnl. Hemoccult pending   
    
Acute respiratory failure with hypoxia- requiring 3L NC O2- weaning off - CTA chest: No PE, Duplex LEs: no DVTs   
    
CHF: EF 30% per records- not decompensated.-Continue home coreg, entresto - Bumex-defer to nephrology- TTE: ef 50%. Mod pHTN   
    
#. Gout: with acute flare- started with HD. Analgesia, short prednisone course HTN-BP soft-start albumin IV x3 doses- Monitor COPD-PRN duonebs-Keep O2 greater than 90% DMII: last A1c over 10-check A1c-Start lantus-AC/HS accuchecks with SSI HLD-not on statin-check lipid panel Tobacco Use-1PPD x 33 years-counseled for cessation-nicotine patch ordered #. HypoCalcemia: replace, monitor   
    
Code status: Full DVT prophylaxis: SCDs Care Plan discussed with: Patient/Family and Nurse Disposition: TBD   
    
  
   
Renal Failure, Chronic - Care Day 4 (9/19/2020) by Wanda Tee RN  
 
   
Review Status  Review Entered Completed  9/21/2020 14:02   
   
Criteria Review Care Day: 4 Care Date: 9/19/2020 Level of Care: Inpatient Floor Guideline Day 3 Clinical Status   
(X) * Mental status at baseline ( ) * Pulmonary edema absent or improved Activity   
(X) * Ambulatory Routes ( ) * Oral hydration, medications (X) Renal diet as tolerated Interventions (X) Possible dialysis 9/21/2020 14:02:49 EDT by Andriy Reyes   
  9/19 had dialysis Medications (X) Possible erythropoiesis-stimulating agent, calcium supplement, phosphate binder, bicarbonate, vitamin D, antihypertensive medication 9/21/2020 14:02:49 EDT by Mirella Meneses see below * Milestone Additional Notes 9/19/20  inpt medical   
98.9   80   20  211/60   92% ra WBC: 6.6 HGB: 7.2 (L) HCT: 23.7 (L) PLATELET: 94 (L) Meds:  zyloprim 300mg po qd  asa 81mg po qd   coreg 12.5mg po bid   retacrit 17780 u  3 x a week   iron 325mg po qd     lantus 10 u qhs   Humalog ss ac and hs      melatonin 3mg po prn x 1    roxicodone 5mg po q4 prn x 3   deltasone 40mg po qd   entresto 1 po bid   zosyn 3.375g iv q12 Orders:  renal diabetic diet   q dwt     SCD  ambulate Hospitalist note Interval history / Subjective:   
Patient seen and examined at bedside, feels better, has more energy, c/o pain in knees L>R gets gout flares frequently feels similar.   
    
Assessment & Plan:   
    
ESRD: - PD cath placed 9/1, not yet used-didn't attend training Thrombocytopenia- likely 2nd to ESRD- monitor Generalized Weakness: largely immobile x2 weeks-suspect d/t severe uremia- PT/OT   
- S/p Danny. started HD-Avoid nephrotoxins-Appreciate nephrology - Palliative eval   
- will need HD chair/ peraCath prior to dc. BCs NGTD- dc Abx if still negative 9/19   
    
Anemia of chronic disease: d/t ESRD-no s/sx bleeding - s/p Transfusions. monitor Hb, transfuse for <7. Folate wnl. vit b12 wnl. Hemoccult pending   
    
Acute respiratory failure with hypoxia- requiring 3L NC O2- weaning off - CTA chest: No PE, Duplex LEs: no DVTs   
    
CHF: EF 30% per records- not decompensated.-Continue home coreg, entresto - Bumex-defer to nephrology- TTE: ef 50%. Mod pHTN   
    
#. Gout: with acute flare- started with HD. Analgesia, short prednisone course HTN-BP soft-start albumin IV x3 doses- Monitor COPD-PRN duonebs-Keep O2 greater than 90% DMII: last A1c over 10-check A1c-Start lantus-AC/HS accuchecks with SSI HLD-not on statin-check lipid panel Tobacco Use-1PPD x 33 years-counseled for cessation-nicotine patch ordered #. HypoCalcemia: replace, monitor   
    
Code status: Full DVT prophylaxis: SCDs Care Plan discussed with: Patient/Family and Nurse Disposition: TBD Renal note : 1970 Assessment:   
SULEMAN on CKD-4/5>>Pre-ESRD (Dr. Kritsin Hopson)- STARTED HD 9-16 (1st Rx via [employer]) Metabolic acidosis Uremia Anemia with component of ACKD Chronic S-CHF; EF of 30% DM-2 HTN   
Obesity Gout NRP- 8 grams on last check; due to DN and Obesity Plan/Recommendations:   
Plan next HD today and then MWF from next week Will need Permacath and outpt HD unit before d/c- which can be planned on Mon/Tue Consult CM for outpt HD unit set up- orders placed Ct Coreg/Entresto Ct EPO Will eventually transition to PD, once he is more stable clinically PT consult Will see again on Monday

## 2020-09-22 NOTE — PROGRESS NOTES
Transition of Care Plan  · RUR- 26 % Medium Risks   · DISPOSITION: Patient presents from home and the disposition plan is likely home with outpatient services; likely this evening after permacath placement or tomorrow   ·  [facility] Unit [phone] accepted, first day is Thursday 9/24/2020 at 10 AM  · Transport: family   · [address] 54   · Follow up: PCP/Specialist(s)      Reviewed chart for transitions of care,and discussed in rounds with the attending. Patient continues to progress and is nearing towards discharge once the dialysis chair is set-up. CM called and spoke with Mahad Carrizales, , [phone], at [facility], they are awaiting on their  to finalize acceptance. Mahad Carrizales will call this writer once the patient's dialysis location/acceptance is finalized by their . 11:48 AM: Patient has been accepted by [facility]. Patient may leave this evening post permacath placement or tomorrow depending on when he gets the placement.      Lexa Yoder, MSW

## 2020-09-22 NOTE — PROGRESS NOTES
Hospitalist Progress Note  Salomón Monroe MD  Answering service: [phone] from in house phone      Date of Service:  2020  NAME:  Rupinder Fox  :  1970  MRN:  [id number]    Admission Summary:   50M p/w Weakness. New ESRD   Interval history / Subjective:   Patient seen and examined at bedside, feels well, still not had permcath placed. Nephrology arranging for this today, will dc tomorrow am. GI hasn't come to evaluated. Will need to f/u op     Assessment & Plan:     ESRD: - PD cath placed , not yet used-didn't attend training  Thrombocytopenia- likely 2nd to ESRD- monitor  Generalized Weakness: largely immobile x2 weeks-suspect d/t severe uremia- PT/OT   - S/p Danny. started HD-Avoid nephrotoxins-Appreciate nephrology - Palliative eval  - will need HD chair/ peraCath prior to dc. BCs -ve- dc Abx    Anemia of chronic disease: d/t ESRD-no s/sx bleeding  - s/p Transfusions. monitor Hb, transfuse for <7. Folate wnl. vit b12 wnl. Ferritin wnl   - Hemoccult pending, GI eval pending    Acute respiratory failure with hypoxia- requiring 3L NC O2- weaning off  - CTA chest: No PE, Duplex LEs: no DVTs    CHF: EF 30% per records- not decompensated.-Continue home coreg, entresto  - Bumex-defer to nephrology- TTE: ef 50%. Mod pHTN    #. Gout: with acute flare- started with HD. Analgesia, short prednisone course  HTN-BP soft-start albumin IV x3 doses- Monitor   COPD-PRN duonebs-Keep O2 greater than 90%  DMII: last A1c over 10-check A1c-Start lantus-AC/HS accuchecks with SSI   HLD-not on statin-check lipid panel  Tobacco Use-1PPD x 33 years-counseled for cessation-nicotine patch ordered  #.  HypoCalcemia: replace, monitor    Code status: Full  DVT prophylaxis: SCDs  Care Plan discussed with: Patient/Family and Nurse  Disposition: TBD     Hospital Problems  Date Reviewed: 2020          Codes Class Noted POA    * (Principal) Acute on chronic renal failure (HCC) ICD-10-CM: N17.9, N18.9  ICD-9-CM: 584.9, 585.9  9/16/2020 Yes        Congestive heart failure (HCC) ICD-10-CM: I50.9  ICD-9-CM: 428.0  Unknown Yes    Overview Signed 9/16/2020  6:11 PM by Coy De Jesus NP     chronic left sided congestive heart failure             Chronic obstructive pulmonary disease ([facility] ICD-10-CM: J44.9  ICD-9-CM: 496  Unknown Yes        Anemia ICD-10-CM: D64.9  ICD-9-CM: 285. 9  Unknown Yes        Weakness generalized ICD-10-CM: R53.1  ICD-9-CM: 780.79  Unknown Yes        Thrombocytopenia (HCC) ICD-10-CM: D69.6  ICD-9-CM: 287.5  Unknown Yes        Acute respiratory failure with hypoxia (HCC) ICD-10-CM: J96.01  ICD-9-CM: 518.81  Unknown Yes        Obesity, morbid ([facility] ICD-10-CM: E66.01  ICD-9-CM: 278.01  8/19/2020 Yes        Tobacco abuse ICD-10-CM: Z72.0  ICD-9-CM: 305.1  11/19/2009 Yes        HTN (hypertension), benign ICD-10-CM: I10  ICD-9-CM: 401.1  11/19/2009 Yes        DM (diabetes mellitus) ([facility] ICD-10-CM: E11.9  ICD-9-CM: 250.00  11/19/2009 Yes        Hypercholesteremia ICD-10-CM: E78.00  ICD-9-CM: 272.0  11/19/2009 Yes            Review of Systems:   Pertinent items are mentioned in interval history. Vital Signs:    Last 24hrs VS reviewed since prior progress note.  Most recent are:  Visit Vitals  BP (!) 146/71 (BP 1 Location: Left arm, BP Patient Position: Standing)   Pulse 81   Temp 97.9 °F (36.6 °C) (Oral)   Resp 20   Ht 6' 3\" (1.905 m)   Wt 155.6 kg (343 lb 0.6 oz)   SpO2 95%   BMI 42.88 kg/m²         Intake/Output Summary (Last 24 hours) at 9/22/2020 1509  Last data filed at 9/22/2020 0940  Gross per 24 hour   Intake 360 ml   Output 4500 ml   Net -4140 ml        Physical Examination:   General:  Alert, oriented, No acute distress, obese  Card:  S1, S2 without murmurs, good peripheral perfusion  Resp:  No accessory muscle use, Good AE, no wheezes, no rhonchi  Abd:  Soft, non-tender, non-distended, BS+  Extremities:  No cyanosis or clubbing, significant edema b/l LEs  Neuro:  Grossly normal, no focal neuro deficits, follows commands   Psych:  Good insight, not agitated. Data Review:    Review and/or order of clinical lab test  Review and/or order of tests in the radiology section of CPT  Review and/or order of tests in the medicine section of [facility]  Labs:     Recent Labs     09/21/20  1514   WBC 7.1   HGB 7.6*   HCT 25.0*   *     Recent Labs     09/21/20 0052 09/20/20  0733   * 135*   K 3.7 3.7    104   CO2 26 26   BUN 54* 43*   CREA 5.47* 4.67*   * 97   CA 6.7* 7.0*   PHOS 3.8 3.8     Recent Labs     09/21/20 0052 09/20/20  0733   ALB 2.7* 2.8*     No results for input(s): INR, PTP, APTT, INREXT, INREXT in the last 72 hours. No results for input(s): FE, TIBC, PSAT, FERR in the last 72 hours. Lab Results   Component Value Date/Time    Folate 9.6 09/18/2020 04:00 AM      No results for input(s): PH, PCO2, PO2 in the last 72 hours. No results for input(s): CPK, CKNDX, TROIQ in the last 72 hours.     No lab exists for component: CPKMB  Lab Results   Component Value Date/Time    Cholesterol, total 284 (H) 02/22/2013 08:21 AM    HDL Cholesterol 39 (L) 02/22/2013 08:21 AM    LDL, calculated Comment 02/22/2013 08:21 AM    Triglyceride 788 (HH) 02/22/2013 08:21 AM     Lab Results   Component Value Date/Time    Glucose (POC) 112 (H) 09/22/2020 11:17 AM    Glucose (POC) 96 09/22/2020 06:38 AM    Glucose (POC) 160 (H) 09/21/2020 07:26 PM    Glucose (POC) 107 (H) 09/21/2020 07:18 AM    Glucose (POC) 153 (H) 09/20/2020 10:27 PM     Lab Results   Component Value Date/Time    Color YELLOW/STRAW 09/17/2020 01:33 AM    Appearance CLEAR 09/17/2020 01:33 AM    Specific gravity 1.015 09/17/2020 01:33 AM    pH (UA) 5.0 09/17/2020 01:33 AM    Protein 100 (A) 09/17/2020 01:33 AM    Glucose Negative 09/17/2020 01:33 AM    Ketone Negative 09/17/2020 01:33 AM    Bilirubin Negative 09/17/2020 01:33 AM    Urobilinogen 0.2 09/17/2020 01:33 AM Nitrites Negative 09/17/2020 01:33 AM    Leukocyte Esterase Negative 09/17/2020 01:33 AM    Epithelial cells FEW 09/17/2020 01:33 AM    Bacteria Negative 09/17/2020 01:33 AM    WBC 0-4 09/17/2020 01:33 AM    RBC 0-5 09/17/2020 01:33 AM     Medications Reviewed:     Current Facility-Administered Medications   Medication Dose Route Frequency    gentamicin (GARAMYCIN) 0.1 % cream   Topical DAILY    pantoprazole (PROTONIX) tablet 40 mg  40 mg Oral ACB    melatonin tablet 3 mg  3 mg Oral QHS PRN    predniSONE (DELTASONE) tablet 40 mg  40 mg Oral DAILY WITH BREAKFAST    0.9% sodium chloride infusion 250 mL  250 mL IntraVENous PRN    heparin (porcine) 1,000 unit/mL injection 2,500 Units  2,500 Units Hemodialysis DIALYSIS PRN    0.9% sodium chloride infusion 250 mL  250 mL IntraVENous PRN    oxyCODONE IR (ROXICODONE) tablet 5 mg  5 mg Oral Q4H PRN    carvediloL (COREG) tablet 12.5 mg  12.5 mg Oral BID WITH MEALS    epoetin brad-epbx (RETACRIT) injection 10,000 Units  10,000 Units SubCUTAneous Q TUE, THU & SAT    albumin human 25% (BUMINATE) solution 12.5 g  12.5 g IntraVENous DIALYSIS PRN    sodium chloride (NS) flush 5-40 mL  5-40 mL IntraVENous Q8H    sodium chloride (NS) flush 5-40 mL  5-40 mL IntraVENous PRN    acetaminophen (TYLENOL) tablet 650 mg  650 mg Oral Q6H PRN    Or    acetaminophen (TYLENOL) suppository 650 mg  650 mg Rectal Q6H PRN    polyethylene glycol (MIRALAX) packet 17 g  17 g Oral DAILY PRN    promethazine (PHENERGAN) tablet 12.5 mg  12.5 mg Oral Q6H PRN    Or    ondansetron (ZOFRAN) injection 4 mg  4 mg IntraVENous Q6H PRN    glucose chewable tablet 16 g  4 Tab Oral PRN    glucagon (GLUCAGEN) injection 1 mg  1 mg IntraMUSCular PRN    dextrose 10% infusion 0-250 mL  0-250 mL IntraVENous PRN    insulin glargine (LANTUS) injection 10 Units  10 Units SubCUTAneous QHS    insulin lispro (HUMALOG) injection   SubCUTAneous AC&HS    allopurinoL (ZYLOPRIM) tablet 300 mg  300 mg Oral DAILY  aspirin delayed-release tablet 81 mg  81 mg Oral DAILY    ferrous sulfate tablet 325 mg  325 mg Oral DAILY WITH BREAKFAST    sacubitriL-valsartan (ENTRESTO) 49-51 mg tablet 1 Tab  1 Tab Oral BID    nicotine (NICODERM CQ) 14 mg/24 hr patch 1 Patch  1 Patch TransDERmal Q24H    albuterol-ipratropium (DUO-NEB) 2.5 MG-0.5 MG/3 ML  3 mL Nebulization Q4H PRN   ______________________________________________________________________  EXPECTED LENGTH OF STAY: 4d 7h  ACTUAL LENGTH OF STAY:          6               Kobi Chandler MD

## 2020-09-22 NOTE — DISCHARGE INSTRUCTIONS
Discharge Instructions       PATIENT ID: Giovani Dillon  MRN: [id number]   YOB: 1970    DATE OF ADMISSION: 9/16/2020  1:47 PM    DATE OF DISCHARGE: 9/22/2020    PRIMARY CARE PROVIDER: Vicente Boo PA-C     ATTENDING PHYSICIAN: Brady Luis MD  DISCHARGING PROVIDER: Balaji Austin MD    To contact this individual call [phone] and ask the  to page. If unavailable ask to be transferred the Adult Hospitalist Department. DISCHARGE DIAGNOSES ESRD- fluid overload. Started HD. anemia    CONSULTATIONS: IP CONSULT TO NEPHROLOGY  IP CONSULT TO PALLIATIVE CARE - PROVIDER  IP CONSULT TO INTENSIVIST  IP CONSULT TO GASTROENTEROLOGY  IP CONSULT TO CARDIOLOGY    PROCEDURES/SURGERIES: * No surgery found *    FOLLOW UP APPOINTMENTS:   Follow-up Information     Follow up With Specialties Details Why Contact Info    Vicente Boo PA-C Physician Assistant   [address]  [address] [facility] [address]      Vicente Boo PA-C Physician Assistant In 1 week  [address] [place] [address] [address]        In 1 week  Nephrology for Hemodialysis    Jojo Michel MD Gastroenterology In 1 week suspect GI bleed- required transfusions [facility]  [phone]             ADDITIONAL CARE RECOMMENDATIONS: follow up with PCP , nephrology and GI    DIET: Resume previous diet    DISCHARGE MEDICATIONS:  No new meds    · It is important that you take the medication exactly as they are prescribed. · Keep your medication in the bottles provided by the pharmacist and keep a list of the medication names, dosages, and times to be taken in your wallet. · Do not take other medications without consulting your doctor. NOTIFY YOUR PHYSICIAN FOR ANY OF THE FOLLOWING:   Fever over 101 degrees for 24 hours.    Chest pain, shortness of breath, fever, chills, nausea, vomiting, diarrhea, change in mentation, falling, weakness, bleeding. Severe pain or pain not relieved by medications. Or, any other signs or symptoms that you may have questions about. It was our pleasure to help take care of you and we hope you get well very soon.     DISPOSITION:    Home With:   OT  PT  HH  RN       SNF/Inpatient Rehab/LTAC   x Independent/assisted living    Hospice    Other:     CDMP Checked:   Yes x     PROBLEM LIST Updated:  Yes x     Signed:   Bridger Oseguera MD  9/22/2020  11:24 AM

## 2020-09-22 NOTE — PROGRESS NOTES
NUTRITION     Nutrition screening and pt visited d/t length of stay. Pt receiving dialysis during my visit but was awake and talkative. No special food requests at this time, no supplements desired. Pt reports he is eating \"better than I was when I first got here; I wasn't eating a thing for days. \"  RD will continue to follow on the periphery. The patient's chart was reviewed and nutrition assessment is not indicated at this time. Plan to see patient for rescreen as indicated. Thank you.      Wt Readings from Last 15 Encounters:   09/22/20 155.6 kg (343 lb 0.6 oz)   09/01/20 (!) 161.7 kg (356 lb 7.7 oz)   08/19/20 (!) 162.8 kg (358 lb 12.8 oz)   03/04/13 144 kg (317 lb 8 oz)   01/25/13 (!) 159 kg (350 lb 8 oz)   01/11/13 (!) 159.2 kg (351 lb)   12/20/12 158.3 kg (349 lb)   12/12/12 156.5 kg (345 lb)   07/13/11 (!) 163.7 kg (361 lb)   06/07/11 (!) 165.1 kg (364 lb)   05/09/11 (!) 166.9 kg (368 lb)   12/22/10 (!) 160.2 kg (353 lb 3.2 oz)   08/04/10 156.5 kg (345 lb)   03/24/10 (!) 159.2 kg (351 lb)     Meal intake:   Patient Vitals for the past 168 hrs:   % Diet Eaten   09/18/20 1846 10 %   09/18/20 1601 50 %   09/18/20 0843 30 %       Marshall Bingham RD, CSP  C: [phone]

## 2020-09-22 NOTE — PROGRESS NOTES
Problem: Self Care Deficits Care Plan (Adult)  Goal: *Acute Goals and Plan of Care (Insert Text)  Description:   FUNCTIONAL STATUS PRIOR TO ADMISSION: Patient was independent and active without use of DME.     HOME SUPPORT: The patient lived with girlfriend but did not require assist.    Occupational Therapy Goals  Initiated 9/19/2020  1. Patient will perform upper body dressing with minimal assistance/contact guard assist within 7 day(s). 2.  Patient will perform lower body dressing with moderate assistance  within 7 day(s). 3.  Patient will perform toileting with minimal assistance/contact guard assist within 7 day(s). 4.  Patient will perform toilet transfers with minimal assistance  within 7 day(s). 5.  Patient will perform all aspects of toileting with minimal assistance  within 7 day(s). 9/22/2020 1457 by MENDOZA Coffman  Outcome: Progressing Towards Goal   OCCUPATIONAL THERAPY TREATMENT  Patient: Marian Jacobo (45 y.o. male)  Date: 9/22/2020  Diagnosis: Acute on chronic renal failure ([facility] [N17.9, N18.9]   Acute on chronic renal failure [facility])       Precautions: Fall  Chart, occupational therapy assessment, plan of care, and goals were reviewed. ASSESSMENT  Patient continues with skilled OT services and is progressing towards goals. Participation impacted by report of dizziness on standing, quick pace, and impaired reach to feet for donning socks and shoes. Also reports numbness in feet, but states this is chronic. Patient instructed in use of reacher to assist in dressing and retrieving items from floor, use of long handled shoehorn (said he used to have one). Recommend patient stop when first getting up to let dizziness resolve as he reports this does get better with a little time before he starts to ambulate. States he has a built in shower seat at home. States his father has several reachers and he may be able to borrow one.  Orthostatic BPs taken and stable during session. Current Level of Function Impacting Discharge (ADLs): CGA to Mod I for self care, toileting in bathroom with supervision to CGA    Other factors to consider for discharge: Girlfriend home 5 days a week and can assist per patient report         PLAN :  Patient continues to benefit from skilled intervention to address the above impairments. Continue treatment per established plan of care. to address goals. Recommend with staff: Sky Martinejoya in chair a minimum of 3 times a day, completing toileting and self care with set up to [facility], toileting in bathroom    Recommend next OT session: LE self care, dynamic standing    Recommendation for discharge: (in order for the patient to meet his/her long term goals)  No skilled occupational therapy/ follow up rehabilitation needs identified at this time. This discharge recommendation:  Has been made in collaboration with the attending provider and/or case management    IF patient discharges home will need the following DME: AE: long handled dressing       SUBJECTIVE:   Patient stated I have a ramp into my house.     OBJECTIVE DATA SUMMARY:   Cognitive/Behavioral Status:  Neurologic State: Alert  Orientation Level: Oriented X4  Cognition: Follows commands; Appropriate for age attention/concentration  Perception: Appears intact  Perseveration: No perseveration noted  Safety/Judgement: Awareness of environment    Functional Mobility and Transfers for ADLs:  Bed Mobility:       Transfers:  Sit to Stand: Modified independent  Functional Transfers  Toilet Transfer : Contact guard assistance;Assist x1  Adaptive Equipment: Other (comment)(reports he has a built in shower seat, vanity next to toile )       Balance:  Sitting: Intact; Without support  Standing: Impaired; Without support  Standing - Static: Good  Standing - Dynamic : Good    ADL Intervention:            Upper Body Bathing  Bathing Assistance: Set-up(in simulation)  Position Performed: Seated in chair    Lower Body Bathing  Lower Body : Set-up(in simulation)  Position Performed: Seated in chair         Lower Body Dressing Assistance  Socks: Maximum assistance  Position Performed: Seated in chair  Adaptive Equipment Used: Reacher;Long handled shoe horn; Other(comment)(recommended AE for ADLs and picking items off of floor)  Pants with elastic waist: CGA inferred from mobility       Cognitive Retraining  Safety/Judgement: Awareness of environment      Activity Tolerance:   Fair  Please refer to the flowsheet for vital signs taken during this treatment. After treatment patient left in no apparent distress:   Sitting in chair and Call bell within reach    COMMUNICATION/COLLABORATION:   The patients plan of care was discussed with: Physical therapist and Registered nurse.      MENDOZA Slater  Time Calculation: 35 mins  Co treat: charge for 1 unit

## 2020-09-22 NOTE — PROGRESS NOTES
Problem: Falls - Risk of  Goal: *Absence of Falls  Description: Document Radha Hurst Fall Risk and appropriate interventions in the flowsheet.   Outcome: Progressing Towards Goal  Note: Fall Risk Interventions:  Mobility Interventions: Communicate number of staff needed for ambulation/transfer    Mentation Interventions: Adequate sleep, hydration, pain control, Door open when patient unattended, Evaluate medications/consider consulting pharmacy, Increase mobility, Toileting rounds, Update white board, Gait belt with transfers/ambulation    Medication Interventions: Patient to call before getting OOB    Elimination Interventions: Call light in reach    History of Falls Interventions: Door open when patient unattended, Investigate reason for fall

## 2020-09-22 NOTE — DISCHARGE SUMMARY
Discharge Summary       PATIENT ID: Domi Ennis  MRN: [id number]   YOB: 1970    DATE OF ADMISSION: 9/16/2020  1:47 PM    DATE OF DISCHARGE: 9/22/2020   PRIMARY CARE PROVIDER: Dmitry Perdomo PA-C     ATTENDING PHYSICIAN: Tam Gore MD  DISCHARGING PROVIDER: Tam Gore MD    To contact this individual call [phone] and ask the  to page. If unavailable ask to be transferred the Adult Hospitalist Department. CONSULTATIONS: IP CONSULT TO NEPHROLOGY  IP CONSULT TO PALLIATIVE CARE - PROVIDER  IP CONSULT TO INTENSIVIST  IP CONSULT TO GASTROENTEROLOGY  IP CONSULT TO CARDIOLOGY    PROCEDURES/SURGERIES: * No surgery found *    ADMITTING [address] [place] [address] COURSE:   Evaluated for fluid overload. Advanced renal disease to point of ESRD started on HD. Had anemia, required transfusions. GI couldn't evaluate inpatient. Will need f/u with GI closely    Risk of Re-Admission: mod  DISCHARGE DIAGNOSES / PLAN:      ESRD: - PD cath placed 9/1, not yet used-didn't attend training  Thrombocytopenia- likely 2nd to ESRD- monitor  Generalized Weakness: largely immobile x2 weeks-suspect d/t severe uremia- PT/OT   - S/p Danny. started HD-Avoid nephrotoxins-Appreciate nephrology - Palliative eval  - BCs -ve- dc Abx- accepted for HD center outpatient.     Anemia of chronic disease: d/t ESRD-no s/sx bleeding  - s/p Transfusions. monitor Hb, transfuse for <7. Folate wnl. vit b12 wnl. Ferritin wnl   - Hemoccult pending, GI eval pending - couldn't evaluate inpatient. F/u op in 1 week     Acute respiratory failure with hypoxia- requiring 3L NC O2- weaned off.  - CTA chest: No PE, Duplex LEs: no DVTs     CHF: EF 30% per records- not decompensated.-Continue home coreg, entresto  - Bumex-defer to nephrology- TTE: ef 50%. Mod pHTN     #. Gout: with acute flare- started with HD.  Analgesia, short prednisone course  HTN-BP soft-start albumin IV x3 doses- Monitor   COPD-PRN duonebs-Keep O2 greater than 90%  DMII: -f/u op with PCP- A1c <3.8. reduced insulin doses significantly. F/u closely  HLD-not on statin-check lipid panel  Tobacco Use-1PPD x 33 years-counseled for cessation-nicotine patch ordered  #. HypoCalcemia: replace, monitor     FOLLOW UP APPOINTMENTS:    Follow-up Information     Follow up With Specialties Details Why Contact Info    Elvin Avery PA-C Physician Assistant   [address] [facility] [place]note 2[place] [address]      Elvin Avery PA-C Physician Assistant In 1 week  [address] [facility] [address]        In 1 week  Nephrology for Hemodialysis    Mary Jalloh MD Gastroenterology In 1 week suspect GI bleed- required transfusions [address] [phone]           ADDITIONAL CARE RECOMMENDATIONS:  Follow up with PCP and GI    DIET: Resume previous diet    ACTIVITY: Activity as tolerated    DISCHARGE MEDICATIONS:  Current Discharge Medication List      CONTINUE these medications which have CHANGED    Details   carvediloL (COREG) 12.5 mg tablet Take 1 Tab by mouth two (2) times daily (with meals) for 30 days. Qty: 60 Tab, Refills: 0         CONTINUE these medications which have NOT CHANGED    Details   predniSONE (STERAPRED DS) 10 mg dose pack Take  by mouth See Admin Instructions. See administration instruction per 10mg dose pack      iron, carbonyl (FEOSOL) 45 mg tab Take 1 Tab by mouth daily. allopurinoL (ZYLOPRIM) 100 mg tablet Take 300 mg by mouth daily. sacubitriL-valsartan (Entresto) 49-51 mg tab tablet Take 1 Tab by mouth two (2) times a day. aspirin delayed-release 81 mg tablet Take 81 mg by mouth daily. omeprazole (PRILOSEC) 40 mg capsule Take 40 mg by mouth daily. insulin glargine U-300 conc (Toujeo Max U-300 SoloStar) 300 unit/mL (3 mL) inpn 40 Units by SubCUTAneous route daily.       dulaglutide (Trulicity) 3.63 EI/3.3 mL sub-q pen 0.75 mg by SubCUTAneous route every seven (7) days. bumetanide (BUMEX) 2 mg tablet Take 2 mg by mouth daily. magnesium oxide (MAG-OX) 400 mg tablet Take 400 mg by mouth daily. Insulin Needles, Disposable, (BD INSULIN PEN NEEDLE UF SHORT) 31 X 5/16 \" Ndle Use as directed with insulin pen  Qty: 100 Package, Refills: 11      Blood-Glucose Meter monitoring kit Test blood sugar four times daily. Qty: 1 Kit, Refills: 0      glucose blood VI test strips (ASCENSIA AUTODISC VI, ONE TOUCH ULTRA TEST VI) strip Test four times daily  Qty: 1 Package, Refills: 11      Lancets Misc Test four times daily  Qty: 1 Package, Refills: 11           NOTIFY YOUR PHYSICIAN FOR ANY OF THE FOLLOWING:   Fever over 101 degrees for 24 hours. Chest pain, shortness of breath, fever, chills, nausea, vomiting, diarrhea, change in mentation, falling, weakness, bleeding. Severe pain or pain not relieved by medications. Or, any other signs or symptoms that you may have questions about. DISPOSITION:    Home With:   OT  PT  HH  RN       Long term SNF/Inpatient Rehab   x Independent/assisted living    Hospice    Other:     PATIENT CONDITION AT DISCHARGE:     Functional status    Poor     Deconditioned     Independent      Cognition     Lucid     Forgetful     Dementia      Catheters/lines (plus indication)    Eric     PICC     PEG     None      Code status     Full code     DNR      PHYSICAL EXAMINATION AT DISCHARGE:  Patient seen and examined at bedside, Condition stable, explained discharge and follow up plans. BP (!) 140/83   Pulse 69   Temp 97.9 °F (36.6 °C) (Oral)   Resp 20   Ht 6' 3\" (1.905 m)   Wt 155.6 kg (343 lb 0.6 oz)   SpO2 95%   BMI 42.88 kg/m²   General:  Alert, oriented, No acute distress. Still has sig edema  Resp:  No accessory muscle use, Good AE.   Neuro:  Grossly normal, no focal neuro deficits, follows commands     CHRONIC MEDICAL DIAGNOSES:  Problem List as of 9/22/2020 Date Reviewed: 9/16/2020          Codes Class Noted - Resolved * (Principal) Acute on chronic renal failure (HCC) ICD-10-CM: N17.9, N18.9  ICD-9-CM: 584.9, 585.9  9/16/2020 - Present        Congestive heart failure ([facility] ICD-10-CM: I50.9  ICD-9-CM: 428.0  Unknown - Present    Overview Signed 9/16/2020  6:11 PM by Khoa Hudson NP     chronic left sided congestive heart failure             Chronic obstructive pulmonary disease ([facility] ICD-10-CM: J44.9  ICD-9-CM: 496  Unknown - Present        Anemia ICD-10-CM: D64.9  ICD-9-CM: 285. 9  Unknown - Present        Weakness generalized ICD-10-CM: R53.1  ICD-9-CM: 780.79  Unknown - Present        Thrombocytopenia (HCC) ICD-10-CM: D69.6  ICD-9-CM: 287.5  Unknown - Present        Acute respiratory failure with hypoxia (HCC) ICD-10-CM: J96.01  ICD-9-CM: 518.81  Unknown - Present        Stage 5 chronic kidney disease not on chronic dialysis ([facility] ICD-10-CM: N18.5  ICD-9-CM: 585.5  9/2/2020 - Present        Obesity, morbid ([facility] ICD-10-CM: E66.01  ICD-9-CM: 278.01  8/19/2020 - Present        Allergic rhinitis ICD-10-CM: J30.9  ICD-9-CM: 477.9  11/19/2009 - Present        Tobacco abuse ICD-10-CM: Z72.0  ICD-9-CM: 305.1  11/19/2009 - Present        Obesity ICD-10-CM: E66.9  ICD-9-CM: 278.00  11/19/2009 - Present        HTN (hypertension), benign ICD-10-CM: I10  ICD-9-CM: 401.1  11/19/2009 - Present        DM (diabetes mellitus) ([facility] ICD-10-CM: E11.9  ICD-9-CM: 250.00  11/19/2009 - Present        Hypercholesteremia ICD-10-CM: E78.00  ICD-9-CM: 272.0  11/19/2009 - Present              37 minutes were spent with the patient on counseling and coordination of care.     Signed:   Lexie Pichardo MD  9/22/2020  11:25 AM

## 2020-09-22 NOTE — PROGRESS NOTES
Bedside and Verbal shift change report given to Pari Combs RN (oncoming nurse) by Rodrigo Hendrix RN (offgoing nurse). Report included the following information SBAR, Kardex, Intake/Output, MAR and Med Rec Status.

## 2020-09-22 NOTE — PROGRESS NOTES
1400: Per IR, permacath to be placed tomorrow, 9/23. Hospitalist notified and aware. Discharge order cancelled. Problem: Falls - Risk of  Goal: *Absence of Falls  Description: Document Meseret Wilkerson Fall Risk and appropriate interventions in the flowsheet.   Outcome: Progressing Towards Goal  Note: Fall Risk Interventions:  Mobility Interventions: Assess mobility with egress test    Mentation Interventions: Adequate sleep, hydration, pain control    Medication Interventions: Evaluate medications/consider consulting pharmacy    Elimination Interventions: Call light in reach    History of Falls Interventions: Door open when patient unattended         Problem: Chronic Renal Failure  Goal: *Fluid and electrolytes stabilized  Outcome: Progressing Towards Goal     Problem: Patient Education: Go to Patient Education Activity  Goal: Patient/Family Education  Outcome: Progressing Towards Goal

## 2020-09-22 NOTE — ACP (ADVANCE CARE PLANNING)
Referral from Palliative team with request to follow-up with patient regarding his interest is completing an AMD.  Met with Mr. Gwendolyn Lundberg, who indicated that he had the AMD paperwork and that he plans to review with his girlfriend. Offered  assistance; pt declined completion at this time. Per pt, he is being discharged tomorrow. Chaplains remain available for support as needed.     Jane Oneal, Palliative

## 2020-09-22 NOTE — PROGRESS NOTES
Bedside and Verbal shift change report given to Kevin Martinez RN (oncoming nurse) by Ewa Dan RN (offgoing nurse).  Report included the following information SBAR, Kardex, Intake/Output, MAR, Recent Results, Med Rec Status and Cardiac Rhythm SR.

## 2020-09-22 NOTE — PROGRESS NOTES
Name: Chivo Diehl   MRN: [id number]  : 1970  Assessment:  SULEMAN on CKD-4/5>>Pre-ESRD (Dr. Ceasar Kumar)- STARTED HD - (1st Rx via [facility])  Metabolic acidosis  Uremia  Anemia with component of ACKD  Chronic S-CHF; EF of 30% +Volume overload  DM-2  HTN  Obesity  Gout  NRP->8 grams on last check; due to DN and Obesity       Plan/Recommendations:  PUF today  NPO for Permacath later today  Has a outpatient HD chair at [facility] TTS 2nd shift  Ct Coreg/Entresto  Ct EPO  Will eventually transition to PD (has catheter in place), once he is more stable clinically  Can be discharged post permacath placement       Subjective:  Feeling better overall. PUF earlier today 2.5L removed. Edema improving  No cramping.  Moving around better    ROS:   No nausea, no vomiting  No chest pain, no shortness of breath    Exam:  Visit Vitals  BP (!) 146/71 (BP 1 Location: Left arm, BP Patient Position: Standing)   Pulse 81   Temp 97.9 °F (36.6 °C) (Oral)   Resp 20   Ht 6' 3\" (1.905 m)   Wt 155.6 kg (343 lb 0.6 oz)   SpO2 95%   BMI 42.88 kg/m²     WB/WN in NAD  AT/NC  R Danny in place  Clear  RRR, distant  + edema  AOx3    Current Facility-Administered Medications   Medication Dose Route Frequency Last Dose    gentamicin (GARAMYCIN) 0.1 % cream   Topical DAILY      pantoprazole (PROTONIX) tablet 40 mg  40 mg Oral ACB 40 mg at 20 0646    melatonin tablet 3 mg  3 mg Oral QHS PRN 3 mg at 20 0024    predniSONE (DELTASONE) tablet 40 mg  40 mg Oral DAILY WITH BREAKFAST 40 mg at 20 1009    0.9% sodium chloride infusion 250 mL  250 mL IntraVENous PRN      heparin (porcine) 1,000 unit/mL injection 2,500 Units  2,500 Units Hemodialysis DIALYSIS PRN 2,500 Units at 20 0910    0.9% sodium chloride infusion 250 mL  250 mL IntraVENous PRN      oxyCODONE IR (ROXICODONE) tablet 5 mg  5 mg Oral Q4H PRN 5 mg at 09/21/20 2309    carvediloL (COREG) tablet 12.5 mg  12.5 mg Oral BID WITH MEALS 12.5 mg at 09/22/20 1016    epoetin brad-epbx (RETACRIT) injection 10,000 Units  10,000 Units SubCUTAneous Q TUE, THU & SAT 10,000 Units at 09/19/20 2127    albumin human 25% (BUMINATE) solution 12.5 g  12.5 g IntraVENous DIALYSIS PRN 12.5 g at 09/18/20 0100    sodium chloride (NS) flush 5-40 mL  5-40 mL IntraVENous Q8H 10 mL at 09/22/20 0647    sodium chloride (NS) flush 5-40 mL  5-40 mL IntraVENous PRN 10 mL at 09/19/20 1309    acetaminophen (TYLENOL) tablet 650 mg  650 mg Oral Q6H  mg at 09/20/20 1529    Or    acetaminophen (TYLENOL) suppository 650 mg  650 mg Rectal Q6H PRN      polyethylene glycol (MIRALAX) packet 17 g  17 g Oral DAILY PRN 17 g at 09/22/20 1016    promethazine (PHENERGAN) tablet 12.5 mg  12.5 mg Oral Q6H PRN      Or    ondansetron (ZOFRAN) injection 4 mg  4 mg IntraVENous Q6H PRN      glucose chewable tablet 16 g  4 Tab Oral PRN      glucagon (GLUCAGEN) injection 1 mg  1 mg IntraMUSCular PRN      dextrose 10% infusion 0-250 mL  0-250 mL IntraVENous PRN      insulin glargine (LANTUS) injection 10 Units  10 Units SubCUTAneous QHS 10 Units at 09/21/20 2047    insulin lispro (HUMALOG) injection   SubCUTAneous AC&HS Stopped at 09/21/20 2200    allopurinoL (ZYLOPRIM) tablet 300 mg  300 mg Oral DAILY 300 mg at 09/22/20 1009    aspirin delayed-release tablet 81 mg  81 mg Oral DAILY 81 mg at 09/22/20 1009    ferrous sulfate tablet 325 mg  325 mg Oral DAILY WITH BREAKFAST 325 mg at 09/22/20 1016    sacubitriL-valsartan (ENTRESTO) 49-51 mg tablet 1 Tab  1 Tab Oral BID 1 Tab at 09/22/20 1009    nicotine (NICODERM CQ) 14 mg/24 hr patch 1 Patch  1 Patch TransDERmal Q24H      albuterol-ipratropium (DUO-NEB) 2.5 MG-0.5 MG/3 ML  3 mL Nebulization Q4H PRN         Labs/Data:    Lab Results   Component Value Date/Time    WBC 7.1 09/21/2020 03:14 PM    HGB 7.6 (L) 09/21/2020 03:14 PM    Hematocrit (POC) 24 (L) 09/01/2020 07:11 AM    HCT 25.0 (L) 09/21/2020 03:14 PM    PLATELET 255 (L) 27/17/4841 03:14 PM    MCV 97.7 09/21/2020 03:14 PM       Lab Results   Component Value Date/Time    Sodium 135 (L) 09/21/2020 12:52 AM    Potassium 3.7 09/21/2020 12:52 AM    Chloride 101 09/21/2020 12:52 AM    CO2 26 09/21/2020 12:52 AM    Anion gap 8 09/21/2020 12:52 AM    Glucose 130 (H) 09/21/2020 12:52 AM    BUN 54 (H) 09/21/2020 12:52 AM    Creatinine 5.47 (H) 09/21/2020 12:52 AM    BUN/Creatinine ratio 10 (L) 09/21/2020 12:52 AM    GFR est AA 14 (L) 09/21/2020 12:52 AM    GFR est non-AA 11 (L) 09/21/2020 12:52 AM    Calcium 6.7 (L) 09/21/2020 12:52 AM       Wt Readings from Last 3 Encounters:   09/22/20 155.6 kg (343 lb 0.6 oz)   09/01/20 (!) 161.7 kg (356 lb 7.7 oz)   08/19/20 (!) 162.8 kg (358 lb 12.8 oz)         Intake/Output Summary (Last 24 hours) at 9/22/2020 1504  Last data filed at 9/22/2020 0940  Gross per 24 hour   Intake 360 ml   Output 4500 ml   Net -4140 ml       Patient seen and examined. Chart reviewed. Labs, data and other pertinent notes reviewed in last 24 hrs.     PMH/SH/FH reviewed and unchanged compared to H&P    Discussed with pt and Dr. Dave Small MD   [address]

## 2020-09-22 NOTE — PROCEDURES
[facility] Team [facility] [place]  [phone]    Vitals   Pre   Post   Assessment   Pre   Post     Temp  Temp: 97.4 °F (36.3 °C) (09/22/20 0740)  97.9 LOC  Alert and oriented  Alert and oriented   HR   Pulse (Heart Rate): 69 (09/22/20 0740) 72 Lungs   Expiratory wheeze present upon auscultation  unlabored, even   B/P   BP: (!) 170/81 (09/22/20 0740) 141/88 Cardiac   RRR  RRR   Resp   Resp Rate: 18 (09/22/20 0740) 20 Skin   Warm and dry  Warm and dry    Pain level  Pain Intensity 1: 0 (09/22/20 0400) No verbal complaints Edema    Significant BLE pitting edema   Significant BLE   Orders:    Duration:   Start:    0740 End:    0940 Total:   2   Dialyzer:   Dialyzer/Set Up Inspection: Revaclear (09/22/20 0740)   K Bath:   Dialysate K (mEq/L): 2(uf only) (09/22/20 0740)   Ca Bath:   Dialysate CA (mEq/L): 2.5(uf only) (09/22/20 0740)   Na/Bicarb:   Dialysate NA (mEq/L): 140 (09/22/20 0740)   Target Fluid Removal:   Goal/Amount of Fluid to Remove (mL): 2500 mL (09/22/20 0740)   Access     Type & Location:   Right non tunneled CVC, dressing 09/18/20, clean, dry and intact, Each catheter limb disinfected per p&p, caps removed, hubs disinfected per p&p.        Labs     Obtained/Reviewed   Critical Results Called   Date when labs were drawn-  Hgb-    HGB   Date Value Ref Range Status   09/21/2020 7.6 (L) 12.1 - 17.0 g/dL Final     K-    Potassium   Date Value Ref Range Status   09/21/2020 3.7 3.5 - 5.1 mmol/L Final     Ca-   Calcium   Date Value Ref Range Status   09/21/2020 6.7 (L) 8.5 - 10.1 MG/DL Final     Bun-   BUN   Date Value Ref Range Status   09/21/2020 54 (H) 6 - 20 MG/DL Final     Creat-   Creatinine   Date Value Ref Range Status   09/21/2020 5.47 (H) 0.70 - 1.30 MG/DL Final        Medications/ Blood Products Given     Name   Dose   Route and Time     Heparin 1:1000 2500 1.1 arterial and 1.4 venous cvc dwell              Blood Volume Processed (BVP):    0 Net Fluid   Removed:  2500   Comments   Time Out Done: 9297  Primary Nurse Rpt Pre: Scot Brasher RN  Primary Nurse Rpt Post: Alycia Waller RN   Pt Education: procedural ultrafiltration  Care Plan: continue current HD plan of care   Tx Summary:  0740 UF initiated as ordered. 0940 UF completed all possible blood returned. Each dialysis catheter limb disinfected per p&p, blood returned per p&p, each dialysis hub disinfected per p&p, post dialysis catheter dwell instilled per order, and caps applied. SBAR to primary nurse. Admiting Diagnosis: acute on chronic kidney disease   Pt's previous clinic- [facility]  Consent signed - Informed Consent Verified: Yes (09/22/20 0740)  Hepatitis Status- 09/16/20 negative antigen, 09/21/20 susceptible connect care   Machine #- Machine Number: [id number] (09/22/20 0740)  Telemetry status-  Pre-dialysis wt. - Pre-Dialysis Weight: 162.4 kg (358 lb 0.4 oz) (09/17/20 2350)

## 2020-09-22 NOTE — PROGRESS NOTES
Problem: Mobility Impaired (Adult and Pediatric)  Goal: *Acute Goals and Plan of Care (Insert Text)  Description: FUNCTIONAL STATUS PRIOR TO ADMISSION: Patient was independent and active without use of DME.    HOME SUPPORT PRIOR TO ADMISSION: The patient lived with girlfriend but did not require assist.    Physical Therapy Goals  Initiated 9/19/2020  1. Patient will move from supine to sit and sit to supine , scoot up and down, and roll side to side in bed with supervision/set-up within 7 day(s). 2.  Patient will transfer from bed to chair and chair to bed with supervision/set-up using the least restrictive device within 7 day(s). 3.  Patient will perform sit to stand with supervision/set-up within 7 day(s). 4.  Patient will ambulate with supervision/set-up for 150 feet with the least restrictive device within 7 day(s). 5.  Patient will ascend/descend 3 stairs with 1 handrail(s) with supervision/set-up within 7 day(s). Outcome: Progressing Towards Goal   PHYSICAL THERAPY TREATMENT  Patient: Dain Downing (32 y.o. male)  Date: 9/22/2020  Diagnosis: Acute on chronic renal failure (HCC) [N17.9, N18.9]   Acute on chronic renal failure [facility])       Precautions: Fall  Chart, physical therapy assessment, plan of care and goals were reviewed. ASSESSMENT  Patient continues with skilled PT services and is progressing towards goals. Today pt reported feeling dizzy when up in the bathroom with DONLADSON. Then took BPs: standing, seated post activity, return to stand BP and standing BP post amb, see below. Note variable BPs. Gait characterized by: increased trunk sway and subtle path deviations. He reported numbness in his feet and is a diabetic. We discussed the importance of not going barefoot. Also of note, pt reports having a ramp to enter his home with a rail. Current Level of Function Impacting Discharge (mobility/balance): contact guard. Impaired standing balance.     Other factors to consider for discharge: new to dialysis. Labile BP      Vitals:     09/22/20 1426 09/22/20 1431 09/22/20 1433 09/22/20 1436   BP: (!) 144/62 (!) 154/66 (!) 171/71 (!) 146/71   BP 1 Location: Left arm Left arm Left arm Left arm   BP Patient Position: Standing;Post activity Sitting;Post activity Standing Standing post amb   Pulse: 89 71 76 81                                      PLAN :  Patient continues to benefit from skilled intervention to address the above impairments. Continue treatment per established plan of care. to address goals. Recommendation for discharge: (in order for the patient to meet his/her long term goals)  Physical therapy at least 2 days/week in the home vs none, pending progress    This discharge recommendation:  A follow-up discussion with the attending provider and/or case management is planned    IF patient discharges home will need the following DME: none       SUBJECTIVE:   Patient stated I just get a little dizzy when I get up.     OBJECTIVE DATA SUMMARY:   Chart checked, pt cleared by nursing  Critical Behavior:  Neurologic State: Alert  Orientation Level: Oriented X4  Cognition: Follows commands  Safety/Judgement: Awareness of environment  Functional Mobility Training:  Bed Mobility:         Not observed            Transfers:  Sit to Stand: Independent  Stand to Sit: Independent                             Balance:  Sitting: Intact; Without support  Standing: Impaired; Without support  Standing - Static: Good  Standing - Dynamic : Good  Ambulation/Gait Training:  Distance (ft): 100 Feet (ft)  Assistive Device: Gait belt  Ambulation - Level of Assistance: Contact guard assistance        Gait Abnormalities: Decreased step clearance;Trunk sway increased; Path deviations        Base of Support: Widened     Speed/Nancy: Slow  Step Length: Right shortened;Left shortened                    Stairs:               Therapeutic Exercises:     Pain Rating:  None rated    Activity Tolerance:   See assessement  Please refer to the flowsheet for vital signs taken during this treatment. After treatment patient left in no apparent distress:   Sitting in chair and Call bell within reach    COMMUNICATION/COLLABORATION:   The patients plan of care was discussed with: Occupational therapy assistant and Registered nurse.      Rocio Pike   Time Calculation: 29 mins

## 2020-09-22 NOTE — PROGRESS NOTES
Referral from Palliative team with request to follow-up with patient regarding his interest is completing an AMD.  Met with Mr. Jeannine Nguyen, who indicated that he had the AMD blank paperwork and that he plans to review with his girlfriend. Offered  assistance; pt declined completion at this time. Per pt, he is being discharged tomorrow. Explored with pt how he is feeling and coping. Pt provided an update on his medical concerns and some feelings of stress as he sharon with his health concerns and plan of care. Normalized his feelings and offered emotional support. He feels that he has good information from doctors and didn't identify any additional needs at this time. Chaplains remain available for support as needed.     Jane Tejada, Palliative

## 2020-09-23 ENCOUNTER — HOSPITAL ENCOUNTER (OUTPATIENT)
Dept: INTERVENTIONAL RADIOLOGY/VASCULAR | Age: 50
Discharge: HOME | DRG: 673 | End: 2020-09-23
Attending: INTERNAL MEDICINE | Admitting: FAMILY MEDICINE
Payer: COMMERCIAL

## 2020-09-23 VITALS
TEMPERATURE: 97.7 F | HEIGHT: 75 IN | OXYGEN SATURATION: 98 % | WEIGHT: 315 LBS | BODY MASS INDEX: 39.17 KG/M2 | SYSTOLIC BLOOD PRESSURE: 150 MMHG | DIASTOLIC BLOOD PRESSURE: 77 MMHG | HEART RATE: 73 BPM | RESPIRATION RATE: 14 BRPM

## 2020-09-23 VITALS
TEMPERATURE: 98 F | OXYGEN SATURATION: 98 % | HEART RATE: 68 BPM | SYSTOLIC BLOOD PRESSURE: 154 MMHG | RESPIRATION RATE: 14 BRPM | DIASTOLIC BLOOD PRESSURE: 47 MMHG

## 2020-09-23 LAB
GLUCOSE BLD STRIP.AUTO-MCNC: 106 MG/DL (ref 65–100)
GLUCOSE BLD STRIP.AUTO-MCNC: 125 MG/DL (ref 65–100)
SERVICE CMNT-IMP: ABNORMAL
SERVICE CMNT-IMP: ABNORMAL

## 2020-09-23 PROCEDURE — C1894 INTRO/SHEATH, NON-LASER: HCPCS

## 2020-09-23 PROCEDURE — 77030002996 HC SUT SLK J&J -A

## 2020-09-23 PROCEDURE — 2709999900 HC NON-CHARGEABLE SUPPLY

## 2020-09-23 PROCEDURE — 77030010507 HC ADH SKN DERMBND J&J -B

## 2020-09-23 PROCEDURE — 77030011229 HC DIL VESL COON COOK -A

## 2020-09-23 PROCEDURE — 05PY33Z REMOVAL OF INFUSION DEVICE FROM UPPER VEIN, PERCUTANEOUS APPROACH: ICD-10-PCS | Performed by: STUDENT IN AN ORGANIZED HEALTH CARE EDUCATION/TRAINING PROGRAM

## 2020-09-23 PROCEDURE — 74011250636 HC RX REV CODE- 250/636

## 2020-09-23 PROCEDURE — C1769 GUIDE WIRE: HCPCS

## 2020-09-23 PROCEDURE — 74011000250 HC RX REV CODE- 250: Performed by: STUDENT IN AN ORGANIZED HEALTH CARE EDUCATION/TRAINING PROGRAM

## 2020-09-23 PROCEDURE — 36558 INSERT TUNNELED CV CATH: CPT

## 2020-09-23 PROCEDURE — 74011250637 HC RX REV CODE- 250/637: Performed by: NURSE PRACTITIONER

## 2020-09-23 PROCEDURE — 74011250636 HC RX REV CODE- 250/636: Performed by: STUDENT IN AN ORGANIZED HEALTH CARE EDUCATION/TRAINING PROGRAM

## 2020-09-23 PROCEDURE — 77030002986 HC SUT PROL J&J -A

## 2020-09-23 PROCEDURE — 74011250637 HC RX REV CODE- 250/637: Performed by: INTERNAL MEDICINE

## 2020-09-23 PROCEDURE — 74011636637 HC RX REV CODE- 636/637: Performed by: INTERNAL MEDICINE

## 2020-09-23 PROCEDURE — 02HV33Z INSERTION OF INFUSION DEVICE INTO SUPERIOR VENA CAVA, PERCUTANEOUS APPROACH: ICD-10-PCS | Performed by: STUDENT IN AN ORGANIZED HEALTH CARE EDUCATION/TRAINING PROGRAM

## 2020-09-23 PROCEDURE — 82962 GLUCOSE BLOOD TEST: CPT

## 2020-09-23 PROCEDURE — 0JH63XZ INSERTION OF TUNNELED VASCULAR ACCESS DEVICE INTO CHEST SUBCUTANEOUS TISSUE AND FASCIA, PERCUTANEOUS APPROACH: ICD-10-PCS | Performed by: STUDENT IN AN ORGANIZED HEALTH CARE EDUCATION/TRAINING PROGRAM

## 2020-09-23 PROCEDURE — 74011000258 HC RX REV CODE- 258: Performed by: STUDENT IN AN ORGANIZED HEALTH CARE EDUCATION/TRAINING PROGRAM

## 2020-09-23 PROCEDURE — C1750 CATH, HEMODIALYSIS,LONG-TERM: HCPCS

## 2020-09-23 PROCEDURE — 77030031139 HC SUT VCRL2 J&J -A

## 2020-09-23 RX ORDER — HEPARIN SODIUM 1000 [USP'U]/ML
INJECTION, SOLUTION INTRAVENOUS; SUBCUTANEOUS
Status: COMPLETED
Start: 2020-09-23 | End: 2020-09-23

## 2020-09-23 RX ORDER — OXYCODONE HYDROCHLORIDE 5 MG/1
5 TABLET ORAL
Qty: 8 TAB | Refills: 0 | Status: SHIPPED | OUTPATIENT
Start: 2020-09-23 | End: 2020-09-26

## 2020-09-23 RX ORDER — INSULIN GLARGINE 300 U/ML
10 INJECTION, SOLUTION SUBCUTANEOUS DAILY
Qty: 3 ML | Refills: 0 | Status: SHIPPED | OUTPATIENT
Start: 2020-09-23 | End: 2020-10-23

## 2020-09-23 RX ORDER — HEPARIN 100 UNIT/ML
300 SYRINGE INTRAVENOUS AS NEEDED
Status: DISCONTINUED | OUTPATIENT
Start: 2020-09-23 | End: 2020-09-23 | Stop reason: ALTCHOICE

## 2020-09-23 RX ORDER — LIDOCAINE HYDROCHLORIDE 20 MG/ML
20 INJECTION, SOLUTION INFILTRATION; PERINEURAL ONCE
Status: COMPLETED | OUTPATIENT
Start: 2020-09-23 | End: 2020-09-23

## 2020-09-23 RX ORDER — FENTANYL CITRATE 50 UG/ML
100 INJECTION, SOLUTION INTRAMUSCULAR; INTRAVENOUS
Status: DISCONTINUED | OUTPATIENT
Start: 2020-09-23 | End: 2020-09-23 | Stop reason: ALTCHOICE

## 2020-09-23 RX ORDER — MIDAZOLAM HYDROCHLORIDE 1 MG/ML
5 INJECTION, SOLUTION INTRAMUSCULAR; INTRAVENOUS
Status: DISCONTINUED | OUTPATIENT
Start: 2020-09-23 | End: 2020-09-23 | Stop reason: ALTCHOICE

## 2020-09-23 RX ORDER — SODIUM CHLORIDE 9 MG/ML
50 INJECTION, SOLUTION INTRAVENOUS CONTINUOUS
Status: DISCONTINUED | OUTPATIENT
Start: 2020-09-23 | End: 2020-09-23 | Stop reason: ALTCHOICE

## 2020-09-23 RX ADMIN — GENTAMICIN SULFATE: 1 CREAM TOPICAL at 09:35

## 2020-09-23 RX ADMIN — LIDOCAINE HYDROCHLORIDE 20 ML: 20 INJECTION, SOLUTION INFILTRATION; PERINEURAL at 07:51

## 2020-09-23 RX ADMIN — ASPIRIN 81 MG: 81 TABLET, COATED ORAL at 09:30

## 2020-09-23 RX ADMIN — CEFAZOLIN 3 G: 10 INJECTION, POWDER, FOR SOLUTION INTRAVENOUS at 07:27

## 2020-09-23 RX ADMIN — PANTOPRAZOLE SODIUM 40 MG: 40 TABLET, DELAYED RELEASE ORAL at 09:31

## 2020-09-23 RX ADMIN — FERROUS SULFATE TAB 325 MG (65 MG ELEMENTAL FE) 325 MG: 325 (65 FE) TAB at 09:31

## 2020-09-23 RX ADMIN — OXYCODONE 5 MG: 5 TABLET ORAL at 09:42

## 2020-09-23 RX ADMIN — PREDNISONE 40 MG: 20 TABLET ORAL at 09:30

## 2020-09-23 RX ADMIN — MIDAZOLAM HYDROCHLORIDE 2 MG: 1 INJECTION, SOLUTION INTRAMUSCULAR; INTRAVENOUS at 07:46

## 2020-09-23 RX ADMIN — ALLOPURINOL 300 MG: 300 TABLET ORAL at 09:31

## 2020-09-23 RX ADMIN — SACUBITRIL AND VALSARTAN 1 TABLET: 49; 51 TABLET, FILM COATED ORAL at 09:32

## 2020-09-23 RX ADMIN — CARVEDILOL 12.5 MG: 12.5 TABLET, FILM COATED ORAL at 09:30

## 2020-09-23 RX ADMIN — HEPARIN SODIUM 3000 UNITS: 1000 INJECTION INTRAVENOUS; SUBCUTANEOUS at 07:52

## 2020-09-23 RX ADMIN — FENTANYL CITRATE 50 MCG: 50 INJECTION, SOLUTION INTRAMUSCULAR; INTRAVENOUS at 07:45

## 2020-09-23 NOTE — PROGRESS NOTES
Physical Therapy  9/23/2020    Chart reviewed. Patient in AIR for dialysis catheter conversion. F/u later today as able/appropriate for PT session. Thank you.     Marge Dodd, PT, DPT

## 2020-09-23 NOTE — PROGRESS NOTES
Pt discharged to home with significant other. No s/s of distress. Pt stable at this time. Pt and significant other verbalized understanding of follow up instructions and medications.

## 2020-09-23 NOTE — DISCHARGE SUMMARY
Discharge Summary       PATIENT ID: Shaan Mishra  MRN: [id number]   YOB: 1970    DATE OF ADMISSION: 9/16/2020  1:47 PM    DATE OF DISCHARGE: 9/23/2020   PRIMARY CARE PROVIDER: Elvin Avery PA-C     ATTENDING PHYSICIAN: Lexie Picahrdo MD  DISCHARGING PROVIDER: Lexie Pichardo MD    To contact this individual call [phone] and ask the  to page. If unavailable ask to be transferred the Adult Hospitalist Department. CONSULTATIONS: IP CONSULT TO NEPHROLOGY  IP CONSULT TO PALLIATIVE CARE - PROVIDER  IP CONSULT TO INTENSIVIST  IP CONSULT TO GASTROENTEROLOGY  IP CONSULT TO INTERVENTIONAL RADIOLOGY  IP CONSULT TO CARDIOLOGY    PROCEDURES/SURGERIES: * No surgery found *    ADMITTING [address] [place] [address] COURSE:   Evaluated for fluid overload. Advanced renal disease to point of ESRD started on HD. Had anemia, required transfusions. GI couldn't evaluate inpatient. Will need f/u with GI closely    Dc was deferred from yesterday, as IR couldn't place permCath till this am. Feels well after permcath placement. Little bleed on site seem to have stopped. Risk of Re-Admission: mod  DISCHARGE DIAGNOSES / PLAN:      ESRD: - PD cath placed 9/1, not yet used-didn't attend training  Thrombocytopenia- likely 2nd to ESRD- monitor  Generalized Weakness: largely immobile x2 weeks-suspect d/t severe uremia- PT/OT   - S/p Danny. started HD-Avoid nephrotoxins-Appreciate nephrology - Palliative eval  - BCs -ve- dc Abx- accepted for HD center outpatient.     Anemia of chronic disease: d/t ESRD-no s/sx bleeding  - s/p Transfusions. monitor Hb, transfuse for <7. Folate wnl. vit b12 wnl. Ferritin wnl   - Hemoccult pending, GI eval pending - couldn't evaluate inpatient.  F/u op in 1 week     Acute respiratory failure with hypoxia- requiring 3L NC O2- weaned off.  - CTA chest: No PE, Duplex LEs: no DVTs     CHF: EF 30% per records- not decompensated.-Continue home coreg, entresto  - Bumex-defer to nephrology- TTE: ef 50%. Mod pHTN     #. Gout: with acute flare- started with HD. Analgesia, short prednisone course  HTN-BP soft-start albumin IV x3 doses- Monitor   COPD-PRN duonebs-Keep O2 greater than 90%  DMII: -f/u op with PCP- A1c <3.8. reduced insulin doses significantly. F/u closely  HLD-not on statin-check lipid panel  Tobacco Use-1PPD x 33 years-counseled for cessation-nicotine patch ordered  #. HypoCalcemia: replace, monitor     FOLLOW UP APPOINTMENTS:    Follow-up Information     Follow up With Specialties Details Why Contact Info    Stacey Richardson PA-C Physician Assistant   [address] [facility] [address]      Stacey Richardson PA-C Physician Assistant In 1 week  [address]  [address]        In 1 week  Nephrology for Hemodialysis    Magdalena Duncan MD Gastroenterology In 1 week suspect GI bleed- required transfusions [address] in 1 day please go to the clinic to complete paper work on Wednesday and start dialysis on Thursday. Address: [facility] [address], [address]  Phone: [phone]    Tues/Thurs/Sat @ 10:30 AM         ADDITIONAL CARE RECOMMENDATIONS:  Follow up with PCP and GI    DIET: Resume previous diet    ACTIVITY: Activity as tolerated    DISCHARGE MEDICATIONS:  Current Discharge Medication List      CONTINUE these medications which have CHANGED    Details   carvediloL (COREG) 12.5 mg tablet Take 1 Tab by mouth two (2) times daily (with meals) for 30 days. Qty: 60 Tab, Refills: 0      insulin glargine U-300 conc (Toujeo Max U-300 SoloStar) 300 unit/mL (3 mL) inpn 10 Units by SubCUTAneous route daily for 30 days. Qty: 1 mL, Refills: 0         CONTINUE these medications which have NOT CHANGED    Details   predniSONE (STERAPRED DS) 10 mg dose pack Take  by mouth See Admin Instructions.  See administration instruction per 10mg dose pack      iron, carbonyl (FEOSOL) 45 mg tab Take 1 Tab by mouth daily. allopurinoL (ZYLOPRIM) 100 mg tablet Take 300 mg by mouth daily. sacubitriL-valsartan (Entresto) 49-51 mg tab tablet Take 1 Tab by mouth two (2) times a day. aspirin delayed-release 81 mg tablet Take 81 mg by mouth daily. omeprazole (PRILOSEC) 40 mg capsule Take 40 mg by mouth daily. dulaglutide (Trulicity) 4.82 LZ/5.0 mL sub-q pen 0.75 mg by SubCUTAneous route every seven (7) days. bumetanide (BUMEX) 2 mg tablet Take 2 mg by mouth daily. magnesium oxide (MAG-OX) 400 mg tablet Take 400 mg by mouth daily. Insulin Needles, Disposable, (BD INSULIN PEN NEEDLE UF SHORT) 31 X 5/16 \" Ndle Use as directed with insulin pen  Qty: 100 Package, Refills: 11      Blood-Glucose Meter monitoring kit Test blood sugar four times daily. Qty: 1 Kit, Refills: 0      glucose blood VI test strips (ASCENSIA AUTODISC VI, ONE TOUCH ULTRA TEST VI) strip Test four times daily  Qty: 1 Package, Refills: 11      Lancets Misc Test four times daily  Qty: 1 Package, Refills: 11           NOTIFY YOUR PHYSICIAN FOR ANY OF THE FOLLOWING:   Fever over 101 degrees for 24 hours. Chest pain, shortness of breath, fever, chills, nausea, vomiting, diarrhea, change in mentation, falling, weakness, bleeding. Severe pain or pain not relieved by medications. Or, any other signs or symptoms that you may have questions about.     DISPOSITION:    Home With:   OT  PT  HH  RN       Long term SNF/Inpatient Rehab   x Independent/assisted living    Hospice    Other:     PATIENT CONDITION AT DISCHARGE:     Functional status    Poor     Deconditioned     Independent      Cognition     Lucid     Forgetful     Dementia      Catheters/lines (plus indication)    Eric     PICC     PEG     None      Code status     Full code     DNR      PHYSICAL EXAMINATION AT DISCHARGE:  Patient seen and examined at bedside, Condition stable, explained discharge and follow up plans. BP (!) 150/77 (BP 1 Location: Left arm, BP Patient Position: At rest)   Pulse 73   Temp 97.7 °F (36.5 °C)   Resp 14   Ht 6' 3\" (1.905 m)   Wt 155.6 kg (343 lb 0.6 oz)   SpO2 98%   BMI 42.88 kg/m²   General:  Alert, oriented, No acute distress. Still has sig edema  Resp:  No accessory muscle use, Good AE. Neuro:  Grossly normal, no focal neuro deficits, follows commands     CHRONIC MEDICAL DIAGNOSES:  Problem List as of 9/23/2020 Date Reviewed: 9/16/2020          Codes Class Noted - Resolved    * (Principal) Acute on chronic renal failure ([facility] ICD-10-CM: N17.9, N18.9  ICD-9-CM: 584.9, 585.9  9/16/2020 - Present        Congestive heart failure ([facility] ICD-10-CM: I50.9  ICD-9-CM: 428.0  Unknown - Present    Overview Signed 9/16/2020  6:11 PM by Violet Dolan NP     chronic left sided congestive heart failure             Chronic obstructive pulmonary disease ([facility] ICD-10-CM: J44.9  ICD-9-CM: 496  Unknown - Present        Anemia ICD-10-CM: D64.9  ICD-9-CM: 202. 9  Unknown - Present        Weakness generalized ICD-10-CM: R53.1  ICD-9-CM: 780.79  Unknown - Present        Thrombocytopenia (HCC) ICD-10-CM: D69.6  ICD-9-CM: 287.5  Unknown - Present        Acute respiratory failure with hypoxia (HCC) ICD-10-CM: J96.01  ICD-9-CM: 518.81  Unknown - Present        Stage 5 chronic kidney disease not on chronic dialysis [facility]) ICD-10-CM: N18.5  ICD-9-CM: 585.5  9/2/2020 - Present        Obesity, morbid ([facility] ICD-10-CM: E66.01  ICD-9-CM: 278.01  8/19/2020 - Present        Allergic rhinitis ICD-10-CM: J30.9  ICD-9-CM: 477.9  11/19/2009 - Present        Tobacco abuse ICD-10-CM: Z72.0  ICD-9-CM: 305.1  11/19/2009 - Present        Obesity ICD-10-CM: E66.9  ICD-9-CM: 278.00  11/19/2009 - Present        HTN (hypertension), benign ICD-10-CM: I10  ICD-9-CM: 401.1  11/19/2009 - Present        DM (diabetes mellitus) ([facility] ICD-10-CM: E11.9  ICD-9-CM: 250.00  11/19/2009 - Present Hypercholesteremia ICD-10-CM: E78.00  ICD-9-CM: 272.0  11/19/2009 - Present              37 minutes were spent with the patient on counseling and coordination of care.     Signed:   Jenny Ríos MD  9/23/2020  11:25 AM

## 2020-09-23 NOTE — PROGRESS NOTES
0640 Pt off floor for Permacath placement with transport. 0730 Bedside shift change report given to [facility] RN (oncoming nurse) by Lizzy Saravia RN (offgoing nurse). Report included the following information SBAR, Kardex, MAR, Accordion and Med Rec Status.

## 2020-09-23 NOTE — PROCEDURES
Interventional Radiology  Procedure Note        9/23/2020 8:04 AM    Patient: Hayden Alvarez     Informed consent obtained    Diagnosis: Acute on chronic renal failure    Procedure(s): Conversion of right nontunneled hemodialysis catheter to tunneled hemodialysis catheter    Specimens removed:   Indwelling nontunneled catheter removed, intact    Complications: None    Primary Physician: Irish Rosenbaum MD    Recomendations: N/A    Discharge Disposition: stable; return to floor    Full dictated report to follow    Irish Rosenbaum MD  Interventional Radiology  [facility]  8:04 AM, 9/23/2020

## 2020-09-23 NOTE — H&P
Radiology History and Physical    Patient: Axel Singh 53 y.o. male       Chief Complaint: Need for long term hemodialysis    History of Present Illness: 53year old male with acute on chronic renal failure. Had a nontunneled hemodialysis catheter placed on 9/16, presents today for conversion to tunneled hemodialysis catheter. No issues with catheter function or infection. Denies chest pain, abdominal pain, fevers, shortness of breath.     History:    Past Medical History:   Diagnosis Date    Allergic rhinitis 11/19/2009    Anemia     CAD (coronary artery disease)     Chronic kidney disease     stage 4    Chronic obstructive pulmonary disease (HCC)     Congestive heart failure (HCC)     chronic left sided congestive heart failure    DM (diabetes mellitus) ([facility] 11/19/2009    type 2    GERD (gastroesophageal reflux disease)     HTN (hypertension), benign 11/19/2009    Hypercholesteremia 11/19/2009    LBBB (left bundle branch block)     Obesity 11/19/2009    Pulmonary HTN ([facility] 06/2020    mild     Tobacco abuse 11/19/2009    Weakness generalized      Family History   Problem Relation Age of Onset    Hypertension Father     Heart Disease Father     Hypertension Mother      Social History     Socioeconomic History    Marital status: SINGLE     Spouse name: Not on file    Number of children: Not on file    Years of education: Not on file    Highest education level: Not on file   Occupational History    Not on file   Social Needs    Financial resource strain: Not on file    Food insecurity     Worry: Not on file     Inability: Not on file    Transportation needs     Medical: Not on file     Non-medical: Not on file   Tobacco Use    Smoking status: Current Every Day Smoker     Packs/day: 0.50     Years: 25.00     Pack years: 12.50     Types: Cigarettes    Smokeless tobacco: Former User     Quit date: 8/25/1995   Substance and Sexual Activity    Alcohol use: Not Currently     Comment: pt quit 10/19    Drug use: Never    Sexual activity: Yes     Partners: Female   Lifestyle    Physical activity     Days per week: Not on file     Minutes per session: Not on file    Stress: Not on file   Relationships    Social connections     Talks on phone: Not on file     Gets together: Not on file     Attends Gnosticist service: Not on file     Active member of club or organization: Not on file     Attends meetings of clubs or organizations: Not on file     Relationship status: Not on file    Intimate partner violence     Fear of current or ex partner: Not on file     Emotionally abused: Not on file     Physically abused: Not on file     Forced sexual activity: Not on file   Other Topics Concern     Service Not Asked    Blood Transfusions Not Asked    Caffeine Concern Not Asked    Occupational Exposure Not Asked    Hobby Hazards Not Asked    Sleep Concern Not Asked    Stress Concern Not Asked    Weight Concern Not Asked    Special Diet Not Asked    Back Care Not Asked    Exercise Not Asked    Bike Helmet Not Asked    Seat Belt Not Asked    Self-Exams Not Asked   Social History Narrative    Not on file       Allergies: No Known Allergies    Current Medications:  Current Facility-Administered Medications   Medication Dose Route Frequency    lidocaine (XYLOCAINE) 20 mg/mL (2 %) injection 400 mg  20 mL IntraDERMal ONCE    heparin (porcine) pf 300 Units  300 Units InterCATHeter PRN    0.9% sodium chloride infusion  50 mL/hr IntraVENous CONTINUOUS    fentaNYL citrate (PF) injection 100 mcg  100 mcg IntraVENous RAD PRN    midazolam (PF) (VERSED) injection 5 mg  5 mg IntraVENous RAD PRN    ceFAZolin (ANCEF) 3 g in 0.9%  ml IVPB  3 g IntraVENous RAD ONCE    heparin (porcine) 1,000 unit/mL injection         Current Outpatient Medications   Medication Sig    carvediloL (COREG) 12.5 mg tablet Take 1 Tab by mouth two (2) times daily (with meals) for 30 days.     insulin glargine U-300 conc (Toujeo Max U-300 SoloStar) 300 unit/mL (3 mL) inpn 10 Units by SubCUTAneous route daily for 30 days.      Facility-Administered Medications Ordered in Other Encounters   Medication Dose Route Frequency    gentamicin (GARAMYCIN) 0.1 % cream   Topical DAILY    pantoprazole (PROTONIX) tablet 40 mg  40 mg Oral ACB    melatonin tablet 3 mg  3 mg Oral QHS PRN    predniSONE (DELTASONE) tablet 40 mg  40 mg Oral DAILY WITH BREAKFAST    0.9% sodium chloride infusion 250 mL  250 mL IntraVENous PRN    heparin (porcine) 1,000 unit/mL injection 2,500 Units  2,500 Units Hemodialysis DIALYSIS PRN    0.9% sodium chloride infusion 250 mL  250 mL IntraVENous PRN    oxyCODONE IR (ROXICODONE) tablet 5 mg  5 mg Oral Q4H PRN    carvediloL (COREG) tablet 12.5 mg  12.5 mg Oral BID WITH MEALS    epoetin brad-epbx (RETACRIT) injection 10,000 Units  10,000 Units SubCUTAneous Q TUE, THU & SAT    albumin human 25% (BUMINATE) solution 12.5 g  12.5 g IntraVENous DIALYSIS PRN    sodium chloride (NS) flush 5-40 mL  5-40 mL IntraVENous Q8H    sodium chloride (NS) flush 5-40 mL  5-40 mL IntraVENous PRN    acetaminophen (TYLENOL) tablet 650 mg  650 mg Oral Q6H PRN    Or    acetaminophen (TYLENOL) suppository 650 mg  650 mg Rectal Q6H PRN    polyethylene glycol (MIRALAX) packet 17 g  17 g Oral DAILY PRN    promethazine (PHENERGAN) tablet 12.5 mg  12.5 mg Oral Q6H PRN    Or    ondansetron (ZOFRAN) injection 4 mg  4 mg IntraVENous Q6H PRN    glucose chewable tablet 16 g  4 Tab Oral PRN    glucagon (GLUCAGEN) injection 1 mg  1 mg IntraMUSCular PRN    dextrose 10% infusion 0-250 mL  0-250 mL IntraVENous PRN    insulin glargine (LANTUS) injection 10 Units  10 Units SubCUTAneous QHS    insulin lispro (HUMALOG) injection   SubCUTAneous AC&HS    allopurinoL (ZYLOPRIM) tablet 300 mg  300 mg Oral DAILY    aspirin delayed-release tablet 81 mg  81 mg Oral DAILY    ferrous sulfate tablet 325 mg  325 mg Oral DAILY WITH BREAKFAST    sacubitriL-valsartan (ENTRESTO) 49-51 mg tablet 1 Tab  1 Tab Oral BID    nicotine (NICODERM CQ) 14 mg/24 hr patch 1 Patch  1 Patch TransDERmal Q24H    albuterol-ipratropium (DUO-NEB) 2.5 MG-0.5 MG/3 ML  3 mL Nebulization Q4H PRN        Physical Exam:  Blood pressure (!) 163/71, pulse 69, temperature 98 °F (36.7 °C), resp. rate 13, SpO2 99 %. GENERAL: alert, cooperative, no distress, appears stated age,   LUNG: Nonlabored respiration on room air  HEART: regular rate and rhythm, R radial & R DP pulse 2/2  EXT: No edema BLEs  ABD: Nontender, nondistended    Alerts:    Hospital Problems  Date Reviewed: 9/16/2020    None          Laboratory:      Recent Labs     09/21/20  1514 09/21/20  0052   HGB 7.6*  --    HCT 25.0*  --    WBC 7.1  --    *  --    BUN  --  54*   CREA  --  5.47*   K  --  3.7         Plan of Care/Planned Procedure:  Risks, benefits, and alternatives reviewed with patient and he agrees to proceed with the procedure. Conversion of RIJ nontunneled hemodialysis catheter to tunneled catheter    Deemed appropriate for moderate sedation with versed and fentanyl.     Linnea Shaw MD  Interventional Radiology  [facility]  7:30 AM, 9/23/2020

## 2020-09-23 NOTE — PROGRESS NOTES
Name: Sergo Chambers   MRN: [id number]  : 1970  Assessment:  SULEMAN on CKD-4/5>>Pre-ESRD (Dr. Alis Watson)- STARTED HD 9-16 (1st Rx via [facility])  Metabolic acidosis  Uremia  Anemia with component of ACKD  Chronic S-CHF; EF of 30% +Volume overload  DM-2  HTN  Obesity  Gout  NRP->8 grams on last check; due to DN and Obesity       Plan/Recommendations:  Stable for discharge   Has a outpatient HD chair at [facility] 2nd shift-> tomorrow  Ct Coreg/Entresto  Restrict FLuid/Limit dietary K intake       Subjective:  Permacath placement pushed to this morning. Sore. Girlfriend at bedside. ROS:   No nausea, no vomiting  No chest pain, no shortness of breath    Exam:  Visit Vitals  BP (!) 150/77 (BP 1 Location: Left arm, BP Patient Position: At rest)   Pulse 73   Temp 97.7 °F (36.5 °C)   Resp 14   Ht 6' 3\" (1.905 m)   Wt 155.6 kg (343 lb 0.6 oz)   SpO2 98%   BMI 42.88 kg/m²     WB/WN in NAD  AT/NC  R permacath dressed  Clear  RRR, distant  + edema  AOx3    No current facility-administered medications for this encounter. Current Outpatient Medications   Medication Sig Dispense    insulin glargine U-300 conc (Toujeo Max U-300 SoloStar) 300 unit/mL (3 mL) inpn 10 Units by SubCUTAneous route daily for 30 days. 3 mL    oxyCODONE IR (ROXICODONE) 5 mg immediate release tablet Take 1 Tab by mouth every four (4) hours as needed for Pain for up to 3 days. Max Daily Amount: 30 mg. 8 Tab    carvediloL (COREG) 12.5 mg tablet Take 1 Tab by mouth two (2) times daily (with meals) for 30 days. 60 Tab    predniSONE (STERAPRED DS) 10 mg dose pack Take  by mouth See Admin Instructions. See administration instruction per 10mg dose pack     iron, carbonyl (FEOSOL) 45 mg tab Take 1 Tab by mouth daily.  allopurinoL (ZYLOPRIM) 100 mg tablet Take 300 mg by mouth daily.      sacubitriL-valsartan (Entresto) 49-51 mg tab tablet Take 1 Tab by mouth two (2) times a day.  aspirin delayed-release 81 mg tablet Take 81 mg by mouth daily.  omeprazole (PRILOSEC) 40 mg capsule Take 40 mg by mouth daily.  dulaglutide (Trulicity) 3.65 LS/6.8 mL sub-q pen 0.75 mg by SubCUTAneous route every seven (7) days.  bumetanide (BUMEX) 2 mg tablet Take 2 mg by mouth daily.  magnesium oxide (MAG-OX) 400 mg tablet Take 400 mg by mouth daily.  Insulin Needles, Disposable, (BD INSULIN PEN NEEDLE UF SHORT) 31 X 5/16 \" Ndle Use as directed with insulin pen 100 Package    Blood-Glucose Meter monitoring kit Test blood sugar four times daily. 1 Kit    glucose blood VI test strips (ASCENSIA AUTODISC VI, ONE TOUCH ULTRA TEST VI) strip Test four times daily 1 Package    Lancets Misc Test four times daily 1 Package       Labs/Data:    Lab Results   Component Value Date/Time    WBC 7.1 09/21/2020 03:14 PM    HGB 7.6 (L) 09/21/2020 03:14 PM    Hematocrit (POC) 24 (L) 09/01/2020 07:11 AM    HCT 25.0 (L) 09/21/2020 03:14 PM    PLATELET 911 (L) 10/25/3726 03:14 PM    MCV 97.7 09/21/2020 03:14 PM       Lab Results   Component Value Date/Time    Sodium 135 (L) 09/21/2020 12:52 AM    Potassium 3.7 09/21/2020 12:52 AM    Chloride 101 09/21/2020 12:52 AM    CO2 26 09/21/2020 12:52 AM    Anion gap 8 09/21/2020 12:52 AM    Glucose 130 (H) 09/21/2020 12:52 AM    BUN 54 (H) 09/21/2020 12:52 AM    Creatinine 5.47 (H) 09/21/2020 12:52 AM    BUN/Creatinine ratio 10 (L) 09/21/2020 12:52 AM    GFR est AA 14 (L) 09/21/2020 12:52 AM    GFR est non-AA 11 (L) 09/21/2020 12:52 AM    Calcium 6.7 (L) 09/21/2020 12:52 AM       Wt Readings from Last 3 Encounters:   09/22/20 155.6 kg (343 lb 0.6 oz)   09/01/20 (!) 161.7 kg (356 lb 7.7 oz)   08/19/20 (!) 162.8 kg (358 lb 12.8 oz)         Intake/Output Summary (Last 24 hours) at 9/23/2020 184  Last data filed at 9/22/2020 2341  Gross per 24 hour   Intake 100 ml   Output    Net 100 ml       Patient seen and examined. Chart reviewed. Labs, data and other pertinent notes reviewed in last 24 hrs.     PMH/SH/FH reviewed and unchanged compared to H&P    Discussed with pt/girlfriend      Jane Lazo MD   [facility]

## 2020-09-23 NOTE — PROGRESS NOTES
TRANSFER - OUT REPORT:    Verbal report given to RN on Sheila Jackson  being transferred to [phone] for routine progression of care       Report consisted of patients Situation, Background, Assessment and   Recommendations(SBAR). Information from the following report(s) SBAR and Procedure Summary was reviewed with the receiving nurse. Lines:   Peripheral IV 09/16/20 Right Hand (Active)   Site Assessment Clean, dry, & intact 09/22/20 2341   Phlebitis Assessment 0 09/22/20 2341   Infiltration Assessment 0 09/22/20 2341   Dressing Status Clean, dry, & intact 09/22/20 2341   Dressing Type Transparent;Tape 09/22/20 2341   Hub Color/Line Status Blue;Flushed;Capped 09/22/20 2341   Action Taken Open ports on tubing capped 09/22/20 0800   Alcohol Cap Used Yes 09/22/20 2341       Peripheral IV 09/16/20 Left Antecubital (Active)   Site Assessment Clean, dry, & intact 09/22/20 2341   Phlebitis Assessment 0 09/22/20 2341   Infiltration Assessment 0 09/22/20 2341   Dressing Status Clean, dry, & intact 09/22/20 2341   Dressing Type Transparent;Tape 09/22/20 2341   Hub Color/Line Status Green;Flushed;Capped 09/22/20 2341   Action Taken Open ports on tubing capped 09/22/20 0800   Alcohol Cap Used Yes 09/22/20 2341        Opportunity for questions and clarification was provided.       Patient transported with:   [employer]

## 2020-09-23 NOTE — PROGRESS NOTES
Bedside shift change report given to AKASH Fletcher (oncoming nurse) by Anabel Perry RN (offgoing nurse). Report included the following information SBAR, Kardex, ED Summary, OR Summary, Procedure Summary, Intake/Output, MAR, Accordion, Recent Results, Med Rec Status, Cardiac Rhythm NSR, and Alarm Parameters . Problem: Falls - Risk of  Goal: *Absence of Falls  Description: Document Andrei Bean Fall Risk and appropriate interventions in the flowsheet. Outcome: Progressing Towards Goal  Note: Fall Risk Interventions:  Mobility Interventions: Communicate number of staff needed for ambulation/transfer    Mentation Interventions: Reorient patient    Medication Interventions: Patient to call before getting OOB, Teach patient to arise slowly    Elimination Interventions: Patient to call for help with toileting needs, Call light in reach    History of Falls Interventions: Evaluate medications/consider consulting pharmacy, Assess for delayed presentation/identification of injury for 48 hrs (comment for end date), Vital signs minimum Q4HRs X 24 hrs (comment for end date)         Problem: Pressure Injury - Risk of  Goal: *Prevention of pressure injury  Description: Document Justo Scale and appropriate interventions in the flowsheet. Outcome: Progressing Towards Goal  Note: Pressure Injury Interventions:  Sensory Interventions: Assess changes in LOC, Check visual cues for pain, Discuss PT/OT consult with provider, Maintain/enhance activity level    Moisture Interventions: Absorbent underpads    Activity Interventions: Increase time out of bed, Pressure redistribution bed/mattress(bed type)    Mobility Interventions: HOB 30 degrees or less, Turn and reposition approx.  every two hours(pillow and wedges)    Nutrition Interventions: Document food/fluid/supplement intake    Friction and Shear Interventions: Minimize layers                Problem: Pain  Goal: *Control of Pain  Outcome: Progressing Towards Goal

## 2020-09-23 NOTE — PROGRESS NOTES
Transition of Care Plan/Discharge Note  · RUR- 27 % Medium Risks   · DISPOSITION: Patient presents from home and the disposition plan is home with outpatient services for PT/OT AND community dialysis   · Script provided to the patient for outpatient therapy   ·  [facility] Unit [phone] accepted, first day is Thursday 9/24/2020 at 10 AM  · Transport: family; primary nurse to apprise the ride ETA  · Follow up: PCP/Specialist(s    Reviewed chart for transitions of care,and discussed in rounds with the attending. Patient is medically stable for discharge. Per primary nurse, there is a bleeding from the source of permacath placement, will await until the bleeding is resolved to let the patient go. A script will be provided to the patient for outpatient therapy follow-up. Care Management Interventions  PCP Verified by CM: Yes  Last Visit to PCP: 06/12/20  Palliative Care Criteria Met (RRAT>21 & CHF Dx)?: No  Palliative Consult Recommended?: (FYI placed on chart for MD)  Mode of Transport at Discharge:  Other (see comment)(family)  Transition of Care Consult (CM Consult): Discharge Planning, Other(community HD chair at [facility] )  [address] [place]: No  Discharge Durable Medical Equipment: No  Health Maintenance Reviewed: Yes  Physical Therapy Consult: Yes  Occupational Therapy Consult: Yes  Speech Therapy Consult: No  Current Support Network: Own Home, Lives with Spouse  Confirm Follow Up Transport: Family  The Patient and/or Patient Representative was Provided with a Choice of Provider and Agrees with the Discharge Plan?: Yes  Freedom of Choice List was Provided with Basic Dialogue that Supports the Patient's Individualized Plan of Care/Goals, Treatment Preferences and Shares the Quality Data Associated with the Providers?: Yes   Resource Information Provided?: No  Discharge Location  Discharge Placement: Home with outpatient services    CHANDAN Schmitt

## 2020-09-24 ENCOUNTER — PATIENT OUTREACH (OUTPATIENT)
Dept: CASE MANAGEMENT | Age: 50
End: 2020-09-24

## 2020-09-24 NOTE — PROGRESS NOTES
Patient contacted regarding recent discharge and COVID-19 risk. Discussed COVID-19 related testing which was not done at this time. Test results were not done. Patient informed of results, if available? n/a    Care Transition Nurse/ Ambulatory Care Manager/ LPN Care Coordinator contacted the caregiver, Joan Au-patient provided verbal permission for Ms Akosua Gimenez to continue with ACM by telephone to perform post discharge assessment. Verified name and  with caregiver as identifiers. Patient has following risk factors of: heart failure, COPD, diabetes and chronic kidney disease. CTN/ACM/LPN reviewed discharge instructions, medical action plan and red flags related to discharge diagnosis. Reviewed and educated them on any new and changed medications related to discharge diagnosis. Advised obtaining a 90-day supply of all daily and as-needed medications. Advance Care Planning:   Does patient have an Advance Directive: currently not on file; education provided     Education provided regarding infection prevention, and signs and symptoms of COVID-19 and when to seek medical attention with caregiver who verbalized understanding. Discussed exposure protocols and quarantine from [address] you at higher risk for severe illness  and given an opportunity for questions and concerns. The caregiver agrees to contact the COVID-19 hotline [phone] or PCP office for questions related to their healthcare. CTN/ACM/LPN provided contact information for future reference. From CDC: Are you at higher risk for severe illness?  Wash your hands often.  Avoid close contact (6 feet, which is about two arm lengths) with people who are sick.  Put distance between yourself and other people if COVID-19 is spreading in your community.  Clean and disinfect frequently touched surfaces.  Avoid all cruise travel and non-essential air travel.    Call your healthcare professional if you have concerns about COVID-19 and your underlying condition or if you are sick. For more information on steps you can take to protect yourself, see CDC's How to Protect Yourself      Patient/family/caregiver given information for GetWell Loop and agrees to enroll yes  Patient's preferred e-mail:  N/a   Patient's preferred phone number: [phone]  Based on Loop alert triggers, patient will be contacted by nurse care manager for worsening symptoms. Pt will be further monitored by COVID Loop Team based on severity of symptoms and risk factors. [facility]  9/21/20 ACM attempted to contact patient at numbers provided-left message for patient call back. DMB

## 2020-12-07 ENCOUNTER — ANESTHESIA (OUTPATIENT)
Dept: SURGERY | Age: 50
DRG: 264 | End: 2020-12-07
Payer: COMMERCIAL

## 2020-12-07 ENCOUNTER — APPOINTMENT (OUTPATIENT)
Dept: VASCULAR SURGERY | Age: 50
DRG: 264 | End: 2020-12-07
Attending: FAMILY MEDICINE
Payer: COMMERCIAL

## 2020-12-07 ENCOUNTER — APPOINTMENT (OUTPATIENT)
Dept: GENERAL RADIOLOGY | Age: 50
DRG: 264 | End: 2020-12-07
Attending: EMERGENCY MEDICINE
Payer: COMMERCIAL

## 2020-12-07 ENCOUNTER — HOSPITAL ENCOUNTER (INPATIENT)
Age: 50
LOS: 5 days | Discharge: HOME | DRG: 264 | End: 2020-12-12
Attending: EMERGENCY MEDICINE | Admitting: FAMILY MEDICINE
Payer: COMMERCIAL

## 2020-12-07 ENCOUNTER — ANESTHESIA EVENT (OUTPATIENT)
Dept: SURGERY | Age: 50
DRG: 264 | End: 2020-12-07
Payer: COMMERCIAL

## 2020-12-07 DIAGNOSIS — L08.9 WOUND INFECTION: ICD-10-CM

## 2020-12-07 DIAGNOSIS — E11.628 DIABETIC FOOT INFECTION (HCC): ICD-10-CM

## 2020-12-07 DIAGNOSIS — L08.9 DIABETIC FOOT INFECTION (HCC): ICD-10-CM

## 2020-12-07 DIAGNOSIS — E08.621 DIABETIC ULCER OF HEEL ASSOCIATED WITH DIABETES MELLITUS DUE TO UNDERLYING CONDITION, LIMITED TO BREAKDOWN OF SKIN, UNSPECIFIED LATERALITY (HCC): Primary | ICD-10-CM

## 2020-12-07 DIAGNOSIS — L97.401 DIABETIC ULCER OF HEEL ASSOCIATED WITH DIABETES MELLITUS DUE TO UNDERLYING CONDITION, LIMITED TO BREAKDOWN OF SKIN, UNSPECIFIED LATERALITY (HCC): Primary | ICD-10-CM

## 2020-12-07 DIAGNOSIS — T14.8XXA WOUND INFECTION: ICD-10-CM

## 2020-12-07 LAB
ABO + RH BLD: NORMAL
ALBUMIN SERPL-MCNC: 1.8 G/DL (ref 3.5–5)
ALBUMIN/GLOB SERPL: 0.4 {RATIO} (ref 1.1–2.2)
ALP SERPL-CCNC: 340 U/L (ref 45–117)
ALT SERPL-CCNC: 34 U/L (ref 12–78)
ANION GAP SERPL CALC-SCNC: 9 MMOL/L (ref 5–15)
AST SERPL-CCNC: 30 U/L (ref 15–37)
ATRIAL RATE: 89 BPM
BASOPHILS # BLD: 0 K/UL (ref 0–0.1)
BASOPHILS NFR BLD: 0 % (ref 0–1)
BILIRUB SERPL-MCNC: 0.6 MG/DL (ref 0.2–1)
BLOOD GROUP ANTIBODIES SERPL: NORMAL
BUN SERPL-MCNC: 77 MG/DL (ref 6–20)
BUN/CREAT SERPL: 15 (ref 12–20)
CALCIUM SERPL-MCNC: 7.5 MG/DL (ref 8.5–10.1)
CALCULATED R AXIS, ECG10: -4 DEGREES
CALCULATED T AXIS, ECG11: 112 DEGREES
CHLORIDE SERPL-SCNC: 102 MMOL/L (ref 97–108)
CO2 SERPL-SCNC: 24 MMOL/L (ref 21–32)
COMMENT, HOLDF: NORMAL
CREAT SERPL-MCNC: 5.29 MG/DL (ref 0.7–1.3)
DIAGNOSIS, 93000: NORMAL
DIFFERENTIAL METHOD BLD: ABNORMAL
EOSINOPHIL # BLD: 0.1 K/UL (ref 0–0.4)
EOSINOPHIL NFR BLD: 1 % (ref 0–7)
ERYTHROCYTE [DISTWIDTH] IN BLOOD BY AUTOMATED COUNT: 14 % (ref 11.5–14.5)
GLOBULIN SER CALC-MCNC: 4.3 G/DL (ref 2–4)
GLUCOSE BLD STRIP.AUTO-MCNC: 100 MG/DL (ref 65–100)
GLUCOSE BLD STRIP.AUTO-MCNC: 108 MG/DL (ref 65–100)
GLUCOSE SERPL-MCNC: 159 MG/DL (ref 65–100)
HCT VFR BLD AUTO: 27.9 % (ref 36.6–50.3)
HGB BLD-MCNC: 9.1 G/DL (ref 12.1–17)
IMM GRANULOCYTES # BLD AUTO: 0.1 K/UL (ref 0–0.04)
IMM GRANULOCYTES NFR BLD AUTO: 1 % (ref 0–0.5)
LACTATE SERPL-SCNC: 0.7 MMOL/L (ref 0.4–2)
LYMPHOCYTES # BLD: 0.8 K/UL (ref 0.8–3.5)
LYMPHOCYTES NFR BLD: 5 % (ref 12–49)
MCH RBC QN AUTO: 31.6 PG (ref 26–34)
MCHC RBC AUTO-ENTMCNC: 32.6 G/DL (ref 30–36.5)
MCV RBC AUTO: 96.9 FL (ref 80–99)
MONOCYTES # BLD: 0.9 K/UL (ref 0–1)
MONOCYTES NFR BLD: 6 % (ref 5–13)
NEUTS SEG # BLD: 14.3 K/UL (ref 1.8–8)
NEUTS SEG NFR BLD: 87 % (ref 32–75)
NRBC # BLD: 0 K/UL (ref 0–0.01)
NRBC BLD-RTO: 0 PER 100 WBC
P-R INTERVAL, ECG05: 200 MS
PLATELET # BLD AUTO: 176 K/UL (ref 150–400)
PMV BLD AUTO: 10.4 FL (ref 8.9–12.9)
POTASSIUM SERPL-SCNC: 3.8 MMOL/L (ref 3.5–5.1)
PROT SERPL-MCNC: 6.1 G/DL (ref 6.4–8.2)
Q-T INTERVAL, ECG07: 436 MS
QRS DURATION, ECG06: 180 MS
QTC CALCULATION (BEZET), ECG08: 530 MS
RBC # BLD AUTO: 2.88 M/UL (ref 4.1–5.7)
SAMPLES BEING HELD,HOLD: NORMAL
SERVICE CMNT-IMP: ABNORMAL
SERVICE CMNT-IMP: NORMAL
SODIUM SERPL-SCNC: 135 MMOL/L (ref 136–145)
SPECIMEN EXP DATE BLD: NORMAL
VENTRICULAR RATE, ECG03: 89 BPM
WBC # BLD AUTO: 16.3 K/UL (ref 4.1–11.1)

## 2020-12-07 PROCEDURE — 65270000029 HC RM PRIVATE

## 2020-12-07 PROCEDURE — 74011250637 HC RX REV CODE- 250/637

## 2020-12-07 PROCEDURE — 82962 GLUCOSE BLOOD TEST: CPT

## 2020-12-07 PROCEDURE — 76210000002 HC OR PH I REC 3 TO 3.5 HR

## 2020-12-07 PROCEDURE — 74011250636 HC RX REV CODE- 250/636

## 2020-12-07 PROCEDURE — 74011250636 HC RX REV CODE- 250/636: Performed by: ANESTHESIOLOGY

## 2020-12-07 PROCEDURE — 0JBR0ZZ EXCISION OF LEFT FOOT SUBCUTANEOUS TISSUE AND FASCIA, OPEN APPROACH: ICD-10-PCS

## 2020-12-07 PROCEDURE — 88304 TISSUE EXAM BY PATHOLOGIST: CPT

## 2020-12-07 PROCEDURE — 87205 SMEAR GRAM STAIN: CPT

## 2020-12-07 PROCEDURE — 74011250637 HC RX REV CODE- 250/637: Performed by: FAMILY MEDICINE

## 2020-12-07 PROCEDURE — 80053 COMPREHEN METABOLIC PANEL: CPT

## 2020-12-07 PROCEDURE — 87040 BLOOD CULTURE FOR BACTERIA: CPT

## 2020-12-07 PROCEDURE — 83605 ASSAY OF LACTIC ACID: CPT

## 2020-12-07 PROCEDURE — 87077 CULTURE AEROBIC IDENTIFY: CPT

## 2020-12-07 PROCEDURE — 77030008684 HC TU ET CUF COVD -B: Performed by: ANESTHESIOLOGY

## 2020-12-07 PROCEDURE — 74011000250 HC RX REV CODE- 250: Performed by: NURSE ANESTHETIST, CERTIFIED REGISTERED

## 2020-12-07 PROCEDURE — 77030026438 HC STYL ET INTUB CARD -A: Performed by: ANESTHESIOLOGY

## 2020-12-07 PROCEDURE — 87075 CULTR BACTERIA EXCEPT BLOOD: CPT

## 2020-12-07 PROCEDURE — 99284 EMERGENCY DEPT VISIT MOD MDM: CPT

## 2020-12-07 PROCEDURE — 74011250636 HC RX REV CODE- 250/636: Performed by: NURSE ANESTHETIST, CERTIFIED REGISTERED

## 2020-12-07 PROCEDURE — 71045 X-RAY EXAM CHEST 1 VIEW: CPT

## 2020-12-07 PROCEDURE — 87186 SC STD MICRODIL/AGAR DIL: CPT

## 2020-12-07 PROCEDURE — 36415 COLL VENOUS BLD VENIPUNCTURE: CPT

## 2020-12-07 PROCEDURE — 86900 BLOOD TYPING SEROLOGIC ABO: CPT

## 2020-12-07 PROCEDURE — 93971 EXTREMITY STUDY: CPT

## 2020-12-07 PROCEDURE — 76010000149 HC OR TIME 1 TO 1.5 HR

## 2020-12-07 PROCEDURE — 85025 COMPLETE CBC W/AUTO DIFF WBC: CPT

## 2020-12-07 PROCEDURE — 74011000258 HC RX REV CODE- 258: Performed by: FAMILY MEDICINE

## 2020-12-07 PROCEDURE — 74011250636 HC RX REV CODE- 250/636: Performed by: FAMILY MEDICINE

## 2020-12-07 PROCEDURE — 76060000033 HC ANESTHESIA 1 TO 1.5 HR

## 2020-12-07 PROCEDURE — 93005 ELECTROCARDIOGRAM TRACING: CPT

## 2020-12-07 PROCEDURE — 74011250637 HC RX REV CODE- 250/637: Performed by: EMERGENCY MEDICINE

## 2020-12-07 PROCEDURE — 2709999900 HC NON-CHARGEABLE SUPPLY

## 2020-12-07 RX ORDER — VANCOMYCIN 2 GRAM/500 ML IN 0.9 % SODIUM CHLORIDE INTRAVENOUS
2000 ONCE
Status: COMPLETED | OUTPATIENT
Start: 2020-12-07 | End: 2020-12-07

## 2020-12-07 RX ORDER — ONDANSETRON 2 MG/ML
4 INJECTION INTRAMUSCULAR; INTRAVENOUS AS NEEDED
Status: DISCONTINUED | OUTPATIENT
Start: 2020-12-07 | End: 2020-12-07 | Stop reason: HOSPADM

## 2020-12-07 RX ORDER — ROCURONIUM BROMIDE 10 MG/ML
INJECTION, SOLUTION INTRAVENOUS AS NEEDED
Status: DISCONTINUED | OUTPATIENT
Start: 2020-12-07 | End: 2020-12-07 | Stop reason: HOSPADM

## 2020-12-07 RX ORDER — SODIUM CHLORIDE, SODIUM LACTATE, POTASSIUM CHLORIDE, CALCIUM CHLORIDE 600; 310; 30; 20 MG/100ML; MG/100ML; MG/100ML; MG/100ML
1000 INJECTION, SOLUTION INTRAVENOUS CONTINUOUS
Status: DISCONTINUED | OUTPATIENT
Start: 2020-12-07 | End: 2020-12-07 | Stop reason: HOSPADM

## 2020-12-07 RX ORDER — EPHEDRINE SULFATE/0.9% NACL/PF 50 MG/5 ML
SYRINGE (ML) INTRAVENOUS AS NEEDED
Status: DISCONTINUED | OUTPATIENT
Start: 2020-12-07 | End: 2020-12-07 | Stop reason: HOSPADM

## 2020-12-07 RX ORDER — INSULIN LISPRO 100 [IU]/ML
INJECTION, SOLUTION INTRAVENOUS; SUBCUTANEOUS EVERY 6 HOURS
Status: DISCONTINUED | OUTPATIENT
Start: 2020-12-07 | End: 2020-12-12 | Stop reason: HOSPADM

## 2020-12-07 RX ORDER — OXYCODONE AND ACETAMINOPHEN 5; 325 MG/1; MG/1
1 TABLET ORAL
Status: COMPLETED | OUTPATIENT
Start: 2020-12-07 | End: 2020-12-07

## 2020-12-07 RX ORDER — HYDROCODONE BITARTRATE AND ACETAMINOPHEN 7.5; 325 MG/1; MG/1
1 TABLET ORAL
Status: DISCONTINUED | OUTPATIENT
Start: 2020-12-07 | End: 2020-12-12 | Stop reason: HOSPADM

## 2020-12-07 RX ORDER — MIDAZOLAM HYDROCHLORIDE 1 MG/ML
INJECTION, SOLUTION INTRAMUSCULAR; INTRAVENOUS AS NEEDED
Status: DISCONTINUED | OUTPATIENT
Start: 2020-12-07 | End: 2020-12-07 | Stop reason: HOSPADM

## 2020-12-07 RX ORDER — MIDAZOLAM HYDROCHLORIDE 1 MG/ML
1 INJECTION, SOLUTION INTRAMUSCULAR; INTRAVENOUS AS NEEDED
Status: DISCONTINUED | OUTPATIENT
Start: 2020-12-07 | End: 2020-12-07 | Stop reason: HOSPADM

## 2020-12-07 RX ORDER — LIDOCAINE HYDROCHLORIDE 10 MG/ML
0.1 INJECTION, SOLUTION EPIDURAL; INFILTRATION; INTRACAUDAL; PERINEURAL AS NEEDED
Status: DISCONTINUED | OUTPATIENT
Start: 2020-12-07 | End: 2020-12-07 | Stop reason: HOSPADM

## 2020-12-07 RX ORDER — HEPARIN SODIUM 5000 [USP'U]/ML
5000 INJECTION, SOLUTION INTRAVENOUS; SUBCUTANEOUS EVERY 8 HOURS
Status: DISCONTINUED | OUTPATIENT
Start: 2020-12-07 | End: 2020-12-12 | Stop reason: HOSPADM

## 2020-12-07 RX ORDER — SODIUM CHLORIDE 9 MG/ML
75 INJECTION, SOLUTION INTRAVENOUS CONTINUOUS
Status: DISCONTINUED | OUTPATIENT
Start: 2020-12-07 | End: 2020-12-08

## 2020-12-07 RX ORDER — ROPIVACAINE HYDROCHLORIDE 5 MG/ML
150 INJECTION, SOLUTION EPIDURAL; INFILTRATION; PERINEURAL AS NEEDED
Status: DISCONTINUED | OUTPATIENT
Start: 2020-12-07 | End: 2020-12-07 | Stop reason: HOSPADM

## 2020-12-07 RX ORDER — PROPOFOL 10 MG/ML
INJECTION, EMULSION INTRAVENOUS AS NEEDED
Status: DISCONTINUED | OUTPATIENT
Start: 2020-12-07 | End: 2020-12-07 | Stop reason: HOSPADM

## 2020-12-07 RX ORDER — SODIUM CHLORIDE, SODIUM LACTATE, POTASSIUM CHLORIDE, CALCIUM CHLORIDE 600; 310; 30; 20 MG/100ML; MG/100ML; MG/100ML; MG/100ML
100 INJECTION, SOLUTION INTRAVENOUS CONTINUOUS
Status: DISCONTINUED | OUTPATIENT
Start: 2020-12-07 | End: 2020-12-07 | Stop reason: HOSPADM

## 2020-12-07 RX ORDER — GLYCOPYRROLATE 0.2 MG/ML
INJECTION INTRAMUSCULAR; INTRAVENOUS AS NEEDED
Status: DISCONTINUED | OUTPATIENT
Start: 2020-12-07 | End: 2020-12-07 | Stop reason: HOSPADM

## 2020-12-07 RX ORDER — LIDOCAINE HYDROCHLORIDE 20 MG/ML
INJECTION, SOLUTION EPIDURAL; INFILTRATION; INTRACAUDAL; PERINEURAL AS NEEDED
Status: DISCONTINUED | OUTPATIENT
Start: 2020-12-07 | End: 2020-12-07 | Stop reason: HOSPADM

## 2020-12-07 RX ORDER — HYDROMORPHONE HYDROCHLORIDE 1 MG/ML
0.2 INJECTION, SOLUTION INTRAMUSCULAR; INTRAVENOUS; SUBCUTANEOUS
Status: COMPLETED | OUTPATIENT
Start: 2020-12-07 | End: 2020-12-07

## 2020-12-07 RX ORDER — CARVEDILOL 12.5 MG/1
12.5 TABLET ORAL 2 TIMES DAILY WITH MEALS
COMMUNITY
End: 2021-11-30

## 2020-12-07 RX ORDER — MAGNESIUM SULFATE 100 %
4 CRYSTALS MISCELLANEOUS AS NEEDED
Status: DISCONTINUED | OUTPATIENT
Start: 2020-12-07 | End: 2020-12-12 | Stop reason: HOSPADM

## 2020-12-07 RX ORDER — MIDAZOLAM HYDROCHLORIDE 1 MG/ML
0.5 INJECTION, SOLUTION INTRAMUSCULAR; INTRAVENOUS
Status: DISCONTINUED | OUTPATIENT
Start: 2020-12-07 | End: 2020-12-07 | Stop reason: HOSPADM

## 2020-12-07 RX ORDER — MORPHINE SULFATE 10 MG/ML
2 INJECTION, SOLUTION INTRAMUSCULAR; INTRAVENOUS
Status: DISCONTINUED | OUTPATIENT
Start: 2020-12-07 | End: 2020-12-07 | Stop reason: HOSPADM

## 2020-12-07 RX ORDER — EPHEDRINE SULFATE/0.9% NACL/PF 50 MG/5 ML
5 SYRINGE (ML) INTRAVENOUS AS NEEDED
Status: DISCONTINUED | OUTPATIENT
Start: 2020-12-07 | End: 2020-12-07 | Stop reason: HOSPADM

## 2020-12-07 RX ORDER — FENTANYL CITRATE 50 UG/ML
25 INJECTION, SOLUTION INTRAMUSCULAR; INTRAVENOUS
Status: COMPLETED | OUTPATIENT
Start: 2020-12-07 | End: 2020-12-07

## 2020-12-07 RX ORDER — HYDRALAZINE HYDROCHLORIDE 20 MG/ML
10 INJECTION INTRAMUSCULAR; INTRAVENOUS
Status: DISCONTINUED | OUTPATIENT
Start: 2020-12-07 | End: 2020-12-12 | Stop reason: HOSPADM

## 2020-12-07 RX ORDER — LANOLIN ALCOHOL/MO/W.PET/CERES
325 CREAM (GRAM) TOPICAL DAILY
Status: DISCONTINUED | OUTPATIENT
Start: 2020-12-08 | End: 2020-12-12 | Stop reason: HOSPADM

## 2020-12-07 RX ORDER — DEXTROSE MONOHYDRATE 100 MG/ML
0-250 INJECTION, SOLUTION INTRAVENOUS AS NEEDED
Status: DISCONTINUED | OUTPATIENT
Start: 2020-12-07 | End: 2020-12-12 | Stop reason: HOSPADM

## 2020-12-07 RX ORDER — VANCOMYCIN 2 GRAM/500 ML IN 0.9 % SODIUM CHLORIDE INTRAVENOUS
2 ONCE
Status: DISCONTINUED | OUTPATIENT
Start: 2020-12-07 | End: 2020-12-07 | Stop reason: SDUPTHER

## 2020-12-07 RX ORDER — HYDROCODONE BITARTRATE AND ACETAMINOPHEN 5; 325 MG/1; MG/1
TABLET ORAL
Status: DISCONTINUED
Start: 2020-12-07 | End: 2020-12-07 | Stop reason: WASHOUT

## 2020-12-07 RX ORDER — FENTANYL CITRATE 50 UG/ML
INJECTION, SOLUTION INTRAMUSCULAR; INTRAVENOUS AS NEEDED
Status: DISCONTINUED | OUTPATIENT
Start: 2020-12-07 | End: 2020-12-07 | Stop reason: HOSPADM

## 2020-12-07 RX ORDER — SODIUM CHLORIDE 9 MG/ML
25 INJECTION, SOLUTION INTRAVENOUS CONTINUOUS
Status: DISCONTINUED | OUTPATIENT
Start: 2020-12-07 | End: 2020-12-07 | Stop reason: HOSPADM

## 2020-12-07 RX ORDER — SODIUM CHLORIDE 0.9 % (FLUSH) 0.9 %
5-40 SYRINGE (ML) INJECTION AS NEEDED
Status: DISCONTINUED | OUTPATIENT
Start: 2020-12-07 | End: 2020-12-12 | Stop reason: HOSPADM

## 2020-12-07 RX ORDER — HYDROCODONE BITARTRATE AND ACETAMINOPHEN 7.5; 325 MG/1; MG/1
TABLET ORAL
Status: COMPLETED
Start: 2020-12-07 | End: 2020-12-07

## 2020-12-07 RX ORDER — ASPIRIN 81 MG/1
81 TABLET ORAL DAILY
Status: DISCONTINUED | OUTPATIENT
Start: 2020-12-08 | End: 2020-12-12 | Stop reason: HOSPADM

## 2020-12-07 RX ORDER — OXYCODONE HYDROCHLORIDE 5 MG/1
5 TABLET ORAL AS NEEDED
Status: DISCONTINUED | OUTPATIENT
Start: 2020-12-07 | End: 2020-12-07 | Stop reason: HOSPADM

## 2020-12-07 RX ORDER — ACETAMINOPHEN 325 MG/1
650 TABLET ORAL ONCE
Status: DISCONTINUED | OUTPATIENT
Start: 2020-12-07 | End: 2020-12-07 | Stop reason: HOSPADM

## 2020-12-07 RX ORDER — FENTANYL CITRATE 50 UG/ML
50 INJECTION, SOLUTION INTRAMUSCULAR; INTRAVENOUS AS NEEDED
Status: DISCONTINUED | OUTPATIENT
Start: 2020-12-07 | End: 2020-12-07 | Stop reason: HOSPADM

## 2020-12-07 RX ORDER — HYDROMORPHONE HYDROCHLORIDE 1 MG/ML
0.5 INJECTION, SOLUTION INTRAMUSCULAR; INTRAVENOUS; SUBCUTANEOUS
Status: DISCONTINUED | OUTPATIENT
Start: 2020-12-07 | End: 2020-12-12 | Stop reason: HOSPADM

## 2020-12-07 RX ORDER — ACETAMINOPHEN 325 MG/1
650 TABLET ORAL
Status: DISCONTINUED | OUTPATIENT
Start: 2020-12-07 | End: 2020-12-12 | Stop reason: HOSPADM

## 2020-12-07 RX ORDER — PANTOPRAZOLE SODIUM 40 MG/1
40 TABLET, DELAYED RELEASE ORAL
Status: DISCONTINUED | OUTPATIENT
Start: 2020-12-08 | End: 2020-12-09

## 2020-12-07 RX ORDER — NEOSTIGMINE METHYLSULFATE 1 MG/ML
INJECTION INTRAVENOUS AS NEEDED
Status: DISCONTINUED | OUTPATIENT
Start: 2020-12-07 | End: 2020-12-07 | Stop reason: HOSPADM

## 2020-12-07 RX ORDER — SODIUM CHLORIDE 0.9 % (FLUSH) 0.9 %
5-40 SYRINGE (ML) INJECTION EVERY 8 HOURS
Status: DISCONTINUED | OUTPATIENT
Start: 2020-12-07 | End: 2020-12-12 | Stop reason: HOSPADM

## 2020-12-07 RX ORDER — SUCCINYLCHOLINE CHLORIDE 20 MG/ML
INJECTION INTRAMUSCULAR; INTRAVENOUS AS NEEDED
Status: DISCONTINUED | OUTPATIENT
Start: 2020-12-07 | End: 2020-12-07 | Stop reason: HOSPADM

## 2020-12-07 RX ORDER — DIPHENHYDRAMINE HYDROCHLORIDE 50 MG/ML
12.5 INJECTION, SOLUTION INTRAMUSCULAR; INTRAVENOUS AS NEEDED
Status: DISCONTINUED | OUTPATIENT
Start: 2020-12-07 | End: 2020-12-07 | Stop reason: HOSPADM

## 2020-12-07 RX ORDER — SODIUM CHLORIDE 9 MG/ML
INJECTION, SOLUTION INTRAVENOUS
Status: DISCONTINUED | OUTPATIENT
Start: 2020-12-07 | End: 2020-12-07 | Stop reason: HOSPADM

## 2020-12-07 RX ADMIN — OXYCODONE HYDROCHLORIDE AND ACETAMINOPHEN 1 TABLET: 5; 325 TABLET ORAL at 12:00

## 2020-12-07 RX ADMIN — SACUBITRIL AND VALSARTAN 1 TABLET: 49; 51 TABLET, FILM COATED ORAL at 22:14

## 2020-12-07 RX ADMIN — PROPOFOL 50 MG: 10 INJECTION, EMULSION INTRAVENOUS at 15:47

## 2020-12-07 RX ADMIN — HYDROCODONE BITARTRATE AND ACETAMINOPHEN 1 TABLET: 7.5; 325 TABLET ORAL at 19:02

## 2020-12-07 RX ADMIN — GLYCOPYRROLATE 0.4 MG: 0.2 INJECTION, SOLUTION INTRAMUSCULAR; INTRAVENOUS at 15:54

## 2020-12-07 RX ADMIN — NEOSTIGMINE METHYLSULFATE 3 MG: 1 INJECTION, SOLUTION INTRAVENOUS at 15:54

## 2020-12-07 RX ADMIN — HYDROMORPHONE HYDROCHLORIDE 0.2 MG: 1 INJECTION, SOLUTION INTRAMUSCULAR; INTRAVENOUS; SUBCUTANEOUS at 17:32

## 2020-12-07 RX ADMIN — SODIUM CHLORIDE: 900 INJECTION, SOLUTION INTRAVENOUS at 15:07

## 2020-12-07 RX ADMIN — VANCOMYCIN HYDROCHLORIDE 2 G: 10 INJECTION, POWDER, LYOPHILIZED, FOR SOLUTION INTRAVENOUS at 15:32

## 2020-12-07 RX ADMIN — HYDROCODONE BITARTRATE AND ACETAMINOPHEN 1 TABLET: 7.5; 325 TABLET ORAL at 23:06

## 2020-12-07 RX ADMIN — ROCURONIUM BROMIDE 20 MG: 10 SOLUTION INTRAVENOUS at 15:47

## 2020-12-07 RX ADMIN — FENTANYL CITRATE 25 MCG: 50 INJECTION, SOLUTION INTRAMUSCULAR; INTRAVENOUS at 16:32

## 2020-12-07 RX ADMIN — FENTANYL CITRATE 25 MCG: 50 INJECTION, SOLUTION INTRAMUSCULAR; INTRAVENOUS at 16:37

## 2020-12-07 RX ADMIN — LIDOCAINE HYDROCHLORIDE 60 MG: 20 INJECTION, SOLUTION EPIDURAL; INFILTRATION; INTRACAUDAL; PERINEURAL at 15:18

## 2020-12-07 RX ADMIN — ROCURONIUM BROMIDE 10 MG: 10 SOLUTION INTRAVENOUS at 15:18

## 2020-12-07 RX ADMIN — HYDROMORPHONE HYDROCHLORIDE 0.2 MG: 1 INJECTION, SOLUTION INTRAMUSCULAR; INTRAVENOUS; SUBCUTANEOUS at 17:15

## 2020-12-07 RX ADMIN — HYDROMORPHONE HYDROCHLORIDE 0.2 MG: 1 INJECTION, SOLUTION INTRAMUSCULAR; INTRAVENOUS; SUBCUTANEOUS at 17:22

## 2020-12-07 RX ADMIN — PROPOFOL 100 MG: 10 INJECTION, EMULSION INTRAVENOUS at 15:18

## 2020-12-07 RX ADMIN — HYDROMORPHONE HYDROCHLORIDE 0.2 MG: 1 INJECTION, SOLUTION INTRAMUSCULAR; INTRAVENOUS; SUBCUTANEOUS at 16:52

## 2020-12-07 RX ADMIN — FENTANYL CITRATE 50 MCG: 50 INJECTION, SOLUTION INTRAMUSCULAR; INTRAVENOUS at 15:18

## 2020-12-07 RX ADMIN — Medication 10 MG: at 15:51

## 2020-12-07 RX ADMIN — ONDANSETRON 4 MG: 2 INJECTION INTRAMUSCULAR; INTRAVENOUS at 16:32

## 2020-12-07 RX ADMIN — MIDAZOLAM 2 MG: 1 INJECTION INTRAMUSCULAR; INTRAVENOUS at 15:09

## 2020-12-07 RX ADMIN — HEPARIN SODIUM 5000 UNITS: 5000 INJECTION INTRAVENOUS; SUBCUTANEOUS at 20:53

## 2020-12-07 RX ADMIN — Medication 10 MG: at 15:31

## 2020-12-07 RX ADMIN — Medication 10 MG: at 15:40

## 2020-12-07 RX ADMIN — SUCCINYLCHOLINE CHLORIDE 80 MG: 20 INJECTION, SOLUTION INTRAMUSCULAR; INTRAVENOUS at 15:18

## 2020-12-07 RX ADMIN — HYDROMORPHONE HYDROCHLORIDE 0.2 MG: 1 INJECTION, SOLUTION INTRAMUSCULAR; INTRAVENOUS; SUBCUTANEOUS at 17:07

## 2020-12-07 RX ADMIN — HYDROMORPHONE HYDROCHLORIDE 0.5 MG: 1 INJECTION, SOLUTION INTRAMUSCULAR; INTRAVENOUS; SUBCUTANEOUS at 20:53

## 2020-12-07 RX ADMIN — CEFEPIME HYDROCHLORIDE 2 G: 2 INJECTION, POWDER, FOR SOLUTION INTRAVENOUS at 20:53

## 2020-12-07 RX ADMIN — FENTANYL CITRATE 25 MCG: 50 INJECTION, SOLUTION INTRAMUSCULAR; INTRAVENOUS at 16:42

## 2020-12-07 RX ADMIN — FENTANYL CITRATE 25 MCG: 50 INJECTION, SOLUTION INTRAMUSCULAR; INTRAVENOUS at 16:47

## 2020-12-07 NOTE — ANESTHESIA PREPROCEDURE EVALUATION
Relevant Problems   No relevant active problems     Relevant Problems   No relevant active problems       Anesthetic History   No history of anesthetic complications            Review of Systems / Medical History  Patient summary reviewed, nursing notes reviewed and pertinent labs reviewed    Pulmonary    COPD    Sleep apnea  Shortness of breath and undiagnosed apnea    Pertinent negatives: No smoker     Neuro/Psych   Within defined limits           Cardiovascular    Hypertension      CHF: NYHA Classification III, orthopnea, dyspnea on exertion  Dysrhythmias   CAD and hyperlipidemia    Exercise tolerance: <4 METS  Comments:   LBBB    TTE:  LVEF 30% reportedly   GI/Hepatic/Renal     GERD    Renal disease: ESRD    Pertinent negatives: No liver disease   Endo/Other    Diabetes: using insulin    Morbid obesity    Comments: polycythemia Other Findings   Comments: Previous alcohol abuse         Physical Exam    Airway  Mallampati: III  TM Distance: 4 - 6 cm  Neck ROM: normal range of motion       Comments: Small mouth opening Cardiovascular    Rhythm: regular  Rate: normal         Dental  No notable dental hx       Pulmonary  Breath sounds clear to auscultation            Pertinent negatives: No rhonchi and decreased breath sounds   Abdominal  GI exam deferred       Other Findings            Anesthetic Plan    ASA: 4  Anesthesia type: general    Monitoring Plan: BIS      Induction: Intravenous  Anesthetic plan and risks discussed with: Patient              Anesthetic History   No history of anesthetic complications            Review of Systems / Medical History  Patient summary reviewed, nursing notes reviewed and pertinent labs reviewed    Pulmonary    COPD      Smoker         Neuro/Psych   Within defined limits           Cardiovascular    Hypertension      CHF    CAD      Comments: EF 25-30%   GI/Hepatic/Renal     GERD    Renal disease: CRI       Endo/Other    Diabetes    Obesity and anemia     Other Findings Physical Exam    Airway  Mallampati: II  TM Distance: > 6 cm  Neck ROM: normal range of motion   Mouth opening: Normal     Cardiovascular  Regular rate and rhythm,  S1 and S2 normal,  no murmur, click, rub, or gallop             Dental  No notable dental hx       Pulmonary  Breath sounds clear to auscultation               Abdominal  GI exam deferred       Other Findings            Anesthetic Plan    ASA: 4  Anesthesia type: general            Anesthetic plan and risks discussed with: Patient

## 2020-12-07 NOTE — ANESTHESIA POSTPROCEDURE EVALUATION
Procedure(s):  DEBRIDE LEFT HEEL WOUND.    MAC, general    Anesthesia Post Evaluation        Patient location during evaluation: PACU  Patient participation: complete - patient participated  Level of consciousness: awake and alert  Pain management: adequate  Airway patency: patent  Anesthetic complications: no  Cardiovascular status: acceptable  Respiratory status: acceptable  Hydration status: acceptable  Comments: I have seen and evaluated the patient and is ready for discharge. Eliz Amado MD    Post anesthesia nausea and vomiting:  none      INITIAL Post-op Vital signs:   Vitals Value Taken Time   /36 12/7/2020  5:30 PM   Temp 36.7 °C (98.1 °F) 12/7/2020  4:22 PM   Pulse 80 12/7/2020  5:32 PM   Resp 21 12/7/2020  5:32 PM   SpO2 98 % 12/7/2020  5:32 PM   Vitals shown include unvalidated device data.

## 2020-12-07 NOTE — OP NOTES
[facility]  OPERATIVE REPORT    Name:  Ahmet Clark  MR#:  [id number]  :  1970  ACCOUNT #:  [de-identified]  DATE OF SERVICE:  2020      PREOPERATIVE DIAGNOSIS:  Gangrenous left heel pad. POSTOPERATIVE DIAGNOSIS:  Gangrenous left heel pad. PROCEDURE PERFORMED:  Debridement of left heel with excision of necrotic skin, subcutaneous tissue, and fascia. SURGEON:  Marcel Meraz MD    ASSISTANT:  None. ANESTHESIA:  General.    COMPLICATIONS:  None. SPECIMENS REMOVED:  Soft tissue from gangrenous heel. IMPLANTS:  none    ESTIMATED BLOOD LOSS:  150 mL. INDICATIONS:  The patient is a 67-year-old diabetic male with end-stage renal disease on peritoneal dialysis. He presented to the emergency department with a worsening left heel wound. He is found on exam to have wet gangrene of the left heel over an area measuring about 8 cm in diameter. He will undergo urgent debridement. PROCEDURE:  The patient's left foot was prepped and draped in the left side down lateral decubitus position. The necrotic tissue was excised circumferentially creating a significant soft tissue defect measuring approximately 10 cm in diameter. The necrotic tissue extended down to bone. The bone was not involved. There was purulent material in the deep portion of the wound and this was cultured. The skin edges were again excised to get back to healthy bleeding tissue. There was surprisingly good bleeding from the soft tissues. The wound was packed open with saline gauze. Dressings were applied. The patient was then transferred to the recovery room in stable condition.       Lynnette Martin MD      [id number]  D:  2020 16:01  T:  2020 18:24  JOB #:  [id number]

## 2020-12-07 NOTE — CONSULTS
Patient name: Kris Holm MRN: [id number]      [facility]  Initial Consult Note  YHS:43/[birth year]  DOS: 12/7/2020  Requested by: Dr. Will Calderon  Reason: Evaluation and management of ESRD on PD  Source: pt/chart review    Assessment:  ESRD on CCPD at home; uses 2 green bags, 12 liter volume  HTN  DM-2  L heel foot wound/gangrene    Plan/Recommendations: Will order CAPD in house; 2.5% dextrose, 5 exchanges per day  Ct home meds  ABx  Vascular surgery consult    HPI: Kris Holm is a 53 y.o. male with PMH significant for ESRD on PD (Dr. Jill Hebert), HTN, DM>>has worsening L foot heel infection  Did not improve even with OP oral ABx. Referred by PD nurse to come hospital due to worsening infection, pain and in ability to bear wt  Noted to have gangrene of his L foot wound. Seen by vascular. For OR today  Nephrology consulted for PD    He denies any n/v/abd pain. No diarrhea or blood in stool. Reports generalized body aches and pain.      PMH:    Past Medical History:   Diagnosis Date    Allergic rhinitis 11/19/2009    Anemia     CAD (coronary artery disease)     Chronic kidney disease     stage 4    Chronic obstructive pulmonary disease (HCC)     Congestive heart failure (HCC)     chronic left sided congestive heart failure    DM (diabetes mellitus) ([address] 11/19/2009    type 2    GERD (gastroesophageal reflux disease)     HTN (hypertension), benign 11/19/2009    Hypercholesteremia 11/19/2009    LBBB (left bundle branch block)     Obesity 11/19/2009    Pulmonary HTN ([address] 06/2020 mild     Tobacco abuse 11/19/2009    Weakness generalized        PSH:  Past Surgical History:   Procedure Laterality Date    CARDIAC SURG PROCEDURE UNLIST  06/11/2020    echo ef 25-30% down from 40% on 04/19    HX APPENDECTOMY  2004    IR INSERT NON TUNL CVC LESS THAN 5 YR  9/16/2020    IR INSERT TUNL CVC W/O PORT OVER 5 YR  9/23/2020       No Known Allergies    Medications Prior to Admission   Medication Sig    carvediloL (COREG) 12.5 mg tablet Take 12.5 mg by mouth two (2) times daily (with meals).  iron, carbonyl (FEOSOL) 45 mg tab Take 1 Tab by mouth daily.  allopurinoL (ZYLOPRIM) 100 mg tablet Take 300 mg by mouth daily.  sacubitriL-valsartan (Entresto) 49-51 mg tab tablet Take 1 Tab by mouth two (2) times a day.  aspirin delayed-release 81 mg tablet Take 81 mg by mouth daily.  dulaglutide (Trulicity) 0.43 CH/5.4 mL sub-q pen 0.75 mg by SubCUTAneous route every seven (7) days.  bumetanide (BUMEX) 2 mg tablet Take 2 mg by mouth daily.  Insulin Needles, Disposable, (BD INSULIN PEN NEEDLE UF SHORT) 31 X 5/16 \" Ndle Use as directed with insulin pen    Blood-Glucose Meter monitoring kit Test blood sugar four times daily.  glucose blood VI test strips (ASCENSIA AUTODISC VI, ONE TOUCH ULTRA TEST VI) strip Test four times daily    Lancets Misc Test four times daily    predniSONE (STERAPRED DS) 10 mg dose pack Take  by mouth See Admin Instructions. See administration instruction per 10mg dose pack    omeprazole (PRILOSEC) 40 mg capsule Take 40 mg by mouth daily.  magnesium oxide (MAG-OX) 400 mg tablet Take 400 mg by mouth daily.      Social History     Tobacco Use    Smoking status: Current Every Day Smoker     Packs/day: 0.50     Years: 25.00     Pack years: 12.50     Types: Cigarettes    Smokeless tobacco: Former User     Quit date: 8/25/1995   Substance Use Topics    Alcohol use: Not Currently     Comment: pt quit  10/19    Drug use: Never     Physical Exam:  Visit Vitals  BP (!) 165/61   Pulse 94   Temp 98.7 °F (37.1 °C)   Resp 18   Ht 6' 4\" (1.93 m)   Wt 141.5 kg (312 lb)   SpO2 97%   BMI 37.98 kg/m²     WB/WN in mild distress in pain. Seen in ED. Clear   RRR, no rub  Soft, NT, PD cath in place. Exit site clean  L foot covered with drsg  RLE with tr edema  AOx3    Labs/Data:   Lab Results   Component Value Date/Time    WBC 16.3 (H) 12/07/2020 11:30 AM    HGB 9.1 (L) 12/07/2020 11:30 AM    Hematocrit (POC) 24 (L) 09/01/2020 07:11 AM    HCT 27.9 (L) 12/07/2020 11:30 AM    PLATELET 998 08/55/5868 11:30 AM    MCV 96.9 12/07/2020 11:30 AM       Lab Results   Component Value Date/Time    Sodium 135 (L) 12/07/2020 11:30 AM    Potassium 3.8 12/07/2020 11:30 AM    Chloride 102 12/07/2020 11:30 AM    CO2 24 12/07/2020 11:30 AM    Anion gap 9 12/07/2020 11:30 AM    Glucose 159 (H) 12/07/2020 11:30 AM    BUN 77 (H) 12/07/2020 11:30 AM    Creatinine 5.29 (H) 12/07/2020 11:30 AM    BUN/Creatinine ratio 15 12/07/2020 11:30 AM    GFR est AA 14 (L) 12/07/2020 11:30 AM    GFR est non-AA 12 (L) 12/07/2020 11:30 AM    Calcium 7.5 (L) 12/07/2020 11:30 AM       No intake or output data in the 24 hours ending 12/07/20 1605    Wt Readings from Last 3 Encounters:   12/07/20 141.5 kg (312 lb)   09/22/20 155.6 kg (343 lb 0.6 oz)   09/01/20 (!) 161.7 kg (356 lb 7.7 oz)       Patient seen and examined. Chart reviewed.  Labs, data and other pertinent notes reviewed from admit    Discussed with pt    Signed by:  Nicholas Rooney MD  Nephrology and HTN

## 2020-12-07 NOTE — ED PROVIDER NOTES
HPI .  Patient has a history of smoking cigarettes, hyperlipidemia, diabetes, neuropathy, coronary disease, end-stage renal disease, peritoneal dialysis, COPD, GERD, gout, and pulmonary hypertension. Patient developed a wound on his left heel 7 days ago. He saw podiatrist across town at another hospital system 5 days ago and 3 days ago. He may have had some superficial debridement done 3 days ago. Patient was started on doxycycline. Wound is become larger with more erythema and pain despite the antibiotic treatment. Patient contacted his dialysis nurse and nephrologist and was referred to the emergency department. Patient has quit walking and quit putting weight on his left leg.   Ambulating is painful but pain is also present at rest.    Past Medical History:   Diagnosis Date    Allergic rhinitis 11/19/2009    Anemia     CAD (coronary artery disease)     Chronic kidney disease     stage 4    Chronic obstructive pulmonary disease (HCC)     Congestive heart failure (HCC)     chronic left sided congestive heart failure    DM (diabetes mellitus) ([facility] 11/19/2009    type 2    GERD (gastroesophageal reflux disease)     HTN (hypertension), benign 11/19/2009    Hypercholesteremia 11/19/2009    LBBB (left bundle branch block)     Obesity 11/19/2009    Pulmonary HTN ([facility] 06/2020    mild     Tobacco abuse 11/19/2009    Weakness generalized        Past Surgical History:   Procedure Laterality Date    CARDIAC SURG PROCEDURE UNLIST  06/11/2020    echo ef 25-30% down from 40% on 04/19    HX APPENDECTOMY  2004    IR INSERT NON TUNL CVC LESS THAN 5 YR  9/16/2020    IR INSERT TUNL CVC W/O PORT OVER 5 YR  9/23/2020         Family History:   Problem Relation Age of Onset    Hypertension Father     Heart Disease Father     Hypertension Mother        Social History     Socioeconomic History    Marital status: SINGLE     Spouse name: Not on file    Number of children: Not on file    Years of education: Not on file    Highest education level: Not on file   Occupational History    Not on file   Social Needs    Financial resource strain: Not on file    Food insecurity     Worry: Not on file     Inability: Not on file    Transportation needs     Medical: Not on file     Non-medical: Not on file   Tobacco Use    Smoking status: Current Every Day Smoker     Packs/day: 0.50     Years: 25.00     Pack years: 12.50     Types: Cigarettes    Smokeless tobacco: Former User     Quit date: 8/25/1995   Substance and Sexual Activity    Alcohol use: Not Currently     Comment: pt quit  10/19    Drug use: Never    Sexual activity: Yes     Partners: Female   Lifestyle    Physical activity     Days per week: Not on file     Minutes per session: Not on file    Stress: Not on file   Relationships    Social connections     Talks on phone: Not on file     Gets together: Not on file     Attends Buddhist service: Not on file     Active member of club or organization: Not on file     Attends meetings of clubs or organizations: Not on file     Relationship status: Not on file    Intimate partner violence     Fear of current or ex partner: Not on file     Emotionally abused: Not on file     Physically abused: Not on file     Forced sexual activity: Not on file   Other Topics Concern     Service Not Asked    Blood Transfusions Not Asked    Caffeine Concern Not Asked    Occupational Exposure Not Asked   [place] Hazards Not Asked    Sleep Concern Not Asked    Stress Concern Not Asked    Weight Concern Not Asked    Special Diet Not Asked    Back Care Not Asked    Exercise Not Asked    Bike Helmet Not Asked   [address] Not Asked    Self-Exams Not Asked   Social History Narrative    Not on file         ALLERGIES: Patient has no known allergies. Review of Systems   Constitutional: Positive for activity change. HENT: Negative for congestion. Eyes: Negative for redness. Cardiovascular: Negative for chest pain. Gastrointestinal: Negative for abdominal pain. Genitourinary: Negative for difficulty urinating. Neurological: Negative for speech difficulty. Psychiatric/Behavioral: Negative for agitation and behavioral problems. The patient is not nervous/anxious. There were no vitals filed for this visit. Physical Exam  Vitals signs and nursing note reviewed. Constitutional:       Appearance: He is well-developed. He is obese. HENT:      Head: Normocephalic and atraumatic. Eyes:      Pupils: Pupils are equal, round, and reactive to light. Neck:      Musculoskeletal: Normal range of motion and neck supple. Cardiovascular:      Rate and Rhythm: Normal rate and regular rhythm. Heart sounds: Normal heart sounds. No murmur. No friction rub. No gallop. Pulmonary:      Effort: Pulmonary effort is normal. No respiratory distress. Breath sounds: No wheezing or rales. Abdominal:      Palpations: Abdomen is soft. Tenderness: There is no abdominal tenderness. There is no rebound. Comments: PD catheter left abdomen   Musculoskeletal: Normal range of motion. General: No tenderness. Comments: Superficial ulceration plantar aspect left heel; black underlying tissue with surrounding erythema up to the ankle; pedal pulses are difficult to palpate   Skin:     Findings: No erythema. Neurological:      Mental Status: He is alert. Cranial Nerves: No cranial nerve deficit. Comments: Motor; symmetric   Psychiatric:         Behavior: Behavior normal.          MDM       Procedures          ED EKG interpretation:  Rhythm: normal sinus rhythm; and regular . Rate (approx.): 90; Axis: normal; P wave: normal; QRS interval: prolonged; ST/T wave: non-specific changes; in  Lead: ; Other findings: LBBB. This EKG was interpreted by Kenia Parra MD,ED Provider. 12:28 PM       CONSULT NOTE:  Spoke to Dr Xuan Ruvalcaba concerning the patient.  The patient's history, presentation, physical findings, and results were all discussed. Consult vascular surgery, admit to medical service, and Dr. Cristi Kwan will arrange peritoneal dialysis. 12:28 PM        CONSULT NOTE:  Spoke to Dr Stanton Melgar concerning the patient. The patient's history, presentation, physical findings, and results were all discussed. He will take the patient to the OR for debridement  1:32 PM    Perfect Serve Consult for Admission  1:32 PM    ED Room Number: ER09/09  Patient Name and age:  Fartun Gallardo 53 y.o.  male  Working Diagnosis:   1. Diabetic ulcer of heel associated with diabetes mellitus due to underlying condition, limited to breakdown of skin, unspecified laterality ([facility] [address]    2.  Wound infection        COVID-19 Suspicion:  no  Sepsis present:  no  Reassessment needed: yes  Code Status:  Full Code  Readmission: no  Isolation Requirements:  yes  Recommended Level of Care:  telemetry  Department:[facility] - 21   Other: Dr. Sam Mcdowell vascular surgery just saw the patient and plans on debriding the wound this afternoon

## 2020-12-07 NOTE — ROUTINE PROCESS
Patient: Louis Larsen MRN: [id number]  SSN: [id number]   YOB: 1970  Age: 53 y.o. Sex: male     Patient is status post Procedure(s):  DEBRIDE LEFT HEEL WOUND.     Surgeon(s) and Role:     * Maria Guadalupe Rodriguez MD - Primary    Local/Dose/Irrigation:  0.9%normal saline used for irrigation                Peripheral IV 12/07/20 Left Hand (Active)   Site Assessment Clean, dry, & intact 12/07/20 1135   Phlebitis Assessment 0 12/07/20 1135   Infiltration Assessment 0 12/07/20 1135   Dressing Status Clean, dry, & intact 12/07/20 1135   Hub Color/Line Status Pink 12/07/20 1135   Action Taken Blood drawn 12/07/20 1135       Peripheral IV 12/07/20 Right Antecubital (Active)   Site Assessment Clean, dry, & intact 12/07/20 1210   Phlebitis Assessment 0 12/07/20 1210   Infiltration Assessment 0 12/07/20 1210   Dressing Status Clean, dry, & intact 12/07/20 1210   Dressing Type Transparent 12/07/20 1210            Airway - Endotracheal Tube Oral (Active)                   Dressing/Packing:  Wound Foot Left-Dressing/Treatment: Gauze dressing/dressing sponge(ABD, Packing of 4x4, Kerlix, Ace) (12/07/20 8591)    Splint/Cast:  ]    Other:

## 2020-12-07 NOTE — H&P
.                                     History & Physical    Primary Care Provider: Elvin Avery PA-C  Source of Information: Patient     History of Presenting Illness:   Shaan Mishra is a 53 y.o. male who presents with left lower extremity wound. History was finally obtained from the patient    Patient reports that he developed a left lower extremity wound hematocele about 7 days ago. Patient reports he saw a podiatrist about 5 days ago and then again about 3 days ago. Patient was put on doxycycline, had a superficial debridement done about 3 days back but the wound continued to get worse. Patient reports that now the erythema has gotten much bigger. The wound has become larger. Patient has history of ESRD, contact his dialysis nurse today and was referred to the ER for further management and evaluation. Patient reports that he has not been able to put much weight on his leg. And reports that he has significant pain and discomfort, he also reports that there is some foul-smelling discharge from the wound. Patient was seen in the ER by vascular surgery, patient was found to have no pulses below the femoral, patient was scheduled for urgent debridement of the wound per vascular surgery and was requested to be admitted under the hospitalist service. Patient denies any other complaints or problems. The patient denies any Headache, blurry vision, sore throat, trouble swallowing, trouble with speech, chest pain, SOB, cough, fever, chills, N/V/D, abd pain, urinary symptoms, constipation, recent travels, sick contacts, focal or generalized neurological symptoms,  falls, injuries, rashes, contact with COVID-19 diagnosed patients, hematemesis, melena, hemoptysis, hematuria, rashes, denies starting any new medications and denies any other concerns or problems besides as mentioned above.         Review of Systems:  A comprehensive review of systems was negative except for that written in the History of Present Illness. Past Medical History:   Diagnosis Date    Allergic rhinitis 11/19/2009    Anemia     CAD (coronary artery disease)     Chronic kidney disease     stage 4    Chronic obstructive pulmonary disease (HCC)     Congestive heart failure (HCC)     chronic left sided congestive heart failure    DM (diabetes mellitus) ([facility] [address] 11/19/2009    type 2    GERD (gastroesophageal reflux disease)     HTN (hypertension), benign 11/19/2009    Hypercholesteremia 11/19/2009    LBBB (left bundle branch block)     Obesity 11/19/2009    Pulmonary HTN ([facility] [address] 06/2020    mild     Tobacco abuse 11/19/2009    Weakness generalized       Past Surgical History:   Procedure Laterality Date    CARDIAC SURG PROCEDURE UNLIST  06/11/2020    echo ef 25-30% down from 40% on 04/19    HX APPENDECTOMY  2004    IR INSERT NON TUNL CVC LESS THAN 5 YR  9/16/2020    IR INSERT TUNL CVC W/O PORT OVER 5 YR  9/23/2020     Prior to Admission medications    Medication Sig Start Date End Date Taking? Authorizing Provider   predniSONE (STERAPRED DS) 10 mg dose pack Take  by mouth See Admin Instructions. See administration instruction per 10mg dose pack    Provider, Historical   iron, carbonyl (FEOSOL) 45 mg tab Take 1 Tab by mouth daily. Provider, Historical   allopurinoL (ZYLOPRIM) 100 mg tablet Take 300 mg by mouth daily. Provider, Historical   sacubitriL-valsartan (Entresto) 49-51 mg tab tablet Take 1 Tab by mouth two (2) times a day. Provider, Historical   aspirin delayed-release 81 mg tablet Take 81 mg by mouth daily. Provider, Historical   omeprazole (PRILOSEC) 40 mg capsule Take 40 mg by mouth daily. Provider, Historical   dulaglutide (Trulicity) 7.24 [place]/3.8 mL sub-q pen 0.75 mg by SubCUTAneous route every seven (7) days. Provider, Historical   bumetanide (BUMEX) 2 mg tablet Take 2 mg by mouth daily. Provider, Historical   magnesium oxide (MAG-OX) 400 mg tablet Take 400 mg by mouth daily.     Provider, Historical   Insulin Needles, Disposable, (BD INSULIN PEN NEEDLE UF SHORT) 31 X 5/16 \" Ndle Use as directed with insulin pen 3/15/13   Hollis Mensah MD   Blood-Glucose Meter monitoring kit Test blood sugar four times daily.  12/12/12   Hollis Mensah MD   glucose blood VI test strips (ASCENSIA AUTODISC VI, ONE TOUCH ULTRA TEST VI) strip Test four times daily 12/12/12   Hollis Mensah MD   Lancets Misc Test four times daily 12/12/12   Hollis Mensah MD     No Known Allergies   Family History   Problem Relation Age of Onset    Hypertension Father     Heart Disease Father     Hypertension Mother         SOCIAL HISTORY:  Patient resides:  Independently X   Assisted Living    SNF    With family care       Smoking history:   None    Former    Chronic X     Alcohol history:   None    Social X   Chronic      Ambulates:   Independently X   w/cane    w/walker    w/wc    CODE STATUS:  DNR    Full X   Other      Objective:     Physical Exam:     Visit Vitals  BP (!) 165/61   Pulse 94   Temp 98.7 °F (37.1 °C)   Resp 18   Ht 6' 4\" (1.93 m)   Wt 141.5 kg (312 lb)   SpO2 97%   BMI 37.98 kg/m²      O2 Device: Room air      General : alert x 3, awake, mildly distressed, pleasant male, appears to be stated age  [de-identified]: PEERL, EOMI, moist mucus membrane, TM clear  Neck: supple, no JVD, no meningeal signs  Chest: Scattered wheezes throughout lung fields  CVS: S1 S2 heard, Capillary refill less than 2 seconds  Abd: soft/ Non tender, non distended, BS physiological, PD catheter noted in the abdomen   Ext: no clubbing, no cyanosis, large eschar on the heel of the left foot with surrounding erythema up to the distal one third of the leg, and palpable pedal pulses  Neuro/Psych: pleasant mood and affect, CN 2-12 grossly intact, sensory grossly decreased bilateral lower extremity, Strength 5/5 in all extremities, DTR 1+ x 4  Skin: warm and as above    EKG: Sinus rhythm with left bundle branch block      Data Review: Recent Days:  Recent Labs     12/07/20  1130   WBC 16.3*   HGB 9.1*   HCT 27.9*        Recent Labs     12/07/20  1130   *   K 3.8      CO2 24   *   BUN 77*   CREA 5.29*   CA 7.5*   ALB 1.8*   ALT 34     No results for input(s): PH, PCO2, PO2, HCO3, FIO2 in the last 72 hours. 24 Hour Results:  Recent Results (from the past 24 hour(s))   CBC WITH AUTOMATED DIFF    Collection Time: 12/07/20 11:30 AM   Result Value Ref Range    WBC 16.3 (H) 4.1 - 11.1 K/uL    RBC 2.88 (L) 4.10 - 5.70 M/uL    HGB 9.1 (L) 12.1 - 17.0 g/dL    HCT 27.9 (L) 36.6 - 50.3 %    MCV 96.9 80.0 - 99.0 FL    MCH 31.6 26.0 - 34.0 PG    MCHC 32.6 30.0 - 36.5 g/dL    RDW 14.0 11.5 - 14.5 %    PLATELET 228 507 - 096 K/uL    MPV 10.4 8.9 - 12.9 FL    NRBC 0.0 0  WBC    ABSOLUTE NRBC 0.00 0.00 - 0.01 K/uL    NEUTROPHILS 87 (H) 32 - 75 %    LYMPHOCYTES 5 (L) 12 - 49 %    MONOCYTES 6 5 - 13 %    EOSINOPHILS 1 0 - 7 %    BASOPHILS 0 0 - 1 %    IMMATURE GRANULOCYTES 1 (H) 0.0 - 0.5 %    ABS. NEUTROPHILS 14.3 (H) 1.8 - 8.0 K/UL    ABS. LYMPHOCYTES 0.8 0.8 - 3.5 K/UL    ABS. MONOCYTES 0.9 0.0 - 1.0 K/UL    ABS. EOSINOPHILS 0.1 0.0 - 0.4 K/UL    ABS. BASOPHILS 0.0 0.0 - 0.1 K/UL    ABS. IMM. GRANS. 0.1 (H) 0.00 - 0.04 K/UL    DF AUTOMATED     METABOLIC PANEL, COMPREHENSIVE    Collection Time: 12/07/20 11:30 AM   Result Value Ref Range    Sodium 135 (L) 136 - 145 mmol/L    Potassium 3.8 3.5 - 5.1 mmol/L    Chloride 102 97 - 108 mmol/L    CO2 24 21 - 32 mmol/L    Anion gap 9 5 - 15 mmol/L    Glucose 159 (H) 65 - 100 mg/dL    BUN 77 (H) 6 - 20 MG/DL    Creatinine 5.29 (H) 0.70 - 1.30 MG/DL    BUN/Creatinine ratio 15 12 - 20      GFR est AA 14 (L) >60 ml/min/1.73m2    GFR est non-AA 12 (L) >60 ml/min/1.73m2    Calcium 7.5 (L) 8.5 - 10.1 MG/DL    Bilirubin, total 0.6 0.2 - 1.0 MG/DL    ALT (SGPT) 34 12 - 78 U/L    AST (SGOT) 30 15 - 37 U/L    Alk.  phosphatase 340 (H) 45 - 117 U/L    Protein, total 6.1 (L) 6.4 - 8.2 g/dL Albumin 1.8 (L) 3.5 - 5.0 g/dL    Globulin 4.3 (H) 2.0 - 4.0 g/dL    A-G Ratio 0.4 (L) 1.1 - 2.2     SAMPLES BEING HELD    Collection Time: 12/07/20 11:31 AM   Result Value Ref Range    SAMPLES BEING HELD 1RED,1BLU,1(BC)SLVR     COMMENT        Add-on orders for these samples will be processed based on acceptable specimen integrity and analyte stability, which may vary by analyte. EKG, 12 LEAD, INITIAL    Collection Time: 12/07/20 12:01 PM   Result Value Ref Range    Ventricular Rate 89 BPM    Atrial Rate 89 BPM    P-R Interval 200 ms    QRS Duration 180 ms    Q-T Interval 436 ms    QTC Calculation (Bezet) 530 ms    Calculated R Axis -4 degrees    Calculated T Axis 112 degrees    Diagnosis       Sinus rhythm with  Left bundle branch block  When compared with ECG of 16-SEP-2020 18:54,  fusion complexes are now present  T wave amplitude has decreased in Anterior leads  Confirmed by Jairon Olmos M.D., Kaylee Sibley ([id number]) on 12/7/2020 2:02:07 PM     LACTIC ACID    Collection Time: 12/07/20 12:12 PM   Result Value Ref Range    Lactic acid 0.7 0.4 - 2.0 MMOL/L         Imaging:   Xr Chest Port    Result Date: 12/7/2020  IMPRESSION: No acute cardiopulmonary process. Unchanged cardiomegaly.      Assessment:     Left lower extremity cellulitis/wound: Patient will be admitted on a telemetry bed, patient for urgent debridement today, appreciate vascular surgery consult, broad-spectrum IV antibiotics, patient went absent pulses, may need vascular studies angiography, defer to vascular surgery, wound care consult, gentle IV hydration, pain control, venous duplex of lower extremity rule out DVT, blood cultures, supportive care, close monitoring, reassess as needed, further intervention per hospital course, continue monitor    Diabetes mellitus type 2: Patient been sliding scale NovoLog insulin, Accu-Cheks, diet control, close monitoring, hold oral: Hypoglycemics for now    End-stage renal disease, on peritoneal dialysis, nephrology consult requested, continue to monitor, reassess as needed    Anemia: Likely anemia of chronic disease, supportive care, close monitoring, reassess as needed    GI DVT prophylaxis: Patient will be on heparin             Signed By: Ronaldo Russell MD     December 7, 2020

## 2020-12-07 NOTE — CONSULTS
[address]    Name:  Dudley Pedro  MR#:  [id number]  :  1970  ACCOUNT #:  [de-identified]  DATE OF SERVICE:  2020      REASON FOR CONSULTATION:  Infected left heel wound. HISTORY OF PRESENT ILLNESS:  The patient is a 67-year-old diabetic male with end-stage renal disease, on peritoneal dialysis, who presented to the emergency department with a worsening left heel wound. He was apparently seen by a podiatrist last week and some superficial debridement done. The wound has worsened, and he presented to the emergency department. He has no previous history of foot wounds. He has not been told he has peripheral vascular disease, and he has had no previous vascular interventions. He has a history of cardiomyopathy with a reduced ejection fraction. He had a PD catheter inserted earlier this year under general anesthesia without undue complications. At present, his main complaint is left foot pain. PAST MEDICAL HISTORY:  1. Renal failure, on dialysis. 2.  Cardiomyopathy. 3.  Hypertension. HOME MEDICATIONS:  1. Allopurinol. 2.  Entresto. 3.  Aspirin. 4.  Prilosec. 5.  Bumex. 6.  Magnesium. 7.  Insulin. ALLERGIES:  NONE. SOCIAL HISTORY:  The patient is not currently working. He smokes daily. He is here with his wife. FAMILY HISTORY:  Unremarkable. REVIEW OF SYSTEMS:  He has had no recent cardiac, respiratory, GI, , neurologic, or hematologic complaints. PHYSICAL EXAMINATION:  GENERAL:  He is a heavy-set male, in no distress. He is awake, alert, and responsive. HEENT:  Head within normal limits. NECK:  No masses. LUNGS:  Clear. HEART:  Regular rate and rhythm. ABDOMEN:  Soft, nontender. EXTREMITIES:  He has palpable femoral pulses bilaterally, but no pulses distally. He has a foul-smelling black necrotic wound involving the plantar aspect of the left heel. The skin is intact, but gangrenous.   This covers an area of about 6 cm in diameter. NEUROLOGIC:  Grossly normal with intact motor and sensory function. IMPRESSION:  The patient has a significant ischemic necrotic left heel wound that is limb threatening in nature. He also has peripheral vascular disease based upon absent foot pulses. He will be taken to the operating room later today for heel debridement. We will then pursue a vascular workup. He agrees with this plan of treatment.       Edwin Johnson MD      [id number]  D:  12/07/2020 14:04  T:  12/07/2020 18:56  JOB #:  [id number]

## 2020-12-07 NOTE — ED TRIAGE NOTES
Pt comes in via EMS from home for L foot pain x two weeks. Pt states he doesn't remember any injury to his foot, pt diabetic , pt ESRD pt had does PD every night.

## 2020-12-07 NOTE — PROGRESS NOTES
Pharmacist Note - Vancomycin Dosing    Consult provided for this 53 y.o. male for indication of gangrenous left heel pad  Antibiotic regimen(s): vancomycin, cefepime  Patient on vancomycin PTA? NO     Recent Labs     20  1130   WBC 16.3*   CREA 5.29*   BUN 77*       Height: 193 cm  Weight: 141.5 kg  Est CrCl: ESRD on CAPD 5 exchanges/day  Temp (24hrs), Av.3 °F (36.8 °C), Min:98.1 °F (36.7 °C), Max:98.7 °F (37.1 °C)  Cultures:: blood: in process  : wound: in process      Goal trough = 10 - 15 mcg/mL    Therapy will be initiated with a loading dose of 2000mg IV x 1 to be followed by intermittent dose approximately every 5-7 days (will get level to dose.)  Pharmacy to follow patient daily and order levels / make dose adjustments as appropriate.

## 2020-12-07 NOTE — BRIEF OP NOTE
Brief Postoperative Note    Patient: Lauren García  YOB: 1970  MRN: [id number]    Date of Procedure: 12/7/2020     Pre-Op Diagnosis: Infected Gangrenous left heel wound    Post-Op Diagnosis: Same as preoperative diagnosis.       Procedure(s):  DEBRIDE LEFT HEEL WOUND    Surgeon(s):  Jacob Devlin MD    Surgical Assistant: None    Anesthesia: General     Estimated Blood Loss (mL): 263 ml    Complications: None    Specimens:   ID Type Source Tests Collected by Time Destination   1 : Necrotic left heel pad Fresh Foot, left  Jacob Devlin MD 12/7/2020 1537 Pathology   1 : Left heel wound Wound Foot, left ANAEROBIC/AEROBIC/GRAM STAIN Jacob Devlin MD 12/7/2020 1536 Microbiology        Implants: * No implants in log *    Drains: * No LDAs found *    Findings: large soft tissue defect post debridement, necrotic tissue extended to calcaneus    Electronically Signed by Danette Cagle MD on 12/7/2020 at 3:55 PM

## 2020-12-07 NOTE — PROGRESS NOTES
Vascular:    Dialysis patient with wet, necrotic left heel wound. No palpable pulses below femoral on left. Needs urgent debridement. Plan OR later today. He agrees.

## 2020-12-08 ENCOUNTER — APPOINTMENT (OUTPATIENT)
Dept: VASCULAR SURGERY | Age: 50
DRG: 264 | End: 2020-12-08
Payer: COMMERCIAL

## 2020-12-08 LAB
GLUCOSE BLD STRIP.AUTO-MCNC: 111 MG/DL (ref 65–100)
GLUCOSE BLD STRIP.AUTO-MCNC: 142 MG/DL (ref 65–100)
GLUCOSE BLD STRIP.AUTO-MCNC: 87 MG/DL (ref 65–100)
SERVICE CMNT-IMP: ABNORMAL
SERVICE CMNT-IMP: ABNORMAL
SERVICE CMNT-IMP: NORMAL

## 2020-12-08 PROCEDURE — 65270000029 HC RM PRIVATE

## 2020-12-08 PROCEDURE — 74011250636 HC RX REV CODE- 250/636: Performed by: INTERNAL MEDICINE

## 2020-12-08 PROCEDURE — 74011636637 HC RX REV CODE- 636/637: Performed by: FAMILY MEDICINE

## 2020-12-08 PROCEDURE — 74011250637 HC RX REV CODE- 250/637: Performed by: INTERNAL MEDICINE

## 2020-12-08 PROCEDURE — 3E1M39Z IRRIGATION OF PERITONEAL CAVITY USING DIALYSATE, PERCUTANEOUS APPROACH: ICD-10-PCS | Performed by: INTERNAL MEDICINE

## 2020-12-08 PROCEDURE — 74011250636 HC RX REV CODE- 250/636: Performed by: FAMILY MEDICINE

## 2020-12-08 PROCEDURE — 74011250637 HC RX REV CODE- 250/637: Performed by: FAMILY MEDICINE

## 2020-12-08 PROCEDURE — 74011000258 HC RX REV CODE- 258: Performed by: INTERNAL MEDICINE

## 2020-12-08 PROCEDURE — 93922 UPR/L XTREMITY ART 2 LEVELS: CPT

## 2020-12-08 PROCEDURE — 74011250637 HC RX REV CODE- 250/637

## 2020-12-08 PROCEDURE — A4722 DIALYS SOL FLD VOL > 1999CC: HCPCS | Performed by: INTERNAL MEDICINE

## 2020-12-08 PROCEDURE — 74011250636 HC RX REV CODE- 250/636

## 2020-12-08 PROCEDURE — 2709999900 HC NON-CHARGEABLE SUPPLY

## 2020-12-08 PROCEDURE — 82962 GLUCOSE BLOOD TEST: CPT

## 2020-12-08 RX ADMIN — HYDROMORPHONE HYDROCHLORIDE 0.5 MG: 1 INJECTION, SOLUTION INTRAMUSCULAR; INTRAVENOUS; SUBCUTANEOUS at 19:07

## 2020-12-08 RX ADMIN — Medication 81 MG: at 09:18

## 2020-12-08 RX ADMIN — SACUBITRIL AND VALSARTAN 1 TABLET: 49; 51 TABLET, FILM COATED ORAL at 09:17

## 2020-12-08 RX ADMIN — HYDROMORPHONE HYDROCHLORIDE 0.5 MG: 1 INJECTION, SOLUTION INTRAMUSCULAR; INTRAVENOUS; SUBCUTANEOUS at 12:38

## 2020-12-08 RX ADMIN — PANTOPRAZOLE SODIUM 40 MG: 40 TABLET, DELAYED RELEASE ORAL at 07:21

## 2020-12-08 RX ADMIN — INSULIN LISPRO 2 UNITS: 100 INJECTION, SOLUTION INTRAVENOUS; SUBCUTANEOUS at 18:23

## 2020-12-08 RX ADMIN — HYDROCODONE BITARTRATE AND ACETAMINOPHEN 1 TABLET: 7.5; 325 TABLET ORAL at 18:32

## 2020-12-08 RX ADMIN — HYDROMORPHONE HYDROCHLORIDE 0.5 MG: 1 INJECTION, SOLUTION INTRAMUSCULAR; INTRAVENOUS; SUBCUTANEOUS at 21:29

## 2020-12-08 RX ADMIN — SODIUM CHLORIDE, SODIUM LACTATE, CALCIUM CHLORIDE, MAGNESIUM CHLORIDE AND DEXTROSE 2000 ML: 2.5; 538; 448; 18.3; 5.08 INJECTION, SOLUTION INTRAPERITONEAL at 11:15

## 2020-12-08 RX ADMIN — HYDROCODONE BITARTRATE AND ACETAMINOPHEN 1 TABLET: 7.5; 325 TABLET ORAL at 09:18

## 2020-12-08 RX ADMIN — HYDROCODONE BITARTRATE AND ACETAMINOPHEN 1 TABLET: 7.5; 325 TABLET ORAL at 04:21

## 2020-12-08 RX ADMIN — FERROUS SULFATE TAB 325 MG (65 MG ELEMENTAL FE) 325 MG: 325 (65 FE) TAB at 12:21

## 2020-12-08 RX ADMIN — SACUBITRIL AND VALSARTAN 1 TABLET: 49; 51 TABLET, FILM COATED ORAL at 18:24

## 2020-12-08 RX ADMIN — HEPARIN SODIUM 5000 UNITS: 5000 INJECTION INTRAVENOUS; SUBCUTANEOUS at 12:21

## 2020-12-08 RX ADMIN — CEFEPIME HYDROCHLORIDE 1 G: 1 INJECTION, POWDER, FOR SOLUTION INTRAMUSCULAR; INTRAVENOUS at 21:31

## 2020-12-08 RX ADMIN — SODIUM CHLORIDE, SODIUM LACTATE, CALCIUM CHLORIDE, MAGNESIUM CHLORIDE AND DEXTROSE 2000 ML: 2.5; 538; 448; 18.3; 5.08 INJECTION, SOLUTION INTRAPERITONEAL at 07:15

## 2020-12-08 RX ADMIN — Medication 10 ML: at 15:55

## 2020-12-08 RX ADMIN — HEPARIN SODIUM 5000 UNITS: 5000 INJECTION INTRAVENOUS; SUBCUTANEOUS at 21:32

## 2020-12-08 RX ADMIN — SODIUM CHLORIDE, SODIUM LACTATE, CALCIUM CHLORIDE, MAGNESIUM CHLORIDE AND DEXTROSE 2000 ML: 2.5; 538; 448; 18.3; 5.08 INJECTION, SOLUTION INTRAPERITONEAL at 15:54

## 2020-12-08 RX ADMIN — HEPARIN SODIUM 5000 UNITS: 5000 INJECTION INTRAVENOUS; SUBCUTANEOUS at 04:20

## 2020-12-08 NOTE — PERIOP NOTES
Dr Rosaura Delgado notified of current /43 and pre-op /54. MAP above 60. MD aware and states BP appropriate at this time and does not need to be treated and pt cleared to be transferred to floor.

## 2020-12-08 NOTE — PROGRESS NOTES
Hospitalist Progress Note  Yuli Valverde MD  Answering service: [phone] from in house phone      Date of Service:  2020  NAME:  Marian Jacobo  :  1970  MRN:  [id number]      Admission Summary:   Marian Jacobo is a 53 y.o. male who presents with left lower extremity wound. Interval history / Subjective:      Patient seen and examined this morning. Patient has multiple complaints. \"My joints hurt, both knee hurts, my right hand hurts, I have had this problem for 3 years and no one seems to figure it out. I'm going to loss my foot so I don't care that much. I will go home tomorrow. \"     Assessment & Plan:     # Left heel/foot diabetic foot infection-wound/cellulitis   - s/p heel debridement   - on cefepime and iv vancomycin   - IV pain medication   - podiatry following     # DM type 2: SSI, accucheck, diabetic diet. # ESRD on CCPD: in house dialysis, nephrology following   # HTN: ct home medication   # Anemia: stable. May need epo injection     Code: Full   DVT ppx: Heparin      Hospital Problems  Date Reviewed: 2020          Codes Class Noted POA    Diabetic foot infection [facility]) ICD-10-CM: E11.628, L08.9  ICD-9-CM: 250.80, 686.9  2020 Unknown                Review of Systems:   Pertinent positive mentioned in interval hx/HPI. Other systems reviewed and negative     Vital Signs:    Last 24hrs VS reviewed since prior progress note. Most recent are:  Visit Vitals  /72   Pulse 88   Temp 97.3 °F (36.3 °C)   Resp 16   Ht 6' 4\" (1.93 m)   Wt 130.1 kg (286 lb 12.8 oz)   SpO2 95%   BMI 34.91 kg/m²         Intake/Output Summary (Last 24 hours) at 2020 1832  Last data filed at 2020 2107  Gross per 24 hour   Intake 2150 ml   Output 1300 ml   Net 850 ml        Physical Examination:             Constitutional:  No acute distress, obese,    ENT:  Oral mucous moist, oropharynx benign.  Neck supple,    Resp:  CTA bilaterally. No wheezing/rhonchi/rales. No accessory muscle use   CV:  Regular rhythm, normal rate, no murmurs, gallops, rubs    GI:  PD cath in place, Soft, non distended, non tender. normoactive bowel sounds, no hepatosplenomegaly     Musculoskeletal:  left foot covered with dressing     Neurologic:  Moves all extremities. AAOx3, CN II-XII reviewed            Data Review:   I personally reviewed labs and imaging     Labs:     Recent Labs     12/07/20  1130   WBC 16.3*   HGB 9.1*   HCT 27.9*        Recent Labs     12/07/20  1130   *   K 3.8      CO2 24   BUN 77*   CREA 5.29*   *   CA 7.5*     Recent Labs     12/07/20  1130   ALT 34   *   TBILI 0.6   TP 6.1*   ALB 1.8*   GLOB 4.3*     No results for input(s): INR, PTP, APTT, INREXT in the last 72 hours. No results for input(s): FE, TIBC, PSAT, FERR in the last 72 hours. Lab Results   Component Value Date/Time    Folate 9.6 09/18/2020 04:00 AM      No results for input(s): PH, PCO2, PO2 in the last 72 hours. No results for input(s): CPK, CKNDX, TROIQ in the last 72 hours.     No lab exists for component: CPKMB  Lab Results   Component Value Date/Time    Cholesterol, total 284 (H) 02/22/2013 08:21 AM    HDL Cholesterol 39 (L) 02/22/2013 08:21 AM    LDL, calculated Comment 02/22/2013 08:21 AM    Triglyceride 788 (HH) 02/22/2013 08:21 AM     Lab Results   Component Value Date/Time    Glucose (POC) 142 (H) 12/08/2020 06:05 PM    Glucose (POC) 111 (H) 12/08/2020 11:47 AM    Glucose (POC) 87 12/08/2020 06:17 AM    Glucose (POC) 100 12/07/2020 11:46 PM    Glucose (POC) 108 (H) 12/07/2020 04:28 PM     Lab Results   Component Value Date/Time    Color YELLOW/STRAW 09/17/2020 01:33 AM    Appearance CLEAR 09/17/2020 01:33 AM    Specific gravity 1.015 09/17/2020 01:33 AM    pH (UA) 5.0 09/17/2020 01:33 AM    Protein 100 (A) 09/17/2020 01:33 AM    Glucose Negative 09/17/2020 01:33 AM    Ketone Negative 09/17/2020 01:33 AM    Bilirubin Negative 09/17/2020 01:33 AM    Urobilinogen 0.2 09/17/2020 01:33 AM    Nitrites Negative 09/17/2020 01:33 AM    Leukocyte Esterase Negative 09/17/2020 01:33 AM    Epithelial cells FEW 09/17/2020 01:33 AM    Bacteria Negative 09/17/2020 01:33 AM    WBC 0-4 09/17/2020 01:33 AM    RBC 0-5 09/17/2020 01:33 AM         Medications Reviewed:     Current Facility-Administered Medications   Medication Dose Route Frequency    cefepime (MAXIPIME) 1 g in 0.9% sodium chloride (MBP/ADV) 50 mL MBP  1 g IntraVENous Q24H    aspirin delayed-release tablet 81 mg  81 mg Oral DAILY    ferrous sulfate tablet 325 mg  325 mg Oral DAILY    pantoprazole (PROTONIX) tablet 40 mg  40 mg Oral ACB    sacubitriL-valsartan (ENTRESTO) 49-51 mg tablet 1 Tab  1 Tab Oral BID    sodium chloride (NS) flush 5-40 mL  5-40 mL IntraVENous Q8H    sodium chloride (NS) flush 5-40 mL  5-40 mL IntraVENous PRN    heparin (porcine) injection 5,000 Units  5,000 Units SubCUTAneous Q8H    acetaminophen (TYLENOL) tablet 650 mg  650 mg Oral Q4H PRN    hydrALAZINE (APRESOLINE) 20 mg/mL injection 10 mg  10 mg IntraVENous Q6H PRN    glucose chewable tablet 16 g  4 Tab Oral PRN    glucagon (GLUCAGEN) injection 1 mg  1 mg IntraMUSCular PRN    dextrose 10% infusion 0-250 mL  0-250 mL IntraVENous PRN    insulin lispro (HUMALOG) injection   SubCUTAneous Q6H    HYDROcodone-acetaminophen (NORCO) 7.5-325 mg per tablet 1 Tab  1 Tab Oral Q4H PRN    HYDROmorphone (PF) (DILAUDID) injection 0.5 mg  0.5 mg IntraVENous Q2H PRN    peritoneal dialysis DEXTROSE 2.5% (2.5 mEq/L low calcium) solution 2,000 mL  2,000 mL IntraPERitoneal 5XD    Vancomycin pharmacy to dose   Other Rx Dosing/Monitoring     ______________________________________________________________________  EXPECTED LENGTH OF STAY: - - -  ACTUAL LENGTH OF STAY:          1                 Mya Small MD   Patient has given Verbal permission to discuss medical care with   persons present in the room and and also with contact as listed on face sheet. Please note that this dictation was completed with Rosum, the computer voice recognition software. Quite often unanticipated grammatical, syntax, homophones, and other interpretive errors are inadvertently transcribed by the computer software. Please disregard these errors. Please excuse any errors that have escaped final proofreading. Thank you.

## 2020-12-08 NOTE — PROGRESS NOTES
Bedside and Verbal shift change report given to Aure Cohn (oncoming nurse) by Lemuel Hendrix (offgoing nurse). Report included the following information SBAR, Kardex, Intake/Output, MAR and Recent Results.

## 2020-12-08 NOTE — ACP (ADVANCE CARE PLANNING)
[address] [facility] Adult  Hospitalist Group                                      Advance Care Planning Note    Name: Jojo Milton  YOB: 1970  MRN: [id number]  Admission Date: 12/7/2020 10:58 AM    Date of discussion: 12/7/2020    Active Diagnoses:    Hospital Problems  Date Reviewed: 12/7/2020          Codes Class Noted POA    Diabetic foot infection [facility]) ICD-10-CM: E11.628, L08.9  ICD-9-CM: 250.80, 686.9  12/7/2020 Unknown              These active diagnoses are of sufficient risk that focused discussion on advance care planning is indicated in order to allow the patient to thoughtfully consider personal goals of care, and if situations arise that prevent the ability to personally give input, to ensure appropriate representation of their personal desires for different levels and aggressiveness of care. Discussion:     Persons present and participating in discussion: Octavio Person MD,     Topics Discussed:  Patient's medical condition and diagnosis: [ x ] yes [  ] no   Surrogate decision maker: [x  ] yes [  ] no   Patient's current physical function/cognitive function/frailty: [x  ] yes [  ] no   Code Status: [x  ] yes [  ] no   Artificial Nutrition / Dialysis / Non-Invasive Ventilation / Blood Transfusion: [ x ] yes [  ] no  Potential Resources for home (durable medical equipment, home nursing, home O2): [  ] yes [  ] no    Overview of Discussion:     I had a detailed discussion with the patient and his wife regarding code status. I explained in detail the need for having this discussion and what this discussion entails. I explained what code status means and how it may impact the medical care plan. I explained in detail what Full Code, Partial code and DNR/DNI mean. I explained in detail what modalities are employed with each of these code statuses.  I explained in detail what CPR, Mechanical and non invasive ventilation, shocks, pressors, intubation and other modalities used during Resuscitative processes entail. All of the questions and concerns were answered. The patient states that he wants to be Full code     Time Spent:     Total time spent face-to-face in education and discussion: 19  minutes.      Sergey Francis MD  Date of Service:  12/7/2020  9:13 PM

## 2020-12-08 NOTE — PERIOP NOTES
TRANSFER - OUT REPORT:    Verbal report given to [facility] RN(name) on Larisa Ingram  being transferred to 555(unit) for routine post - op       Report consisted of patients Situation, Background, Assessment and   Recommendations(SBAR). Time Pre op antibiotic given:1532  Anesthesia Stop time: 3565  Eric Present on Transfer to floor:no  Order for Eric on Chart:no  Discharge Prescriptions with Chart:no    Information from the following report(s) SBAR, OR Summary, Procedure Summary, Intake/Output, MAR, Recent Results, Med Rec Status and Cardiac Rhythm nsr was reviewed with the receiving nurse. Opportunity for questions and clarification was provided. Is the patient on 02? NO       L/Min        Other ra    Is the patient on a monitor? NO    Is the nurse transporting with the patient? NO    Surgical Waiting Area notified of patient's transfer from PACU?  YES      The following personal items collected during your admission accompanied patient upon transfer:   Dental Appliance: Dental Appliances: None  Vision:    Hearing Aid:    Jewelry:    Clothing: Clothing: (belongings sent to [employer])  Other Valuables:    Valuables sent to safe:

## 2020-12-08 NOTE — PROGRESS NOTES
Bedside shift change report given to Corwin Sampson and Orlin Garza RN (oncoming nurse) by Nilda Mcdonnell (offgoing nurse). Report included the following information SBAR, Kardex, Procedure Summary, Intake/Output, MAR and Recent Results.

## 2020-12-08 NOTE — PROGRESS NOTES
Patient refused lab draw. Patient stated his arms are too tender. Patient was educated on importance of these labs. Will continue to monitor.

## 2020-12-08 NOTE — PROGRESS NOTES
Day #2 of cefepime  Indication:  SSTI  Current regimen:  2 gram Q24H  Abx regimen: cefepime + vancomycin  Recent Labs     20  1130   WBC 16.3*   CREA 5.29*   BUN 77*     Est CrCl: ESRD on PD x 5 exchanges per day  Temp (24hrs), Av.9 °F (36.6 °C), Min:97.3 °F (36.3 °C), Max:98.3 °F (36.8 °C)      Plan: Change to cefepime 1 gram Q24H for PD

## 2020-12-08 NOTE — PROGRESS NOTES
Primary Nurse Roge Malik and Rupal Austin, AKASH performed a dual skin assessment on this patient No impairment noted  Justo score is 19

## 2020-12-08 NOTE — PROGRESS NOTES
Name: Giovani Dillon   MRN: [id number]  : 1970      Assessment:  ESRD on CCPD at home; uses 2 green bags, 12 liter volume  HTN  DM-2  L heel foot wound/gangrene    S/p L heel debridement 12-7. No labs today. Getting CAPD in house. 2 liters, 2.5% dextrose, 5x/day. ?if he is absoring     Plan/Recommendations:  Ct CAPD in house; 2.5% dextrose, 5 exchanges per day  D/C IVF  Hold parameters for Entresto placed  ABx  Eventually may need L BKA     Subjective:  Sleepy but arousable. No events overnight  Feels OK    ROS:   No nausea, no vomiting  No chest pain, no shortness of breath    Exam:  Visit Vitals  /61   Pulse 76   Temp 97.4 °F (36.3 °C)   Resp 20   Ht 6' 4\" (1.93 m)   Wt 130.1 kg (286 lb 12.8 oz)   SpO2 94%   BMI 34.91 kg/m²     WB/WN in NAD  AT/NC  No icterus  No resp distress  L foot drsg in place  RLE with no sig edema  Sleepy but arousable    Current Facility-Administered Medications   Medication Dose Route Frequency Last Dose    aspirin delayed-release tablet 81 mg  81 mg Oral DAILY 81 mg at 20 0918    . PHARMACY TO SUBSTITUTE PER PROTOCOL (Reordered from: iron, carbonyl (FEOSOL) 45 mg tab)    Per Protocol      pantoprazole (PROTONIX) tablet 40 mg  40 mg Oral ACB 40 mg at 20 0721    sacubitriL-valsartan (ENTRESTO) 49-51 mg tablet 1 Tab  1 Tab Oral BID 1 Tab at 20 0917    sodium chloride (NS) flush 5-40 mL  5-40 mL IntraVENous Q8H      sodium chloride (NS) flush 5-40 mL  5-40 mL IntraVENous PRN      heparin (porcine) injection 5,000 Units  5,000 Units SubCUTAneous Q8H 5,000 Units at 20 0420    acetaminophen (TYLENOL) tablet 650 mg  650 mg Oral Q4H PRN      cefepime (MAXIPIME) 2 g in 0.9% sodium chloride (MBP/ADV) 100 mL MBP  2 g IntraVENous Q24H 2 g at 20    hydrALAZINE (APRESOLINE) 20 mg/mL injection 10 mg  10 mg IntraVENous Q6H PRN      glucose chewable tablet 16 g  4 Tab Oral PRN      glucagon (GLUCAGEN) injection 1 mg  1 mg IntraMUSCular PRN      dextrose 10% infusion 0-250 mL  0-250 mL IntraVENous PRN      insulin lispro (HUMALOG) injection   SubCUTAneous Q6H Stopped at 12/08/20 0000    HYDROcodone-acetaminophen (NORCO) 7.5-325 mg per tablet 1 Tab  1 Tab Oral Q4H PRN 1 Tab at 12/08/20 0918    HYDROmorphone (PF) (DILAUDID) injection 0.5 mg  0.5 mg IntraVENous Q2H PRN 0.5 mg at 12/07/20 2053    peritoneal dialysis DEXTROSE 2.5% (2.5 mEq/L low calcium) solution 2,000 mL  2,000 mL IntraPERitoneal 5XD 2,000 mL at 12/08/20 0715    Vancomycin pharmacy to dose   Other Rx Dosing/Monitoring         Labs/Data:    Lab Results   Component Value Date/Time    WBC 16.3 (H) 12/07/2020 11:30 AM    HGB 9.1 (L) 12/07/2020 11:30 AM    Hematocrit (POC) 24 (L) 09/01/2020 07:11 AM    HCT 27.9 (L) 12/07/2020 11:30 AM    PLATELET 481 16/99/7303 11:30 AM    MCV 96.9 12/07/2020 11:30 AM       Lab Results   Component Value Date/Time    Sodium 135 (L) 12/07/2020 11:30 AM    Potassium 3.8 12/07/2020 11:30 AM    Chloride 102 12/07/2020 11:30 AM    CO2 24 12/07/2020 11:30 AM    Anion gap 9 12/07/2020 11:30 AM    Glucose 159 (H) 12/07/2020 11:30 AM    BUN 77 (H) 12/07/2020 11:30 AM    Creatinine 5.29 (H) 12/07/2020 11:30 AM    BUN/Creatinine ratio 15 12/07/2020 11:30 AM    GFR est AA 14 (L) 12/07/2020 11:30 AM    GFR est non-AA 12 (L) 12/07/2020 11:30 AM    Calcium 7.5 (L) 12/07/2020 11:30 AM       Wt Readings from Last 3 Encounters:   12/08/20 130.1 kg (286 lb 12.8 oz)   09/22/20 155.6 kg (343 lb 0.6 oz)   09/01/20 (!) 161.7 kg (356 lb 7.7 oz)         Intake/Output Summary (Last 24 hours) at 12/8/2020 1000  Last data filed at 12/8/2020 0903  Gross per 24 hour   Intake 2150 ml   Output 1300 ml   Net 850 ml       Patient seen and examined. Chart reviewed. Labs, data and other pertinent notes reviewed in last 24 hrs.     PMH/SH/FH reviewed and unchanged compared to H&P    Discussed with pt      Bernadine Martin MD

## 2020-12-08 NOTE — PROGRESS NOTES
LENI: Patient lives at home with his girlfriend, Dillard Lombard. Patient was independent prior to admission and does not own any DME. Patient gets peritoneal dialysis. Patient's girlfriend will transport patient home via car at discharge. Care Management Interventions  PCP Verified by CM: Yes  Mode of Transport at Discharge: Other (see comment)(family/car)  Transition of Care Consult (CM Consult): Discharge Planning  MyChart Signup: No  Discharge Durable Medical Equipment: No  Physical Therapy Consult: No  Occupational Therapy Consult: No  Speech Therapy Consult: No  Current Support Network: Lives with Spouse, Own Home  Confirm Follow Up Transport: Family  The Patient and/or Patient Representative was Provided with a Choice of Provider and Agrees with the Discharge Plan?: Yes  Freedom of Choice List was Provided with Basic Dialogue that Supports the Patient's Individualized Plan of Care/Goals, Treatment Preferences and Shares the Quality Data Associated with the Providers?: Yes  Discharge Location  Discharge Placement: Home    Reason for Admission: diabetic foot infection                    RUR Score:  23%                PCP: First and Last name:  Darryle Rolls, PA   Name of Practice:    Are you a current patient: Yes/No: yes   Approximate date of last visit: September 2020   Can you participate in a virtual visit if needed:     Do you (patient/family) have any concerns for transition/discharge? Not at this time              Plan for utilizing home health:   TBD    Current Advanced Directive/Advance Care Plan:  No AMD On file            Transition of Care Plan:      Home, TBD pending progress. Chart reviewed. CM met with patient at bedside. CM verified demographics on face sheet. Patient was independent prior to admission and does not use DME. Patient lives with hisgirlfriend, Dillard Lombard (ph#: [phone]), in a private residence; there are 3-4 steps to enter the home.  Girlfriend to provide transportation home after discharge. . Patient uses [facility] [address] and reports no concerns with affording medications.  CM will continue to follow and will be available if needs arise    GERA Lozoya/WILL

## 2020-12-08 NOTE — PROGRESS NOTES
Vascular:    Stable post heel debridement - he has a large soft tissue defect that extends to bone. Likelihood of eventual healing with a functional weight bearing foot is very low. Will eventually come to BKA. Not needed at present. Plan wound care and antibiotics for now.

## 2020-12-08 NOTE — PROGRESS NOTES
TRANSFER - IN REPORT:    Verbal report received from Wen(name) on Priyanka Archer  being received from PACU(unit) for routine post - op      Report consisted of patients Situation, Background, Assessment and   Recommendations(SBAR). Information from the following report(s) SBAR, Kardex, Intake/Output, MAR and Recent Results was reviewed with the receiving nurse. Opportunity for questions and clarification was provided. Assessment completed upon patients arrival to unit and care assumed. Silas Baumann RN spoke with Dr. Mert Collins regarding pt's /43. Dr. Mert Collins stated patient is ok to come to floor.

## 2020-12-09 LAB
ANION GAP SERPL CALC-SCNC: 11 MMOL/L (ref 5–15)
BUN SERPL-MCNC: 87 MG/DL (ref 6–20)
BUN/CREAT SERPL: 15 (ref 12–20)
CALCIUM SERPL-MCNC: 8 MG/DL (ref 8.5–10.1)
CHLORIDE SERPL-SCNC: 100 MMOL/L (ref 97–108)
CO2 SERPL-SCNC: 24 MMOL/L (ref 21–32)
CREAT SERPL-MCNC: 5.98 MG/DL (ref 0.7–1.3)
EST. AVERAGE GLUCOSE BLD GHB EST-MCNC: 128 MG/DL
GLUCOSE BLD STRIP.AUTO-MCNC: 105 MG/DL (ref 65–100)
GLUCOSE BLD STRIP.AUTO-MCNC: 121 MG/DL (ref 65–100)
GLUCOSE BLD STRIP.AUTO-MCNC: 150 MG/DL (ref 65–100)
GLUCOSE BLD STRIP.AUTO-MCNC: 160 MG/DL (ref 65–100)
GLUCOSE BLD STRIP.AUTO-MCNC: 91 MG/DL (ref 65–100)
GLUCOSE SERPL-MCNC: 99 MG/DL (ref 65–100)
HBA1C MFR BLD: 6.1 % (ref 4–5.6)
POTASSIUM SERPL-SCNC: 3.5 MMOL/L (ref 3.5–5.1)
SERVICE CMNT-IMP: ABNORMAL
SERVICE CMNT-IMP: NORMAL
SODIUM SERPL-SCNC: 135 MMOL/L (ref 136–145)

## 2020-12-09 PROCEDURE — C9113 INJ PANTOPRAZOLE SODIUM, VIA: HCPCS | Performed by: INTERNAL MEDICINE

## 2020-12-09 PROCEDURE — 74011250636 HC RX REV CODE- 250/636

## 2020-12-09 PROCEDURE — 80048 BASIC METABOLIC PNL TOTAL CA: CPT

## 2020-12-09 PROCEDURE — 74011250637 HC RX REV CODE- 250/637: Performed by: INTERNAL MEDICINE

## 2020-12-09 PROCEDURE — 83036 HEMOGLOBIN GLYCOSYLATED A1C: CPT

## 2020-12-09 PROCEDURE — 74011000258 HC RX REV CODE- 258: Performed by: INTERNAL MEDICINE

## 2020-12-09 PROCEDURE — A4722 DIALYS SOL FLD VOL > 1999CC: HCPCS | Performed by: INTERNAL MEDICINE

## 2020-12-09 PROCEDURE — 74011250637 HC RX REV CODE- 250/637: Performed by: FAMILY MEDICINE

## 2020-12-09 PROCEDURE — 74011250636 HC RX REV CODE- 250/636: Performed by: FAMILY MEDICINE

## 2020-12-09 PROCEDURE — 94760 N-INVAS EAR/PLS OXIMETRY 1: CPT

## 2020-12-09 PROCEDURE — 2709999900 HC NON-CHARGEABLE SUPPLY

## 2020-12-09 PROCEDURE — 74011250636 HC RX REV CODE- 250/636: Performed by: INTERNAL MEDICINE

## 2020-12-09 PROCEDURE — 36415 COLL VENOUS BLD VENIPUNCTURE: CPT

## 2020-12-09 PROCEDURE — 82962 GLUCOSE BLOOD TEST: CPT

## 2020-12-09 PROCEDURE — 65270000029 HC RM PRIVATE

## 2020-12-09 PROCEDURE — 74011000250 HC RX REV CODE- 250: Performed by: INTERNAL MEDICINE

## 2020-12-09 RX ADMIN — SODIUM CHLORIDE, SODIUM LACTATE, CALCIUM CHLORIDE, MAGNESIUM CHLORIDE AND DEXTROSE 2000 ML: 2.5; 538; 448; 18.3; 5.08 INJECTION, SOLUTION INTRAPERITONEAL at 17:45

## 2020-12-09 RX ADMIN — CEFEPIME HYDROCHLORIDE 1 G: 1 INJECTION, POWDER, FOR SOLUTION INTRAMUSCULAR; INTRAVENOUS at 22:51

## 2020-12-09 RX ADMIN — FERROUS SULFATE TAB 325 MG (65 MG ELEMENTAL FE) 325 MG: 325 (65 FE) TAB at 10:12

## 2020-12-09 RX ADMIN — Medication 10 ML: at 14:40

## 2020-12-09 RX ADMIN — PANTOPRAZOLE SODIUM 40 MG: 40 TABLET, DELAYED RELEASE ORAL at 07:36

## 2020-12-09 RX ADMIN — SACUBITRIL AND VALSARTAN 1 TABLET: 49; 51 TABLET, FILM COATED ORAL at 17:45

## 2020-12-09 RX ADMIN — HYDROMORPHONE HYDROCHLORIDE 0.5 MG: 1 INJECTION, SOLUTION INTRAMUSCULAR; INTRAVENOUS; SUBCUTANEOUS at 01:03

## 2020-12-09 RX ADMIN — HYDROMORPHONE HYDROCHLORIDE 0.5 MG: 1 INJECTION, SOLUTION INTRAMUSCULAR; INTRAVENOUS; SUBCUTANEOUS at 22:50

## 2020-12-09 RX ADMIN — HYDROMORPHONE HYDROCHLORIDE 0.5 MG: 1 INJECTION, SOLUTION INTRAMUSCULAR; INTRAVENOUS; SUBCUTANEOUS at 14:40

## 2020-12-09 RX ADMIN — SODIUM CHLORIDE, SODIUM LACTATE, CALCIUM CHLORIDE, MAGNESIUM CHLORIDE AND DEXTROSE 2000 ML: 2.5; 538; 448; 18.3; 5.08 INJECTION, SOLUTION INTRAPERITONEAL at 11:52

## 2020-12-09 RX ADMIN — HYDROMORPHONE HYDROCHLORIDE 0.5 MG: 1 INJECTION, SOLUTION INTRAMUSCULAR; INTRAVENOUS; SUBCUTANEOUS at 19:24

## 2020-12-09 RX ADMIN — HEPARIN SODIUM 5000 UNITS: 5000 INJECTION INTRAVENOUS; SUBCUTANEOUS at 14:29

## 2020-12-09 RX ADMIN — HEPARIN SODIUM 5000 UNITS: 5000 INJECTION INTRAVENOUS; SUBCUTANEOUS at 05:15

## 2020-12-09 RX ADMIN — HYDROMORPHONE HYDROCHLORIDE 0.5 MG: 1 INJECTION, SOLUTION INTRAMUSCULAR; INTRAVENOUS; SUBCUTANEOUS at 05:15

## 2020-12-09 RX ADMIN — HYDROMORPHONE HYDROCHLORIDE 0.5 MG: 1 INJECTION, SOLUTION INTRAMUSCULAR; INTRAVENOUS; SUBCUTANEOUS at 00:05

## 2020-12-09 RX ADMIN — HEPARIN SODIUM 5000 UNITS: 5000 INJECTION INTRAVENOUS; SUBCUTANEOUS at 22:50

## 2020-12-09 RX ADMIN — Medication 81 MG: at 10:12

## 2020-12-09 RX ADMIN — SODIUM CHLORIDE 40 MG: 9 INJECTION INTRAMUSCULAR; INTRAVENOUS; SUBCUTANEOUS at 22:51

## 2020-12-09 RX ADMIN — HYDROMORPHONE HYDROCHLORIDE 0.5 MG: 1 INJECTION, SOLUTION INTRAMUSCULAR; INTRAVENOUS; SUBCUTANEOUS at 10:13

## 2020-12-09 RX ADMIN — SACUBITRIL AND VALSARTAN 1 TABLET: 49; 51 TABLET, FILM COATED ORAL at 10:12

## 2020-12-09 NOTE — ROUTINE PROCESS
Bedside and Verbal shift change report given to Nell Alvarez RN (oncoming nurse) by Chucky Valentin RN (offgoing nurse). Report included the following information SBAR, OR Summary, Procedure Summary, Intake/Output, MAR and Recent Results.

## 2020-12-09 NOTE — PROGRESS NOTES
Hospitalist Progress Note  Scarlet Carson MD  Answering service: [phone] from in house phone      Date of Service:  2020  NAME:  Bebeto Mitchell  :  1970  MRN:  [id number]      Admission Summary:   Bebeto Mitchell is a 53 y.o. male who presents with left lower extremity wound. Interval history / Subjective:      Patient seen and examined this afternoon. Patient was complaining about pain, insisting on going home. He is on Dilaudid every 3 hours but patient stated that that did not touch his pain. Assessment & Plan:     # Left foot heel gangrenous wound/cellulitis w/ PAD and diabetes ( controlled)   - s/p heel debridement , wound culture and pathology pending   - on cefepime and iv vancomycin   - IV pain medication   - vascular surgery following   - May end up having BKA at some point-- per vascular     # DM type 2: SSI, accucheck, diabetic diet. # ESRD on CCPD: in house dialysis, nephrology following   # HTN: ct home medication   # Anemia: stable. May need epo injection     Code: Full   DVT ppx: Heparin      Hospital Problems  Date Reviewed: 2020          Codes Class Noted POA    Diabetic foot infection [facility]) ICD-10-CM: E11.628, L08.9  ICD-9-CM: 250.80, 686.9  2020 Unknown                Review of Systems:   Pertinent positive mentioned in interval hx/HPI. Other systems reviewed and negative     Vital Signs:    Last 24hrs VS reviewed since prior progress note. Most recent are:  Visit Vitals  /71 (BP 1 Location: Left arm, BP Patient Position: At rest)   Pulse 91   Temp 97.8 °F (36.6 °C)   Resp 18   Ht 6' 4\" (1.93 m)   Wt 131 kg (288 lb 12.8 oz)   SpO2 95%   BMI 35.15 kg/m²       No intake or output data in the 24 hours ending 20 1659     Physical Examination:             Constitutional:  No acute distress, obese,    ENT:  Oral mucous moist, oropharynx benign. Neck supple,    Resp:  CTA bilaterally. No wheezing/rhonchi/rales. No accessory muscle use   CV:  Regular rhythm, normal rate, no murmurs, gallops, rubs    GI:  PD cath in place, Soft, non distended, non tender. normoactive bowel sounds, no hepatosplenomegaly     Musculoskeletal:  left foot covered with dressing     Neurologic:  Moves all extremities. AAOx3, CN II-XII reviewed            Data Review:   I personally reviewed labs and imaging     Labs:     Recent Labs     12/07/20  1130   WBC 16.3*   HGB 9.1*   HCT 27.9*        Recent Labs     12/09/20  0109 12/07/20  1130   * 135*   K 3.5 3.8    102   CO2 24 24   BUN 87* 77*   CREA 5.98* 5.29*   GLU 99 159*   CA 8.0* 7.5*     Recent Labs     12/07/20  1130   ALT 34   *   TBILI 0.6   TP 6.1*   ALB 1.8*   GLOB 4.3*     No results for input(s): INR, PTP, APTT, INREXT, INREXT in the last 72 hours. No results for input(s): FE, TIBC, PSAT, FERR in the last 72 hours. Lab Results   Component Value Date/Time    Folate 9.6 09/18/2020 04:00 AM      No results for input(s): PH, PCO2, PO2 in the last 72 hours. No results for input(s): CPK, CKNDX, TROIQ in the last 72 hours.     No lab exists for component: CPKMB  Lab Results   Component Value Date/Time    Cholesterol, total 284 (H) 02/22/2013 08:21 AM    HDL Cholesterol 39 (L) 02/22/2013 08:21 AM    LDL, calculated Comment 02/22/2013 08:21 AM    Triglyceride 788 (HH) 02/22/2013 08:21 AM     Lab Results   Component Value Date/Time    Glucose (POC) 121 (H) 12/09/2020 11:36 AM    Glucose (POC) 105 (H) 12/09/2020 05:19 AM    Glucose (POC) 91 12/09/2020 12:11 AM    Glucose (POC) 142 (H) 12/08/2020 06:05 PM    Glucose (POC) 111 (H) 12/08/2020 11:47 AM     Lab Results   Component Value Date/Time    Color YELLOW/STRAW 09/17/2020 01:33 AM    Appearance CLEAR 09/17/2020 01:33 AM    Specific gravity 1.015 09/17/2020 01:33 AM    pH (UA) 5.0 09/17/2020 01:33 AM    Protein 100 (A) 09/17/2020 01:33 AM    Glucose Negative 09/17/2020 01:33 AM    Ketone Negative 09/17/2020 01:33 AM    Bilirubin Negative 09/17/2020 01:33 AM    Urobilinogen 0.2 09/17/2020 01:33 AM    Nitrites Negative 09/17/2020 01:33 AM    Leukocyte Esterase Negative 09/17/2020 01:33 AM    Epithelial cells FEW 09/17/2020 01:33 AM    Bacteria Negative 09/17/2020 01:33 AM    WBC 0-4 09/17/2020 01:33 AM    RBC 0-5 09/17/2020 01:33 AM         Medications Reviewed:     Current Facility-Administered Medications   Medication Dose Route Frequency    Vancomycin, Random - Please draw on Wednesday, 12/09 @ 1500. Thanks!    Other ONCE    pantoprazole (PROTONIX) 40 mg in 0.9% sodium chloride 10 mL injection  40 mg IntraVENous Q12H    cefepime (MAXIPIME) 1 g in 0.9% sodium chloride (MBP/ADV) 50 mL MBP  1 g IntraVENous Q24H    aspirin delayed-release tablet 81 mg  81 mg Oral DAILY    ferrous sulfate tablet 325 mg  325 mg Oral DAILY    sacubitriL-valsartan (ENTRESTO) 49-51 mg tablet 1 Tab  1 Tab Oral BID    sodium chloride (NS) flush 5-40 mL  5-40 mL IntraVENous Q8H    sodium chloride (NS) flush 5-40 mL  5-40 mL IntraVENous PRN    heparin (porcine) injection 5,000 Units  5,000 Units SubCUTAneous Q8H    acetaminophen (TYLENOL) tablet 650 mg  650 mg Oral Q4H PRN    hydrALAZINE (APRESOLINE) 20 mg/mL injection 10 mg  10 mg IntraVENous Q6H PRN    glucose chewable tablet 16 g  4 Tab Oral PRN    glucagon (GLUCAGEN) injection 1 mg  1 mg IntraMUSCular PRN    dextrose 10% infusion 0-250 mL  0-250 mL IntraVENous PRN    insulin lispro (HUMALOG) injection   SubCUTAneous Q6H    HYDROcodone-acetaminophen (NORCO) 7.5-325 mg per tablet 1 Tab  1 Tab Oral Q4H PRN    HYDROmorphone (PF) (DILAUDID) injection 0.5 mg  0.5 mg IntraVENous Q2H PRN    peritoneal dialysis DEXTROSE 2.5% (2.5 mEq/L low calcium) solution 2,000 mL  2,000 mL IntraPERitoneal 5XD    Vancomycin pharmacy to dose   Other Rx Dosing/Monitoring     ______________________________________________________________________  EXPECTED LENGTH OF STAY: 6d 12h  ACTUAL LENGTH OF STAY:          2                 Roel Small MD   Patient has given Verbal permission to discuss medical care with   persons present in the room and and also with contact as listed on face sheet. Please note that this dictation was completed with [employer], the computer voice recognition software. Quite often unanticipated grammatical, syntax, homophones, and other interpretive errors are inadvertently transcribed by the computer software. Please disregard these errors. Please excuse any errors that have escaped final proofreading. Thank you.

## 2020-12-09 NOTE — PROGRESS NOTES
Bedside shift change report given to Papo Hanna RN (oncoming nurse) by Claudette Mitchell (offgoing nurse). Report included the following information SBAR, Kardex, Procedure Summary, Intake/Output, MAR and Recent Results.

## 2020-12-09 NOTE — PROGRESS NOTES
Patient is non compliant with treatment. He refuses dialysis at least 50% of the time complaining of the way the PD is done here, refuses blood draws including the 1500 ordered vanc trough today, patient moans and complains of pain, however refuses pain meds on occasion. Patient will not eat and will not drink sufficiently. States they are sending the food that he doesn't like, this RN encouraged patient to look at menu & he refused. Patient states he just \"wants to go home\". This RN has encouraged all treatment, food and water. 2000-Bedside and Verbal shift change report given to AKASH Calvert (oncoming nurse) by Zander Bergman RN (offgoing nurse). Report included the following information SBAR, ED Summary, OR Summary, Procedure Summary, Intake/Output, MAR and Recent Results.

## 2020-12-09 NOTE — WOUND CARE
Wound Care Note:     New consult placed by physician request for heel wound    Chart shows:  Admitted for diabetic foot infection s/p debridement of left heel with excision of necrotic skin, subcutaneous tissue and fascia 12/7/20  Past Medical History:   Diagnosis Date    Allergic rhinitis 11/19/2009    Anemia     CAD (coronary artery disease)     Chronic kidney disease     stage 4    Chronic obstructive pulmonary disease (HCC)     Congestive heart failure ([facility] [address]     chronic left sided congestive heart failure    DM (diabetes mellitus) ([facility] 11/19/2009    type 2    GERD (gastroesophageal reflux disease)     HTN (hypertension), benign 11/19/2009    Hypercholesteremia 11/19/2009    LBBB (left bundle branch block)     Obesity 11/19/2009    Pulmonary HTN ([facility] [facility] 06/2020    mild     Tobacco abuse 11/19/2009    Weakness generalized      Wound Culture obtained on 12/7/20 with results moderate gram negative rods and possible scant alpha streptococcus  WBC = 16.3 on 12/7/20  Admitted from home    Assessment:   Patient is A&O x 4, communicative, continent with no assistance needed in repositioning. Bed: [facility]  Diet: Diabetic consistent carb regular  Patient reports some pain in foot; not as bad as last night. No number given but patient agreeable for dressing change. Right heels and sacral skin intact and without erythema. Bilateral buttocks unable to be assessed; patient sitting on side of bed. No palpable DP pulses on left foot. 1. Left heel wound measures 8 cm x 7 cm x 3.3 cm, wound bed with bone exposure, pink wound bed, moderate sero/sang drainage, wound edges are open, tiago-wound with blanchable erythema. Wet to dry dressing applied. Discussed increasing protein intake for wound healing, maintaining good glucose control and changing positions at least every 2 hours to prevent pressure injuries.   Patient stated he has had 2 previous pressure injuries in the past so he knows. Patient sitting on side of bed     Recommendations:    Continue with daily wet to dry dressing, per Dr. Missy Barker. Pack left heel wound with saline gauze and cover with dry dressing and wrap with kerlex and ace once daily     Skin Care & Pressure Prevention:  Minimize layers of linen/pads under patient to optimize support surface. Turn/reposition approximately every 2 hours and offload heels.   Manage incontinence / promote continence   Nourishing Skin Cream to dry skin, minimize use of briefs when able    Discussed above plan with patient & Erlinda Smith RN    Transition of Care: Plan to follow as needed while admitted to hospital.    AALIYAH Archer, RN, [facility], [facility].  office [phone]  pager [phone] or call  to page

## 2020-12-09 NOTE — PROGRESS NOTES
Name: Sherri Ingram   MRN: [id number]  : 1970      Assessment:  ESRD on CCPD at home; uses 2 green bags, 12 liter volume  HTN  DM-2  L heel foot wound/gangrene    S/p L heel debridement 12-7. Labs revd- stable lytes. K is low normal. Getting CAPD in house. 2 liters, 2.5% dextrose, 5x/day.      Plan/Recommendations:  Ct CAPD in house; 2.5% dextrose, 5 exchanges per day  Hold parameters for Entresto  ABx  Eventually may need L BKA  Anxious to go home. Ok from renal standpoint.      Subjective:  Wants to go home. No acute c/o  Not taking much po    ROS:   No nausea, no vomiting  No chest pain, no shortness of breath    Exam:  Visit Vitals  /82 (BP 1 Location: Right arm)   Pulse 91   Temp 97.7 °F (36.5 °C)   Resp 16   Ht 6' 4\" (1.93 m)   Wt 131 kg (288 lb 12.8 oz)   SpO2 94%   BMI 35.15 kg/m²     WB/WN in NAD  AT/NC  No icterus  No resp distress  L foot drsg in place  RLE with no sig edema  Alert awake    Current Facility-Administered Medications   Medication Dose Route Frequency Last Dose    Vancomycin, Random - Please draw on Wednesday,  @ 1500. Thanks!    Other ONCE      cefepime (MAXIPIME) 1 g in 0.9% sodium chloride (MBP/ADV) 50 mL MBP  1 g IntraVENous Q24H 1 g at 20 2131    aspirin delayed-release tablet 81 mg  81 mg Oral DAILY 81 mg at 20 1012    ferrous sulfate tablet 325 mg  325 mg Oral DAILY 325 mg at 20 1012    pantoprazole (PROTONIX) tablet 40 mg  40 mg Oral ACB 40 mg at 20 0736    sacubitriL-valsartan (ENTRESTO) 49-51 mg tablet 1 Tab  1 Tab Oral BID 1 Tab at 20 1012    sodium chloride (NS) flush 5-40 mL  5-40 mL IntraVENous Q8H 10 mL at 20 1555    sodium chloride (NS) flush 5-40 mL  5-40 mL IntraVENous PRN      heparin (porcine) injection 5,000 Units  5,000 Units SubCUTAneous Q8H 5,000 Units at 20 0515  acetaminophen (TYLENOL) tablet 650 mg  650 mg Oral Q4H PRN      hydrALAZINE (APRESOLINE) 20 mg/mL injection 10 mg  10 mg IntraVENous Q6H PRN      glucose chewable tablet 16 g  4 Tab Oral PRN      glucagon (GLUCAGEN) injection 1 mg  1 mg IntraMUSCular PRN      dextrose 10% infusion 0-250 mL  0-250 mL IntraVENous PRN      insulin lispro (HUMALOG) injection   SubCUTAneous Q6H Stopped at 12/09/20 0000    HYDROcodone-acetaminophen (NORCO) 7.5-325 mg per tablet 1 Tab  1 Tab Oral Q4H PRN 1 Tab at 12/08/20 1832    HYDROmorphone (PF) (DILAUDID) injection 0.5 mg  0.5 mg IntraVENous Q2H PRN 0.5 mg at 12/09/20 1013    peritoneal dialysis DEXTROSE 2.5% (2.5 mEq/L low calcium) solution 2,000 mL  2,000 mL IntraPERitoneal 5XD 2,000 mL at 12/08/20 1554    Vancomycin pharmacy to dose   Other Rx Dosing/Monitoring         Labs/Data:    Lab Results   Component Value Date/Time    WBC 16.3 (H) 12/07/2020 11:30 AM    HGB 9.1 (L) 12/07/2020 11:30 AM    Hematocrit (POC) 24 (L) 09/01/2020 07:11 AM    HCT 27.9 (L) 12/07/2020 11:30 AM    PLATELET 016 40/00/3738 11:30 AM    MCV 96.9 12/07/2020 11:30 AM       Lab Results   Component Value Date/Time    Sodium 135 (L) 12/09/2020 01:09 AM    Potassium 3.5 12/09/2020 01:09 AM    Chloride 100 12/09/2020 01:09 AM    CO2 24 12/09/2020 01:09 AM    Anion gap 11 12/09/2020 01:09 AM    Glucose 99 12/09/2020 01:09 AM    BUN 87 (H) 12/09/2020 01:09 AM    Creatinine 5.98 (H) 12/09/2020 01:09 AM    BUN/Creatinine ratio 15 12/09/2020 01:09 AM    GFR est AA 12 (L) 12/09/2020 01:09 AM    GFR est non-AA 10 (L) 12/09/2020 01:09 AM    Calcium 8.0 (L) 12/09/2020 01:09 AM       Wt Readings from Last 3 Encounters:   12/09/20 131 kg (288 lb 12.8 oz)   09/22/20 155.6 kg (343 lb 0.6 oz)   09/01/20 (!) 161.7 kg (356 lb 7.7 oz)       No intake or output data in the 24 hours ending 12/09/20 1144    Patient seen and examined. Chart reviewed.  Labs, data and other pertinent notes reviewed in last 24 hrs.    PMH/SH/FH reviewed and unchanged compared to H&P    Discussed with pt      Ania Queen MD

## 2020-12-09 NOTE — PROGRESS NOTES
Physician Progress Note      Chiquis SIDHU #:                  [id number]  :                       1970  ADMIT DATE:       2020 10:58 AM  DISCH DATE:  RESPONDING  PROVIDER #:        ABBEY Adam MD          QUERY TEXT:    Dear Attending Provider,    Pt admitted with gangrenous left heel pad. . Pt noted to have DM 2 and Left lower extremity cellulitis/wound. If possible, please document in progress notes and discharge summary the relationship, if any, between cellulitis and DM. The medical record reflects the following:  Risk Factors: 51yo with DM2 and according to vascular has peripheral vascular disease  Clinical Indicators: per H&P and attending notes: Left lower extremity cellulitis/wound, OP Dx: Gangrenous left heel pad, A1C: 6.1  Treatment: urgent debridement done on admission by vascular surgery, broad-spectrum IV antibiotics, being seen by vascular surgery, lin IV hydration, pain control, blood and wound cultures    Thank you,  Christina Katz  [phone]  Options provided:  -- Left heel cellulitis due to Diabetes  -- Left heel cellulitis unrelated to Diabetes  -- Left heel cellulitis ruled out  -- Other - I will add my own diagnosis  -- Disagree - Not applicable / Not valid  -- Disagree - Clinically unable to determine / Unknown  -- Refer to Clinical Documentation Reviewer    PROVIDER RESPONSE TEXT:    Left heel gangrenous due to PVD, in a patient who is diabetic with controlled A1c.     Query created by: Al Brown on 2020 4:45 PM      Electronically signed by:  Juliette Henriquez MD 2020 4:59 PM

## 2020-12-09 NOTE — PROGRESS NOTES
Vascular:    No new issues, foot dressed, dressing changes to start today.      Arterial doppler OK - no further vascular workup needed    He is unlikely to agree to BKA this admission - anticipate DC in several days with home health for wound care depending on course of IV antibiotics

## 2020-12-10 ENCOUNTER — APPOINTMENT (OUTPATIENT)
Dept: MRI IMAGING | Age: 50
DRG: 264 | End: 2020-12-10
Attending: INTERNAL MEDICINE
Payer: COMMERCIAL

## 2020-12-10 LAB
BACTERIA SPEC CULT: NORMAL
GLUCOSE BLD STRIP.AUTO-MCNC: 156 MG/DL (ref 65–100)
GLUCOSE BLD STRIP.AUTO-MCNC: 178 MG/DL (ref 65–100)
GLUCOSE BLD STRIP.AUTO-MCNC: 216 MG/DL (ref 65–100)
SERVICE CMNT-IMP: ABNORMAL
SERVICE CMNT-IMP: NORMAL

## 2020-12-10 PROCEDURE — 74011636637 HC RX REV CODE- 636/637: Performed by: FAMILY MEDICINE

## 2020-12-10 PROCEDURE — 74011000258 HC RX REV CODE- 258: Performed by: INTERNAL MEDICINE

## 2020-12-10 PROCEDURE — 74011250636 HC RX REV CODE- 250/636

## 2020-12-10 PROCEDURE — 74011250636 HC RX REV CODE- 250/636: Performed by: FAMILY MEDICINE

## 2020-12-10 PROCEDURE — 74011636637 HC RX REV CODE- 636/637: Performed by: HOSPITALIST

## 2020-12-10 PROCEDURE — 74011250636 HC RX REV CODE- 250/636: Performed by: INTERNAL MEDICINE

## 2020-12-10 PROCEDURE — A4722 DIALYS SOL FLD VOL > 1999CC: HCPCS | Performed by: INTERNAL MEDICINE

## 2020-12-10 PROCEDURE — 74011000250 HC RX REV CODE- 250: Performed by: INTERNAL MEDICINE

## 2020-12-10 PROCEDURE — 65270000029 HC RM PRIVATE

## 2020-12-10 PROCEDURE — C9113 INJ PANTOPRAZOLE SODIUM, VIA: HCPCS | Performed by: INTERNAL MEDICINE

## 2020-12-10 PROCEDURE — 82962 GLUCOSE BLOOD TEST: CPT

## 2020-12-10 PROCEDURE — 74011250637 HC RX REV CODE- 250/637: Performed by: FAMILY MEDICINE

## 2020-12-10 PROCEDURE — 74011250637 HC RX REV CODE- 250/637: Performed by: INTERNAL MEDICINE

## 2020-12-10 RX ORDER — PREDNISONE 10 MG/1
40 TABLET ORAL
Status: DISCONTINUED | OUTPATIENT
Start: 2020-12-10 | End: 2020-12-12 | Stop reason: HOSPADM

## 2020-12-10 RX ADMIN — SODIUM CHLORIDE 40 MG: 9 INJECTION INTRAMUSCULAR; INTRAVENOUS; SUBCUTANEOUS at 10:27

## 2020-12-10 RX ADMIN — HYDROMORPHONE HYDROCHLORIDE 0.5 MG: 1 INJECTION, SOLUTION INTRAMUSCULAR; INTRAVENOUS; SUBCUTANEOUS at 10:25

## 2020-12-10 RX ADMIN — INSULIN LISPRO 2 UNITS: 100 INJECTION, SOLUTION INTRAVENOUS; SUBCUTANEOUS at 12:35

## 2020-12-10 RX ADMIN — HEPARIN SODIUM 5000 UNITS: 5000 INJECTION INTRAVENOUS; SUBCUTANEOUS at 12:35

## 2020-12-10 RX ADMIN — HYDROMORPHONE HYDROCHLORIDE 0.5 MG: 1 INJECTION, SOLUTION INTRAMUSCULAR; INTRAVENOUS; SUBCUTANEOUS at 19:02

## 2020-12-10 RX ADMIN — SACUBITRIL AND VALSARTAN 1 TABLET: 49; 51 TABLET, FILM COATED ORAL at 18:23

## 2020-12-10 RX ADMIN — SODIUM CHLORIDE, SODIUM LACTATE, CALCIUM CHLORIDE, MAGNESIUM CHLORIDE AND DEXTROSE 2000 ML: 2.5; 538; 448; 18.3; 5.08 INJECTION, SOLUTION INTRAPERITONEAL at 18:00

## 2020-12-10 RX ADMIN — HEPARIN SODIUM 5000 UNITS: 5000 INJECTION INTRAVENOUS; SUBCUTANEOUS at 22:40

## 2020-12-10 RX ADMIN — PREDNISONE 40 MG: 10 TABLET ORAL at 12:36

## 2020-12-10 RX ADMIN — FERROUS SULFATE TAB 325 MG (65 MG ELEMENTAL FE) 325 MG: 325 (65 FE) TAB at 10:35

## 2020-12-10 RX ADMIN — SODIUM CHLORIDE, SODIUM LACTATE, CALCIUM CHLORIDE, MAGNESIUM CHLORIDE AND DEXTROSE 2000 ML: 2.5; 538; 448; 18.3; 5.08 INJECTION, SOLUTION INTRAPERITONEAL at 11:00

## 2020-12-10 RX ADMIN — Medication 81 MG: at 10:27

## 2020-12-10 RX ADMIN — Medication 10 ML: at 14:00

## 2020-12-10 RX ADMIN — SODIUM CHLORIDE, SODIUM LACTATE, CALCIUM CHLORIDE, MAGNESIUM CHLORIDE AND DEXTROSE 2000 ML: 2.5; 538; 448; 18.3; 5.08 INJECTION, SOLUTION INTRAPERITONEAL at 14:00

## 2020-12-10 RX ADMIN — SACUBITRIL AND VALSARTAN 1 TABLET: 49; 51 TABLET, FILM COATED ORAL at 10:35

## 2020-12-10 RX ADMIN — SODIUM CHLORIDE 40 MG: 9 INJECTION INTRAMUSCULAR; INTRAVENOUS; SUBCUTANEOUS at 22:40

## 2020-12-10 RX ADMIN — INSULIN LISPRO 3 UNITS: 100 INJECTION, SOLUTION INTRAVENOUS; SUBCUTANEOUS at 18:51

## 2020-12-10 RX ADMIN — HYDROMORPHONE HYDROCHLORIDE 0.5 MG: 1 INJECTION, SOLUTION INTRAMUSCULAR; INTRAVENOUS; SUBCUTANEOUS at 15:09

## 2020-12-10 RX ADMIN — CEFEPIME HYDROCHLORIDE 1 G: 1 INJECTION, POWDER, FOR SOLUTION INTRAMUSCULAR; INTRAVENOUS at 22:40

## 2020-12-10 NOTE — CONSULTS
ID Consult Note  NAME:  Sisto Spatz   :   1970   MRN:   [id number]   Date/Time:  12/10/2020 4:53 PM  Subjective:   REASON FOR CONSULT: Left heel infection     Gorge Higgins is a 53 y.o. with a history of diabetes, coronary artery disease, ESRD on peritoneal dialysis, COPD and CHF. He noticed that he had a wound on his left heel about a week back. He told me that it was not really painful. He went to his podiatrist who did a superficial debridement and placed him on doxycycline. Despite these efforts, the wound became bigger. There was foul-smelling discharge coming from the wound. The patient did not have any fever or chills. He could not recall any traumatic episode to the heel. Because of nonresolution of his symptoms, he came to the emergency room. He was admitted under the hospitalist service. Dr. Candelaria Bhat  saw the patient and performed a debridement in the operating room. Dr. Candelaria Bhat mentioned that the wound extended all the way to the bone. His foot is growing Proteus, Citrobacter and possibly a Streptococcus. He is on vancomycin and cefepime. We are being asked to see him in consult.       Past Medical History:   Diagnosis Date    Allergic rhinitis 2009    Anemia     CAD (coronary artery disease)     Chronic kidney disease     stage 4    Chronic obstructive pulmonary disease (HCC)     Congestive heart failure (HCC)     chronic left sided congestive heart failure    DM (diabetes mellitus) ([address] 2009    type 2    GERD (gastroesophageal reflux disease)     HTN (hypertension), benign 2009    Hypercholesteremia 2009    LBBB (left bundle branch block)     Obesity 2009    Pulmonary HTN ([address] 2020    mild     Tobacco abuse 2009    Weakness generalized       Past Surgical History:   Procedure Laterality Date    CARDIAC SURG PROCEDURE UNLIST  2020    echo ef 25-30% down from 40% on     HX APPENDECTOMY  2004    IR INSERT NON TUNL CVC LESS THAN 5 YR  2020    IR INSERT RON CVC W/O PORT OVER 5 YR  2020     Social History     Tobacco Use    Smoking status: Current Every Day Smoker     Packs/day: 0.50     Years: 25.00     Pack years: 12.50     Types: Cigarettes    Smokeless tobacco: Former User     Quit date: 1995   Substance Use Topics    Alcohol use: Not Currently     Comment: pt quit  10/19      Family History   Problem Relation Age of Onset    Hypertension Father     Heart Disease Father     Hypertension Mother       No Known Allergies   Home Medications:  Prior to Admission Medications   Prescriptions Last Dose Informant Patient Reported? Taking? Blood-Glucose Meter monitoring kit 2020 at Unknown time  No Yes   Sig: Test blood sugar four times daily. Insulin Needles, Disposable, (BD INSULIN PEN NEEDLE UF SHORT) 31 X 5/16 \" Ndle 2020 at Unknown time  No Yes   Sig: Use as directed with insulin pen   Lancets Misc 2020 at Unknown time  No Yes   Sig: Test four times daily   allopurinoL (ZYLOPRIM) 100 mg tablet 2020 at 0600  Yes Yes   Sig: Take 300 mg by mouth daily. aspirin delayed-release 81 mg tablet 2020 at Unknown time  Yes Yes   Sig: Take 81 mg by mouth daily. bumetanide (BUMEX) 2 mg tablet 2020 at 0600  Yes Yes   Sig: Take 2 mg by mouth daily. carvediloL (COREG) 12.5 mg tablet 2020 at 0600  Yes Yes   Sig: Take 12.5 mg by mouth two (2) times daily (with meals). dulaglutide (Trulicity) 2.52 PO/4.2 mL sub-q pen 2020 at 0600  Yes Yes   Si.75 mg by SubCUTAneous route every seven (7) days. glucose blood VI test strips (ASCENSIA AUTODISC VI, ONE TOUCH ULTRA TEST VI) strip 2020 at Unknown time  No Yes   Sig: Test four times daily   iron, carbonyl (FEOSOL) 45 mg tab 2020 at 0600  Yes Yes   Sig: Take 1 Tab by mouth daily. magnesium oxide (MAG-OX) 400 mg tablet 2020 at 0600  Yes No   Sig: Take 400 mg by mouth daily.    omeprazole (PRILOSEC) 40 mg capsule Unknown at Unknown time  Yes No   Sig: Take 40 mg by mouth daily. predniSONE (STERAPRED DS) 10 mg dose pack Not Taking at Unknown time  Yes No   Sig: Take  by mouth See Admin Instructions. See administration instruction per 10mg dose pack   sacubitriL-valsartan (Entresto) 49-51 mg tab tablet 12/7/2020 at 0600  Yes Yes   Sig: Take 1 Tab by mouth two (2) times a day.       Facility-Administered Medications: None     Hospital medications:  Current Facility-Administered Medications   Medication Dose Route Frequency    predniSONE (DELTASONE) tablet 40 mg  40 mg Oral DAILY WITH BREAKFAST    pantoprazole (PROTONIX) 40 mg in 0.9% sodium chloride 10 mL injection  40 mg IntraVENous Q12H    cefepime (MAXIPIME) 1 g in 0.9% sodium chloride (MBP/ADV) 50 mL MBP  1 g IntraVENous Q24H    aspirin delayed-release tablet 81 mg  81 mg Oral DAILY    ferrous sulfate tablet 325 mg  325 mg Oral DAILY    sacubitriL-valsartan (ENTRESTO) 49-51 mg tablet 1 Tab  1 Tab Oral BID    sodium chloride (NS) flush 5-40 mL  5-40 mL IntraVENous Q8H    sodium chloride (NS) flush 5-40 mL  5-40 mL IntraVENous PRN    heparin (porcine) injection 5,000 Units  5,000 Units SubCUTAneous Q8H    acetaminophen (TYLENOL) tablet 650 mg  650 mg Oral Q4H PRN    hydrALAZINE (APRESOLINE) 20 mg/mL injection 10 mg  10 mg IntraVENous Q6H PRN    glucose chewable tablet 16 g  4 Tab Oral PRN    glucagon (GLUCAGEN) injection 1 mg  1 mg IntraMUSCular PRN    dextrose 10% infusion 0-250 mL  0-250 mL IntraVENous PRN    insulin lispro (HUMALOG) injection   SubCUTAneous Q6H    HYDROcodone-acetaminophen (NORCO) 7.5-325 mg per tablet 1 Tab  1 Tab Oral Q4H PRN    HYDROmorphone (PF) (DILAUDID) injection 0.5 mg  0.5 mg IntraVENous Q2H PRN    peritoneal dialysis DEXTROSE 2.5% (2.5 mEq/L low calcium) solution 2,000 mL  2,000 mL IntraPERitoneal 5XD    Vancomycin pharmacy to dose   Other Rx Dosing/Monitoring     REVIEW OF SYSTEMS:        Const:    negative weight loss  Eyes: negative diplopia or visual changes, negative eye pain  ENT:   negative coryza, negative sore throat  Resp:   negative cough, hemoptysis, dyspnea  Cards:  negative for chest pain  :  negative for hematuria  GI:   Negative for hematemesis and hematochezia  Skin:   negative for rash and pruritus  Heme:   negative for easy bruising and gum/nose bleeding  MS:  negative for myalgias, arthralgias  Neurolo:  negative for headaches, dizziness  Psych:  negative for hallucinations        Objective:   VITALS:    Visit Vitals  BP (!) 143/77 (BP 1 Location: Left arm, BP Patient Position: At rest)   Pulse 88   Temp 98 °F (36.7 °C)   Resp 16   Ht 6' 4\" (1.93 m)   Wt 130.8 kg (288 lb 4.8 oz)   SpO2 93%   BMI 35.09 kg/m²     Temp (24hrs), Av.1 °F (36.7 °C), Min:97.6 °F (36.4 °C), Max:98.5 °F (36.9 °C)    PHYSICAL EXAM:   General:    Alert, cooperative, no distress, appears stated age. Head:   Normocephalic, without obvious abnormality, atraumatic. Eyes:   Conjunctivae clear, anicteric sclerae. Nose:  Nares normal.   Throat:    Lips and tongue normal.  No Thrush  Neck:  Supple, symmetrical    no carotid bruit and no JVD. :    No CVA tenderness, no maldonado catheter  Lungs:   Clear to auscultation bilaterally. No Wheezing or Rhonchi. No rales. Heart:   Regular rate and rhythm,  no murmur, rub or gallop. Abdomen:   Soft, non-tender,not distended. Bowel sounds normal.   Extremities: Left knee is tender to palpation, the left heel is packed. I could not see the depth of the wound. There is no erythema on the ankle. Skin:     No rashes or lesions.   Not Jaundiced  Lymph: Cervical normal  Neurologic: Full use of extraocular muscles, no facial asymmetry, tongue midline muscle strength 5 out of 5    LAB DATA REVIEWED:    Recent Results (from the past 48 hour(s))   GLUCOSE, POC    Collection Time: 20  6:05 PM   Result Value Ref Range    Glucose (POC) 142 (H) 65 - 100 mg/dL    Performed by [address] [place] [address], POC    Collection Time: 12/09/20 12:11 AM   Result Value Ref Range    Glucose (POC) 91 65 - 100 mg/dL    Performed by Lerry Marbin    METABOLIC PANEL, BASIC    Collection Time: 12/09/20  1:09 AM   Result Value Ref Range    Sodium 135 (L) 136 - 145 mmol/L    Potassium 3.5 3.5 - 5.1 mmol/L    Chloride 100 97 - 108 mmol/L    CO2 24 21 - 32 mmol/L    Anion gap 11 5 - 15 mmol/L    Glucose 99 65 - 100 mg/dL    BUN 87 (H) 6 - 20 MG/DL    Creatinine 5.98 (H) 0.70 - 1.30 MG/DL    BUN/Creatinine ratio 15 12 - 20      GFR est AA 12 (L) >60 ml/min/1.73m2    GFR est non-AA 10 (L) >60 ml/min/1.73m2    Calcium 8.0 (L) 8.5 - 10.1 MG/DL   HEMOGLOBIN A1C WITH EAG    Collection Time: 12/09/20  1:09 AM   Result Value Ref Range    Hemoglobin A1c 6.1 (H) 4.0 - 5.6 %    Est. average glucose 128 mg/dL   GLUCOSE, POC    Collection Time: 12/09/20  5:19 AM   Result Value Ref Range    Glucose (POC) 105 (H) 65 - 100 mg/dL    Performed by Haroon Zazueta    GLUCOSE, POC    Collection Time: 12/09/20 11:36 AM   Result Value Ref Range    Glucose (POC) 121 (H) 65 - 100 mg/dL    Performed by Constancia Cabot    GLUCOSE, POC    Collection Time: 12/09/20  5:55 PM   Result Value Ref Range    Glucose (POC) 150 (H) 65 - 100 mg/dL    Performed by [id number], POC    Collection Time: 12/09/20  9:13 PM   Result Value Ref Range    Glucose (POC) 160 (H) 65 - 100 mg/dL    Performed by [facility], POC    Collection Time: 12/10/20  6:55 AM   Result Value Ref Range    Glucose (POC) 178 (H) 65 - 100 mg/dL    Performed by [address], POC    Collection Time: 12/10/20 11:58 AM   Result Value Ref Range    Glucose (POC) 156 (H) 65 - 100 mg/dL    Performed by Yoni Farnsworth    #1 infected left heel wound    #2 ESRD    #3 CAD    #4 CHF    #5 COPD      PLAN    We will get MRI to see whether he has osteomyelitis. If he does, he will need prolonged antibiotic therapy.      continue vancomycin and cefepime at this time.                    ___________________________________________________  ID: Obdulia Kirk MD

## 2020-12-10 NOTE — PROGRESS NOTES
Name: Jeff Torres   MRN: [id number]  : 1970      Assessment:  ESRD on CCPD at home; uses 2 green bags, 12 liter volume  HTN  DM-2  L heel foot wound/gangrene  Gouty arthritis    12-10: pt refused PD exchange this am. He reports he told RN to come in 5-10 mins and then no one ever came. Explained that he will absorb most of fluids and cause fluid overload/electrolyte disturbances. For his Gout, he has been taking allopurinol but still gets acute flare time to time. Colchicine dont work for him. Want to avoid NSAIDs- although he is now ESRD. Short course of Prednisone is only option, which is high risk due to L foot infection. But he is in severe pain. Discuss with RN to get PD exchange going asap.     : S/p L heel debridement . Labs revd- stable lytes. K is low normal. Getting CAPD in house. 2 liters, 2.5% dextrose, 5x/day.      Plan/Recommendations:  Ct CAPD in house; 2.5% dextrose, 5 exchanges per day  Try to ensure compliance  If ok with 1ry team, would consider short taper of Prednisone vs IV Toradol/oral nsaids  Hold parameters for Entresto  ABx  Eventually may need L BKA  Anxious to go home. [place] from renal standpoint as soon as he is able to stand on his feet     Subjective:  C/o b/l knee pain.  Had refused labs and PD exchange this am    ROS:   No nausea, no vomiting  No chest pain, no shortness of breath    Exam:  Visit Vitals  BP (!) 176/67 (BP 1 Location: Left arm, BP Patient Position: At rest)   Pulse 96   Temp 98.5 °F (36.9 °C)   Resp 16   Ht 6' 4\" (1.93 m)   Wt 130.8 kg (288 lb 4.8 oz)   SpO2 95%   BMI 35.09 kg/m²     WB/WN in distress due to pain  AT/NC  No icterus  Clear  RRR  PD cath in place  B/l knee- mild warm, mild erythema at R knee, b/l tender to touch  L foot drsg in place  RLE with no sig edema  Alert awake    Current Facility-Administered Medications   Medication Dose Route Frequency Last Dose    pantoprazole (PROTONIX) 40 mg in 0.9% sodium chloride 10 mL injection  40 mg IntraVENous Q12H 40 mg at 12/10/20 1027    Vancomycin, Random level with AM labs 12/10   Other ONCE      cefepime (MAXIPIME) 1 g in 0.9% sodium chloride (MBP/ADV) 50 mL MBP  1 g IntraVENous Q24H 1 g at 12/09/20 2251    aspirin delayed-release tablet 81 mg  81 mg Oral DAILY 81 mg at 12/10/20 1027    ferrous sulfate tablet 325 mg  325 mg Oral DAILY 325 mg at 12/10/20 1035    sacubitriL-valsartan (ENTRESTO) 49-51 mg tablet 1 Tab  1 Tab Oral BID 1 Tab at 12/10/20 1035    sodium chloride (NS) flush 5-40 mL  5-40 mL IntraVENous Q8H 10 mL at 12/09/20 1440    sodium chloride (NS) flush 5-40 mL  5-40 mL IntraVENous PRN      heparin (porcine) injection 5,000 Units  5,000 Units SubCUTAneous Q8H 5,000 Units at 12/09/20 2250    acetaminophen (TYLENOL) tablet 650 mg  650 mg Oral Q4H PRN      hydrALAZINE (APRESOLINE) 20 mg/mL injection 10 mg  10 mg IntraVENous Q6H PRN      glucose chewable tablet 16 g  4 Tab Oral PRN      glucagon (GLUCAGEN) injection 1 mg  1 mg IntraMUSCular PRN      dextrose 10% infusion 0-250 mL  0-250 mL IntraVENous PRN      insulin lispro (HUMALOG) injection   SubCUTAneous Q6H Stopped at 12/09/20 0000    HYDROcodone-acetaminophen (NORCO) 7.5-325 mg per tablet 1 Tab  1 Tab Oral Q4H PRN 1 Tab at 12/08/20 1832    HYDROmorphone (PF) (DILAUDID) injection 0.5 mg  0.5 mg IntraVENous Q2H PRN 0.5 mg at 12/10/20 1025    peritoneal dialysis DEXTROSE 2.5% (2.5 mEq/L low calcium) solution 2,000 mL  2,000 mL IntraPERitoneal 5XD 2,000 mL at 12/09/20 1745    Vancomycin pharmacy to dose   Other Rx Dosing/Monitoring         Labs/Data:    Lab Results   Component Value Date/Time    WBC 16.3 (H) 12/07/2020 11:30 AM    HGB 9.1 (L) 12/07/2020 11:30 AM    Hematocrit (POC) 24 (L) 09/01/2020 07:11 AM    HCT 27.9 (L) 12/07/2020 11:30 AM    PLATELET 025 24/32/1964 11:30 AM    MCV 96.9 12/07/2020 11:30 AM       Lab Results   Component Value Date/Time    Sodium 135 (L) 12/09/2020 01:09 AM    Potassium 3.5 12/09/2020 01:09 AM    Chloride 100 12/09/2020 01:09 AM    CO2 24 12/09/2020 01:09 AM    Anion gap 11 12/09/2020 01:09 AM    Glucose 99 12/09/2020 01:09 AM    BUN 87 (H) 12/09/2020 01:09 AM    Creatinine 5.98 (H) 12/09/2020 01:09 AM    BUN/Creatinine ratio 15 12/09/2020 01:09 AM    GFR est AA 12 (L) 12/09/2020 01:09 AM    GFR est non-AA 10 (L) 12/09/2020 01:09 AM    Calcium 8.0 (L) 12/09/2020 01:09 AM       Wt Readings from Last 3 Encounters:   12/10/20 130.8 kg (288 lb 4.8 oz)   09/22/20 155.6 kg (343 lb 0.6 oz)   09/01/20 (!) 161.7 kg (356 lb 7.7 oz)         Intake/Output Summary (Last 24 hours) at 12/10/2020 1112  Last data filed at 12/10/2020 0903  Gross per 24 hour   Intake 4000 ml   Output 2800 ml   Net 1200 ml       Patient seen and examined. Chart reviewed. Labs, data and other pertinent notes reviewed in last 24 hrs.     PMH/SH/FH reviewed and unchanged compared to H&P    Discussed with pt/RN      Noah Banks MD

## 2020-12-10 NOTE — PROGRESS NOTES
Hospitalist Progress Note  Elvin Krishnan MD  Answering service: [phone] OR 36 from in house phone      Date of Service:  12/10/2020  NAME:  Chivo Diehl  :  1970  MRN:  [id number]      Admission Summary:   Chivo Diehl is a 53 y.o. male who presents with left lower extremity wound. Interval history / Subjective:      F/u diabetic foot ulcer  Complains of pain in his knee as well as bilateral arms/hands, says pain is sec to gout. Says has had only 3 hours of sleep since the admission    Assessment & Plan:     # Left foot heel gangrenous wound/cellulitis w/ PAD and diabetes ( controlled)   - s/p heel debridement , wound culture and pathology suggests Proteus  - on cefepime and iv vancomycin   - IV pain medication   - vascular surgery following, appreciate discussion with Dr eWs Sanabria  - May end up having BKA at some point-- per vascular   -Will consult ID    Gout  -Follows up with Dr Stephanie Jesus  -Will put the patient on steroid although this will prevent proper healing above but with severe pain, it is reasonable    Chronic systolic CHF  -On GDMT  -Follows up with Dr Christie Springer    # DM type 2: SSI, accucheck, diabetic diet. # ESRD on CCPD: in house dialysis, nephrology following   # HTN: ct home medication   # Anemia: stable. May need epo injection     Renal diet    PT/OT  Code: Full   DVT ppx: Heparin     Plan: Follow ID, PT/OT, may need to go to rehab     Hospital Problems  Date Reviewed: 12/10/2020          Codes Class Noted POA    * (Principal) Diabetic foot infection ([facility] ICD-10-CM: E11.628, L08.9  ICD-9-CM: 250.80, 686.9  2020 Yes                Review of Systems:   Pertinent positive mentioned in interval hx/HPI. Other systems reviewed and negative     Vital Signs:    Last 24hrs VS reviewed since prior progress note.  Most recent are:  Visit Vitals  BP (!) 176/67 (BP 1 Location: Left arm, BP Patient Position: At rest)   Pulse 96 Temp 98.5 °F (36.9 °C)   Resp 16   Ht 6' 4\" (1.93 m)   Wt 130.8 kg (288 lb 4.8 oz)   SpO2 95%   BMI 35.09 kg/m²         Intake/Output Summary (Last 24 hours) at 12/10/2020 1054  Last data filed at 12/10/2020 3019  Gross per 24 hour   Intake 4000 ml   Output 2800 ml   Net 1200 ml        Physical Examination:             Constitutional:  No acute distress, obese,    ENT:  Oral mucous moist, oropharynx benign. Neck supple,    Resp:  CTA bilaterally. No wheezing/rhonchi/rales. No accessory muscle use   CV:  Regular rhythm, normal rate, no murmurs, gallops, rubs    GI:  PD cath in place, Soft, non distended, non tender. normoactive bowel sounds, no hepatosplenomegaly     Musculoskeletal:  left foot covered with dressing     Neurologic:  Moves all extremities. AAOx3, CN II-XII reviewed            Data Review:   I personally reviewed labs and imaging     Labs:     Recent Labs     12/07/20  1130   WBC 16.3*   HGB 9.1*   HCT 27.9*        Recent Labs     12/09/20  0109 12/07/20  1130   * 135*   K 3.5 3.8    102   CO2 24 24   BUN 87* 77*   CREA 5.98* 5.29*   GLU 99 159*   CA 8.0* 7.5*     Recent Labs     12/07/20  1130   ALT 34   *   TBILI 0.6   TP 6.1*   ALB 1.8*   GLOB 4.3*     No results for input(s): INR, PTP, APTT, INREXT, INREXT in the last 72 hours. No results for input(s): FE, TIBC, PSAT, FERR in the last 72 hours. Lab Results   Component Value Date/Time    Folate 9.6 09/18/2020 04:00 AM      No results for input(s): PH, PCO2, PO2 in the last 72 hours. No results for input(s): CPK, CKNDX, TROIQ in the last 72 hours.     No lab exists for component: CPKMB  Lab Results   Component Value Date/Time    Cholesterol, total 284 (H) 02/22/2013 08:21 AM    HDL Cholesterol 39 (L) 02/22/2013 08:21 AM    LDL, calculated Comment 02/22/2013 08:21 AM    Triglyceride 788 (HH) 02/22/2013 08:21 AM     Lab Results   Component Value Date/Time    Glucose (POC) 178 (H) 12/10/2020 06:55 AM    Glucose (POC) 160 (H) 12/09/2020 09:13 PM    Glucose (POC) 150 (H) 12/09/2020 05:55 PM    Glucose (POC) 121 (H) 12/09/2020 11:36 AM    Glucose (POC) 105 (H) 12/09/2020 05:19 AM     Lab Results   Component Value Date/Time    Color YELLOW/STRAW 09/17/2020 01:33 AM    Appearance CLEAR 09/17/2020 01:33 AM    Specific gravity 1.015 09/17/2020 01:33 AM    pH (UA) 5.0 09/17/2020 01:33 AM    Protein 100 (A) 09/17/2020 01:33 AM    Glucose Negative 09/17/2020 01:33 AM    Ketone Negative 09/17/2020 01:33 AM    Bilirubin Negative 09/17/2020 01:33 AM    Urobilinogen 0.2 09/17/2020 01:33 AM    Nitrites Negative 09/17/2020 01:33 AM    Leukocyte Esterase Negative 09/17/2020 01:33 AM    Epithelial cells FEW 09/17/2020 01:33 AM    Bacteria Negative 09/17/2020 01:33 AM    WBC 0-4 09/17/2020 01:33 AM    RBC 0-5 09/17/2020 01:33 AM         Medications Reviewed:     Current Facility-Administered Medications   Medication Dose Route Frequency    pantoprazole (PROTONIX) 40 mg in 0.9% sodium chloride 10 mL injection  40 mg IntraVENous Q12H    Vancomycin, Random level with AM labs 12/10   Other ONCE    cefepime (MAXIPIME) 1 g in 0.9% sodium chloride (MBP/ADV) 50 mL MBP  1 g IntraVENous Q24H    aspirin delayed-release tablet 81 mg  81 mg Oral DAILY    ferrous sulfate tablet 325 mg  325 mg Oral DAILY    sacubitriL-valsartan (ENTRESTO) 49-51 mg tablet 1 Tab  1 Tab Oral BID    sodium chloride (NS) flush 5-40 mL  5-40 mL IntraVENous Q8H    sodium chloride (NS) flush 5-40 mL  5-40 mL IntraVENous PRN    heparin (porcine) injection 5,000 Units  5,000 Units SubCUTAneous Q8H    acetaminophen (TYLENOL) tablet 650 mg  650 mg Oral Q4H PRN    hydrALAZINE (APRESOLINE) 20 mg/mL injection 10 mg  10 mg IntraVENous Q6H PRN    glucose chewable tablet 16 g  4 Tab Oral PRN    glucagon (GLUCAGEN) injection 1 mg  1 mg IntraMUSCular PRN    dextrose 10% infusion 0-250 mL  0-250 mL IntraVENous PRN    insulin lispro (HUMALOG) injection   SubCUTAneous Q6H    HYDROcodone-acetaminophen (NORCO) 7.5-325 mg per tablet 1 Tab  1 Tab Oral Q4H PRN    HYDROmorphone (PF) (DILAUDID) injection 0.5 mg  0.5 mg IntraVENous Q2H PRN    peritoneal dialysis DEXTROSE 2.5% (2.5 mEq/L low calcium) solution 2,000 mL  2,000 mL IntraPERitoneal 5XD    Vancomycin pharmacy to dose   Other Rx Dosing/Monitoring     ______________________________________________________________________  EXPECTED LENGTH OF STAY: 6d 12h  ACTUAL LENGTH OF STAY:          Dee Bonilla MD   Patient has given Verbal permission to discuss medical care with   persons present in the room and and also with contact as listed on face sheet. Please note that this dictation was completed with [employer], the computer voice recognition software. Quite often unanticipated grammatical, syntax, homophones, and other interpretive errors are inadvertently transcribed by the computer software. Please disregard these errors. Please excuse any errors that have escaped final proofreading. Thank you.

## 2020-12-10 NOTE — PROGRESS NOTES
Bedside shift change report given to Jeana Mann (oncoming nurse) by Marleni Ayala (offgoing nurse). Report included the following information SBAR, Kardex, Intake/Output, MAR and Recent Results.

## 2020-12-10 NOTE — PROGRESS NOTES
Patient consented to 1100 peritoneal dialysis. Patient wore mask but refused to wear gloves. RN wearing mask, gloves, face shield and completed hand hygiene.

## 2020-12-10 NOTE — PROGRESS NOTES
Occupational Therapy Note:     Attempted to see pt for evaluation but he declined at this time. He reports gout in FERN knees and left hand. He reports he just started on steroids today and reports getting OOB would be too painful at this time. Will follow up tomorrow for evaluation.        Stacey Govea, OT

## 2020-12-10 NOTE — PROGRESS NOTES
Patient refusing PD treatment and blood draws this morning. When I try to to explain to this patient the importance of checking his labs and doing his PD, he gets upset and begins to yell about having a bowel movement. He states that no one has come to help him, although multiple staff members have helped this patient during the night.

## 2020-12-11 LAB
BACTERIA SPEC CULT: ABNORMAL
GLUCOSE BLD STRIP.AUTO-MCNC: 152 MG/DL (ref 65–100)
GLUCOSE BLD STRIP.AUTO-MCNC: 154 MG/DL (ref 65–100)
GLUCOSE BLD STRIP.AUTO-MCNC: 227 MG/DL (ref 65–100)
GLUCOSE BLD STRIP.AUTO-MCNC: 268 MG/DL (ref 65–100)
GRAM STN SPEC: ABNORMAL
GRAM STN SPEC: ABNORMAL
SERVICE CMNT-IMP: ABNORMAL

## 2020-12-11 PROCEDURE — 65270000029 HC RM PRIVATE

## 2020-12-11 PROCEDURE — 74011250636 HC RX REV CODE- 250/636: Performed by: FAMILY MEDICINE

## 2020-12-11 PROCEDURE — 74011250637 HC RX REV CODE- 250/637: Performed by: INTERNAL MEDICINE

## 2020-12-11 PROCEDURE — A4722 DIALYS SOL FLD VOL > 1999CC: HCPCS | Performed by: INTERNAL MEDICINE

## 2020-12-11 PROCEDURE — 74011250636 HC RX REV CODE- 250/636: Performed by: INTERNAL MEDICINE

## 2020-12-11 PROCEDURE — 77030040718 HC DRSG HYDRGEL SKINTEGRITY MDII -A

## 2020-12-11 PROCEDURE — 74011000250 HC RX REV CODE- 250: Performed by: INTERNAL MEDICINE

## 2020-12-11 PROCEDURE — 74011636637 HC RX REV CODE- 636/637: Performed by: FAMILY MEDICINE

## 2020-12-11 PROCEDURE — 74011250637 HC RX REV CODE- 250/637: Performed by: FAMILY MEDICINE

## 2020-12-11 PROCEDURE — 74011250636 HC RX REV CODE- 250/636

## 2020-12-11 PROCEDURE — 82962 GLUCOSE BLOOD TEST: CPT

## 2020-12-11 PROCEDURE — 74011636637 HC RX REV CODE- 636/637: Performed by: HOSPITALIST

## 2020-12-11 PROCEDURE — C9113 INJ PANTOPRAZOLE SODIUM, VIA: HCPCS | Performed by: INTERNAL MEDICINE

## 2020-12-11 PROCEDURE — 74011000258 HC RX REV CODE- 258: Performed by: INTERNAL MEDICINE

## 2020-12-11 RX ADMIN — HEPARIN SODIUM 5000 UNITS: 5000 INJECTION INTRAVENOUS; SUBCUTANEOUS at 07:30

## 2020-12-11 RX ADMIN — Medication 10 ML: at 20:19

## 2020-12-11 RX ADMIN — SODIUM CHLORIDE, SODIUM LACTATE, CALCIUM CHLORIDE, MAGNESIUM CHLORIDE AND DEXTROSE 2000 ML: 2.5; 538; 448; 18.3; 5.08 INJECTION, SOLUTION INTRAPERITONEAL at 11:02

## 2020-12-11 RX ADMIN — INSULIN LISPRO 5 UNITS: 100 INJECTION, SOLUTION INTRAVENOUS; SUBCUTANEOUS at 18:20

## 2020-12-11 RX ADMIN — Medication 81 MG: at 10:00

## 2020-12-11 RX ADMIN — HYDROMORPHONE HYDROCHLORIDE 0.5 MG: 1 INJECTION, SOLUTION INTRAMUSCULAR; INTRAVENOUS; SUBCUTANEOUS at 17:27

## 2020-12-11 RX ADMIN — HYDROMORPHONE HYDROCHLORIDE 0.5 MG: 1 INJECTION, SOLUTION INTRAMUSCULAR; INTRAVENOUS; SUBCUTANEOUS at 01:49

## 2020-12-11 RX ADMIN — FERROUS SULFATE TAB 325 MG (65 MG ELEMENTAL FE) 325 MG: 325 (65 FE) TAB at 10:00

## 2020-12-11 RX ADMIN — INSULIN LISPRO 2 UNITS: 100 INJECTION, SOLUTION INTRAVENOUS; SUBCUTANEOUS at 00:50

## 2020-12-11 RX ADMIN — HYDROMORPHONE HYDROCHLORIDE 0.5 MG: 1 INJECTION, SOLUTION INTRAMUSCULAR; INTRAVENOUS; SUBCUTANEOUS at 07:30

## 2020-12-11 RX ADMIN — SACUBITRIL AND VALSARTAN 1 TABLET: 49; 51 TABLET, FILM COATED ORAL at 17:24

## 2020-12-11 RX ADMIN — SODIUM CHLORIDE, SODIUM LACTATE, CALCIUM CHLORIDE, MAGNESIUM CHLORIDE AND DEXTROSE 2000 ML: 2.5; 538; 448; 18.3; 5.08 INJECTION, SOLUTION INTRAPERITONEAL at 20:12

## 2020-12-11 RX ADMIN — SODIUM CHLORIDE, SODIUM LACTATE, CALCIUM CHLORIDE, MAGNESIUM CHLORIDE AND DEXTROSE 2000 ML: 2.5; 538; 448; 18.3; 5.08 INJECTION, SOLUTION INTRAPERITONEAL at 16:00

## 2020-12-11 RX ADMIN — HEPARIN SODIUM 5000 UNITS: 5000 INJECTION INTRAVENOUS; SUBCUTANEOUS at 16:22

## 2020-12-11 RX ADMIN — Medication 10 ML: at 14:32

## 2020-12-11 RX ADMIN — PREDNISONE 40 MG: 10 TABLET ORAL at 10:00

## 2020-12-11 RX ADMIN — CEFEPIME HYDROCHLORIDE 1 G: 1 INJECTION, POWDER, FOR SOLUTION INTRAMUSCULAR; INTRAVENOUS at 20:17

## 2020-12-11 RX ADMIN — HYDROMORPHONE HYDROCHLORIDE 0.5 MG: 1 INJECTION, SOLUTION INTRAMUSCULAR; INTRAVENOUS; SUBCUTANEOUS at 12:33

## 2020-12-11 RX ADMIN — HYDROMORPHONE HYDROCHLORIDE 0.5 MG: 1 INJECTION, SOLUTION INTRAMUSCULAR; INTRAVENOUS; SUBCUTANEOUS at 22:24

## 2020-12-11 RX ADMIN — INSULIN LISPRO 2 UNITS: 100 INJECTION, SOLUTION INTRAVENOUS; SUBCUTANEOUS at 11:58

## 2020-12-11 RX ADMIN — INSULIN LISPRO 2 UNITS: 100 INJECTION, SOLUTION INTRAVENOUS; SUBCUTANEOUS at 07:30

## 2020-12-11 RX ADMIN — SACUBITRIL AND VALSARTAN 1 TABLET: 49; 51 TABLET, FILM COATED ORAL at 10:00

## 2020-12-11 NOTE — PROGRESS NOTES
Hospitalist Progress Note  Reyna Hunt MD  Answering service: [phone] from in house phone      Date of Service:  2020  NAME:  Priyanka Archer  :  1970  MRN:  [id number]      Admission Summary:   Priyanka Archer is a 53 y.o. male who presents with left lower extremity wound. Interval history / Subjective:      F/u diabetic foot ulcer  Complains of pain in his knee as well as bilateral arms/hands, says pain is sec to gout. Says has had only 3 hours of sleep since the admission  Reportedly refusing am labs    Assessment & Plan:     # Left foot heel gangrenous wound/cellulitis w/ PAD and diabetes ( controlled)   - s/p heel debridement , wound culture and pathology suggests Proteus  - on cefepime and iv vancomycin   - IV pain medication   - vascular surgery following, appreciate discussion with Dr Adonis Chao  - May end up having BKA at some point-- per vascular   -Appreciate ID/Vascular, awaiting MRI foot to look for osteomyelitis    Gout  -Follows up with Dr Mohan Chu  -Will put the patient on steroid although this will prevent proper healing above but with severe pain, it is reasonable    Chronic systolic CHF  -On GDMT  -Follows up with Dr Panda Dejesus    # DM type 2: SSI, accucheck, diabetic diet. # ESRD on CCPD: in house dialysis, nephrology following   # HTN: ct home medication   # Anemia: stable. May need epo injection     Renal diet    PT/OT  Code: Full   DVT ppx: Heparin     Plan: Continue IV antibiotics for now    Plan: Follow ID, PT/OT, may need to go to rehab     Hospital Problems  Date Reviewed: 12/10/2020          Codes Class Noted POA    * (Principal) [facility] ICD-10-CM: E11.628, L08.9  ICD-9-CM: 250.80, 686.9  2020 Yes                Review of Systems:   Pertinent positive mentioned in interval hx/HPI.  Other systems reviewed and negative     Vital Signs:    Last 24hrs VS reviewed since prior progress note. Most recent are:  Visit Vitals  /79 (BP 1 Location: Left arm, BP Patient Position: At rest)   Pulse 88   Temp 97.4 °F (36.3 °C)   Resp 17   Ht 6' 4\" (1.93 m)   Wt 130 kg (286 lb 9.6 oz)   SpO2 96%   BMI 34.89 kg/m²         Intake/Output Summary (Last 24 hours) at 12/11/2020 1109  Last data filed at 12/11/2020 5534  Gross per 24 hour   Intake 6000 ml   Output 6201 ml   Net -201 ml        Physical Examination:             Constitutional:  No acute distress, obese,    ENT:  Oral mucous moist, oropharynx benign. Neck supple,    Resp:  CTA bilaterally. No wheezing/rhonchi/rales. No accessory muscle use   CV:  Regular rhythm, normal rate, no murmurs, gallops, rubs    GI:  PD cath in place, Soft, non distended, non tender. normoactive bowel sounds, no hepatosplenomegaly     Musculoskeletal:  left foot covered with dressing     Neurologic:  Moves all extremities. AAOx3, CN II-XII reviewed            Data Review:   I personally reviewed labs and imaging     Labs:     No results for input(s): WBC, HGB, HCT, PLT, HGBEXT, HCTEXT, PLTEXT, HGBEXT, HCTEXT, PLTEXT in the last 72 hours. Recent Labs     12/09/20  0109   *   K 3.5      CO2 24   BUN 87*   CREA 5.98*   GLU 99   CA 8.0*     No results for input(s): ALT, AP, TBIL, TBILI, TP, ALB, GLOB, GGT, AML, LPSE in the last 72 hours. No lab exists for component: SGOT, GPT, AMYP, HLPSE  No results for input(s): INR, PTP, APTT, INREXT, INREXT in the last 72 hours. No results for input(s): FE, TIBC, PSAT, FERR in the last 72 hours. Lab Results   Component Value Date/Time    Folate 9.6 09/18/2020 04:00 AM      No results for input(s): PH, PCO2, PO2 in the last 72 hours. No results for input(s): CPK, CKNDX, TROIQ in the last 72 hours.     No lab exists for component: CPKMB  Lab Results   Component Value Date/Time    Cholesterol, total 284 (H) 02/22/2013 08:21 AM    HDL Cholesterol 39 (L) 02/22/2013 08:21 AM    LDL, calculated Comment 02/22/2013 08:21 AM    Triglyceride 788 (HH) 02/22/2013 08:21 AM     Lab Results   Component Value Date/Time    Glucose (POC) 154 (H) 12/11/2020 05:50 AM    Glucose (POC) 227 (H) 12/10/2020 11:59 PM    Glucose (POC) 216 (H) 12/10/2020 06:36 PM    Glucose (POC) 156 (H) 12/10/2020 11:58 AM    Glucose (POC) 178 (H) 12/10/2020 06:55 AM     Lab Results   Component Value Date/Time    Color YELLOW/STRAW 09/17/2020 01:33 AM    Appearance CLEAR 09/17/2020 01:33 AM    Specific gravity 1.015 09/17/2020 01:33 AM    pH (UA) 5.0 09/17/2020 01:33 AM    Protein 100 (A) 09/17/2020 01:33 AM    Glucose Negative 09/17/2020 01:33 AM    Ketone Negative 09/17/2020 01:33 AM    Bilirubin Negative 09/17/2020 01:33 AM    Urobilinogen 0.2 09/17/2020 01:33 AM    Nitrites Negative 09/17/2020 01:33 AM    Leukocyte Esterase Negative 09/17/2020 01:33 AM    Epithelial cells FEW 09/17/2020 01:33 AM    Bacteria Negative 09/17/2020 01:33 AM    WBC 0-4 09/17/2020 01:33 AM    RBC 0-5 09/17/2020 01:33 AM         Medications Reviewed:     Current Facility-Administered Medications   Medication Dose Route Frequency    predniSONE (DELTASONE) tablet 40 mg  40 mg Oral DAILY WITH BREAKFAST    pantoprazole (PROTONIX) 40 mg in 0.9% sodium chloride 10 mL injection  40 mg IntraVENous Q12H    cefepime (MAXIPIME) 1 g in 0.9% sodium chloride (MBP/ADV) 50 mL MBP  1 g IntraVENous Q24H    aspirin delayed-release tablet 81 mg  81 mg Oral DAILY    ferrous sulfate tablet 325 mg  325 mg Oral DAILY    sacubitriL-valsartan (ENTRESTO) 49-51 mg tablet 1 Tab  1 Tab Oral BID    sodium chloride (NS) flush 5-40 mL  5-40 mL IntraVENous Q8H    sodium chloride (NS) flush 5-40 mL  5-40 mL IntraVENous PRN    heparin (porcine) injection 5,000 Units  5,000 Units SubCUTAneous Q8H    acetaminophen (TYLENOL) tablet 650 mg  650 mg Oral Q4H PRN    hydrALAZINE (APRESOLINE) 20 mg/mL injection 10 mg  10 mg IntraVENous Q6H PRN    glucose chewable tablet 16 g  4 Tab Oral PRN    glucagon (GLUCAGEN) injection 1 mg  1 mg IntraMUSCular PRN    dextrose 10% infusion 0-250 mL  0-250 mL IntraVENous PRN    insulin lispro (HUMALOG) injection   SubCUTAneous Q6H    HYDROcodone-acetaminophen (NORCO) 7.5-325 mg per tablet 1 Tab  1 Tab Oral Q4H PRN    HYDROmorphone (PF) (DILAUDID) injection 0.5 mg  0.5 mg IntraVENous Q2H PRN    peritoneal dialysis DEXTROSE 2.5% (2.5 mEq/L low calcium) solution 2,000 mL  2,000 mL IntraPERitoneal 5XD    Vancomycin pharmacy to dose   Other Rx Dosing/Monitoring     ______________________________________________________________________  EXPECTED LENGTH OF STAY: 7d 9h  ACTUAL LENGTH OF STAY:          Easton Alexandre MD   Patient has given Verbal permission to discuss medical care with   persons present in the room and and also with contact as listed on face sheet. Please note that this dictation was completed with [employer], the computer voice recognition software. Quite often unanticipated grammatical, syntax, homophones, and other interpretive errors are inadvertently transcribed by the computer software. Please disregard these errors. Please excuse any errors that have escaped final proofreading. Thank you.

## 2020-12-11 NOTE — PROGRESS NOTES
Bedside shift change report given to Lu (oncoming nurse) by Rohith Castañeda (offgoing nurse). Report included the following information SBAR, Kardex, Intake/Output, MAR and Recent Results.

## 2020-12-11 NOTE — PROGRESS NOTES
Bedside and Verbal shift change report given to Anna Romero (oncoming nurse) by Prateek Nesbitt (offgoing nurse). Report included the following information SBAR, Kardex, Intake/Output and MAR.

## 2020-12-11 NOTE — PROGRESS NOTES
Vascular:    Left heel wound doesn't look very good. No wet infected tissue but dry nonviable subq along margins of wound with dry black eschar over calcaneus. Will adjust wound care orders. Will follow up Monday.

## 2020-12-11 NOTE — PROGRESS NOTES
Chart reviewed and discussed with OT - received call back from Dr. Chhaya Barrow office, [facility] - who confirmed pt is WBAT left foot. Attempted to see pt for eval - pt receiving meds in prep for MRI - pt stated he plans to be discharged 12/12 - confirms he has all needed DME at home and assist from girlfriend as needed - crutches, walker and knee scooter. Patient continues to state the MD's told him he could put weight on left foot - but probably would not be able to tolerate. Patient deferred PT eval at this time. Provided pt with adjustable shoe left foot. Will continue to offer eval - however, pt feel confident he will not be needing anything.        Marlee De La Cruz, PT

## 2020-12-11 NOTE — PROGRESS NOTES
Occupational Therapy    Completed chart review and discussed patient with PT and nurse. Attempting to get a hold of vascular or hospitalist to get WB order for LLE. Patient reports MD told him to keep weight through toes only. Will continue to follow for OT eval as appropriate. Dr. Johnna Lawton nurse, [facility], discussed WBAT with PT. Attempted OT session, patient preparing for MRI. Patient reports no concerns with dc home as he has assistance and extensive DME. He reports limited mobility on this date secondary to gout however is better than yesterday. Will continue to follow for OT eval as appropriate and able.        Thank you,    Jesenia Reno, OT

## 2020-12-11 NOTE — PROGRESS NOTES
Name: Hayden Alvarez   MRN: [id number]  : 1970      Assessment:  ESRD on CCPD at home; uses 2 green bags, 12 liter volume  HTN  DM-2  L heel foot wound/gangrene  Gouty arthritis    12-11: Gout is much better on PO Prednisone. He is not getting CAPD as ordered. His last exchange was yesterday at 7 PM. It was not drained at midnight. He told to come later in AM to do another CAPD exchange, but RN says he refused exchange in AM. He was just finishing his CAPD exchange at 12 noon when I saw him. His OUTPUT was 1300 ml (2000 ml was placed). He absorb 700 ml of PD fluid. He has been doing that intermittently due to delayed exchanges, particularly the night time exchange. He has refused labs last 2 days. He is at risk for fluid overload. We need to increase the freq of exchanges but leave him dry at Night. He is adamant about leaving home tomm. 12-10: pt refused PD exchange this am. He reports he told RN to come in 5-10 mins and then no one ever came. Explained that he will absorb most of fluids and cause fluid overload/electrolyte disturbances. For his Gout, he has been taking allopurinol but still gets acute flare time to time. Colchicine dont work for him. Want to avoid NSAIDs- although he is now ESRD. Short course of Prednisone is only option, which is high risk due to L foot infection. But he is in severe pain. Discuss with RN to get PD exchange going asap.     12-9: S/p L heel debridement 12-7. Labs revd- stable lytes. K is low normal. Getting CAPD in house. 2 liters, 2.5% dextrose, 5x/day.      Plan/Recommendations:  Ct CAPD in house; 2.5% dextrose- but I discussed with RN. Will do exchanges every 4 hrs (12 noon- just finished, 4 PM, 8 PM, 12 midnight).  BUT AT MIDNIGHT, I WOULD JUST DRAIN HIS EXCHANGE and NOT FILL THE ABDOMEN (Leave him dry)  From tomm, we can resume exchange starting at 6 AM (6am, 10 am, 2 pm, 6 pm, 10 pm with the goal of JUST DRAINING AT 10 PM and LEAVING HIM DRY at NIGHT without any fluid in abdomen)  Try to ensure compliance  Short course of Prednisone  Hold parameters for Entresto  ABx  Eventually may need L BKA  He is hoping to go home tomm and would be ok from renal standpoint if no active issues     Subjective:  Knee pain is much better.  Refused labs in AM  Finishing PD exchange during visit    ROS:   No nausea, no vomiting  No chest pain, no shortness of breath    Exam:  Visit Vitals  /79 (BP 1 Location: Left arm, BP Patient Position: At rest)   Pulse 88   Temp 97.4 °F (36.3 °C)   Resp 17   Ht 6' 4\" (1.93 m)   Wt 130 kg (286 lb 9.6 oz)   SpO2 96%   BMI 34.89 kg/m²     WB/WN in distress due to pain  Clear  RRR  B/l knee- improved pain; non tender  L foot drsg in place  RLE with no sig edema  Alert awake    Current Facility-Administered Medications   Medication Dose Route Frequency Last Dose    predniSONE (DELTASONE) tablet 40 mg  40 mg Oral DAILY WITH BREAKFAST 40 mg at 12/11/20 1000    pantoprazole (PROTONIX) 40 mg in 0.9% sodium chloride 10 mL injection  40 mg IntraVENous Q12H 40 mg at 12/10/20 2240    cefepime (MAXIPIME) 1 g in 0.9% sodium chloride (MBP/ADV) 50 mL MBP  1 g IntraVENous Q24H 1 g at 12/10/20 2240    aspirin delayed-release tablet 81 mg  81 mg Oral DAILY 81 mg at 12/11/20 1000    ferrous sulfate tablet 325 mg  325 mg Oral DAILY 325 mg at 12/11/20 1000    sacubitriL-valsartan (ENTRESTO) 49-51 mg tablet 1 Tab  1 Tab Oral BID 1 Tab at 12/11/20 1000    sodium chloride (NS) flush 5-40 mL  5-40 mL IntraVENous Q8H 10 mL at 12/10/20 1400    sodium chloride (NS) flush 5-40 mL  5-40 mL IntraVENous PRN      heparin (porcine) injection 5,000 Units  5,000 Units SubCUTAneous Q8H 5,000 Units at 12/11/20 0730    acetaminophen (TYLENOL) tablet 650 mg  650 mg Oral Q4H PRN      hydrALAZINE (APRESOLINE) 20 mg/mL injection 10 mg  10 mg IntraVENous Q6H PRN      glucose chewable tablet 16 g  4 Tab Oral PRN      glucagon (GLUCAGEN) injection 1 mg  1 mg IntraMUSCular PRN      dextrose 10% infusion 0-250 mL  0-250 mL IntraVENous PRN      insulin lispro (HUMALOG) injection   SubCUTAneous Q6H 2 Units at 12/11/20 1158    HYDROcodone-acetaminophen (NORCO) 7.5-325 mg per tablet 1 Tab  1 Tab Oral Q4H PRN 1 Tab at 12/08/20 1832    HYDROmorphone (PF) (DILAUDID) injection 0.5 mg  0.5 mg IntraVENous Q2H PRN 0.5 mg at 12/11/20 1233    peritoneal dialysis DEXTROSE 2.5% (2.5 mEq/L low calcium) solution 2,000 mL  2,000 mL IntraPERitoneal 5XD 2,000 mL at 12/11/20 1102    Vancomycin pharmacy to dose   Other Rx Dosing/Monitoring         Labs/Data:    Lab Results   Component Value Date/Time    WBC 16.3 (H) 12/07/2020 11:30 AM    HGB 9.1 (L) 12/07/2020 11:30 AM    Hematocrit (POC) 24 (L) 09/01/2020 07:11 AM    HCT 27.9 (L) 12/07/2020 11:30 AM    PLATELET 516 03/46/8898 11:30 AM    MCV 96.9 12/07/2020 11:30 AM       Lab Results   Component Value Date/Time    Sodium 135 (L) 12/09/2020 01:09 AM    Potassium 3.5 12/09/2020 01:09 AM    Chloride 100 12/09/2020 01:09 AM    CO2 24 12/09/2020 01:09 AM    Anion gap 11 12/09/2020 01:09 AM    Glucose 99 12/09/2020 01:09 AM    BUN 87 (H) 12/09/2020 01:09 AM    Creatinine 5.98 (H) 12/09/2020 01:09 AM    BUN/Creatinine ratio 15 12/09/2020 01:09 AM    GFR est AA 12 (L) 12/09/2020 01:09 AM    GFR est non-AA 10 (L) 12/09/2020 01:09 AM    Calcium 8.0 (L) 12/09/2020 01:09 AM       Wt Readings from Last 3 Encounters:   12/11/20 130 kg (286 lb 9.6 oz)   09/22/20 155.6 kg (343 lb 0.6 oz)   09/01/20 (!) 161.7 kg (356 lb 7.7 oz)         Intake/Output Summary (Last 24 hours) at 12/11/2020 1300  Last data filed at 12/11/2020 0657  Gross per 24 hour   Intake 4000 ml   Output 4901 ml   Net -901 ml       Patient seen and examined. Chart reviewed. Labs, data and other pertinent notes reviewed in last 24 hrs.     PMH/SH/FH reviewed and unchanged compared to H&P    Discussed with pt/RN      Marcy Weiss MD

## 2020-12-11 NOTE — PROGRESS NOTES
LENI: Patient lives at home with his girlfriend, Goyo Alanis. Patient was independent prior to admission and does not own any DME. Patient gets peritoneal dialysis. Patient's girlfriend will transport patient home via car at discharge. Noted plans for MRI. ID is following. Chart reviewed. Care management will continue to follow.     GERA Del Castillo/CRM

## 2020-12-11 NOTE — WOUND CARE
Spoke with Dr. Melissa Lizarraga, he wants to switch to wound gel moist to dry dressings, wound is becoming too dry. Demonstrated with wife and RN at bedside how to impregnate roll gauze with wound gel and loosely fill wound with gauze. Wound covered with ABD pads and secured with roll gauze and tape. Reminded patient and wife to offload heel at all times; they acknowledged understanding. Recommendation: 
Left heel- Daily cleanse with normal saline, wipe wound bed clean and dry, impregnate roll gauze with wound gel, cover with ABD pad and secure with roll gauze and tape. Leesa Draft" AALIYAH Marina, RN, [facility] Office x [phone] Pager x [phone]

## 2020-12-11 NOTE — PROGRESS NOTES
ID Progress Note  2020    Subjective:     Afebrile. Could not tolerate MRI yesterday. Will try again today. Refuses blood work. Objective:     Vitals:   Visit Vitals  /79 (BP 1 Location: Left arm, BP Patient Position: At rest)   Pulse 88   Temp 97.4 °F (36.3 °C)   Resp 17   Ht 6' 4\" (1.93 m)   Wt 130 kg (286 lb 9.6 oz)   SpO2 96%   BMI 34.89 kg/m²        Tmax:  Temp (24hrs), Av °F (36.7 °C), Min:97.4 °F (36.3 °C), Max:98.5 °F (36.9 °C)      Exam:    Not in distress  Lungs: clear to auscultation  Left foot bandaged       Labs:   Lab Results   Component Value Date/Time    WBC 16.3 (H) 2020 11:30 AM    HGB 9.1 (L) 2020 11:30 AM    Hematocrit (POC) 24 (L) 2020 07:11 AM    HCT 27.9 (L) 2020 11:30 AM    PLATELET 389  11:30 AM    MCV 96.9 2020 11:30 AM     Lab Results   Component Value Date/Time    Sodium 135 (L) 2020 01:09 AM    Potassium 3.5 2020 01:09 AM    Chloride 100 2020 01:09 AM    CO2 24 2020 01:09 AM    Anion gap 11 2020 01:09 AM    Glucose 99 2020 01:09 AM    BUN 87 (H) 2020 01:09 AM    Creatinine 5.98 (H) 2020 01:09 AM    BUN/Creatinine ratio 15 2020 01:09 AM    GFR est AA 12 (L) 2020 01:09 AM    GFR est non-AA 10 (L) 2020 01:09 AM    Calcium 8.0 (L) 2020 01:09 AM    Bilirubin, total 0.6 2020 11:30 AM    Alk. phosphatase 340 (H) 2020 11:30 AM    Protein, total 6.1 (L) 2020 11:30 AM    Albumin 1.8 (L) 2020 11:30 AM    Globulin 4.3 (H) 2020 11:30 AM    A-G Ratio 0.4 (L) 2020 11:30 AM    ALT (SGPT) 34 2020 11:30 AM             Assessment:     #1 infected left heel wound     #2 ESRD     #3 CAD     #4 CHF     #5 COPD    Recommendations:     He will try to have MRI again today. Dr. Mary Haider note says that the wound doesn't look good. He has been refusing blood work. I have been trying to convince him to do this, but he doesn't want to. Continue abx     Team available over the weekend if needed.      Micaela Marie MD

## 2020-12-12 VITALS
RESPIRATION RATE: 16 BRPM | BODY MASS INDEX: 34.46 KG/M2 | DIASTOLIC BLOOD PRESSURE: 82 MMHG | TEMPERATURE: 97.7 F | WEIGHT: 283 LBS | HEART RATE: 85 BPM | OXYGEN SATURATION: 99 % | SYSTOLIC BLOOD PRESSURE: 160 MMHG | HEIGHT: 76 IN

## 2020-12-12 LAB
BACTERIA SPEC CULT: NORMAL
BACTERIA SPEC CULT: NORMAL
GLUCOSE BLD STRIP.AUTO-MCNC: 143 MG/DL (ref 65–100)
GLUCOSE BLD STRIP.AUTO-MCNC: 182 MG/DL (ref 65–100)
GLUCOSE BLD STRIP.AUTO-MCNC: 208 MG/DL (ref 65–100)
SERVICE CMNT-IMP: ABNORMAL
SERVICE CMNT-IMP: NORMAL
SERVICE CMNT-IMP: NORMAL

## 2020-12-12 PROCEDURE — 74011636637 HC RX REV CODE- 636/637: Performed by: HOSPITALIST

## 2020-12-12 PROCEDURE — 74011250637 HC RX REV CODE- 250/637: Performed by: FAMILY MEDICINE

## 2020-12-12 PROCEDURE — 74011250637 HC RX REV CODE- 250/637

## 2020-12-12 PROCEDURE — 74011250636 HC RX REV CODE- 250/636: Performed by: INTERNAL MEDICINE

## 2020-12-12 PROCEDURE — 2709999900 HC NON-CHARGEABLE SUPPLY

## 2020-12-12 PROCEDURE — 74011250636 HC RX REV CODE- 250/636: Performed by: FAMILY MEDICINE

## 2020-12-12 PROCEDURE — 82962 GLUCOSE BLOOD TEST: CPT

## 2020-12-12 PROCEDURE — 74011636637 HC RX REV CODE- 636/637: Performed by: FAMILY MEDICINE

## 2020-12-12 PROCEDURE — 74011250637 HC RX REV CODE- 250/637: Performed by: INTERNAL MEDICINE

## 2020-12-12 PROCEDURE — A4722 DIALYS SOL FLD VOL > 1999CC: HCPCS | Performed by: INTERNAL MEDICINE

## 2020-12-12 RX ORDER — HYDROCODONE BITARTRATE AND ACETAMINOPHEN 7.5; 325 MG/1; MG/1
1 TABLET ORAL
Qty: 10 TAB | Refills: 0 | Status: SHIPPED | OUTPATIENT
Start: 2020-12-12 | End: 2020-12-15

## 2020-12-12 RX ORDER — CEFUROXIME AXETIL 500 MG/1
500 TABLET ORAL 2 TIMES DAILY
Qty: 14 TAB | Refills: 0 | Status: SHIPPED | OUTPATIENT
Start: 2020-12-12 | End: 2021-11-30

## 2020-12-12 RX ADMIN — HEPARIN SODIUM 5000 UNITS: 5000 INJECTION INTRAVENOUS; SUBCUTANEOUS at 00:15

## 2020-12-12 RX ADMIN — HEPARIN SODIUM 5000 UNITS: 5000 INJECTION INTRAVENOUS; SUBCUTANEOUS at 07:41

## 2020-12-12 RX ADMIN — FERROUS SULFATE TAB 325 MG (65 MG ELEMENTAL FE) 325 MG: 325 (65 FE) TAB at 09:50

## 2020-12-12 RX ADMIN — SACUBITRIL AND VALSARTAN 1 TABLET: 49; 51 TABLET, FILM COATED ORAL at 09:53

## 2020-12-12 RX ADMIN — HYDROCODONE BITARTRATE AND ACETAMINOPHEN 1 TABLET: 7.5; 325 TABLET ORAL at 08:45

## 2020-12-12 RX ADMIN — SODIUM CHLORIDE, SODIUM LACTATE, CALCIUM CHLORIDE, MAGNESIUM CHLORIDE AND DEXTROSE 2000 ML: 2.5; 538; 448; 18.3; 5.08 INJECTION, SOLUTION INTRAPERITONEAL at 06:00

## 2020-12-12 RX ADMIN — Medication 81 MG: at 09:50

## 2020-12-12 RX ADMIN — INSULIN LISPRO 3 UNITS: 100 INJECTION, SOLUTION INTRAVENOUS; SUBCUTANEOUS at 00:15

## 2020-12-12 RX ADMIN — PREDNISONE 40 MG: 10 TABLET ORAL at 08:41

## 2020-12-12 RX ADMIN — Medication 10 ML: at 05:42

## 2020-12-12 NOTE — PROGRESS NOTES
Hospitalist Progress Note  Jackelin Castellanos MD  Answering service: [phone] from in house phone      Date of Service:  2020  NAME:  Mati Gupta  :  1970  MRN:  [id number]      Admission Summary:   Mati Gupta is a 53 y.o. male who presents with left lower extremity wound. Interval history / Subjective:      F/u diabetic foot ulcer  Pain in knee much better  Continues to refuse blood work in the hospital  Wants to leave AMA today  MRI could not be done yesterday    Assessment & Plan:     # Left foot heel gangrenous wound/cellulitis w/ PAD and diabetes ( controlled)   - s/p heel debridement , wound culture and pathology suggests Proteus  - on cefepime and iv vancomycin   - IV pain medication   - vascular surgery following, appreciate discussion with Dr Floyd Arnold  - May end up having BKA at some point-- per vascular   -Appreciate ID/Vascular, awaiting MRI foot to look for osteomyelitis    Gout  -Follows up with Dr Jonathan Wheeler  -On prednisone, feeling much better    Chronic systolic CHF  -On GDMT  -Follows up with Dr Miguelangel Bain    # DM type 2: SSI, accucheck, diabetic diet. # ESRD on CCPD: in house dialysis, nephrology following   # HTN: ct home medication   # Anemia: stable. May need epo injection     Non compliance. The patient is continuing to refuse blood work in the hospital. MRI not done. Counseled the patient extensively    Renal diet    PT/OT  Code: Full   DVT ppx: Heparin     Plan: The patient wants to leave AMA. I tried to  him against leaving AMA but he does not want to stay. I told him leaving without appropriate antibiotics can cause severe infection and/or death.  He understands       Hospital Problems  Date Reviewed: 12/10/2020          Codes Class Noted POA    * (Principal) Diabetic foot infection ([facility] ICD-10-CM: E11.628, L08.9  ICD-9-CM: 250.80, 686.9  2020 Yes                Review of Systems:   Pertinent positive mentioned in interval hx/HPI. Other systems reviewed and negative     Vital Signs:    Last 24hrs VS reviewed since prior progress note. Most recent are:  Visit Vitals  BP (!) 126/51 (BP 1 Location: Right arm, BP Patient Position: At rest)   Pulse 82   Temp 97.4 °F (36.3 °C)   Resp 16   Ht 6' 4\" (1.93 m)   Wt 128.4 kg (283 lb)   SpO2 96%   BMI 34.45 kg/m²         Intake/Output Summary (Last 24 hours) at 12/12/2020 0748  Last data filed at 12/12/2020 9883  Gross per 24 hour   Intake 8000 ml   Output 8750 ml   Net -750 ml        Physical Examination:             Constitutional:  No acute distress, obese,    ENT:  Oral mucous moist, oropharynx benign. Neck supple,    Resp:  CTA bilaterally. No wheezing/rhonchi/rales. No accessory muscle use   CV:  Regular rhythm, normal rate, no murmurs, gallops, rubs    GI:  PD cath in place, Soft, non distended, non tender. normoactive bowel sounds, no hepatosplenomegaly     Musculoskeletal:  left foot covered with dressing     Neurologic:  Moves all extremities. AAOx3, CN II-XII reviewed            Data Review:   I personally reviewed labs and imaging     Labs:     No results for input(s): WBC, HGB, HCT, PLT, HGBEXT, HCTEXT, PLTEXT, HGBEXT, HCTEXT, PLTEXT in the last 72 hours. No results for input(s): NA, K, CL, CO2, BUN, CREA, GLU, CA, MG, PHOS, URICA in the last 72 hours. No results for input(s): ALT, AP, TBIL, TBILI, TP, ALB, GLOB, GGT, AML, LPSE in the last 72 hours. No lab exists for component: SGOT, GPT, AMYP, HLPSE  No results for input(s): INR, PTP, APTT, INREXT, INREXT in the last 72 hours. No results for input(s): FE, TIBC, PSAT, FERR in the last 72 hours. Lab Results   Component Value Date/Time    Folate 9.6 09/18/2020 04:00 AM      No results for input(s): PH, PCO2, PO2 in the last 72 hours. No results for input(s): CPK, CKNDX, TROIQ in the last 72 hours.     No lab exists for component: CPKMB  Lab Results   Component Value Date/Time    Cholesterol, total 284 (H) 02/22/2013 08:21 AM    HDL Cholesterol 39 (L) 02/22/2013 08:21 AM    LDL, calculated Comment 02/22/2013 08:21 AM    Triglyceride 788 (HH) 02/22/2013 08:21 AM     Lab Results   Component Value Date/Time    Glucose (POC) 143 (H) 12/12/2020 05:55 AM    Glucose (POC) 208 (H) 12/12/2020 12:02 AM    Glucose (POC) 268 (H) 12/11/2020 06:04 PM    Glucose (POC) 152 (H) 12/11/2020 11:44 AM    Glucose (POC) 154 (H) 12/11/2020 05:50 AM     Lab Results   Component Value Date/Time    Color YELLOW/STRAW 09/17/2020 01:33 AM    Appearance CLEAR 09/17/2020 01:33 AM    Specific gravity 1.015 09/17/2020 01:33 AM    pH (UA) 5.0 09/17/2020 01:33 AM    Protein 100 (A) 09/17/2020 01:33 AM    Glucose Negative 09/17/2020 01:33 AM    Ketone Negative 09/17/2020 01:33 AM    Bilirubin Negative 09/17/2020 01:33 AM    Urobilinogen 0.2 09/17/2020 01:33 AM    Nitrites Negative 09/17/2020 01:33 AM    Leukocyte Esterase Negative 09/17/2020 01:33 AM    Epithelial cells FEW 09/17/2020 01:33 AM    Bacteria Negative 09/17/2020 01:33 AM    WBC 0-4 09/17/2020 01:33 AM    RBC 0-5 09/17/2020 01:33 AM         Medications Reviewed:     Current Facility-Administered Medications   Medication Dose Route Frequency    predniSONE (DELTASONE) tablet 40 mg  40 mg Oral DAILY WITH BREAKFAST    pantoprazole (PROTONIX) 40 mg in 0.9% sodium chloride 10 mL injection  40 mg IntraVENous Q12H    cefepime (MAXIPIME) 1 g in 0.9% sodium chloride (MBP/ADV) 50 mL MBP  1 g IntraVENous Q24H    aspirin delayed-release tablet 81 mg  81 mg Oral DAILY    ferrous sulfate tablet 325 mg  325 mg Oral DAILY    sacubitriL-valsartan (ENTRESTO) 49-51 mg tablet 1 Tab  1 Tab Oral BID    sodium chloride (NS) flush 5-40 mL  5-40 mL IntraVENous Q8H    sodium chloride (NS) flush 5-40 mL  5-40 mL IntraVENous PRN    heparin (porcine) injection 5,000 Units  5,000 Units SubCUTAneous Q8H    acetaminophen (TYLENOL) tablet 650 mg  650 mg Oral Q4H PRN    hydrALAZINE (APRESOLINE) 20 mg/mL injection 10 mg  10 mg IntraVENous Q6H PRN    glucose chewable tablet 16 g  4 Tab Oral PRN    glucagon (GLUCAGEN) injection 1 mg  1 mg IntraMUSCular PRN    dextrose 10% infusion 0-250 mL  0-250 mL IntraVENous PRN    insulin lispro (HUMALOG) injection   SubCUTAneous Q6H    HYDROcodone-acetaminophen (NORCO) 7.5-325 mg per tablet 1 Tab  1 Tab Oral Q4H PRN    HYDROmorphone (PF) (DILAUDID) injection 0.5 mg  0.5 mg IntraVENous Q2H PRN    peritoneal dialysis DEXTROSE 2.5% (2.5 mEq/L low calcium) solution 2,000 mL  2,000 mL IntraPERitoneal 5XD    Vancomycin pharmacy to dose   Other Rx Dosing/Monitoring     ______________________________________________________________________  EXPECTED LENGTH OF STAY: 7d 9h  ACTUAL LENGTH OF STAY:          Germain Alcantara MD   Patient has given Verbal permission to discuss medical care with   persons present in the room and and also with contact as listed on face sheet. Please note that this dictation was completed with [employer], the computer voice recognition software. Quite often unanticipated grammatical, syntax, homophones, and other interpretive errors are inadvertently transcribed by the computer software. Please disregard these errors. Please excuse any errors that have escaped final proofreading. Thank you.

## 2020-12-12 NOTE — DISCHARGE SUMMARY
Discharge Summary       PATIENT ID: Johanna Warren  MRN: [id number]   YOB: 1970    DATE OF ADMISSION: 12/7/2020 10:58 AM    DATE OF DISCHARGE: 12/12/2020 Leaving AMA   PRIMARY CARE PROVIDER: Adrianne Tracy PA-C     ATTENDING PHYSICIAN: Dr Allan Taylor  DISCHARGING PROVIDER: Allan Taylor MD    To contact this individual call [phone] and ask the  to page. If unavailable ask to be transferred the Adult Hospitalist Department. CONSULTATIONS: IP CONSULT TO NEPHROLOGY  IP CONSULT TO INFECTIOUS DISEASES    PROCEDURES/SURGERIES: Procedure(s):  DEBRIDE LEFT HEEL WOUND    ADMITTING DIAGNOSES & HOSPITAL COURSE:   # Left foot heel gangrenous wound/cellulitis w/ PAD and diabetes ( controlled)   - s/p heel debridement 12/7, wound culture and pathology suggests Proteus  - on cefepime and iv vancomycin   - IV pain medication   - vascular surgery following, appreciate discussion with Dr Melissa Lizarraga  - May end up having BKA at some point-- per vascular   -Appreciate ID/Vascular, awaiting MRI foot to look for osteomyelitis    Gout  -Follows up with Dr Kelsey Sarabia  -On prednisone, feeling much better    Chronic systolic CHF  -On GDMT  -Follows up with Dr Mello Arroyo    # DM type 2: SSI, accucheck, diabetic diet. # ESRD on CCPD: in house dialysis, nephrology following   # HTN: ct home medication   # Anemia: stable. May need epo injection     Non compliance. The patient is continuing to refuse blood work in the hospital. MRI not done. Counseled the patient extensively    Renal diet    The patient wants to leave AMA. I tried to  him against leaving AMA but he does not want to stay. I told him leaving without appropriate antibiotics can cause severe infection and/or death. He understands. I also told him that the antibiotics that I am giving him at discharge most likely would not help, he understands        [address] / PLAN:      1.   Diabetic foot ulcer     ADDITIONAL CARE RECOMMENDATIONS:   Follow up with PMD  Schedule MRI with the help of your PMD     PENDING TEST RESULTS:   At the time of discharge the following test results are still pending: none    FOLLOW UP APPOINTMENTS:    Follow-up Information     Follow up With Specialties Details Why Contact Info    Vicente Boo PA-C Physician Assistant In 1 week  [address] [phone]               DIET: Renal Diet    ACTIVITY: Activity as tolerated      DISCHARGE MEDICATIONS:  Current Discharge Medication List      START taking these medications    Details   HYDROcodone-acetaminophen ([address]) 7.5-325 mg per tablet Take 1 Tab by mouth every four (4) hours as needed for Pain for up to 3 days. Max Daily Amount: 6 Tabs. Qty: 10 Tab, Refills: 0    Associated Diagnoses: Diabetic foot infection ([address]      cefUROXime (CEFTIN) 500 mg tablet Take 1 Tab by mouth two (2) times a day. Qty: 14 Tab, Refills: 0         CONTINUE these medications which have NOT CHANGED    Details   carvediloL (COREG) 12.5 mg tablet Take 12.5 mg by mouth two (2) times daily (with meals). iron, carbonyl (FEOSOL) 45 mg tab Take 1 Tab by mouth daily. allopurinoL (ZYLOPRIM) 100 mg tablet Take 300 mg by mouth daily. sacubitriL-valsartan (Entresto) 49-51 mg tab tablet Take 1 Tab by mouth two (2) times a day. aspirin delayed-release 81 mg tablet Take 81 mg by mouth daily. dulaglutide (Trulicity) 5.94 EC/9.7 mL sub-q pen 0.75 mg by SubCUTAneous route every seven (7) days. bumetanide (BUMEX) 2 mg tablet Take 2 mg by mouth daily. Insulin Needles, Disposable, (BD INSULIN PEN NEEDLE UF SHORT) 31 X 5/16 \" Ndle Use as directed with insulin pen  Qty: 100 Package, Refills: 11      Blood-Glucose Meter monitoring kit Test blood sugar four times daily.   Qty: 1 Kit, Refills: 0      glucose blood VI test strips (ASCENSIA AUTODISC VI, ONE TOUCH ULTRA TEST VI) strip Test four times daily  Qty: 1 Package, Refills: 11      Lancets Misc Test four times daily  Qty: 1 Package, Refills: 11      predniSONE (STERAPRED DS) 10 mg dose pack Take  by mouth See Admin Instructions. See administration instruction per 10mg dose pack      omeprazole (PRILOSEC) 40 mg capsule Take 40 mg by mouth daily. magnesium oxide (MAG-OX) 400 mg tablet Take 400 mg by mouth daily. NOTIFY YOUR PHYSICIAN FOR ANY OF THE FOLLOWING:   Fever over 101 degrees for 24 hours. Chest pain, shortness of breath, fever, chills, nausea, vomiting, diarrhea, change in mentation, falling, weakness, bleeding. Severe pain or pain not relieved by medications. Or, any other signs or symptoms that you may have questions about. DISPOSITION:    Home With:   OT  PT  HH  RN       Long term SNF/Inpatient Rehab    Independent/assisted living    Hospice   x Other:leaving AMA       PATIENT CONDITION AT DISCHARGE:     Functional status    Poor     Deconditioned    x Independent      Cognition    x Lucid     Forgetful     Dementia      Catheters/lines (plus indication)    Eric     PICC     PEG    x None      Code status    x Full code     DNR      PHYSICAL EXAMINATION AT DISCHARGE:  Please see progress note      CHRONIC MEDICAL DIAGNOSES:  Problem List as of 12/12/2020 Date Reviewed: 12/10/2020          Codes Class Noted - Resolved    * (Principal) Diabetic foot infection ([facility] ICD-10-CM: E11.628, L08.9  ICD-9-CM: 250.80, 686.9  12/7/2020 - Present        Acute on chronic renal failure ([facility] ICD-10-CM: N17.9, N18.9  ICD-9-CM: 584.9, 585.9  9/16/2020 - Present        Congestive heart failure ([facility] ICD-10-CM: I50.9  ICD-9-CM: 428.0  Unknown - Present    Overview Signed 9/16/2020  6:11 PM by Corey Rogers NP     chronic left sided congestive heart failure             Chronic obstructive pulmonary disease ([facility] ICD-10-CM: J44.9  ICD-9-CM: 496  Unknown - Present        Anemia ICD-10-CM: D64.9  ICD-9-CM: 285. 9  Unknown - Present        Weakness generalized ICD-10-CM: R53.1  ICD-9-CM: 780.79  Unknown - Present        Thrombocytopenia (HCC) ICD-10-CM: D69.6  ICD-9-CM: 287.5  Unknown - Present        Acute respiratory failure with hypoxia (HCC) ICD-10-CM: J96.01  ICD-9-CM: 518.81  Unknown - Present        Stage 5 chronic kidney disease not on chronic dialysis ([facility] ICD-10-CM: N18.5  ICD-9-CM: 585.5  9/2/2020 - Present        Obesity, morbid ([facility] ICD-10-CM: E66.01  ICD-9-CM: 278.01  8/19/2020 - Present        Allergic rhinitis ICD-10-CM: J30.9  ICD-9-CM: 477.9  11/19/2009 - Present        Tobacco abuse ICD-10-CM: Z72.0  ICD-9-CM: 305.1  11/19/2009 - Present        Obesity ICD-10-CM: E66.9  ICD-9-CM: 278.00  11/19/2009 - Present        HTN (hypertension), benign ICD-10-CM: I10  ICD-9-CM: 401.1  11/19/2009 - Present        DM (diabetes mellitus) ([facility] ICD-10-CM: E11.9  ICD-9-CM: 250.00  11/19/2009 - Present        Hypercholesteremia ICD-10-CM: E78.00  ICD-9-CM: 272.0  11/19/2009 - Present              Greater than 35 minutes were spent with the patient on counseling and coordination of care    Signed:   Kenyatta Woodard MD  12/12/2020  11:38 AM   .

## 2020-12-12 NOTE — PROGRESS NOTES
Nurse Fred Monroy gave bedside report to oncoming nurse Monie Epps RN by [employer] and Marisel

## 2020-12-12 NOTE — PROGRESS NOTES
1022:  RN notified Dr. Berry Leahy that patient stated he scheduled a ride and would be leaving at noon. RN was unable to convince patient to stay. Per MD he would come to speak with patient. Will continue to monitor. 1045:  RN notified MD that patient had refused his 1000 peritoneal dialysis. 1135:  MD came to speak with patient. Patient wants to leave AMA. Patient refused waiting for MRI to be completed. 1141:  RN attempted to call to notify nursing supervisor. 1154:  Patient verbalized he understands the risks of leaving AMA and signed AMA documentation. 1240: I have reviewed discharge instructions with the patient. The patient verbalized understanding. Patient was taken in a wheelchair to Patient Discharge along with discharge instructions, prescription scripts, dressing supplies, his scooter, crutches, and patient's belongings.

## 2020-12-12 NOTE — PROGRESS NOTES
Occupational therapy Note:    Chart reviewed and per RN pt may be leaving AMA. Will attempt to follow up with pt if he decides to stay.     Thank you,  Evelin Boyre, OTR/L

## 2020-12-12 NOTE — DISCHARGE INSTRUCTIONS
Discharge Instructions       PATIENT ID: Leon Kc  MRN: [id number]   YOB: 1970    DATE OF ADMISSION: 12/7/2020 10:58 AM    DATE OF DISCHARGE: 12/12/2020    PRIMARY CARE PROVIDER: Vincent Ny PA-C     ATTENDING PHYSICIAN: Christiano Da Silva MD  DISCHARGING PROVIDER: Rafat Garrison MD    To contact this individual call [phone] and ask the  to page. If unavailable ask to be transferred the Adult Hospitalist Department. DISCHARGE DIAGNOSES   Cellulitis     CONSULTATIONS: IP CONSULT TO NEPHROLOGY  IP CONSULT TO INFECTIOUS DISEASES    PROCEDURES/SURGERIES: Procedure(s):  DEBRIDE LEFT HEEL WOUND    PENDING TEST RESULTS:   At the time of discharge the following test results are still pending: none    FOLLOW UP APPOINTMENTS:   Follow-up Information     Follow up With Specialties Details Why Contact Info    Vincent Ny PA-C Physician Assistant In 1 week  [address]:   Follow up with PMD  Please schedule MRI of the foot through your PMD    DIET: Diabetic Diet    ACTIVITY: Activity as tolerated      DISCHARGE MEDICATIONS:   See Medication Reconciliation Form    · It is important that you take the medication exactly as they are prescribed. · Keep your medication in the bottles provided by the pharmacist and keep a list of the medication names, dosages, and times to be taken in your wallet. · Do not take other medications without consulting your doctor. NOTIFY YOUR PHYSICIAN FOR ANY OF THE FOLLOWING:   Fever over 101 degrees for 24 hours. Chest pain, shortness of breath, fever, chills, nausea, vomiting, diarrhea, change in mentation, falling, weakness, bleeding. Severe pain or pain not relieved by medications. Or, any other signs or symptoms that you may have questions about.       DISPOSITION:    Home With:   OT  PT  [facility]  RN       SNF/Inpatient Rehab/LTAC Independent/assisted living    Hospice   x Other:to home Leaving against medical advice     CDMP Checked:   Yes x     PROBLEM LIST Updated:  Yes x       Signed:   Bandar Howell MD  12/12/2020  11:34 AM Abdomen soft, nontender, nondistended, bowel sounds present in all 4 quadrants.

## 2020-12-12 NOTE — PROGRESS NOTES
Bedside and Verbal shift change report given to Aileen Starks (oncoming nurse) by Jeremias Oconnor (offgoing nurse). Report included the following information SBAR, Kardex and Procedure Summary.

## 2020-12-12 NOTE — PROGRESS NOTES
Physical Therapy  Spoke with RN who states that patient may be leaving AMA. Patient was educated on benefit of therapy yesterday and declined evaluation at that time. Will follow up if patient decides to stay.   Everardo Wilhelm, PT, DPT

## 2021-01-07 ENCOUNTER — HOSPITAL ENCOUNTER (OUTPATIENT)
Age: 51
Setting detail: OUTPATIENT SURGERY
Discharge: HOME | End: 2021-01-07
Payer: COMMERCIAL

## 2021-01-07 ENCOUNTER — ANESTHESIA (OUTPATIENT)
Dept: SURGERY | Age: 51
End: 2021-01-07
Payer: COMMERCIAL

## 2021-01-07 ENCOUNTER — ANESTHESIA EVENT (OUTPATIENT)
Dept: SURGERY | Age: 51
End: 2021-01-07
Payer: COMMERCIAL

## 2021-01-07 VITALS
OXYGEN SATURATION: 99 % | BODY MASS INDEX: 36.38 KG/M2 | SYSTOLIC BLOOD PRESSURE: 104 MMHG | DIASTOLIC BLOOD PRESSURE: 36 MMHG | WEIGHT: 298.72 LBS | RESPIRATION RATE: 19 BRPM | HEART RATE: 88 BPM | TEMPERATURE: 98.1 F | HEIGHT: 76 IN

## 2021-01-07 DIAGNOSIS — L08.9 DIABETIC FOOT INFECTION (HCC): Primary | ICD-10-CM

## 2021-01-07 DIAGNOSIS — E11.628 DIABETIC FOOT INFECTION (HCC): Primary | ICD-10-CM

## 2021-01-07 LAB
GLUCOSE BLD STRIP.AUTO-MCNC: 111 MG/DL (ref 65–100)
SERVICE CMNT-IMP: ABNORMAL

## 2021-01-07 PROCEDURE — 77030042556 HC PNCL CAUT -B

## 2021-01-07 PROCEDURE — 82962 GLUCOSE BLOOD TEST: CPT

## 2021-01-07 PROCEDURE — 76060000032 HC ANESTHESIA 0.5 TO 1 HR

## 2021-01-07 PROCEDURE — 76010000138 HC OR TIME 0.5 TO 1 HR

## 2021-01-07 PROCEDURE — 74011250636 HC RX REV CODE- 250/636: Performed by: NURSE ANESTHETIST, CERTIFIED REGISTERED

## 2021-01-07 PROCEDURE — 76210000017 HC OR PH I REC 1.5 TO 2 HR

## 2021-01-07 PROCEDURE — 77030008684 HC TU ET CUF COVD -B: Performed by: ANESTHESIOLOGY

## 2021-01-07 PROCEDURE — 74011250636 HC RX REV CODE- 250/636: Performed by: ANESTHESIOLOGY

## 2021-01-07 PROCEDURE — 74011250637 HC RX REV CODE- 250/637: Performed by: ANESTHESIOLOGY

## 2021-01-07 PROCEDURE — 74011000258 HC RX REV CODE- 258: Performed by: NURSE ANESTHETIST, CERTIFIED REGISTERED

## 2021-01-07 PROCEDURE — 2709999900 HC NON-CHARGEABLE SUPPLY

## 2021-01-07 PROCEDURE — 74011000250 HC RX REV CODE- 250: Performed by: NURSE ANESTHETIST, CERTIFIED REGISTERED

## 2021-01-07 PROCEDURE — 77030040922 HC BLNKT HYPOTHRM STRY -A

## 2021-01-07 RX ORDER — MIDAZOLAM HYDROCHLORIDE 1 MG/ML
0.5 INJECTION, SOLUTION INTRAMUSCULAR; INTRAVENOUS
Status: DISCONTINUED | OUTPATIENT
Start: 2021-01-07 | End: 2021-01-07 | Stop reason: HOSPADM

## 2021-01-07 RX ORDER — PHENYLEPHRINE HCL IN 0.9% NACL 0.4MG/10ML
SYRINGE (ML) INTRAVENOUS AS NEEDED
Status: DISCONTINUED | OUTPATIENT
Start: 2021-01-07 | End: 2021-01-07 | Stop reason: HOSPADM

## 2021-01-07 RX ORDER — SODIUM CHLORIDE 0.9 % (FLUSH) 0.9 %
5-40 SYRINGE (ML) INJECTION AS NEEDED
Status: DISCONTINUED | OUTPATIENT
Start: 2021-01-07 | End: 2021-01-07 | Stop reason: HOSPADM

## 2021-01-07 RX ORDER — ROCURONIUM BROMIDE 10 MG/ML
INJECTION, SOLUTION INTRAVENOUS AS NEEDED
Status: DISCONTINUED | OUTPATIENT
Start: 2021-01-07 | End: 2021-01-07 | Stop reason: HOSPADM

## 2021-01-07 RX ORDER — DIPHENHYDRAMINE HYDROCHLORIDE 50 MG/ML
12.5 INJECTION, SOLUTION INTRAMUSCULAR; INTRAVENOUS AS NEEDED
Status: DISCONTINUED | OUTPATIENT
Start: 2021-01-07 | End: 2021-01-07 | Stop reason: HOSPADM

## 2021-01-07 RX ORDER — SODIUM CHLORIDE 0.9 % (FLUSH) 0.9 %
5-40 SYRINGE (ML) INJECTION EVERY 8 HOURS
Status: DISCONTINUED | OUTPATIENT
Start: 2021-01-07 | End: 2021-01-07 | Stop reason: HOSPADM

## 2021-01-07 RX ORDER — SODIUM CHLORIDE, SODIUM LACTATE, POTASSIUM CHLORIDE, CALCIUM CHLORIDE 600; 310; 30; 20 MG/100ML; MG/100ML; MG/100ML; MG/100ML
25 INJECTION, SOLUTION INTRAVENOUS CONTINUOUS
Status: DISCONTINUED | OUTPATIENT
Start: 2021-01-07 | End: 2021-01-07 | Stop reason: HOSPADM

## 2021-01-07 RX ORDER — FENTANYL CITRATE 50 UG/ML
25 INJECTION, SOLUTION INTRAMUSCULAR; INTRAVENOUS
Status: DISCONTINUED | OUTPATIENT
Start: 2021-01-07 | End: 2021-01-07 | Stop reason: HOSPADM

## 2021-01-07 RX ORDER — KETAMINE HYDROCHLORIDE 10 MG/ML
INJECTION, SOLUTION INTRAMUSCULAR; INTRAVENOUS AS NEEDED
Status: DISCONTINUED | OUTPATIENT
Start: 2021-01-07 | End: 2021-01-07 | Stop reason: HOSPADM

## 2021-01-07 RX ORDER — MORPHINE SULFATE 10 MG/ML
2 INJECTION, SOLUTION INTRAMUSCULAR; INTRAVENOUS
Status: DISCONTINUED | OUTPATIENT
Start: 2021-01-07 | End: 2021-01-07 | Stop reason: HOSPADM

## 2021-01-07 RX ORDER — ONDANSETRON 2 MG/ML
INJECTION INTRAMUSCULAR; INTRAVENOUS AS NEEDED
Status: DISCONTINUED | OUTPATIENT
Start: 2021-01-07 | End: 2021-01-07 | Stop reason: HOSPADM

## 2021-01-07 RX ORDER — SUCCINYLCHOLINE CHLORIDE 20 MG/ML
INJECTION INTRAMUSCULAR; INTRAVENOUS AS NEEDED
Status: DISCONTINUED | OUTPATIENT
Start: 2021-01-07 | End: 2021-01-07 | Stop reason: HOSPADM

## 2021-01-07 RX ORDER — SODIUM CHLORIDE 9 MG/ML
INJECTION, SOLUTION INTRAVENOUS
Status: DISCONTINUED | OUTPATIENT
Start: 2021-01-07 | End: 2021-01-07 | Stop reason: HOSPADM

## 2021-01-07 RX ORDER — FENTANYL CITRATE 50 UG/ML
INJECTION, SOLUTION INTRAMUSCULAR; INTRAVENOUS AS NEEDED
Status: DISCONTINUED | OUTPATIENT
Start: 2021-01-07 | End: 2021-01-07 | Stop reason: HOSPADM

## 2021-01-07 RX ORDER — ONDANSETRON 2 MG/ML
4 INJECTION INTRAMUSCULAR; INTRAVENOUS AS NEEDED
Status: DISCONTINUED | OUTPATIENT
Start: 2021-01-07 | End: 2021-01-07 | Stop reason: HOSPADM

## 2021-01-07 RX ORDER — SODIUM CHLORIDE, SODIUM LACTATE, POTASSIUM CHLORIDE, CALCIUM CHLORIDE 600; 310; 30; 20 MG/100ML; MG/100ML; MG/100ML; MG/100ML
125 INJECTION, SOLUTION INTRAVENOUS CONTINUOUS
Status: DISCONTINUED | OUTPATIENT
Start: 2021-01-07 | End: 2021-01-07 | Stop reason: HOSPADM

## 2021-01-07 RX ORDER — MIDAZOLAM HYDROCHLORIDE 1 MG/ML
INJECTION, SOLUTION INTRAMUSCULAR; INTRAVENOUS AS NEEDED
Status: DISCONTINUED | OUTPATIENT
Start: 2021-01-07 | End: 2021-01-07 | Stop reason: HOSPADM

## 2021-01-07 RX ORDER — HYDROMORPHONE HYDROCHLORIDE 2 MG/ML
INJECTION, SOLUTION INTRAMUSCULAR; INTRAVENOUS; SUBCUTANEOUS AS NEEDED
Status: DISCONTINUED | OUTPATIENT
Start: 2021-01-07 | End: 2021-01-07 | Stop reason: HOSPADM

## 2021-01-07 RX ORDER — HYDROMORPHONE HYDROCHLORIDE 1 MG/ML
0.2 INJECTION, SOLUTION INTRAMUSCULAR; INTRAVENOUS; SUBCUTANEOUS
Status: DISCONTINUED | OUTPATIENT
Start: 2021-01-07 | End: 2021-01-07 | Stop reason: HOSPADM

## 2021-01-07 RX ORDER — FENTANYL CITRATE 50 UG/ML
50 INJECTION, SOLUTION INTRAMUSCULAR; INTRAVENOUS AS NEEDED
Status: DISCONTINUED | OUTPATIENT
Start: 2021-01-07 | End: 2021-01-07 | Stop reason: HOSPADM

## 2021-01-07 RX ORDER — MIDAZOLAM HYDROCHLORIDE 1 MG/ML
1 INJECTION, SOLUTION INTRAMUSCULAR; INTRAVENOUS AS NEEDED
Status: DISCONTINUED | OUTPATIENT
Start: 2021-01-07 | End: 2021-01-07 | Stop reason: HOSPADM

## 2021-01-07 RX ORDER — LIDOCAINE HYDROCHLORIDE 20 MG/ML
INJECTION, SOLUTION EPIDURAL; INFILTRATION; INTRACAUDAL; PERINEURAL AS NEEDED
Status: DISCONTINUED | OUTPATIENT
Start: 2021-01-07 | End: 2021-01-07 | Stop reason: HOSPADM

## 2021-01-07 RX ORDER — PROPOFOL 10 MG/ML
INJECTION, EMULSION INTRAVENOUS AS NEEDED
Status: DISCONTINUED | OUTPATIENT
Start: 2021-01-07 | End: 2021-01-07 | Stop reason: HOSPADM

## 2021-01-07 RX ORDER — OXYCODONE AND ACETAMINOPHEN 5; 325 MG/1; MG/1
1 TABLET ORAL
Qty: 40 TAB | Refills: 0 | Status: SHIPPED | OUTPATIENT
Start: 2021-01-07 | End: 2021-01-21

## 2021-01-07 RX ORDER — LIDOCAINE HYDROCHLORIDE 10 MG/ML
0.1 INJECTION, SOLUTION EPIDURAL; INFILTRATION; INTRACAUDAL; PERINEURAL AS NEEDED
Status: DISCONTINUED | OUTPATIENT
Start: 2021-01-07 | End: 2021-01-07 | Stop reason: HOSPADM

## 2021-01-07 RX ORDER — OXYCODONE HYDROCHLORIDE 5 MG/1
5 TABLET ORAL AS NEEDED
Status: DISCONTINUED | OUTPATIENT
Start: 2021-01-07 | End: 2021-01-07 | Stop reason: HOSPADM

## 2021-01-07 RX ORDER — ACETAMINOPHEN 325 MG/1
650 TABLET ORAL ONCE
Status: COMPLETED | OUTPATIENT
Start: 2021-01-07 | End: 2021-01-07

## 2021-01-07 RX ORDER — SODIUM CHLORIDE 9 MG/ML
25 INJECTION, SOLUTION INTRAVENOUS CONTINUOUS
Status: DISCONTINUED | OUTPATIENT
Start: 2021-01-07 | End: 2021-01-07 | Stop reason: HOSPADM

## 2021-01-07 RX ADMIN — HYDROMORPHONE HYDROCHLORIDE 1 MG: 2 INJECTION, SOLUTION INTRAMUSCULAR; INTRAVENOUS; SUBCUTANEOUS at 10:59

## 2021-01-07 RX ADMIN — MIDAZOLAM 2 MG: 1 INJECTION INTRAMUSCULAR; INTRAVENOUS at 10:05

## 2021-01-07 RX ADMIN — PROPOFOL 30 MG: 10 INJECTION, EMULSION INTRAVENOUS at 10:42

## 2021-01-07 RX ADMIN — DEXMEDETOMIDINE HYDROCHLORIDE 5 MCG: 100 INJECTION, SOLUTION, CONCENTRATE INTRAVENOUS at 10:30

## 2021-01-07 RX ADMIN — FENTANYL CITRATE 50 MCG: 50 INJECTION, SOLUTION INTRAMUSCULAR; INTRAVENOUS at 10:16

## 2021-01-07 RX ADMIN — MEPERIDINE HYDROCHLORIDE 25 MG: 25 INJECTION INTRAMUSCULAR; INTRAVENOUS; SUBCUTANEOUS at 11:10

## 2021-01-07 RX ADMIN — ONDANSETRON HYDROCHLORIDE 4 MG: 2 INJECTION, SOLUTION INTRAMUSCULAR; INTRAVENOUS at 10:41

## 2021-01-07 RX ADMIN — PROPOFOL 140 MG: 10 INJECTION, EMULSION INTRAVENOUS at 10:13

## 2021-01-07 RX ADMIN — OXYCODONE 5 MG: 5 TABLET ORAL at 11:45

## 2021-01-07 RX ADMIN — FENTANYL CITRATE 50 MCG: 50 INJECTION, SOLUTION INTRAMUSCULAR; INTRAVENOUS at 11:09

## 2021-01-07 RX ADMIN — HYDROMORPHONE HYDROCHLORIDE 0.5 MG: 1 INJECTION, SOLUTION INTRAMUSCULAR; INTRAVENOUS; SUBCUTANEOUS at 11:58

## 2021-01-07 RX ADMIN — ACETAMINOPHEN 650 MG: 325 TABLET ORAL at 09:45

## 2021-01-07 RX ADMIN — FENTANYL CITRATE 25 MCG: 50 INJECTION, SOLUTION INTRAMUSCULAR; INTRAVENOUS at 11:45

## 2021-01-07 RX ADMIN — DEXMEDETOMIDINE HYDROCHLORIDE 5 MCG: 100 INJECTION, SOLUTION, CONCENTRATE INTRAVENOUS at 10:34

## 2021-01-07 RX ADMIN — FENTANYL CITRATE 25 MCG: 50 INJECTION, SOLUTION INTRAMUSCULAR; INTRAVENOUS at 11:30

## 2021-01-07 RX ADMIN — PROPOFOL 30 MG: 10 INJECTION, EMULSION INTRAVENOUS at 10:05

## 2021-01-07 RX ADMIN — HYDROMORPHONE HYDROCHLORIDE 0.5 MG: 1 INJECTION, SOLUTION INTRAMUSCULAR; INTRAVENOUS; SUBCUTANEOUS at 11:33

## 2021-01-07 RX ADMIN — DEXMEDETOMIDINE HYDROCHLORIDE 5 MCG: 100 INJECTION, SOLUTION, CONCENTRATE INTRAVENOUS at 10:40

## 2021-01-07 RX ADMIN — Medication 80 MCG: at 10:28

## 2021-01-07 RX ADMIN — KETAMINE HYDROCHLORIDE 40 MG: 10 INJECTION, SOLUTION INTRAMUSCULAR; INTRAVENOUS at 10:27

## 2021-01-07 RX ADMIN — Medication 80 MCG: at 10:34

## 2021-01-07 RX ADMIN — SUCCINYLCHOLINE CHLORIDE 120 MG: 20 INJECTION, SOLUTION INTRAMUSCULAR; INTRAVENOUS at 10:14

## 2021-01-07 RX ADMIN — LIDOCAINE HYDROCHLORIDE 50 MG: 20 INJECTION, SOLUTION EPIDURAL; INFILTRATION; INTRACAUDAL; PERINEURAL at 10:13

## 2021-01-07 RX ADMIN — ROCURONIUM BROMIDE 5 MG: 10 SOLUTION INTRAVENOUS at 10:12

## 2021-01-07 RX ADMIN — SODIUM CHLORIDE 25 ML/HR: 900 INJECTION, SOLUTION INTRAVENOUS at 09:40

## 2021-01-07 RX ADMIN — CEFAZOLIN SODIUM 3 G: 10 INJECTION, POWDER, FOR SOLUTION INTRAVENOUS at 10:24

## 2021-01-07 RX ADMIN — SODIUM CHLORIDE: 900 INJECTION, SOLUTION INTRAVENOUS at 10:12

## 2021-01-07 NOTE — ANESTHESIA POSTPROCEDURE EVALUATION
Post-Anesthesia Evaluation and Assessment    Patient: Laura Alexis MRN: [id number]  SSN: [id number]    YOB: 1970  Age: 53 y.o. Sex: male       Cardiovascular Function/Vital Signs  Visit Vitals  BP (!) 104/36   Pulse 88   Temp 36.7 °C (98.1 °F)   Resp 19   Ht 6' 4\" (1.93 m)   Wt 135.5 kg (298 lb 11.6 oz)   SpO2 99%   BMI 36.36 kg/m²       Patient is status post General anesthesia for Procedure(s):  DEBRIDEMENT LEFT HEEL WOUND. Nausea/Vomiting: None    Postoperative hydration reviewed and adequate. Pain:  Pain Scale 1: Numeric (0 - 10) (01/07/21 1200)  Pain Intensity 1: 5 (01/07/21 1200)   Managed    Neurological Status:   Neuro (WDL): Within Defined Limits (01/07/21 1100)  Neuro  LUE Motor Response: Purposeful (01/07/21 1100)  LLE Motor Response: Purposeful (01/07/21 1100)  RUE Motor Response: Purposeful (01/07/21 1100)  RLE Motor Response: Purposeful (01/07/21 1100)   At baseline    Mental Status and Level of Consciousness: Alert and oriented to person, place, and time    Pulmonary Status:   O2 Device: Nasal cannula (01/07/21 1200)   Adequate oxygenation and airway patent    Complications related to anesthesia: None    Post-anesthesia assessment completed. No concerns    Signed By: Yolanda De Oliveira MD     January 7, 2021              Procedure(s):  DEBRIDEMENT LEFT HEEL WOUND.    general    <BSHSIANPOST>    INITIAL Post-op Vital signs:   Vitals Value Taken Time   /36 01/07/21 1218   Temp 36.7 °C (98.1 °F) 01/07/21 1059   Pulse 88 01/07/21 1222   Resp 24 01/07/21 1222   SpO2 96 % 01/07/21 1222   Vitals shown include unvalidated device data.

## 2021-01-07 NOTE — OP NOTES
[address]   OPERATIVE REPORT    Name:  Sara Chambers  MR#:  [id number]  :  1970  ACCOUNT #:  [de-identified]  DATE OF SERVICE:  2021      PREOPERATIVE DIAGNOSIS:  Left heel wound with exposed calcaneus. POSTOPERATIVE DIAGNOSIS:  Left heel wound with exposed calcaneus. PROCEDURE PERFORMED:  Debridement left heel wound to include skin, subcutaneous tissue, fascia, and bone. SURGEON:  MD Merrill Dickerson    ANESTHESIA:  General.    COMPLICATIONS:  None. SPECIMENS REMOVED:  None. IMPLANTS:  None. ESTIMATED BLOOD LOSS:  Minimal.    INDICATION:  The patient is a 52-year-old diabetic male with end-stage renal disease, on peritoneal dialysis. He was admitted in early December with infected, necrotic left heel wound. This was debrided. He has been managed as an outpatient with antibiotics and wound care. He has residual nonviable tissue. He has been counseled that a below-knee amputation will be needed, but he is not ready to go ahead with that. He will undergo further debridement of the wound to further clean it up. PROCEDURE:  The patient's left foot was prepped and draped in the left side down decubitus position. The wound measured approximately 12 cm in diameter. The skin edges were excised circumferentially. Superficial nonviable subcutaneous tissue was excised throughout the wound. There was good bleeding and viable tissue beneath the nonviable tissue. The exposed calcaneus was debrided superficially with a rongeur down to bleeding bone. The wound was then packed open with saline gauze and a dressing applied. The patient was returned to the recovery room in stable condition.       Dorian Obando MD      [id number]  D:  2021 11:01  T:  2021 11:56  JOB #:  [id number]

## 2021-01-07 NOTE — BRIEF OP NOTE
Brief Postoperative Note    Patient: Ridge Huang  YOB: 1970  MRN: [id number]    Date of Procedure: 1/7/2021     Pre-Op Diagnosis: ULCER OF HEEL AND MID FOOT    Post-Op Diagnosis: Same as preoperative diagnosis.       Procedure(s):  DEBRIDEMENT LEFT HEEL WOUND INCLUDING SOFT TISSUE AND BONE    Surgeon(s):  Tegan King MD    Surgical Assistant: Surg Asst-1: Trevon Cancino    Anesthesia: General     Estimated Blood Loss (mL): Minimal    Complications: None    Specimens: * No specimens in log *     Implants: * No implants in log *    Drains: * No LDAs found *    Findings: NONVIABLE SOFT TISSUE REMOVED, EXPOSED CALCANEUS DEBRIDED    Electronically Signed by Jonathan Fitzgerald MD on 1/7/2021 at 10:56 AM

## 2021-01-07 NOTE — ROUTINE PROCESS
Patient: Kyle Baron MRN: [id number]  SSN: [id number] YOB: 1970  Age: 53 y.o. Sex: male Patient is status post Procedure(s): DEBRIDEMENT LEFT HEEL WOUND. Surgeon(s) and Role: 
   Yuni Grant MD - Primary Local/Dose/Irrigation:   
 
             
Peripheral IV 01/07/21 Left; Outer Antecubital (Active) Airway - Endotracheal Tube 01/07/21 Oral (Active) Dressing/Packing:  Incision 01/07/21 Foot Left-Dressing/Treatment: ABD pad; Ace wrap;Gauze dressing/dressing sponge;Roll gauze (01/07/21 0900) Splint/Cast:  ] Other:

## 2021-01-07 NOTE — DISCHARGE INSTRUCTIONS
Patient Discharge Instructions    [facility] / [id number] : 1970    Admitted 2021 Discharged: 2021     Take Home Medications     . · It is important that you take the medication exactly as they are prescribed. · Keep your medication in the bottles provided by the pharmacist and keep a list of the medication names, dosages, and times to be taken in your wallet. · Do not take other medications without consulting your doctor. What to do at Home    Recommended diet: Diabetic Diet,     Recommended activity: Activity as tolerated,     Additional Instructions: RESUME WOUND CARE ON FRIDAY AS USUAL    Follow-up with Dr Emigdio Barrios in 2 weeks, call [phone] for appt        Information obtained by :  I understand that if any problems occur once I am at home I am to contact my physician. I understand and acknowledge receipt of the instructions indicated above.                                                                                                                                            Physician's or R.N.'s Signature                                                                  Date/Time                                                                                                                                              Patient or Representative Signature                                                          Date/Time

## 2021-01-07 NOTE — ANESTHESIA PREPROCEDURE EVALUATION
Relevant Problems   RESPIRATORY SYSTEM   (+) Chronic obstructive pulmonary disease (HCC)      CARDIOVASCULAR   (+) Congestive heart failure (HCC)   (+) HTN (hypertension), benign      RENAL FAILURE   (+) Acute on chronic renal failure (HCC)   (+) Stage 5 chronic kidney disease not on chronic dialysis (HCC)      ENDOCRINE   (+) DM (diabetes mellitus) (HCC)   (+) Obesity   (+) Obesity, morbid (HCC)      HEMATOLOGY   (+) Anemia     Relevant Problems   RESPIRATORY SYSTEM   (+) Chronic obstructive pulmonary disease (HCC)      CARDIOVASCULAR   (+) Congestive heart failure (HCC)   (+) HTN (hypertension), benign      RENAL FAILURE   (+) Acute on chronic renal failure (HCC)   (+) Stage 5 chronic kidney disease not on chronic dialysis (HCC)      ENDOCRINE   (+) DM (diabetes mellitus) (HCC)   (+) Obesity   (+) Obesity, morbid (HCC)      HEMATOLOGY   (+) Anemia       Anesthetic History   No history of anesthetic complications            Review of Systems / Medical History  Patient summary reviewed, nursing notes reviewed and pertinent labs reviewed    Pulmonary    COPD    Sleep apnea  Shortness of breath and undiagnosed apnea    Pertinent negatives: No smoker     Neuro/Psych   Within defined limits           Cardiovascular    Hypertension      CHF: NYHA Classification III, orthopnea, dyspnea on exertion  Dysrhythmias   CAD and hyperlipidemia    Exercise tolerance: <4 METS  Comments:   LBBB    TTE:  LVEF 30% reportedly   GI/Hepatic/Renal     GERD    Renal disease: ESRD    Pertinent negatives: No liver disease   Endo/Other    Diabetes: using insulin    Morbid obesity    Comments: polycythemia Other Findings   Comments: Previous alcohol abuse         Physical Exam    Airway  Mallampati: III  TM Distance: 4 - 6 cm  Neck ROM: normal range of motion       Comments: Small mouth opening Cardiovascular    Rhythm: regular  Rate: normal         Dental  No notable dental hx       Pulmonary  Breath sounds clear to auscultation            Pertinent negatives: No rhonchi and decreased breath sounds   Abdominal  GI exam deferred       Other Findings            Anesthetic Plan    ASA: 4  Anesthesia type: general    Monitoring Plan: BIS      Induction: Intravenous  Anesthetic plan and risks discussed with: Patient              Anesthetic History   No history of anesthetic complications            Review of Systems / Medical History  Patient summary reviewed, nursing notes reviewed and pertinent labs reviewed    Pulmonary    COPD      Smoker         Neuro/Psych   Within defined limits           Cardiovascular    Hypertension      CHF    CAD         GI/Hepatic/Renal     GERD    Renal disease: CRI       Endo/Other    Diabetes    Obesity and anemia     Other Findings              Physical Exam    Airway  Mallampati: II  TM Distance: > 6 cm  Neck ROM: normal range of motion   Mouth opening: Normal     Cardiovascular  Regular rate and rhythm,  S1 and S2 normal,  no murmur, click, rub, or gallop             Dental  No notable dental hx       Pulmonary  Breath sounds clear to auscultation               Abdominal  GI exam deferred       Other Findings            Anesthetic Plan    ASA: 4  Anesthesia type: general          Induction: Intravenous  Anesthetic plan and risks discussed with: Patient

## 2021-11-30 ENCOUNTER — APPOINTMENT (OUTPATIENT)
Dept: GENERAL RADIOLOGY | Age: 51
DRG: 291 | End: 2021-11-30
Attending: STUDENT IN AN ORGANIZED HEALTH CARE EDUCATION/TRAINING PROGRAM
Payer: COMMERCIAL

## 2021-11-30 ENCOUNTER — HOSPITAL ENCOUNTER (INPATIENT)
Age: 51
LOS: 3 days | Discharge: HOME | DRG: 291 | End: 2021-12-03
Attending: STUDENT IN AN ORGANIZED HEALTH CARE EDUCATION/TRAINING PROGRAM | Admitting: INTERNAL MEDICINE
Payer: COMMERCIAL

## 2021-11-30 DIAGNOSIS — Z51.5 PALLIATIVE CARE ENCOUNTER: ICD-10-CM

## 2021-11-30 DIAGNOSIS — I50.32 DIASTOLIC CHF, CHRONIC (HCC): ICD-10-CM

## 2021-11-30 DIAGNOSIS — N18.9 ACUTE KIDNEY INJURY SUPERIMPOSED ON CHRONIC KIDNEY DISEASE (HCC): ICD-10-CM

## 2021-11-30 DIAGNOSIS — R52 ENCOUNTER FOR PAIN MANAGEMENT: ICD-10-CM

## 2021-11-30 DIAGNOSIS — E87.6 HYPOKALEMIA: ICD-10-CM

## 2021-11-30 DIAGNOSIS — I73.9 PAD (PERIPHERAL ARTERY DISEASE) (HCC): ICD-10-CM

## 2021-11-30 DIAGNOSIS — I10 HTN (HYPERTENSION), BENIGN: ICD-10-CM

## 2021-11-30 DIAGNOSIS — J44.9 CHRONIC OBSTRUCTIVE PULMONARY DISEASE, UNSPECIFIED COPD TYPE (HCC): ICD-10-CM

## 2021-11-30 DIAGNOSIS — M25.551 PAIN OF BOTH HIP JOINTS: ICD-10-CM

## 2021-11-30 DIAGNOSIS — R53.1 WEAKNESS GENERALIZED: ICD-10-CM

## 2021-11-30 DIAGNOSIS — M25.552 PAIN OF BOTH HIP JOINTS: ICD-10-CM

## 2021-11-30 DIAGNOSIS — R26.2 AMBULATORY DYSFUNCTION: Primary | ICD-10-CM

## 2021-11-30 DIAGNOSIS — E66.01 OBESITY, MORBID (HCC): ICD-10-CM

## 2021-11-30 DIAGNOSIS — N18.5 STAGE 5 CHRONIC KIDNEY DISEASE NOT ON CHRONIC DIALYSIS (HCC): ICD-10-CM

## 2021-11-30 DIAGNOSIS — R60.0 BILATERAL EDEMA OF LOWER EXTREMITY: ICD-10-CM

## 2021-11-30 DIAGNOSIS — M79.10 MYALGIA: ICD-10-CM

## 2021-11-30 DIAGNOSIS — E78.00 HYPERCHOLESTEREMIA: ICD-10-CM

## 2021-11-30 DIAGNOSIS — N17.9 ACUTE KIDNEY INJURY SUPERIMPOSED ON CHRONIC KIDNEY DISEASE (HCC): ICD-10-CM

## 2021-11-30 DIAGNOSIS — R65.10 SIRS (SYSTEMIC INFLAMMATORY RESPONSE SYNDROME) (HCC): ICD-10-CM

## 2021-11-30 DIAGNOSIS — R53.1 GENERALIZED WEAKNESS: ICD-10-CM

## 2021-11-30 LAB
ALBUMIN SERPL-MCNC: 1.8 G/DL (ref 3.5–5)
ALBUMIN/GLOB SERPL: 0.4 {RATIO} (ref 1.1–2.2)
ALP SERPL-CCNC: 262 U/L (ref 45–117)
ALT SERPL-CCNC: 14 U/L (ref 12–78)
AMORPH CRY URNS QL MICRO: ABNORMAL
ANION GAP SERPL CALC-SCNC: 18 MMOL/L (ref 5–15)
APPEARANCE UR: ABNORMAL
AST SERPL-CCNC: 12 U/L (ref 15–37)
BACTERIA URNS QL MICRO: ABNORMAL /HPF
BASE DEFICIT BLDV-SCNC: 2.6 MMOL/L
BASOPHILS # BLD: 0 K/UL (ref 0–0.1)
BASOPHILS NFR BLD: 0 % (ref 0–1)
BILIRUB SERPL-MCNC: 0.8 MG/DL (ref 0.2–1)
BILIRUB UR QL: NEGATIVE
BNP SERPL-MCNC: ABNORMAL PG/ML
BUN SERPL-MCNC: 110 MG/DL (ref 6–20)
BUN/CREAT SERPL: 13 (ref 12–20)
CALCIUM SERPL-MCNC: 8.1 MG/DL (ref 8.5–10.1)
CHLORIDE SERPL-SCNC: 92 MMOL/L (ref 97–108)
CK SERPL-CCNC: 69 U/L (ref 39–308)
CO2 SERPL-SCNC: 22 MMOL/L (ref 21–32)
COLOR UR: ABNORMAL
COVID-19 RAPID TEST, COVR: NOT DETECTED
CREAT SERPL-MCNC: 8.52 MG/DL (ref 0.7–1.3)
DIFFERENTIAL METHOD BLD: ABNORMAL
EOSINOPHIL # BLD: 0.4 K/UL (ref 0–0.4)
EOSINOPHIL NFR BLD: 2 % (ref 0–7)
EPITH CASTS URNS QL MICRO: ABNORMAL /LPF
ERYTHROCYTE [DISTWIDTH] IN BLOOD BY AUTOMATED COUNT: 16.9 % (ref 11.5–14.5)
GLOBULIN SER CALC-MCNC: 5.1 G/DL (ref 2–4)
GLUCOSE BLD STRIP.AUTO-MCNC: 141 MG/DL (ref 65–117)
GLUCOSE BLD STRIP.AUTO-MCNC: 151 MG/DL (ref 65–117)
GLUCOSE SERPL-MCNC: 211 MG/DL (ref 65–100)
GLUCOSE UR STRIP.AUTO-MCNC: 100 MG/DL
GRAN CASTS URNS QL MICRO: ABNORMAL /LPF
HCO3 BLDV-SCNC: 23.5 MMOL/L (ref 23–28)
HCT VFR BLD AUTO: 33.2 % (ref 36.6–50.3)
HGB BLD-MCNC: 10.3 G/DL (ref 12.1–17)
HGB UR QL STRIP: ABNORMAL
IMM GRANULOCYTES # BLD AUTO: 0 K/UL (ref 0–0.04)
IMM GRANULOCYTES NFR BLD AUTO: 0 % (ref 0–0.5)
KETONES UR QL STRIP.AUTO: NEGATIVE MG/DL
LEUKOCYTE ESTERASE UR QL STRIP.AUTO: ABNORMAL
LYMPHOCYTES # BLD: 0.2 K/UL (ref 0.8–3.5)
LYMPHOCYTES NFR BLD: 1 % (ref 12–49)
MAGNESIUM SERPL-MCNC: 2.3 MG/DL (ref 1.6–2.4)
MCH RBC QN AUTO: 27.5 PG (ref 26–34)
MCHC RBC AUTO-ENTMCNC: 31 G/DL (ref 30–36.5)
MCV RBC AUTO: 88.5 FL (ref 80–99)
MONOCYTES # BLD: 0.5 K/UL (ref 0–1)
MONOCYTES NFR BLD: 3 % (ref 5–13)
MUCOUS THREADS URNS QL MICRO: ABNORMAL /LPF
NEUTS BAND NFR BLD MANUAL: 2 %
NEUTS SEG # BLD: 16.9 K/UL (ref 1.8–8)
NEUTS SEG NFR BLD: 92 % (ref 32–75)
NITRITE UR QL STRIP.AUTO: NEGATIVE
NRBC # BLD: 0 K/UL (ref 0–0.01)
NRBC BLD-RTO: 0 PER 100 WBC
PCO2 BLDV: 45 MMHG (ref 41–51)
PH BLDV: 7.33 [PH] (ref 7.32–7.42)
PH UR STRIP: 5 [PH] (ref 5–8)
PLATELET # BLD AUTO: 253 K/UL (ref 150–400)
PMV BLD AUTO: 9.9 FL (ref 8.9–12.9)
PO2 BLDV: 26 MMHG (ref 25–40)
POTASSIUM SERPL-SCNC: 2.9 MMOL/L (ref 3.5–5.1)
PROT SERPL-MCNC: 6.9 G/DL (ref 6.4–8.2)
PROT UR STRIP-MCNC: 100 MG/DL
RBC # BLD AUTO: 3.75 M/UL (ref 4.1–5.7)
RBC #/AREA URNS HPF: ABNORMAL /HPF (ref 0–5)
RBC MORPH BLD: ABNORMAL
SAO2 % BLDV: 43.3 % (ref 65–88)
SERVICE CMNT-IMP: ABNORMAL
SODIUM SERPL-SCNC: 132 MMOL/L (ref 136–145)
SOURCE, COVRS: NORMAL
SP GR UR REFRACTOMETRY: 1.02 (ref 1–1.03)
SPECIMEN TYPE: ABNORMAL
TSH SERPL DL<=0.05 MIU/L-ACNC: 1.03 UIU/ML (ref 0.36–3.74)
UA: UC IF INDICATED,UAUC: ABNORMAL
UROBILINOGEN UR QL STRIP.AUTO: 1 EU/DL (ref 0.2–1)
WBC # BLD AUTO: 18 K/UL (ref 4.1–11.1)
WBC MORPH BLD: ABNORMAL
WBC URNS QL MICRO: ABNORMAL /HPF (ref 0–4)

## 2021-11-30 PROCEDURE — 74011250636 HC RX REV CODE- 250/636: Performed by: INTERNAL MEDICINE

## 2021-11-30 PROCEDURE — 87040 BLOOD CULTURE FOR BACTERIA: CPT

## 2021-11-30 PROCEDURE — 71045 X-RAY EXAM CHEST 1 VIEW: CPT

## 2021-11-30 PROCEDURE — 74011250637 HC RX REV CODE- 250/637: Performed by: STUDENT IN AN ORGANIZED HEALTH CARE EDUCATION/TRAINING PROGRAM

## 2021-11-30 PROCEDURE — 82550 ASSAY OF CK (CPK): CPT

## 2021-11-30 PROCEDURE — 74011000250 HC RX REV CODE- 250: Performed by: INTERNAL MEDICINE

## 2021-11-30 PROCEDURE — 83735 ASSAY OF MAGNESIUM: CPT

## 2021-11-30 PROCEDURE — 80053 COMPREHEN METABOLIC PANEL: CPT

## 2021-11-30 PROCEDURE — 87086 URINE CULTURE/COLONY COUNT: CPT

## 2021-11-30 PROCEDURE — A4726 DIALYS SOL FLD VOL > 5999CC: HCPCS | Performed by: INTERNAL MEDICINE

## 2021-11-30 PROCEDURE — 83880 ASSAY OF NATRIURETIC PEPTIDE: CPT

## 2021-11-30 PROCEDURE — 81001 URINALYSIS AUTO W/SCOPE: CPT

## 2021-11-30 PROCEDURE — 90945 DIALYSIS ONE EVALUATION: CPT

## 2021-11-30 PROCEDURE — 82962 GLUCOSE BLOOD TEST: CPT

## 2021-11-30 PROCEDURE — 36415 COLL VENOUS BLD VENIPUNCTURE: CPT

## 2021-11-30 PROCEDURE — 82803 BLOOD GASES ANY COMBINATION: CPT

## 2021-11-30 PROCEDURE — 85025 COMPLETE CBC W/AUTO DIFF WBC: CPT

## 2021-11-30 PROCEDURE — 3E1M39Z IRRIGATION OF PERITONEAL CAVITY USING DIALYSATE, PERCUTANEOUS APPROACH: ICD-10-PCS | Performed by: INTERNAL MEDICINE

## 2021-11-30 PROCEDURE — 84443 ASSAY THYROID STIM HORMONE: CPT

## 2021-11-30 PROCEDURE — 87635 SARS-COV-2 COVID-19 AMP PRB: CPT

## 2021-11-30 PROCEDURE — 99284 EMERGENCY DEPT VISIT MOD MDM: CPT

## 2021-11-30 PROCEDURE — 65660000000 HC RM CCU STEPDOWN

## 2021-11-30 PROCEDURE — 74011250637 HC RX REV CODE- 250/637: Performed by: INTERNAL MEDICINE

## 2021-11-30 PROCEDURE — 74011636637 HC RX REV CODE- 636/637: Performed by: INTERNAL MEDICINE

## 2021-11-30 RX ORDER — POTASSIUM CHLORIDE 750 MG/1
40 TABLET, FILM COATED, EXTENDED RELEASE ORAL
Status: COMPLETED | OUTPATIENT
Start: 2021-11-30 | End: 2021-11-30

## 2021-11-30 RX ORDER — SODIUM CHLORIDE 0.9 % (FLUSH) 0.9 %
5-40 SYRINGE (ML) INJECTION EVERY 8 HOURS
Status: DISCONTINUED | OUTPATIENT
Start: 2021-11-30 | End: 2021-12-03 | Stop reason: HOSPADM

## 2021-11-30 RX ORDER — MIDODRINE HYDROCHLORIDE 5 MG/1
5 TABLET ORAL
Status: DISCONTINUED | OUTPATIENT
Start: 2021-11-30 | End: 2021-12-03 | Stop reason: HOSPADM

## 2021-11-30 RX ORDER — ENOXAPARIN SODIUM 100 MG/ML
30 INJECTION SUBCUTANEOUS DAILY
Status: DISCONTINUED | OUTPATIENT
Start: 2021-12-01 | End: 2021-11-30 | Stop reason: ALTCHOICE

## 2021-11-30 RX ORDER — POLYETHYLENE GLYCOL 3350 17 G/17G
17 POWDER, FOR SOLUTION ORAL DAILY PRN
Status: DISCONTINUED | OUTPATIENT
Start: 2021-11-30 | End: 2021-12-03 | Stop reason: HOSPADM

## 2021-11-30 RX ORDER — ONDANSETRON 2 MG/ML
4 INJECTION INTRAMUSCULAR; INTRAVENOUS
Status: DISCONTINUED | OUTPATIENT
Start: 2021-11-30 | End: 2021-12-03 | Stop reason: HOSPADM

## 2021-11-30 RX ORDER — INSULIN GLARGINE 300 U/ML
40 INJECTION, SOLUTION SUBCUTANEOUS EVERY MORNING
COMMUNITY

## 2021-11-30 RX ORDER — GENTAMICIN SULFATE 1 MG/G
CREAM TOPICAL DAILY PRN
Status: DISCONTINUED | OUTPATIENT
Start: 2021-11-30 | End: 2021-12-03 | Stop reason: HOSPADM

## 2021-11-30 RX ORDER — IBUPROFEN 200 MG
1 TABLET ORAL DAILY
Status: DISCONTINUED | OUTPATIENT
Start: 2021-12-01 | End: 2021-11-30

## 2021-11-30 RX ORDER — POTASSIUM CHLORIDE 20 MEQ/1
40 TABLET, EXTENDED RELEASE ORAL
Status: COMPLETED | OUTPATIENT
Start: 2021-11-30 | End: 2021-11-30

## 2021-11-30 RX ORDER — MAGNESIUM SULFATE 100 %
4 CRYSTALS MISCELLANEOUS AS NEEDED
Status: DISCONTINUED | OUTPATIENT
Start: 2021-11-30 | End: 2021-12-03 | Stop reason: HOSPADM

## 2021-11-30 RX ORDER — DEXTROSE 50 % IN WATER (D50W) INTRAVENOUS SYRINGE
12.5-25 AS NEEDED
Status: DISCONTINUED | OUTPATIENT
Start: 2021-11-30 | End: 2021-12-03 | Stop reason: HOSPADM

## 2021-11-30 RX ORDER — INSULIN GLARGINE 100 [IU]/ML
25 INJECTION, SOLUTION SUBCUTANEOUS DAILY
Status: DISCONTINUED | OUTPATIENT
Start: 2021-12-01 | End: 2021-12-02

## 2021-11-30 RX ORDER — HEPARIN SODIUM 5000 [USP'U]/ML
7500 INJECTION, SOLUTION INTRAVENOUS; SUBCUTANEOUS EVERY 8 HOURS
Status: DISCONTINUED | OUTPATIENT
Start: 2021-11-30 | End: 2021-12-03 | Stop reason: HOSPADM

## 2021-11-30 RX ORDER — HYDROCODONE BITARTRATE AND ACETAMINOPHEN 5; 325 MG/1; MG/1
1 TABLET ORAL
COMMUNITY

## 2021-11-30 RX ORDER — CLOPIDOGREL BISULFATE 75 MG/1
75 TABLET ORAL DAILY
Status: DISCONTINUED | OUTPATIENT
Start: 2021-12-01 | End: 2021-12-03 | Stop reason: HOSPADM

## 2021-11-30 RX ORDER — BUMETANIDE 0.25 MG/ML
2 INJECTION INTRAMUSCULAR; INTRAVENOUS 2 TIMES DAILY
Status: DISCONTINUED | OUTPATIENT
Start: 2021-11-30 | End: 2021-12-03 | Stop reason: HOSPADM

## 2021-11-30 RX ORDER — ALLOPURINOL 300 MG/1
300 TABLET ORAL DAILY
Status: DISCONTINUED | OUTPATIENT
Start: 2021-12-01 | End: 2021-11-30

## 2021-11-30 RX ORDER — IBUPROFEN 200 MG
1 TABLET ORAL DAILY
Status: DISCONTINUED | OUTPATIENT
Start: 2021-11-30 | End: 2021-12-03 | Stop reason: HOSPADM

## 2021-11-30 RX ORDER — CLOPIDOGREL BISULFATE 75 MG/1
75 TABLET ORAL DAILY
COMMUNITY

## 2021-11-30 RX ORDER — ASPIRIN 81 MG/1
81 TABLET ORAL DAILY
Status: DISCONTINUED | OUTPATIENT
Start: 2021-12-01 | End: 2021-11-30

## 2021-11-30 RX ORDER — ACETAMINOPHEN 650 MG/1
650 SUPPOSITORY RECTAL
Status: DISCONTINUED | OUTPATIENT
Start: 2021-11-30 | End: 2021-12-03 | Stop reason: HOSPADM

## 2021-11-30 RX ORDER — SODIUM CHLORIDE 0.9 % (FLUSH) 0.9 %
5-40 SYRINGE (ML) INJECTION AS NEEDED
Status: DISCONTINUED | OUTPATIENT
Start: 2021-11-30 | End: 2021-12-03 | Stop reason: HOSPADM

## 2021-11-30 RX ORDER — HYDROMORPHONE HYDROCHLORIDE 1 MG/ML
1 INJECTION, SOLUTION INTRAMUSCULAR; INTRAVENOUS; SUBCUTANEOUS
Status: DISCONTINUED | OUTPATIENT
Start: 2021-11-30 | End: 2021-12-02

## 2021-11-30 RX ORDER — ACETAMINOPHEN 325 MG/1
650 TABLET ORAL
Status: DISCONTINUED | OUTPATIENT
Start: 2021-11-30 | End: 2021-12-03 | Stop reason: HOSPADM

## 2021-11-30 RX ORDER — ASPIRIN 81 MG/1
81 TABLET ORAL DAILY
Status: DISCONTINUED | OUTPATIENT
Start: 2021-12-01 | End: 2021-12-03 | Stop reason: HOSPADM

## 2021-11-30 RX ORDER — ONDANSETRON 4 MG/1
4 TABLET, ORALLY DISINTEGRATING ORAL
Status: DISCONTINUED | OUTPATIENT
Start: 2021-11-30 | End: 2021-12-03 | Stop reason: HOSPADM

## 2021-11-30 RX ORDER — BUMETANIDE 1 MG/1
2 TABLET ORAL DAILY
Status: DISCONTINUED | OUTPATIENT
Start: 2021-12-01 | End: 2021-11-30

## 2021-11-30 RX ORDER — ACETAMINOPHEN 325 MG/1
650 TABLET ORAL
Status: DISCONTINUED | OUTPATIENT
Start: 2021-11-30 | End: 2021-11-30

## 2021-11-30 RX ORDER — PREGABALIN 100 MG/1
100 CAPSULE ORAL 2 TIMES DAILY
COMMUNITY

## 2021-11-30 RX ORDER — INSULIN LISPRO 100 [IU]/ML
INJECTION, SOLUTION INTRAVENOUS; SUBCUTANEOUS
Status: DISCONTINUED | OUTPATIENT
Start: 2021-11-30 | End: 2021-12-03 | Stop reason: HOSPADM

## 2021-11-30 RX ADMIN — Medication 5 ML: at 23:02

## 2021-11-30 RX ADMIN — HEPARIN SODIUM 7500 UNITS: 5000 INJECTION INTRAVENOUS; SUBCUTANEOUS at 23:01

## 2021-11-30 RX ADMIN — SODIUM CHLORIDE, SODIUM LACTATE, CALCIUM CHLORIDE, MAGNESIUM CHLORIDE AND DEXTROSE 6000 ML: 2.5; 538; 448; 18.3; 5.08 INJECTION, SOLUTION INTRAPERITONEAL at 21:33

## 2021-11-30 RX ADMIN — HYDROMORPHONE HYDROCHLORIDE 1 MG: 1 INJECTION, SOLUTION INTRAMUSCULAR; INTRAVENOUS; SUBCUTANEOUS at 18:24

## 2021-11-30 RX ADMIN — BUMETANIDE 2 MG: 0.25 INJECTION INTRAMUSCULAR; INTRAVENOUS at 17:25

## 2021-11-30 RX ADMIN — POTASSIUM CHLORIDE 40 MEQ: 750 TABLET, FILM COATED, EXTENDED RELEASE ORAL at 14:36

## 2021-11-30 RX ADMIN — MIDODRINE HYDROCHLORIDE 5 MG: 5 TABLET ORAL at 16:47

## 2021-11-30 RX ADMIN — POTASSIUM CHLORIDE 40 MEQ: 20 TABLET, EXTENDED RELEASE ORAL at 23:01

## 2021-11-30 RX ADMIN — SODIUM CHLORIDE, SODIUM LACTATE, CALCIUM CHLORIDE, MAGNESIUM CHLORIDE AND DEXTROSE 6000 ML: 1.5; 538; 448; 18.3; 5.08 INJECTION, SOLUTION INTRAPERITONEAL at 21:32

## 2021-11-30 RX ADMIN — GENTAMICIN SULFATE: 1 CREAM TOPICAL at 21:41

## 2021-11-30 RX ADMIN — HEPARIN SODIUM 7500 UNITS: 5000 INJECTION INTRAVENOUS; SUBCUTANEOUS at 16:47

## 2021-11-30 RX ADMIN — INSULIN LISPRO 2 UNITS: 100 INJECTION, SOLUTION INTRAVENOUS; SUBCUTANEOUS at 17:24

## 2021-11-30 NOTE — ED PROVIDER NOTES
EMERGENCY DEPARTMENT HISTORY AND PHYSICAL EXAM      Date: 11/30/2021  Patient Name: Kaylin Sam    History of Presenting Illness     Chief Complaint   Patient presents with    Leg Pain     bilateral hip pain that started 10 days ago and has progressed down his legs and is now going up to his arms    Lethargy       History Provided By: Patient    HPI: Kaylin Sam, 49 y.o. male with past medical history of ESRD on peritoneal dialysis, CAD, chronic left-sided congestive heart failure, COPD, tobacco abuse, type 2 diabetes, morbid obesity, chronic unchanged diabetic foot infection status post debridement (managed by wound care), known right finger gangrene (unchanged), PAD, presents to the ED with cc of diffuse myalgias and arthralgias \"everywhere\" associated with generalized weakness. Patient reports that he has been experiencing recurrent flares of similar symptoms almost monthly for a while. States that his symptoms seem to usually always improve after a course of Medrol Dosepak. States that he has been referred to a rheumatologist, who tested him for rheumatoid arthritis, and told him he did not have this. However, they still started him on hydroxychloroquine, which he took for a month, and states that this did not relieve his symptoms so he stopped taking the medication. Patient reports this episode is much more severe than his previous, and he has been stuck in a chair for the past 10 days without ability to move or get up. He states that his girlfriend has been visiting daily, and helping him with his peritoneal dialysis nightly, which he has been receiving as a result of this. He states that in the past, even when he has had similar symptoms, he would be able to get around with a walker or crutches. Now, even with assistive device, he has been unable to get himself up from the chair.   States that he has not been eating much secondary to this, which is further contributing to his worsening of symptoms. He denies any fevers, chills. States that he is still making urine, but very little. Patient denies chest pain, SOB. PCP: Gema Hong PA-C    No current facility-administered medications on file prior to encounter. Current Outpatient Medications on File Prior to Encounter   Medication Sig Dispense Refill    clopidogreL (Plavix) 75 mg tab Take 75 mg by mouth daily.  insulin glargine U-300 conc (Toujeo Max U-300 SoloStar) 300 unit/mL (3 mL) inpn 40 Units by SubCUTAneous route Every morning.  pregabalin (LYRICA) 100 mg capsule Take 100 mg by mouth two (2) times a day.  HYDROcodone-acetaminophen (Norco) 5-325 mg per tablet Take 1 Tablet by mouth every six (6) hours as needed for Pain.  aspirin delayed-release 81 mg tablet Take 81 mg by mouth daily.  dulaglutide (Trulicity) 2.17 SC/2.1 mL sub-q pen 0.75 mg by SubCUTAneous route every Sunday.  bumetanide (BUMEX) 2 mg tablet Take 2 mg by mouth two (2) times a day.  [DISCONTINUED] cefUROXime (CEFTIN) 500 mg tablet Take 1 Tab by mouth two (2) times a day. 14 Tab 0    [DISCONTINUED] carvediloL (COREG) 12.5 mg tablet Take 12.5 mg by mouth two (2) times daily (with meals).  [DISCONTINUED] predniSONE (STERAPRED DS) 10 mg dose pack Take  by mouth See Admin Instructions. See administration instruction per 10mg dose pack      [DISCONTINUED] iron, carbonyl (FEOSOL) 45 mg tab Take 1 Tab by mouth daily.  [DISCONTINUED] allopurinoL (ZYLOPRIM) 100 mg tablet Take 300 mg by mouth daily.  [DISCONTINUED] sacubitriL-valsartan (Entresto) 49-51 mg tab tablet Take 1 Tab by mouth two (2) times a day.  omeprazole (PRILOSEC) 40 mg capsule Take 40 mg by mouth daily.  [DISCONTINUED] magnesium oxide (MAG-OX) 400 mg tablet Take 400 mg by mouth daily.       [DISCONTINUED] Insulin Needles, Disposable, (BD INSULIN PEN NEEDLE UF SHORT) 31 X 5/16 \" Ndle Use as directed with insulin pen 100 Package 11    [DISCONTINUED] Blood-Glucose Meter monitoring kit Test blood sugar four times daily. 1 Kit 0    [DISCONTINUED] glucose blood VI test strips (ASCENSIA AUTODISC VI, ONE TOUCH ULTRA TEST VI) strip Test four times daily 1 Package 11    [DISCONTINUED] Lancets Misc Test four times daily 1 Package 11       Past History     Past Medical History:  Past Medical History:   Diagnosis Date    Allergic rhinitis 11/19/2009    Anemia     CAD (coronary artery disease)     Chronic kidney disease     stage 4    Chronic obstructive pulmonary disease (HCC)     Congestive heart failure (HCC)     chronic left sided congestive heart failure    DM (diabetes mellitus) ([facility] [address] 11/19/2009    type 2    GERD (gastroesophageal reflux disease)     HTN (hypertension), benign 11/19/2009    Hypercholesteremia 11/19/2009    LBBB (left bundle branch block)     Obesity 11/19/2009    Pulmonary HTN ([facility] [address] 06/2020    mild     Tobacco abuse 11/19/2009    Weakness generalized        Past Surgical History:  Past Surgical History:   Procedure Laterality Date    HX APPENDECTOMY  2004    IR INSERT NON TUNL CVC LESS THAN 5 YR  9/16/2020    IR INSERT TUNL CVC W/O PORT OVER 5 YR  9/23/2020    [place] CARDIAC SURG PROCEDURE UNLIST  06/11/2020    echo ef 25-30% down from 40% on 04/19       Family History:  Family History   Problem Relation Age of Onset    Hypertension Father     Heart Disease Father     Hypertension Mother        Social History:  Social History     Tobacco Use    Smoking status: Current Every Day Smoker     Packs/day: 0.50     Years: 25.00     Pack years: 12.50     Types: Cigarettes    Smokeless tobacco: Former User     Quit date: 8/25/1995   Substance Use Topics    Alcohol use: Not Currently     Comment: pt quit  10/19    Drug use: Never       Allergies:  No Known Allergies      Review of Systems   Review of Systems   Constitutional: Positive for activity change, appetite change and fatigue. Negative for chills and fever.    HENT: Negative for congestion and rhinorrhea. Eyes: Negative for visual disturbance. Respiratory: Negative for cough, chest tightness, shortness of breath and wheezing. Cardiovascular: Negative for chest pain, palpitations and leg swelling. Gastrointestinal: Negative for abdominal pain, diarrhea, nausea and vomiting. Genitourinary: Positive for decreased urine volume. Negative for dysuria, flank pain and hematuria. Musculoskeletal: Positive for arthralgias and myalgias. Negative for back pain and neck pain. Skin: Negative for rash. Allergic/Immunologic: Negative for immunocompromised state. Neurological: Negative for dizziness, speech difficulty, weakness and headaches. Hematological: Negative for adenopathy. Psychiatric/Behavioral: Negative for dysphoric mood and suicidal ideas. Physical Exam   Physical Exam  Vitals and nursing note reviewed. Constitutional:       General: He is not in acute distress. Appearance: Normal appearance. He is morbidly obese. He is ill-appearing. He is not toxic-appearing. Comments: Patient appears disheveled   HENT:      Head: Normocephalic and atraumatic. Nose: Nose normal.      Mouth/Throat:      Mouth: Mucous membranes are moist.   Eyes:      Extraocular Movements: Extraocular movements intact. Pupils: Pupils are equal, round, and reactive to light. Cardiovascular:      Rate and Rhythm: Normal rate and regular rhythm. Pulses: Normal pulses. Pulmonary:      Effort: Pulmonary effort is normal. No respiratory distress. Breath sounds: Normal breath sounds. No stridor. No wheezing or rhonchi. Abdominal:      General: Abdomen is flat. There is no distension. Palpations: There is no mass. Tenderness: There is no abdominal tenderness. Musculoskeletal:         General: Normal range of motion. Cervical back: Normal range of motion and neck supple. Right lower le+ Pitting Edema present.       Left lower leg: 2+ Pitting Edema present. Skin:     General: Skin is warm and dry. Neurological:      General: No focal deficit present. Mental Status: Mental status is at baseline. He is lethargic. Sensory: No sensory deficit. Motor: No weakness. Diagnostic Study Results     Labs -     Recent Results (from the past 24 hour(s))   POC VENOUS BLOOD GAS    Collection Time: 11/30/21 11:18 AM   Result Value Ref Range    pH, venous (POC) 7.33 7.32 - 7.42      pCO2, venous (POC) 45.0 41 - 51 MMHG    pO2, venous (POC) 26 25 - 40 mmHg    HCO3, venous (POC) 23.5 23.0 - 28.0 MMOL/L    sO2, venous (POC) 43.3 (L) 65 - 88 %    Base deficit, venous (POC) 2.6 mmol/L    Specimen type (POC) VENOUS BLOOD      Performed by Lupe Mckinney \"Jameson\"    CBC WITH AUTOMATED DIFF    Collection Time: 11/30/21 11:19 AM   Result Value Ref Range    WBC 18.0 (H) 4.1 - 11.1 K/uL    RBC 3.75 (L) 4.10 - 5.70 M/uL    HGB 10.3 (L) 12.1 - 17.0 g/dL    HCT 33.2 (L) 36.6 - 50.3 %    MCV 88.5 80.0 - 99.0 FL    MCH 27.5 26.0 - 34.0 PG    MCHC 31.0 30.0 - 36.5 g/dL    RDW 16.9 (H) 11.5 - 14.5 %    PLATELET 510 850 - 667 K/uL    MPV 9.9 8.9 - 12.9 FL    NRBC 0.0 0  WBC    ABSOLUTE NRBC 0.00 0.00 - 0.01 K/uL    NEUTROPHILS 92 (H) 32 - 75 %    BAND NEUTROPHILS 2 %    LYMPHOCYTES 1 (L) 12 - 49 %    MONOCYTES 3 (L) 5 - 13 %    EOSINOPHILS 2 0 - 7 %    BASOPHILS 0 0 - 1 %    IMMATURE GRANULOCYTES 0 0.0 - 0.5 %    ABS. NEUTROPHILS 16.9 (H) 1.8 - 8.0 K/UL    ABS. LYMPHOCYTES 0.2 (L) 0.8 - 3.5 K/UL    ABS. MONOCYTES 0.5 0.0 - 1.0 K/UL    ABS. EOSINOPHILS 0.4 0.0 - 0.4 K/UL    ABS. BASOPHILS 0.0 0.0 - 0.1 K/UL    ABS. IMM.  GRANS. 0.0 0.00 - 0.04 K/UL    DF MANUAL      RBC COMMENTS ANISOCYTOSIS  1+        WBC COMMENTS TOXIC GRANULATION     METABOLIC PANEL, COMPREHENSIVE    Collection Time: 11/30/21 11:19 AM   Result Value Ref Range    Sodium 132 (L) 136 - 145 mmol/L    Potassium 2.9 (L) 3.5 - 5.1 mmol/L    Chloride 92 (L) 97 - 108 mmol/L    CO2 22 21 - 32 mmol/L    Anion gap 18 (H) 5 - 15 mmol/L    Glucose 211 (H) 65 - 100 mg/dL     (H) 6 - 20 MG/DL    Creatinine 8.52 (H) 0.70 - 1.30 MG/DL    BUN/Creatinine ratio 13 12 - 20      GFR est AA 8 (L) >60 ml/min/1.73m2    GFR est non-AA 7 (L) >60 ml/min/1.73m2    Calcium 8.1 (L) 8.5 - 10.1 MG/DL    Bilirubin, total 0.8 0.2 - 1.0 MG/DL    ALT (SGPT) 14 12 - 78 U/L    AST (SGOT) 12 (L) 15 - 37 U/L    Alk.  phosphatase 262 (H) 45 - 117 U/L    Protein, total 6.9 6.4 - 8.2 g/dL    Albumin 1.8 (L) 3.5 - 5.0 g/dL    Globulin 5.1 (H) 2.0 - 4.0 g/dL    A-G Ratio 0.4 (L) 1.1 - 2.2     CK    Collection Time: 11/30/21 11:19 AM   Result Value Ref Range    CK 69 39 - 308 U/L   TSH 3RD GENERATION    Collection Time: 11/30/21 11:19 AM   Result Value Ref Range    TSH 1.03 0.36 - 3.74 uIU/mL   MAGNESIUM    Collection Time: 11/30/21 11:19 AM   Result Value Ref Range    Magnesium 2.3 1.6 - 2.4 mg/dL   NT-PRO BNP    Collection Time: 11/30/21 11:19 AM   Result Value Ref Range    NT pro-BNP >35,000 (H) <125 PG/ML   URINALYSIS W/ REFLEX CULTURE    Collection Time: 11/30/21  3:24 PM    Specimen: Urine   Result Value Ref Range    Color YELLOW/STRAW      Appearance CLOUDY (A) CLEAR      Specific gravity 1.016 1.003 - 1.030      pH (UA) 5.0 5.0 - 8.0      Protein 100 (A) NEG mg/dL    Glucose 100 (A) NEG mg/dL    Ketone Negative NEG mg/dL    Bilirubin Negative NEG      Blood TRACE (A) NEG      Urobilinogen 1.0 0.2 - 1.0 EU/dL    Nitrites Negative NEG      Leukocyte Esterase SMALL (A) NEG      WBC 10-20 0 - 4 /hpf    RBC 5-10 0 - 5 /hpf    Epithelial cells MODERATE (A) FEW /lpf    Bacteria 1+ (A) NEG /hpf    UA:UC IF INDICATED URINE CULTURE ORDERED (A) CNI      Mucus 1+ (A) NEG /lpf    Amorphous Crystals 2+ (A) NEG    Granular cast 0-2 (A) NEG /lpf   COVID-19 RAPID TEST    Collection Time: 11/30/21  4:32 PM   Result Value Ref Range    Specimen source Nasopharyngeal      COVID-19 rapid test Not detected NOTD     GLUCOSE, POC    Collection Time: 11/30/21  4:46 PM   Result Value Ref Range    Glucose (POC) 151 (H) 65 - 117 mg/dL    Performed by Evelyn Mcrae MSN    GLUCOSE, POC    Collection Time: 11/30/21  9:25 PM   Result Value Ref Range    Glucose (POC) 141 (H) 65 - 117 mg/dL    Performed by Sri Hanson (CON)        Radiologic Studies -   XR CHEST PORT   Final Result   No change no acute findings. CT Results  (Last 48 hours)    None        CXR Results  (Last 48 hours)               11/30/21 1132  XR CHEST PORT Final result    Impression:  No change no acute findings. Narrative:  Clinical indication: Weakness. Portable AP view of the chest obtained, comparison December 7, 2020. Inspiration   is shallow heart size is prominent no acute infiltrate. Medical Decision Making   I, Nadya Mei MD am the first provider for this patient, and I am the attending of record for this patient encounter. I reviewed the vital signs, available nursing notes, past medical history, past surgical history, family history and social history. Vital Signs-Reviewed the patient's vital signs. Patient Vitals for the past 24 hrs:   Temp Pulse Resp BP SpO2   11/30/21 2030 99.7 °F (37.6 °C) 100 16 104/66 97 %   11/30/21 1824    (!) 159/64    11/30/21 1730 98.2 °F (36.8 °C) (!) 103 16 104/86 97 %   11/30/21 1645  68  100/67    11/30/21 1432    96/61    11/30/21 1300    100/72    11/30/21 1200    (!) 109/46    11/30/21 1130    (!) 75/48    11/30/21 1100    92/66    11/30/21 1046    (!) 110/46    11/30/21 1036 98.3 °F (36.8 °C) 67 16 97/64 97 %     Records Reviewed: Nursing Notes, Old Medical Records and Previous Laboratory Studies    Provider Notes (Medical Decision Making):   Patient has low normal BP, which he reports is \"normal\" for him as baseline BP run around SBP 90s. He has not tachycardic, afebrile. On exam, he appears disheveled, lethargic and chronically ill. Nontoxic.   Differential diagnosis considered include sepsis from UTI versus pneumonia versus other source, dehydration, uremia from inadequate peritoneal dialysis, CO2 narcosis, electrolyte derangements, CHF versus worsening renal failure in light of bilateral lower extremity edema, rhabdomyolysis, thyroid derangements, etc. Will check CBC, CMP, CPK, TSH, magnesium, VBG, BNP, UA, and chest x-ray. Blood work reveal leukocytosis of 18,000, unclear significance as patient does not endorse any infectious-like symptoms. Possible component of hemoconcentration. It appears that he has previously also had leukocytosis in December 2020. Hypokalemia of 2.9, which is supplemented with 40 mEq of p.o. KCl. SULEMAN on CKD with creatinine of 8.52, BUN significantly elevated at 110. proBNP is greater than 35,000. Chest x-ray without acute findings. UA pending at the time of my evaluation. Patient is not able to ambulate, with significant decline in functional status. He currently lives alone and has no assistance at home with his care aside from his girlfriend. He is not able to care for himself in a safe manner, and would benefit from inpatient admission, PT/OT, metabolic optimization, etc.  His case was discussed with the hospitalist, who will come evaluate him for admission. ED Course:   Initial assessment performed. The patient's presenting problems have been discussed, and they are in agreement with the care plan formulated and outlined with them. I have encouraged them to ask questions as they arise throughout their visit. Everton Regan MD      Disposition:  Admit      Diagnosis     Clinical Impression:   1. Ambulatory dysfunction    2. Generalized weakness    3. Hypokalemia    4. Acute kidney injury superimposed on chronic kidney disease ([facility] [address]    5. SIRS (systemic inflammatory response syndrome) ([facility])    6.  PAD (peripheral artery disease) ([facility])        Attestations:    Everton Regan MD    Please note that this dictation was completed with Dragon, the computer voice recognition software. Quite often unanticipated grammatical, syntax, homophones, and other interpretive errors are inadvertently transcribed by the computer software. Please disregard these errors. Please excuse any errors that have escaped final proofreading. Thank you.

## 2021-11-30 NOTE — ED NOTES
Pt. Requesting pain medications. Spoke with Dr. Otto Montemayor.  Verbal orders received at this time.

## 2021-11-30 NOTE — ED NOTES
Pt. Resting comfortably in bed at this time with call bell in reach. Pt. With no complaints and updated on plan of care.

## 2021-11-30 NOTE — H&P
Hospitalist Admission Note    NAME: Zoran Charles   :  1970   MRN:  [id number]     Date/Time:  2021 3:39 PM    Patient PCP: Autumn Gracia PA-C  ______________________________________________________________________  Given the patient's current clinical presentation, I have a high level of concern for decompensation if discharged from the emergency department. Complex decision making was performed, which includes reviewing the patient's available past medical records, laboratory results, and x-ray films. My assessment of this patient's clinical condition and my plan of care is as follows. Assessment / Plan:  Generalized weakness  Chronic hypotension  Leukocytosis, ? Due to stress induced vs underlining infection  Unclear etiology, however pt has had multiple joint pain and myalgias for years. Will check a lactate, Bcx, Ucx to r/o infection. Check covid test (pls offer covid vaccine prior to discharge if he is neg)  CXR neg  He follows with Dr. Mesfin Pepper -rheumatology, will request records  PT/OT, likely needs IPR at discharge    T2DM  Cont reduced dose of lantus  SSI    Anasarca/acute on chronic systolic HF  ESRD/Hypokalemia, consult nephrology for PD, replete KCl  CAD/mild to mod AS   e follows with Dr Chelsea Herbert by Dr Ronna Garcia in 2020. States he has been off all antihypertensives due to hypotension. He is still taking bumex, will start IV bumex 2mg BID  Start midodrine  Check Echo  Probnp>35K  Consulting cardiology  Strict I&Os    PAD  Gangrenous/necrosis fingers  Cont' plavix/asa  Consulting vascular surg for further recs  Wound care    Gout  Stable. Hx of L heel OM  BKA recommended    Code Status: Full  Surrogate Decision Maker: pt's girlfriend  DVT Prophylaxis: heparin  GI Prophylaxis: not indicated  Baseline: from home. Subjective:   CHIEF COMPLAINT: weakness, myalgias    HISTORY OF PRESENT ILLNESS:     Zoran Charles is a 49 y.o.    male with PMHx significant for CAD, systolic HF, gout, chornic pain, T2DM, PAD, tobacco use, presents to the ER c/o generalized weakness and myalgias. He states he has had ongoing myalgias and generalized weakness, started 4 years ago. Pt takes medrol pack monthly and he would feel better after a few doses, however symptoms reoccurred after he gets off med. Per pt, he has been evaluated by rheumatologist outpt with extensive workup without a diagnosis. was told he did not have RA however was started on plaquenil. Pt states the med did nothing for him so he discontinued it. Pt was told his PCP recommended no further steroids moving forward. Pt states he came to the ER today due to severe weakness, unable to get out of chair. He denies any fever, chills, cp. No n/v/d. We were asked to admit for work up and evaluation of the above problems.      Past Medical History:   Diagnosis Date    Allergic rhinitis 11/19/2009    Anemia     CAD (coronary artery disease)     Chronic kidney disease     stage 4    Chronic obstructive pulmonary disease (HCC)     Congestive heart failure (HCC)     chronic left sided congestive heart failure    DM (diabetes mellitus) ([address] 11/19/2009    type 2    GERD (gastroesophageal reflux disease)     HTN (hypertension), benign 11/19/2009    Hypercholesteremia 11/19/2009    LBBB (left bundle branch block)     Obesity 11/19/2009    Pulmonary HTN ([address] 06/2020    mild     Tobacco abuse 11/19/2009    Weakness generalized         Past Surgical History:   Procedure Laterality Date    HX APPENDECTOMY  2004    IR INSERT NON TUNL CVC LESS THAN 5 YR  9/16/2020    IR INSERT TUNL CVC W/O PORT OVER 5 YR  9/23/2020    ND CARDIAC SURG PROCEDURE UNLIST  06/11/2020    echo ef 25-30% down from 40% on 04/19       Social History     Tobacco Use    Smoking status: Current Every Day Smoker     Packs/day: 0.50     Years: 25.00     Pack years: 12.50     Types: Cigarettes    Smokeless tobacco: Former User Quit date: 8/25/1995   Substance Use Topics    Alcohol use: Not Currently     Comment: pt quit  10/19        Family History   Problem Relation Age of Onset    Hypertension Father     Heart Disease Father     Hypertension Mother      No Known Allergies     Prior to Admission medications    Medication Sig Start Date End Date Taking? Authorizing Provider   cefUROXime (CEFTIN) 500 mg tablet Take 1 Tab by mouth two (2) times a day. 12/12/20   Farnaz Bates MD   carvediloL (COREG) 12.5 mg tablet Take 12.5 mg by mouth two (2) times daily (with meals). Provider, Historical   predniSONE (STERAPRED DS) 10 mg dose pack Take  by mouth See Admin Instructions. See administration instruction per 10mg dose pack    Provider, Historical   iron, carbonyl (FEOSOL) 45 mg tab Take 1 Tab by mouth daily. Provider, Historical   allopurinoL (ZYLOPRIM) 100 mg tablet Take 300 mg by mouth daily. Provider, Historical   sacubitriL-valsartan (Entresto) 49-51 mg tab tablet Take 1 Tab by mouth two (2) times a day. Provider, Historical   aspirin delayed-release 81 mg tablet Take 81 mg by mouth daily. Provider, Historical   omeprazole (PRILOSEC) 40 mg capsule Take 40 mg by mouth daily. Provider, Historical   dulaglutide (Trulicity) 6.60 GL/6.2 mL sub-q pen 0.75 mg by SubCUTAneous route every seven (7) days. Provider, Historical   bumetanide (BUMEX) 2 mg tablet Take 2 mg by mouth daily. Provider, Historical   magnesium oxide (MAG-OX) 400 mg tablet Take 400 mg by mouth daily. Provider, Historical   Insulin Needles, Disposable, (BD INSULIN PEN NEEDLE UF SHORT) 31 X 5/16 \" Ndle Use as directed with insulin pen 3/15/13   Rhina Hernandez MD   Blood-Glucose Meter monitoring kit Test blood sugar four times daily.  12/12/12   Rhina Hernandez MD   glucose blood VI test strips (ASCENSIA AUTODISC VI, ONE TOUCH ULTRA TEST VI) strip Test four times daily 12/12/12   hRina Hernandez MD   Lancets Misc Test four times daily 12/12/12   Lita Valencia MD       REVIEW OF SYSTEMS:     I am not able to complete the review of systems because: The patient is intubated and sedated    The patient has altered mental status due to his acute medical problems    The patient has baseline aphasia from prior stroke(s)    The patient has baseline dementia and is not reliable historian    The patient is in acute medical distress and unable to provide information           Total of 12 systems reviewed as follows:       POSITIVE= BOLD text  Negative = text not BOLD  General:  fever, chills, sweats, generalized weakness, weight loss/gain,      loss of appetite   Eyes:    blurred vision, eye pain, loss of vision, double vision  ENT:    rhinorrhea, pharyngitis   Respiratory:   cough, sputum production, SOB, GAVIN, wheezing, pleuritic pain   Cardiology:   chest pain, palpitations, orthopnea, PND, edema, syncope   Gastrointestinal:  abdominal pain , N/V, diarrhea, dysphagia, constipation, bleeding   Genitourinary:  frequency, urgency, dysuria, hematuria, incontinence   Muskuloskeletal :  arthralgia, myalgia, back pain  Hematology:  easy bruising, nose or gum bleeding, lymphadenopathy   Dermatological: rash, ulceration, pruritis, color change / jaundice  Endocrine:   hot flashes or polydipsia   Neurological:  headache, dizziness, confusion, focal weakness, paresthesia,     Speech difficulties, memory loss, gait difficulty  Psychological: Feelings of anxiety, depression, agitation    Objective:   VITALS:    Visit Vitals  BP 96/61   Pulse 67   Temp 98.3 °F (36.8 °C)   Resp 16   Ht 6' 4\" (1.93 m)   Wt 153 kg (337 lb 4.9 oz)   SpO2 97%   BMI 41.06 kg/m²       PHYSICAL EXAM:    General:    Awake, NAD  HEENT: Atraumatic, anicteric sclerae, pink conjunctivae     No oral ulcers, mucosa moist, throat clear  Neck:  Supple, symmetrical,  thyroid: non tender  Lungs:   Diminished bs b/l. No wheezing or Rhonchi. Chest wall:  No tenderness.   No accessory muscle use.  Heart:   Regular  rhythm,  No  Murmur. +++edema  Abdomen:   Anasarca, +BS. Extremities: No cyanosis. No clubbing,      Skin turgor normal, Radial dial pulse 2+. Capillary refill normal  Psych:  Not depressed. Not anxious or agitated. Neurologic: No facial asymmetry. No aphasia or slurred speech. Symmetrical strength, Sensation grossly intact. AAOx4.     _______________________________________________________________________  Care Plan discussed with:    Comments   Patient x    Family      RN x    Care Manager                    Consultant:  janie ALARCON physician   _______________________________________________________________________  Expected  Disposition:   Home with Family    HH/PT/OT/RN    SNF/LTC    JOSE M    ________________________________________________________________________  TOTAL TIME:  72 Minutes    Critical Care Provided     Minutes non procedure based      Comments    x Reviewed previous records   >50% of visit spent in counseling and coordination of care x Discussion with patient and/or family and questions answered       ________________________________________________________________________  Signed: Monica Ferrer MD    Procedures: see electronic medical records for all procedures/Xrays and details which were not copied into this note but were reviewed prior to creation of Plan.     LAB DATA REVIEWED:    Recent Results (from the past 24 hour(s))   POC VENOUS BLOOD GAS    Collection Time: 11/30/21 11:18 AM   Result Value Ref Range    pH, venous (POC) 7.33 7.32 - 7.42      pCO2, venous (POC) 45.0 41 - 51 MMHG    pO2, venous (POC) 26 25 - 40 mmHg    HCO3, venous (POC) 23.5 23.0 - 28.0 MMOL/L    sO2, venous (POC) 43.3 (L) 65 - 88 %    Base deficit, venous (POC) 2.6 mmol/L    Specimen type (POC) VENOUS BLOOD      Performed by Tim Guillermo \"Jameson\"    CBC WITH AUTOMATED DIFF    Collection Time: 11/30/21 11:19 AM   Result Value Ref Range    WBC 18.0 (H) 4.1 - 11.1 K/uL    RBC 3.75 (L) 4.10 - 5.70 M/uL HGB 10.3 (L) 12.1 - 17.0 g/dL    HCT 33.2 (L) 36.6 - 50.3 %    MCV 88.5 80.0 - 99.0 FL    MCH 27.5 26.0 - 34.0 PG    MCHC 31.0 30.0 - 36.5 g/dL    RDW 16.9 (H) 11.5 - 14.5 %    PLATELET 289 553 - 530 K/uL    MPV 9.9 8.9 - 12.9 FL    NRBC 0.0 0  WBC    ABSOLUTE NRBC 0.00 0.00 - 0.01 K/uL    NEUTROPHILS 92 (H) 32 - 75 %    BAND NEUTROPHILS 2 %    LYMPHOCYTES 1 (L) 12 - 49 %    MONOCYTES 3 (L) 5 - 13 %    EOSINOPHILS 2 0 - 7 %    BASOPHILS 0 0 - 1 %    IMMATURE GRANULOCYTES 0 0.0 - 0.5 %    ABS. NEUTROPHILS 16.9 (H) 1.8 - 8.0 K/UL    ABS. LYMPHOCYTES 0.2 (L) 0.8 - 3.5 K/UL    ABS. MONOCYTES 0.5 0.0 - 1.0 K/UL    ABS. EOSINOPHILS 0.4 0.0 - 0.4 K/UL    ABS. BASOPHILS 0.0 0.0 - 0.1 K/UL    ABS. IMM. GRANS. 0.0 0.00 - 0.04 K/UL    DF MANUAL      RBC COMMENTS ANISOCYTOSIS  1+        WBC COMMENTS TOXIC GRANULATION     METABOLIC PANEL, COMPREHENSIVE    Collection Time: 11/30/21 11:19 AM   Result Value Ref Range    Sodium 132 (L) 136 - 145 mmol/L    Potassium 2.9 (L) 3.5 - 5.1 mmol/L    Chloride 92 (L) 97 - 108 mmol/L    CO2 22 21 - 32 mmol/L    Anion gap 18 (H) 5 - 15 mmol/L    Glucose 211 (H) 65 - 100 mg/dL     (H) 6 - 20 MG/DL    Creatinine 8.52 (H) 0.70 - 1.30 MG/DL    BUN/Creatinine ratio 13 12 - 20      GFR est AA 8 (L) >60 ml/min/1.73m2    GFR est non-AA 7 (L) >60 ml/min/1.73m2    Calcium 8.1 (L) 8.5 - 10.1 MG/DL    Bilirubin, total 0.8 0.2 - 1.0 MG/DL    ALT (SGPT) 14 12 - 78 U/L    AST (SGOT) 12 (L) 15 - 37 U/L    Alk.  phosphatase 262 (H) 45 - 117 U/L    Protein, total 6.9 6.4 - 8.2 g/dL    Albumin 1.8 (L) 3.5 - 5.0 g/dL    Globulin 5.1 (H) 2.0 - 4.0 g/dL    A-G Ratio 0.4 (L) 1.1 - 2.2     CK    Collection Time: 11/30/21 11:19 AM   Result Value Ref Range    CK 69 39 - 308 U/L   TSH 3RD GENERATION    Collection Time: 11/30/21 11:19 AM   Result Value Ref Range    TSH 1.03 0.36 - 3.74 uIU/mL   MAGNESIUM    Collection Time: 11/30/21 11:19 AM   Result Value Ref Range    Magnesium 2.3 1.6 - 2.4 mg/dL   NT-PRO BNP    Collection Time: 11/30/21 11:19 AM   Result Value Ref Range    NT pro-BNP >35,000 (H) <125 PG/ML

## 2021-11-30 NOTE — PROGRESS NOTES
Pharmacy Medication Reconciliation     The patient was interviewed regarding current PTA medication list, use and drug allergies;  patient present in room and obtained permission from patient to discuss drug regimen with visitor(s) present. The patient was questioned regarding use of any other inhalers, topical products, over the counter medications, herbal medications, vitamin products or ophthalmic/nasal/otic medication use. Allergy Update: Patient has no known allergies. Recommendations/Findings: The following amendments were made to the patient's active medication list on file at [address]:   1) Additions:   +norco, plavix, toujeo, pregabalin  2) Deletions:   -entresto, prednisone, allopurinol, carvedilol, cefuroxime, diabetic supplies, feosol, magnesium oxide  3) Changes:   (Old regimen: bumetanide 2mg daily /New regimen: bumetanide 2mg bid)  Pertinent Findings:   -patient not taking hydroxychloroquine due to vision changes  -patient is not taking any valsartan,entresto, or ARBs  -patient gets iron infusions, not taking feosol    Clarified PTA med list with patient interview, rx query. PTA medication list was corrected to the following:     Prior to Admission Medications   Prescriptions Last Dose Informant Taking? HYDROcodone-acetaminophen (Norco) 5-325 mg per tablet  Self Yes   Sig: Take 1 Tablet by mouth every six (6) hours as needed for Pain. aspirin delayed-release 81 mg tablet 2021 at Unknown time Self Yes   Sig: Take 81 mg by mouth daily. bumetanide (BUMEX) 2 mg tablet 2021 at 0900 Self Yes   Sig: Take 2 mg by mouth two (2) times a day. clopidogreL (Plavix) 75 mg tab 2021 at Unknown time Self Yes   Sig: Take 75 mg by mouth daily. dulaglutide (Trulicity) 7.49 CM/2.4 mL sub-q pen  Self Yes   Si.75 mg by SubCUTAneous route every .    insulin glargine U-300 conc (Toujeo Max U-300 SoloStar) 300 unit/mL (3 mL) inpn 2021 at Unknown time Self Yes   Si Units by SubCUTAneous route Every morning. omeprazole (PRILOSEC) 40 mg capsule  Self No   Sig: Take 40 mg by mouth daily. pregabalin (LYRICA) 100 mg capsule 11/29/2021 at 2100 Self Yes   Sig: Take 100 mg by mouth two (2) times a day.       Facility-Administered Medications: None        Thank you,  RAHEEM Hernandez

## 2021-11-30 NOTE — CONSULTS
Consult acknowledged:  Mr. Merrill Hansen is a 43-year-old male with past medical history significant for insulin-dependent diabetes mellitus type 2, coronary artery disease, hypertension, hyperlipidemia, combined systolic and diastolic congestive heart failure, pulmonary hypertension, COPD, morbid obesity, end-stage renal failure, anemia, and continued tobacco use. He suffers from chronic myalgias and pain. He is followed by rheumatology. He is a well-known patient of the practice and routinely is under the care of Dr. Ernesto Etienne. 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 His wounds are routinely managed by the wound care center at [facility] [place].  Left BKA was recommended in [place] of 2021. He refused. He now presents to the hospital with generalized weakness and multiple open wounds of the extremities. He has necrosis of the right hand and fingers. He has an anasarca of his bilateral lower extremities. Per his own report he has not left his recliner for 10 days. He presents with leukocytosis of 18,000. He suffers from chronic hypotension. His heart rate and respiratory rate are normal.  He is afebrile. We have been asked to evaluate necrosis of the right hand and fingers and ulceration of the left heel. · No need for urgent procedural intervention. We will obtain upper extremity PVRs. Tentative plan for right upper extremity arteriogram on Thursday. No need for anticoagulation in the interim. Left BKA continues to be recommended for left heel calcaneus osteomyelitis. Formal consult to follow in the a.m.

## 2021-11-30 NOTE — ED NOTES
Pt. Presents to ED today for complaints of hip pain that started 10 days ago which has now gone down both of his legs and is going to his arms. Patient reports becoming increasingly weak over these past 10 days. Pt. Is a peritoneal dialysis patient and will need to empty at 9pm.  Pt. Also has multiple wounds. Black area to tip of R 2nd finger, half of 3rd finger on R hand is black, L foot wounds with heel amputation. Pt. Followed by wound clinic at [facility].  Pt. States he has been sitting in a recliner for 10 days. Pt. Alert and oriented x4. Pt. Placed in position of comfort with call bell in reach.

## 2021-11-30 NOTE — CONSULTS
Consultation Note    NAME: Antoinette Méndez   :  1970   MRN:  [id number]    Date/Time:  2021 6:00 PM    I have been asked to see this patient by Dr. Ines Palafox  for advice/opinion re: ESRD. Assessment :    Plan:  ESRD- [facility] - Dr. Albino Mulligan  PVD  Anemia  Hypotension - chronic  Volume overload Plan for [facility] with 2.5% bags. Will need to watch BP. D/W [place] PD RN.    H/H at goal.  Hold MARINO. Replete K po. Subjective:   CHIEF COMPLAINT:  \"I'm weak. \"    HISTORY OF PRESENT ILLNESS:     Beena Cheng is a 49 y.o.   male who has a history of ESRD on [facility]CP[facility] with Dr. Rommel Thrasher admitted with weakness and hypotension. I talked with his PD RN who reports that the patient is compliant with his PD (his machine is monitored remotely). He says that he has been unable to get out of his recliner for the past 10 days. He says that he has been using mostly 2.5% bags. An occasional 4.25%.       Past Medical History:   Diagnosis Date    Allergic rhinitis 2009    Anemia     CAD (coronary artery disease)     Chronic kidney disease     stage 4    Chronic obstructive pulmonary disease (HCC)     Congestive heart failure (HCC)     chronic left sided congestive heart failure    DM (diabetes mellitus) ([address] 2009    type 2    GERD (gastroesophageal reflux disease)     HTN (hypertension), benign 2009    Hypercholesteremia 2009    LBBB (left bundle branch block)     Obesity 2009    Pulmonary HTN ([address] 2020    mild     Tobacco abuse 2009    Weakness generalized       Past Surgical History:   Procedure Laterality Date    HX APPENDECTOMY  2004    IR INSERT NON TUNL CVC LESS THAN 5 YR  2020    IR INSERT TUNL CVC W/O PORT OVER 5 YR  2020    AR CARDIAC SURG PROCEDURE UNLIST  2020    echo ef 25-30% down from 40% on      Social History     Tobacco Use    Smoking status: Current Every Day Smoker     Packs/day: 0.50     Years: 25.00     Pack years: 12.50     Types: Cigarettes    Smokeless tobacco: Former User     Quit date: 8/25/1995   Substance Use Topics    Alcohol use: Not Currently     Comment: pt quit  10/19      Family History   Problem Relation Age of Onset    Hypertension Father     Heart Disease Father     Hypertension Mother       No Known Allergies   Prior to Admission medications    Medication Sig Start Date End Date Taking? Authorizing Provider   clopidogreL (Plavix) 75 mg tab Take 75 mg by mouth daily. Yes Provider, Historical   insulin glargine U-300 conc (Toujeo Max U-300 SoloStar) 300 unit/mL (3 mL) inpn 40 Units by SubCUTAneous route Every morning. Yes Provider, Historical   pregabalin (LYRICA) 100 mg capsule Take 100 mg by mouth two (2) times a day. Yes Provider, Historical   HYDROcodone-acetaminophen (Norco) 5-325 mg per tablet Take 1 Tablet by mouth every six (6) hours as needed for Pain. Yes Provider, Historical   aspirin delayed-release 81 mg tablet Take 81 mg by mouth daily. Yes Provider, Historical   dulaglutide (Trulicity) 7.11 DN/3.6 mL sub-q pen 0.75 mg by SubCUTAneous route every Sunday. Yes Provider, Historical   bumetanide (BUMEX) 2 mg tablet Take 2 mg by mouth two (2) times a day. Yes Provider, Historical   omeprazole (PRILOSEC) 40 mg capsule Take 40 mg by mouth daily.     Provider, Historical     REVIEW OF SYSTEMS:     []  Unable to obtain reliable ROS due to  [] mental status  [] sedated   [] intubated   [x] Total of 12 systems reviewed as follows:  Constitutional: negative fever, negative chills, negative weight loss; + weakness  Eyes:   negative visual changes  ENT:   negative sore throat, tongue or lip swelling  Respiratory:  negative cough, negative dyspnea  Cards:  negative for chest pain, palpitations, pos lower extremity edema  GI:   negative for nausea, vomiting, diarrhea, and abdominal pain  :  negative for frequency, dysuria  Integument:  negative for rash and pruritus  Heme:  negative for easy bruising and gum/nose bleeding  Musculoskel: negative for myalgias,  back pain and muscle weakness  Neuro:  negative for headaches, dizziness, vertigo  Psych:  negative for feelings of anxiety, depression   Travel?: none    Objective:   VITALS:    Visit Vitals  /86   Pulse (!) 103   Temp 98.2 °F (36.8 °C)   Resp 16   Ht 6' 4\" (1.93 m)   Wt 153 kg (337 lb 4.9 oz)   SpO2 97%   BMI 41.06 kg/m²     PHYSICAL EXAM:  Gen:  []  WD []  WN  [] cachectic []  thin []  obese []  disheveled             []  ill apearing  []   Critical  [x]   Chronic    [x]  No acute distress    HEENT:   [x] NC/AT/PERRL    [x] pink conjunctivae      [] pale conjunctivae                  PERRL  [] yes  [] no      [] moist mucosa    [] dry mucosa    hearing intact to voice [] yes  [] No                 NECK:   supple [x] yes  [] no        masses [] yes  [x] No               thyroid  []  non tender  []  tender    RESP:   [x] CTA bilaterally/no wheezing/rhonchi/rales/crackles    [] rhonchi bilaterally - no dullness  [] wheezing   [] rhonchi   [] crackles     use of accessory muscles [] yes [] no    CARD:   [x]  regular rate and rhythm/No murmurs/rubs/gallops    murmur  [] yes ()  [] no      Rubs  [] yes  [] no       Gallops [] yes  [] no    Rate []  regular  []  irregular        carotid bruits  [] Right  []  Left                 LE edema [x] yes  [] no           JVP  []  yes   []  no    ABD:    [x] soft/non distended/non tender/+bowel sounds/no HSM    []  Rigid    tenderness [] yes [] no   Liver enlargement  []  yes []  no                Spleen enlargement  []  yes []  no     distended []  yes [] no     bowel sound  [] hypoactive   [] hyperactive    LYMPH:    Neck []  yes [x]  no       Axillae []  yes [x]  no    SKIN:   Rashes []  yes   [x]  no    Ulcers []  yes   [x]  no               [] tight to palpitation    skin turgor []  good  [] poor  [] decreased               Cyanosis/clubbing []  yes []  no    NEUR:   [x] cranial nerves II-XII grossly intact       [] Cranial nerves deficit                 []  facial droop    []  slurred speech   [] aphasic     [] Strength normal     []  weakness  []  LUE  []   RUE/ []  LLE  []   RLE    follows commands  [x]  yes []  no           PSYCH:   insight [] poor [x] good   Alert and Oriented to  [x] person  [x] place  [x]  time                    [] depressed [] anxious [] agitated  [] lethargic [] stuporous  [] sedated     LAB DATA REVIEWED:    Recent Labs     11/30/21  1119   WBC 18.0*   HGB 10.3*   HCT 33.2*        Recent Labs     11/30/21  1119   *   K 2.9*   CL 92*   CO2 22   *   CREA 8.52*   *   CA 8.1*   MG 2.3     Recent Labs     11/30/21  1119   ALT 14   *   TBILI 0.8   ALB 1.8*   GLOB 5.1*     No results for input(s): INR, PTP, APTT, INREXT in the last 72 hours. No results for input(s): FE, TIBC, PSAT, FERR in the last 72 hours. No results for input(s): PH, PCO2, PO2 in the last 72 hours.   Recent Labs     11/30/21  1119   CPK 69     Lab Results   Component Value Date/Time    Glucose (POC) 151 (H) 11/30/2021 04:46 PM    Glucose (POC) 111 (H) 01/07/2021 09:36 AM    Glucose (POC) 182 (H) 12/12/2020 11:43 AM    Glucose (POC) 143 (H) 12/12/2020 05:55 AM    Glucose (POC) 208 (H) 12/12/2020 12:02 AM       Procedures: see electronic medical records for all procedures/Xrays and details which were not copied into this note but were reviewed prior to creation of Plan.    ________________________________________________________________________       ___________________________________________________  Consulting Physician: Letty Kiser MD

## 2021-12-01 ENCOUNTER — APPOINTMENT (OUTPATIENT)
Dept: NON INVASIVE DIAGNOSTICS | Age: 51
DRG: 291 | End: 2021-12-01
Attending: INTERNAL MEDICINE
Payer: COMMERCIAL

## 2021-12-01 PROBLEM — I50.32 DIASTOLIC CHF, CHRONIC (HCC): Status: ACTIVE | Noted: 2021-12-01

## 2021-12-01 LAB
ANION GAP SERPL CALC-SCNC: 19 MMOL/L (ref 5–15)
BASOPHILS # BLD: 0 K/UL (ref 0–0.1)
BASOPHILS NFR BLD: 0 % (ref 0–1)
BUN SERPL-MCNC: 116 MG/DL (ref 6–20)
BUN/CREAT SERPL: 13 (ref 12–20)
CALCIUM SERPL-MCNC: 8.1 MG/DL (ref 8.5–10.1)
CHLORIDE SERPL-SCNC: 94 MMOL/L (ref 97–108)
CO2 SERPL-SCNC: 19 MMOL/L (ref 21–32)
CREAT SERPL-MCNC: 8.73 MG/DL (ref 0.7–1.3)
DIFFERENTIAL METHOD BLD: ABNORMAL
ECHO AO ROOT DIAM: 3.1 CM
ECHO AV AREA PEAK VELOCITY: 1.17 CM2
ECHO AV AREA/BSA PEAK VELOCITY: 0.4 CM2/M2
ECHO AV MEAN GRADIENT: 12.64 MMHG
ECHO AV PEAK GRADIENT: 18.55 MMHG
ECHO AV PEAK VELOCITY: 215.17 CM/S
ECHO AV VTI: 29.29 CM
ECHO EST RA PRESSURE: 10 MMHG
ECHO LA MAJOR AXIS: 4.22 CM
ECHO LA MINOR AXIS: 1.52 CM
ECHO LV INTERNAL DIMENSION DIASTOLIC: 6.36 CM (ref 4.2–5.9)
ECHO LV INTERNAL DIMENSION SYSTOLIC: 5.13 CM
ECHO LV IVSD: 0.92 CM (ref 0.6–1)
ECHO LV MASS 2D: 267.5 G (ref 88–224)
ECHO LV MASS INDEX 2D: 96.6 G/M2 (ref 49–115)
ECHO LV POSTERIOR WALL DIASTOLIC: 1.05 CM (ref 0.6–1)
ECHO LVOT DIAM: 2.24 CM
ECHO LVOT PEAK GRADIENT: 1.63 MMHG
ECHO LVOT PEAK VELOCITY: 63.85 CM/S
ECHO PV MAX VELOCITY: 114.56 CM/S
ECHO PV PEAK INSTANTANEOUS GRADIENT SYSTOLIC: 5.25 MMHG
ECHO RIGHT VENTRICULAR SYSTOLIC PRESSURE (RVSP): 38.21 MMHG
ECHO TV REGURGITANT MAX VELOCITY: 265.56 CM/S
ECHO TV REGURGITANT PEAK GRADIENT: 28.21 MMHG
EOSINOPHIL # BLD: 0.2 K/UL (ref 0–0.4)
EOSINOPHIL NFR BLD: 1 % (ref 0–7)
ERYTHROCYTE [DISTWIDTH] IN BLOOD BY AUTOMATED COUNT: 17 % (ref 11.5–14.5)
GLUCOSE BLD STRIP.AUTO-MCNC: 192 MG/DL (ref 65–117)
GLUCOSE BLD STRIP.AUTO-MCNC: 238 MG/DL (ref 65–117)
GLUCOSE BLD STRIP.AUTO-MCNC: 238 MG/DL (ref 65–117)
GLUCOSE BLD STRIP.AUTO-MCNC: 248 MG/DL (ref 65–117)
GLUCOSE SERPL-MCNC: 169 MG/DL (ref 65–100)
HBV SURFACE AB SER QL: NONREACTIVE
HBV SURFACE AB SER-ACNC: <3.1 MIU/ML
HBV SURFACE AG SER QL: 0.11 INDEX
HBV SURFACE AG SER QL: NEGATIVE
HCT VFR BLD AUTO: 31.4 % (ref 36.6–50.3)
HGB BLD-MCNC: 9.8 G/DL (ref 12.1–17)
IMM GRANULOCYTES # BLD AUTO: 0.2 K/UL (ref 0–0.04)
IMM GRANULOCYTES NFR BLD AUTO: 1 % (ref 0–0.5)
LYMPHOCYTES # BLD: 0.7 K/UL (ref 0.8–3.5)
LYMPHOCYTES NFR BLD: 4 % (ref 12–49)
MCH RBC QN AUTO: 27.2 PG (ref 26–34)
MCHC RBC AUTO-ENTMCNC: 31.2 G/DL (ref 30–36.5)
MCV RBC AUTO: 87.2 FL (ref 80–99)
MONOCYTES # BLD: 0.8 K/UL (ref 0–1)
MONOCYTES NFR BLD: 5 % (ref 5–13)
NEUTS SEG # BLD: 14.5 K/UL (ref 1.8–8)
NEUTS SEG NFR BLD: 89 % (ref 32–75)
NRBC # BLD: 0 K/UL (ref 0–0.01)
NRBC BLD-RTO: 0 PER 100 WBC
PLATELET # BLD AUTO: 275 K/UL (ref 150–400)
PMV BLD AUTO: 9.7 FL (ref 8.9–12.9)
POTASSIUM SERPL-SCNC: 3.1 MMOL/L (ref 3.5–5.1)
RBC # BLD AUTO: 3.6 M/UL (ref 4.1–5.7)
RBC MORPH BLD: ABNORMAL
SERVICE CMNT-IMP: ABNORMAL
SODIUM SERPL-SCNC: 132 MMOL/L (ref 136–145)
WBC # BLD AUTO: 16.4 K/UL (ref 4.1–11.1)

## 2021-12-01 PROCEDURE — 97535 SELF CARE MNGMENT TRAINING: CPT

## 2021-12-01 PROCEDURE — 74011000258 HC RX REV CODE- 258: Performed by: INTERNAL MEDICINE

## 2021-12-01 PROCEDURE — 97165 OT EVAL LOW COMPLEX 30 MIN: CPT

## 2021-12-01 PROCEDURE — 80048 BASIC METABOLIC PNL TOTAL CA: CPT

## 2021-12-01 PROCEDURE — 99223 1ST HOSP IP/OBS HIGH 75: CPT | Performed by: INTERNAL MEDICINE

## 2021-12-01 PROCEDURE — 65660000000 HC RM CCU STEPDOWN

## 2021-12-01 PROCEDURE — 74011636637 HC RX REV CODE- 636/637: Performed by: INTERNAL MEDICINE

## 2021-12-01 PROCEDURE — 82962 GLUCOSE BLOOD TEST: CPT

## 2021-12-01 PROCEDURE — 97530 THERAPEUTIC ACTIVITIES: CPT

## 2021-12-01 PROCEDURE — C8929 TTE W OR WO FOL WCON,DOPPLER: HCPCS

## 2021-12-01 PROCEDURE — 97116 GAIT TRAINING THERAPY: CPT

## 2021-12-01 PROCEDURE — 74011250636 HC RX REV CODE- 250/636: Performed by: INTERNAL MEDICINE

## 2021-12-01 PROCEDURE — 86706 HEP B SURFACE ANTIBODY: CPT

## 2021-12-01 PROCEDURE — 87340 HEPATITIS B SURFACE AG IA: CPT

## 2021-12-01 PROCEDURE — 93306 TTE W/DOPPLER COMPLETE: CPT | Performed by: INTERNAL MEDICINE

## 2021-12-01 PROCEDURE — 99223 1ST HOSP IP/OBS HIGH 75: CPT | Performed by: NURSE PRACTITIONER

## 2021-12-01 PROCEDURE — 74011250637 HC RX REV CODE- 250/637: Performed by: INTERNAL MEDICINE

## 2021-12-01 PROCEDURE — 90945 DIALYSIS ONE EVALUATION: CPT

## 2021-12-01 PROCEDURE — A4726 DIALYS SOL FLD VOL > 5999CC: HCPCS | Performed by: INTERNAL MEDICINE

## 2021-12-01 PROCEDURE — 97161 PT EVAL LOW COMPLEX 20 MIN: CPT

## 2021-12-01 PROCEDURE — 74011000250 HC RX REV CODE- 250: Performed by: INTERNAL MEDICINE

## 2021-12-01 PROCEDURE — 85025 COMPLETE CBC W/AUTO DIFF WBC: CPT

## 2021-12-01 PROCEDURE — 36415 COLL VENOUS BLD VENIPUNCTURE: CPT

## 2021-12-01 RX ORDER — PREDNISONE 20 MG/1
20 TABLET ORAL
Status: DISCONTINUED | OUTPATIENT
Start: 2021-12-01 | End: 2021-12-03 | Stop reason: HOSPADM

## 2021-12-01 RX ORDER — POTASSIUM CHLORIDE 20 MEQ/1
40 TABLET, EXTENDED RELEASE ORAL
Status: COMPLETED | OUTPATIENT
Start: 2021-12-01 | End: 2021-12-01

## 2021-12-01 RX ADMIN — CEFTRIAXONE 1 G: 1 INJECTION, POWDER, FOR SOLUTION INTRAMUSCULAR; INTRAVENOUS at 09:29

## 2021-12-01 RX ADMIN — HYDROMORPHONE HYDROCHLORIDE 1 MG: 1 INJECTION, SOLUTION INTRAMUSCULAR; INTRAVENOUS; SUBCUTANEOUS at 02:24

## 2021-12-01 RX ADMIN — Medication 10 ML: at 17:35

## 2021-12-01 RX ADMIN — MIDODRINE HYDROCHLORIDE 5 MG: 5 TABLET ORAL at 09:24

## 2021-12-01 RX ADMIN — HEPARIN SODIUM 7500 UNITS: 5000 INJECTION INTRAVENOUS; SUBCUTANEOUS at 17:31

## 2021-12-01 RX ADMIN — EPOETIN ALFA-EPBX 6000 UNITS: 3000 INJECTION, SOLUTION INTRAVENOUS; SUBCUTANEOUS at 22:58

## 2021-12-01 RX ADMIN — INSULIN LISPRO 2 UNITS: 100 INJECTION, SOLUTION INTRAVENOUS; SUBCUTANEOUS at 23:01

## 2021-12-01 RX ADMIN — MIDODRINE HYDROCHLORIDE 5 MG: 5 TABLET ORAL at 17:33

## 2021-12-01 RX ADMIN — HEPARIN SODIUM 7500 UNITS: 5000 INJECTION INTRAVENOUS; SUBCUTANEOUS at 22:58

## 2021-12-01 RX ADMIN — ASPIRIN 81 MG: 81 TABLET, COATED ORAL at 09:24

## 2021-12-01 RX ADMIN — POTASSIUM CHLORIDE 40 MEQ: 20 TABLET, EXTENDED RELEASE ORAL at 09:29

## 2021-12-01 RX ADMIN — HEPARIN SODIUM 7500 UNITS: 5000 INJECTION INTRAVENOUS; SUBCUTANEOUS at 09:24

## 2021-12-01 RX ADMIN — PREDNISONE 20 MG: 20 TABLET ORAL at 09:28

## 2021-12-01 RX ADMIN — SODIUM CHLORIDE, SODIUM LACTATE, CALCIUM CHLORIDE, MAGNESIUM CHLORIDE AND DEXTROSE 6000 ML: 1.5; 538; 448; 18.3; 5.08 INJECTION, SOLUTION INTRAPERITONEAL at 16:44

## 2021-12-01 RX ADMIN — SODIUM CHLORIDE, SODIUM LACTATE, CALCIUM CHLORIDE, MAGNESIUM CHLORIDE AND DEXTROSE 6000 ML: 1.5; 538; 448; 18.3; 5.08 INJECTION, SOLUTION INTRAPERITONEAL at 22:59

## 2021-12-01 RX ADMIN — SODIUM CHLORIDE, SODIUM LACTATE, CALCIUM CHLORIDE, MAGNESIUM CHLORIDE AND DEXTROSE 6000 ML: 2.5; 538; 448; 18.3; 5.08 INJECTION, SOLUTION INTRAPERITONEAL at 16:45

## 2021-12-01 RX ADMIN — SODIUM CHLORIDE, SODIUM LACTATE, CALCIUM CHLORIDE, MAGNESIUM CHLORIDE AND DEXTROSE 6000 ML: 2.5; 538; 448; 18.3; 5.08 INJECTION, SOLUTION INTRAPERITONEAL at 22:59

## 2021-12-01 RX ADMIN — INSULIN GLARGINE 25 UNITS: 100 INJECTION, SOLUTION SUBCUTANEOUS at 09:22

## 2021-12-01 RX ADMIN — MIDODRINE HYDROCHLORIDE 5 MG: 5 TABLET ORAL at 12:36

## 2021-12-01 RX ADMIN — INSULIN LISPRO 2 UNITS: 100 INJECTION, SOLUTION INTRAVENOUS; SUBCUTANEOUS at 07:30

## 2021-12-01 RX ADMIN — BUMETANIDE 2 MG: 0.25 INJECTION INTRAMUSCULAR; INTRAVENOUS at 17:29

## 2021-12-01 RX ADMIN — INSULIN LISPRO 3 UNITS: 100 INJECTION, SOLUTION INTRAVENOUS; SUBCUTANEOUS at 17:34

## 2021-12-01 RX ADMIN — GENTAMICIN SULFATE: 1 CREAM TOPICAL at 16:43

## 2021-12-01 RX ADMIN — INSULIN LISPRO 3 UNITS: 100 INJECTION, SOLUTION INTRAVENOUS; SUBCUTANEOUS at 12:33

## 2021-12-01 RX ADMIN — Medication 5 ML: at 22:58

## 2021-12-01 RX ADMIN — PERFLUTREN 2 ML: 6.52 INJECTION, SUSPENSION INTRAVENOUS at 12:04

## 2021-12-01 RX ADMIN — HYDROMORPHONE HYDROCHLORIDE 1 MG: 1 INJECTION, SOLUTION INTRAMUSCULAR; INTRAVENOUS; SUBCUTANEOUS at 12:30

## 2021-12-01 RX ADMIN — SODIUM CHLORIDE, SODIUM LACTATE, CALCIUM CHLORIDE, MAGNESIUM CHLORIDE AND DEXTROSE 6000 ML: 2.5; 538; 448; 18.3; 5.08 INJECTION, SOLUTION INTRAPERITONEAL at 16:44

## 2021-12-01 RX ADMIN — CLOPIDOGREL BISULFATE 75 MG: 75 TABLET ORAL at 09:24

## 2021-12-01 NOTE — CONSULTS
[facility]     Date of  Admission: 11/30/2021 10:27 AM     Admission type:Emergency    Referral for: CHF  Referral by: Dr. Bud Gregg      Subjective:     Linnea Perry is a 49 y.o. male with a PMHx of ESRD on CCPD, DM2,HTN, HLD, anemia secondary to CKD, obesity, gout, chronic systolic CHF with EF 31%, pulmonary HTN, active tobacco useadmitted for SIRS (systemic inflammatory response syndrome) ([facility] [address] [R65.10]. Per admitting provider, the patient presented with complaints of weakness, myalgias. Pt takes medrol pack monthly and he would feel better after a few doses, however symptoms reoccurred after he gets off med. RA evaluated him, started on plaquenil, patient reports didn't work, no longer taking. Upon assessment, Mr. Sandie Gamble states he came in because \"I have spent 10 days sitting in a chair because my pain in my hips and arms are so bad all I can do is stand to pee\". He reports he called EMS. He states\" I just want my hips and arms to stop hurting\". He reports they have tried oxycodone which he takes every three hours at home, morphine and dilaudid which don't work. He says \"I have been worked up for RA, lupus, and arthritis, it isn't any of that\". He also reports \"prednisone is the only thing that works. \" He denies any shortness of breath, diaphoresis, chest pain/tightness/discomfort. He endorses chronic leg swelling. He also states \"I have been in heart failure for 6 years\". He reports he has not taken covid vaccines, expressed no intention of taking them. He reports medication compliance. He states he used to drink but quit 3 years ago, states\" my health went to hell ever since I quit drinking, so I'm about to start back\". He also states he used to smoke 5 PPD, now just 1/2 PPD. He is an established patient of Dr. Fransisca Chadwick. Records requested. ECHO 9/2020 EF 55-60%, mod. Concentric hypertrophy mild/mod. AS, mild/mod PHTN. CORBY 12/2020;  There is hyperemic flow at the left ankle. There is evidence of arterial obstruction at the left foot or digit level. Cardiac risk factors: smoking/ tobacco exposure, family history, dyslipidemia, diabetes mellitus, obesity, sedentary life style, male gender, hypertension, stress, ESRD.     Patient Active Problem List    Diagnosis Date Noted    [facility], [facility] 12/01/2021    SIRS (systemic inflammatory response syndrome) ([facility] [address] 11/30/2021    Diabetic foot infection ([facility] [address] 12/07/2020    Acute on chronic renal failure ([facility] [address] 09/16/2020    Congestive heart failure (HCC)     Chronic obstructive pulmonary disease (HCC)     Anemia     Weakness generalized     Thrombocytopenia (HCC)     Acute respiratory failure with hypoxia (HCC)     Stage 5 chronic kidney disease not on chronic dialysis ([facility] [address] 09/02/2020    Obesity, morbid ([facility] [address] 08/19/2020    Allergic rhinitis 11/19/2009    Tobacco abuse 11/19/2009    Obesity 11/19/2009    HTN (hypertension), benign 11/19/2009    DM (diabetes mellitus) ([facility] [address] 11/19/2009    Hypercholesteremia 11/19/2009      Abilio Stewart PA-C  Past Medical History:   Diagnosis Date    Allergic rhinitis 11/19/2009    Anemia     CAD (coronary artery disease)     Chronic kidney disease     stage 4    Chronic obstructive pulmonary disease (HCC)     Congestive heart failure (HCC)     chronic left sided congestive heart failure    DM (diabetes mellitus) ([facility] [address] 11/19/2009    type 2    GERD (gastroesophageal reflux disease)     HTN (hypertension), benign 11/19/2009    Hypercholesteremia 11/19/2009    LBBB (left bundle branch block)     Obesity 11/19/2009    Pulmonary HTN ([address] 06/2020    mild     Tobacco abuse 11/19/2009    Weakness generalized       Social History     Socioeconomic History    Marital status: SINGLE   Tobacco Use    Smoking status: Current Every Day Smoker     Packs/day: 0.50     Years: 25.00     Pack years: 12.50     Types: Cigarettes    Smokeless tobacco: Former User     Quit date: 8/25/1995   Substance and Sexual Activity    Alcohol use: Not Currently     Comment: pt quit  10/19    Drug use: Never    Sexual activity: Yes     Partners: Female     No Known Allergies   Family History   Problem Relation Age of Onset    Hypertension Father     Heart Disease Father     Hypertension Mother       Current Facility-Administered Medications   Medication Dose Route Frequency    cefTRIAXone (ROCEPHIN) 1 g in 0.9% sodium chloride (MBP/ADV) 50 mL MBP  1 g IntraVENous Q24H    predniSONE (DELTASONE) tablet 20 mg  20 mg Oral DAILY WITH BREAKFAST    epoetin brad-epbx (RETACRIT) injection 6,000 Units  6,000 Units SubCUTAneous ONCE    ondansetron (ZOFRAN) injection 4 mg  4 mg IntraVENous Q4H PRN    sodium chloride (NS) flush 5-40 mL  5-40 mL IntraVENous Q8H    sodium chloride (NS) flush 5-40 mL  5-40 mL IntraVENous PRN    acetaminophen (TYLENOL) tablet 650 mg  650 mg Oral Q6H PRN    Or    acetaminophen (TYLENOL) suppository 650 mg  650 mg Rectal Q6H PRN    polyethylene glycol (MIRALAX) packet 17 g  17 g Oral DAILY PRN    ondansetron (ZOFRAN ODT) tablet 4 mg  4 mg Oral Q8H PRN    Or    ondansetron (ZOFRAN) injection 4 mg  4 mg IntraVENous Q6H PRN    heparin (porcine) injection 7,500 Units  7,500 Units SubCUTAneous Q8H    clopidogreL (PLAVIX) tablet 75 mg  75 mg Oral DAILY    aspirin delayed-release tablet 81 mg  81 mg Oral DAILY    insulin lispro (HUMALOG) injection   SubCUTAneous AC&HS    glucose chewable tablet 16 g  4 Tablet Oral PRN    dextrose (D50W) injection syrg 12.5-25 g  12.5-25 g IntraVENous PRN    glucagon (GLUCAGEN) injection 1 mg  1 mg IntraMUSCular PRN    insulin glargine (LANTUS) injection 25 Units  25 Units SubCUTAneous DAILY    midodrine (PROAMATINE) tablet 5 mg  5 mg Oral TID WITH MEALS    nicotine (NICODERM CQ) 21 mg/24 hr patch 1 Patch  1 Patch TransDERmal DAILY    bumetanide (BUMEX) injection 2 mg  2 mg IntraVENous BID    HYDROmorphone (DILAUDID) injection 1 mg  1 mg IntraVENous Q4H PRN    peritoneal dialysis DEXTROSE 1.5% (2.5 mEq/L low calcium) solution 6,000 mL  6,000 mL IntraPERitoneal DAILY    peritoneal dialysis DEXTROSE 2.5% (2.5 mEq/L low calcium) solution 6,000 mL  6,000 mL IntraPERitoneal DAILY    peritoneal dialysis DEXTROSE 2.5% (2.5 mEq/L low calcium) solution 6,000 mL  6,000 mL IntraPERitoneal DAILY    gentamicin (GARAMYCIN) 0.1 % cream   Topical DAILY PRN          Review of Symptoms:  Constitutional: generalized weakness   Eyes: negative  Ears, nose, mouth, throat, and face: negative  Respiratory: negative  Cardiovascular: negative  Gastrointestinal: negative  Genitourinary:negative  Musculoskeletal:myalgia  Neurological: negative  Behvioral/Psych: negative  Endocrine: negative            Objective:      Visit Vitals  /64 (BP 1 Location: Right upper arm, BP Patient Position: At rest)   Pulse (!) 113   Temp 98 °F (36.7 °C)   Resp 18   Ht 6' 4\" (1.93 m)   Wt 153 kg (337 lb 4.9 oz)   SpO2 90%   BMI 41.06 kg/m²       Physical Assessment:   General Appearance:  alert, cooperative, obese  male; appears older than stated age   Eyes: sclera anicteric  Mouth/Throat: moist mucous membranes; oral pharynx clear  Neck: supple; no JVD or bruit  Pulmonary:  clear to auscultation bilaterally; good effort  Cardiovascular: rapid rate and regular rhythm; no murmur, click, rub, or gallop  Abdomen: soft, non-tender, non-distended; bowel sounds normal  Musculoskeletal: no swelling or deformity; moves all extremities  Extremities: 2+ edema right LE, 3+ edema LLE  Skin: dry, cracked on lower extremities, necrotic areas   Neuro: grossly normal  Psych: flat affect       Data Review:   Recent Labs     12/01/21 0228 11/30/21  1119   WBC 16.4* 18.0*   HGB 9.8* 10.3*   HCT 31.4* 33.2*    253     Recent Labs     12/01/21 0228 11/30/21  1119   * 132*   K 3.1* 2.9*   CL 94* 92*   CO2 19* 22   * 211*   * 110*   CREA 8.73* 8.52*   CA 8.1* 8.1*   MG  --  2.3   ALB  --  1.8*   TBILI  --  0.8   ALT  --  14       Recent Labs     11/30/21  1119   CPK 69         Intake/Output Summary (Last 24 hours) at 12/1/2021 1324  Last data filed at 12/1/2021 0752  Gross per 24 hour   Intake    Output 1324 ml   Net -1324 ml        Cardiographics    Telemetry: ST 1 degree AVB LBBB  Echocardiogram: 9/17/2020  LVEF 55-60%, mod. Concentric hypertrophy. Mild/mod AS,mild/mod PHTN  CXRAY: \"Inspiration is shallow heart size is prominent no acute infiltrate. \"       Assessment:       Principal Problem:    SIRS (systemic inflammatory response syndrome) ([facility] [address] (11/30/2021)    Active Problems:    Tobacco abuse (11/19/2009)      DM (diabetes mellitus) ([facility] [address] (11/19/2009)      Hypercholesteremia (11/19/2009)      Stage 5 chronic kidney disease not on chronic dialysis ([facility] [address] (5/5/2904)      Diastolic CHF, chronic ([facility] [address] (12/1/2021)         Plan:   Bryanna Perry is a 49 y.o. male with a PMHx of ESRD on CCPD, DM2,HTN, HLD, anemia secondary to CKD, obesity, gout, chronic systolic CHF with EF 42%, pulmonary HTN, active tobacco useadmitted for SIRS (systemic inflammatory response syndrome) ([facility] [address] [R65.10]. Generalized weakness  Leukocytosis, ? Due to stress induced vs underlining infection  Unclear etiology, however pt has had multiple joint pain and myalgias   Seen by rheumatology as outpatient  Cxs pending as per internal medicine   Covid negative   CXR neg     DM II:  Manage as per attending  A1C 6.1     ESRD/Hypokalemia:  Nephrology following      Anasarca/acute on chronic  DHF:   Obtain ECHO  Continue to monitor telemetry  Prior as above  Continue Bumex agree with change to IV   ESRD on peritoneal dialysis  NT pro BNP greater than 35K      Mild to mod AS as seen on ECHO 9/2020:   He follows with Dr Hinojosa Reason he has been off all antihypertensives due to hypotension, but had been on Entresto and Coreg.     Records requested from office   Continue IV bumex 2mg BID  Check Echo  Strict I&Os, daily weights, labs     PAD  Gangrenous/necrosis fingers  Continue plavix/asa  Wound care   Followed by vascular surgery with consultation noted    Tobacco abuse:  Patient reports down to 1/2 PPD  On nicotine patch     Chronic hypotension:  Currently off anti-hypertensives  Continue IV Bumex, monitor  Also on midodrine 5 mg TID    Chronic anemia due to renal failure:  H/H at baseline, monitor  On epo as per nephrology     Will await ECHO results, thanks for allowing us to partner in the care of this patient. Josephine Fung NP    Patient seen and examined by me with the above nurse practitioner. I personally performed all components of the history, physical, and medical decision making and agree with the assessment and plan with minor modifications as noted. Today the patient presents with primary complaint of generalized weakness with multiple medical problems as noted above. Followed by Dr. Mike Davis for history of what sounds like systolic heart failure, but with last echo in our system showing concentric LVH, normal LVEF, and mild to moderate AS. General PE  Gen:  NAD  Mental Status - Alert. General Appearance - Not in acute distress. HEENT:  PERRL, no carotid bruits or JVD  Chest and Lung Exam   Inspection: Accessory muscles - No use of accessory muscles in breathing. Auscultation:   Breath sounds: - Normal.   Cardiovascular   Inspection: Jugular vein - Bilateral - Inspection Normal.   Palpation/Percussion:   Apical Impulse: - Normal.   Auscultation: Rhythm - Regular. Heart Sounds - S1 WNL and S2 WNL. No S3 or S4. Murmurs & Other Heart Sounds: Auscultation of the heart reveals - No Murmurs. Peripheral Vascular   Upper Extremity: Inspection - Bilateral - No Cyanotic nailbeds or Digital clubbing. Gangrenous fingers bandaged and followed by vascular  Lower Extremity:   Palpation: Edema - Bilateral -3+4 pitting edema with stasis dermatitis.   Abdomen: Soft, non-tender, bowel sounds are active. Neuro: A&O times 3, CN and motor grossly WNL    Check echo. Clinically suspecting combination of chronic diastolic heart failure, probable venous insufficiency with contribution from obesity leading to lower extremity changes. Agree with diuresis in addition to volume control by peritoneal dialysis. Thanks for the consult. We will follow with you.

## 2021-12-01 NOTE — CONSULTS
Palliative Medicine Consult  Geovanny: [phone] ([phone])    Patient Name: Dominga Bhat  YOB: 1970    Date of Initial Consult: 12/1/21  Reason for Consult:   Care decisions  Requesting Provider: Abraham Merino MD   Primary Care Physician: Marshall Lindsey PA-C     SUMMARY:   Dominga Bhat is a 49 y.o. with a past history of ESRD on CCPD, DM2,HTN, HLD, anemia secondary to CKD, obesity, gout, chronic systolic CHF with EF 29%, pulmonary HTN, active tobacco use, who was admitted on 11/30/2021 from home with a diagnosis of ambulatory dysfunction, generalized weakness, hypokalemia. Current medical issues leading to Palliative Medicine involvement include: Goals of care, multiple advanced co morbidities . HPI:  11/30:  Patient presented to the ER with c/o myalgias and arthralgias in both hips, BLE and BUE associated with generalized weakness with recurrent flares of similar symptoms almost monthly for a while. His symptoms always improve after a course of Medrol Dosepak. He was referred to a rheumatologist, was for rheumatoid arthritis, and told him he did not have this. He was still started on hydroxychloroquine, which he took for a month, however because it did not relieve his symptoms, he stopped taking the medication. when he has these flairs he is able to get around with his walker or crutches, however, this time he has not been able to get up and has spent the last 10 days in a recliner; his GF has been coming by every day to help set up his nightly CCPD. associated symptom is poor appetite. Also c/o multiple wounds: black area to tip of R 2nd finger, half of 3rd finger on R hand is black, L foot wounds with heel amputation.   he is followed by outpatient wound clinic at [facility].          reviewed and reveals pt is filling 30 day supply of opioids from 2 different providers since Sept 2021, with the last fills as follows:  11/29: Shadia Gails 5/325mg #90 tab/30 days supply [facility]  11/15: pregabalin 100mg #60 tab/30 days [facility]  11/15: Tyl #2 #90 tab/30 days supply     Psychosocial history: Single, has a girlfriend, he has 1 child, son Teresa Tovar. 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       PALLIATIVE DIAGNOSES:   1. Arthralgias   2. Myalgias   3. Poor appetite  4. Generalized weakness  5. Pitting BLE edema/anasarca  6. Leukocytosis (18.0 on adm which pt reports is chronic)  7. Anemia of chronic disease (hgb 10.3 on adm)  8. Hyponatremia (132 on adm)  9. Hypokalemia (2.9 on adm)  10. ESRD on CCPD (Cr 8.52 on adm)  11. Fluid overload (pro-BNP >35,000)  12. Chronic hypotension   13. PAD gangrenous/necrotic fingers  14. Left heel/calcaneus osteomyelitis (BKA recommended Jan 2021)  15. Care decisions     PLAN:   1. Prior to visit, I completed an extensive review of patient's medical records, including medical documentation, vital signs, MARs, and results of various labs and other diagnostics. I also spoke with patient's attending . I also spoke with AKASH Edwards at PCP's office ([phone]); unfortunately Lorri Hansen NP and Melissa ODEN were both out of the office today; I asked that one of them call me back tomorrow to discuss what options we can use manage this pt's pain for when he is discharged, and to ask what has been ruled out; it appears he possibly has some sort of immunologic problem. 2. PAIN:  Met with pt who was lying stiffly in bed, with subjective and objective signs of pain (lying stiffly, grimaces with movement, guarding): he reports he has had this pain for ~6 years, started with his hips initially, now affects his hips, arms and legs, specifically his muscles, sometimes pain is throbbing, mostly achy. He saw a rheumatologist who ruled out RA. He has been prescribed tylenol #2 and hydrocodone 5/325mg, neither drug helps with the pain; he only gets relief with prednisone.   He has received three 1mg doses of IV dilaudid, none of which helped his pain. 1. Prednisone 20mg daily  3. Will address goals of care tomorrow as pt is in too much discomfort to have this discussion. 4. Initial consult note routed to primary continuity provider and/or primary health care team members  5. Communicated plan of care with: Palliative [facility] Team     GOALS OF CARE / TREATMENT PREFERENCES:     GOALS OF CARE:  Patient/Health Care Proxy Stated Goals: Prolong life    TREATMENT PREFERENCES:   Code Status: Full Code    Patient and family's personal goals include: returning home    Important upcoming milestones or family events: none    The patient identifies the following as important for living well: reduced pain      Advance Care Planning:  [x] [facility] Interdisciplinary Team has updated the ACP Navigator with Health Care Decision Maker and Patient Capacity      Primary Decision Maker: Sonu Gee - [phone]  Advance Care Planning 12/1/2021   Patient's Healthcare Decision Maker is: Legal Next of [facility] [address] Directive None   Patient Would Like to Complete Advance Directive Yes               Other:    As far as possible, the palliative care team has discussed with patient / health care proxy about goals of care / treatment preferences for patient.      HISTORY:     History obtained from: chart, patient    CHIEF COMPLAINT: pain       The patient is:   [x] Verbal and participatory  [] Non-participatory due to:      Clinical Pain Assessment (nonverbal scale for severity on nonverbal patients):   Clinical Pain Assessment  Severity: 7  Location: both hips and all 4 extremities  Character: throbbing, aching  Duration: ~6 years  Effect: mostly immobile  Factors: movement, only prednisone has helped with his pain  Frequency: constant when not taking prednisone     Activity (Movement): Restless, excessive activity and/or withdrawal reflexes    Duration: for how long has pt been experiencing pain (e.g., 2 days, 1 month, years)  Frequency: how often pain is an issue (e.g., several times per day, once every few days, constant)     FUNCTIONAL ASSESSMENT:     Palliative Performance Scale (PPS):  PPS: 20       PSYCHOSOCIAL/SPIRITUAL SCREENING:     Palliative IDT has assessed this patient for cultural preferences / practices and a referral made as appropriate to needs (Cultural Services, Patient Advocacy, Ethics, etc.)    Any spiritual / Ep[employer]alian concerns:  [] Yes /  [x] No    Caregiver Burnout:  [] Yes /  [x] No /  [] No Caregiver Present      Anticipatory grief assessment:   [x] Normal  / [] Maladaptive       ESAS Anxiety: Anxiety: 0    ESAS Depression: Depression: 2        REVIEW OF SYSTEMS:     Positive and pertinent negative findings in ROS are noted above in HPI. The following systems were [x] reviewed / [] unable to be reviewed as noted in HPI  Other findings are noted below. Systems: constitutional, ears/nose/mouth/throat, respiratory, gastrointestinal, genitourinary, musculoskeletal, integumentary, neurologic, psychiatric, endocrine. Positive findings noted below. Modified ESAS Completed by: provider   Fatigue: 8 Drowsiness: 0   Depression: 2 Pain: 7   Anxiety: 0 Nausea: 0   Anorexia: 0 Dyspnea: 0     Constipation: No              PHYSICAL EXAM:     From RN flowsheet:  Wt Readings from Last 3 Encounters:   12/01/21 337 lb 4.9 oz (153 kg)   01/07/21 298 lb 11.6 oz (135.5 kg)   12/12/20 283 lb (128.4 kg)     Blood pressure (!) 97/54, pulse 95, temperature 97.6 °F (36.4 °C), resp. rate 20, height 6' 4\" (1.93 m), weight 337 lb 4.9 oz (153 kg), SpO2 92 %.     Pain Scale 1: Numeric (0 - 10)  Pain Intensity 1: 0     Pain Location 1: Back  Pain Orientation 1: Mid  Pain Description 1: Aching  Pain Intervention(s) 1: Medication (see MAR)  Last bowel movement, if known:     Constitutional: WD, WN, AAOx4, objective and subjective signs of pain, ill appearing  Eyes: pupils equal, anicteric  ENMT: no nasal discharge, moist mucous membranes  Cardiovascular: regular rhythm, distal pulses intact  Respiratory: breathing not labored, symmetric  Gastrointestinal: soft non-tender, +bowel sounds, PD catheter with clean exit site  Musculoskeletal: no deformity, no tenderness to palpation  Skin: warm, dry, large BLE edema, clean dressing around R 2nd finger, half of 3rd finger on R hand is black, L foot wounds with heel amputation.    Neurologic: following commands, moving all extremities  Psychiatric: full affect, no hallucinations          HISTORY:     Principal Problem:    SIRS (systemic inflammatory response syndrome) ([address] (11/30/2021)    Active Problems:    Tobacco abuse (11/19/2009)      DM (diabetes mellitus) ([address] (11/19/2009)      Hypercholesteremia (11/19/2009)      Stage 5 chronic kidney disease not on chronic dialysis ([address] (1/1/4394)      Diastolic CHF, chronic ([address] (12/1/2021)      Past Medical History:   Diagnosis Date    Allergic rhinitis 11/19/2009    Anemia     CAD (coronary artery disease)     Chronic kidney disease     stage 4    Chronic obstructive pulmonary disease (HCC)     Congestive heart failure (HCC)     chronic left sided congestive heart failure    DM (diabetes mellitus) ([address] 11/19/2009    type 2    GERD (gastroesophageal reflux disease)     HTN (hypertension), benign 11/19/2009    Hypercholesteremia 11/19/2009    LBBB (left bundle branch block)     Obesity 11/19/2009    Pulmonary HTN ([address] 06/2020    mild     Tobacco abuse 11/19/2009    Weakness generalized       Past Surgical History:   Procedure Laterality Date    HX APPENDECTOMY  2004    IR INSERT NON TUNL CVC LESS THAN 5 YR  9/16/2020    IR INSERT TUNL CVC W/O PORT OVER 5 YR  9/23/2020    TN CARDIAC SURG PROCEDURE UNLIST  06/11/2020    echo ef 25-30% down from 40% on 04/19      Family History   Problem Relation Age of Onset    Hypertension Father     Heart Disease Father     Hypertension Mother       History reviewed, no pertinent family history.   Social History     Tobacco Use    Smoking status: Current Every Day Smoker     Packs/day: 0.50     Years: 25.00     Pack years: 12.50     Types: Cigarettes    Smokeless tobacco: Former User     Quit date: 8/25/1995   Substance Use Topics    Alcohol use: Not Currently     Comment: pt quit  10/19     No Known Allergies   Current Facility-Administered Medications   Medication Dose Route Frequency    oxyCODONE IR (ROXICODONE) tablet 5 mg  5 mg Oral Q6H PRN    insulin glargine (LANTUS) injection 30 Units  30 Units SubCUTAneous DAILY    predniSONE (DELTASONE) tablet 20 mg  20 mg Oral DAILY WITH BREAKFAST    ondansetron (ZOFRAN) injection 4 mg  4 mg IntraVENous Q4H PRN    sodium chloride (NS) flush 5-40 mL  5-40 mL IntraVENous Q8H    sodium chloride (NS) flush 5-40 mL  5-40 mL IntraVENous PRN    acetaminophen (TYLENOL) tablet 650 mg  650 mg Oral Q6H PRN    Or    acetaminophen (TYLENOL) suppository 650 mg  650 mg Rectal Q6H PRN    polyethylene glycol (MIRALAX) packet 17 g  17 g Oral DAILY PRN    ondansetron (ZOFRAN ODT) tablet 4 mg  4 mg Oral Q8H PRN    Or    ondansetron (ZOFRAN) injection 4 mg  4 mg IntraVENous Q6H PRN    heparin (porcine) injection 7,500 Units  7,500 Units SubCUTAneous Q8H    clopidogreL (PLAVIX) tablet 75 mg  75 mg Oral DAILY    aspirin delayed-release tablet 81 mg  81 mg Oral DAILY    insulin lispro (HUMALOG) injection   SubCUTAneous AC&HS    glucose chewable tablet 16 g  4 Tablet Oral PRN    dextrose (D50W) injection syrg 12.5-25 g  12.5-25 g IntraVENous PRN    glucagon (GLUCAGEN) injection 1 mg  1 mg IntraMUSCular PRN    midodrine (PROAMATINE) tablet 5 mg  5 mg Oral TID WITH MEALS    nicotine (NICODERM CQ) 21 mg/24 hr patch 1 Patch  1 Patch TransDERmal DAILY    bumetanide (BUMEX) injection 2 mg  2 mg IntraVENous BID    peritoneal dialysis DEXTROSE 1.5% (2.5 mEq/L low calcium) solution 6,000 mL  6,000 mL IntraPERitoneal DAILY    peritoneal dialysis DEXTROSE 2.5% (2.5 mEq/L low calcium) solution 6,000 mL  6,000 mL IntraPERitoneal DAILY    peritoneal dialysis DEXTROSE 2.5% (2.5 mEq/L low calcium) solution 6,000 mL  6,000 mL IntraPERitoneal DAILY    gentamicin (GARAMYCIN) 0.1 % cream   Topical DAILY PRN          LAB AND IMAGING FINDINGS:     Lab Results   Component Value Date/Time    WBC 14.3 (H) 12/02/2021 06:36 AM    HGB 9.8 (L) 12/02/2021 06:36 AM    PLATELET 518 72/44/4739 06:36 AM     Lab Results   Component Value Date/Time    Sodium 132 (L) 12/02/2021 06:36 AM    Potassium 3.3 (L) 12/02/2021 06:36 AM    Chloride 95 (L) 12/02/2021 06:36 AM    CO2 18 (L) 12/02/2021 06:36 AM     (H) 12/02/2021 06:36 AM    Creatinine 8.52 (H) 12/02/2021 06:36 AM    Calcium 8.4 (L) 12/02/2021 06:36 AM    Magnesium 2.3 11/30/2021 11:19 AM    Phosphorus 3.8 09/21/2020 12:52 AM      Lab Results   Component Value Date/Time    Alk. phosphatase 262 (H) 11/30/2021 11:19 AM    Protein, total 6.9 11/30/2021 11:19 AM    Albumin 1.8 (L) 11/30/2021 11:19 AM    Globulin 5.1 (H) 11/30/2021 11:19 AM     No results found for: INR, PTMR, PTP, PT1, PT2, APTT, INREXT, INREXT   Lab Results   Component Value Date/Time    Iron 45 09/17/2020 01:33 AM    TIBC 152 (L) 09/17/2020 01:33 AM    Iron % saturation 30 09/17/2020 01:33 AM    Ferritin 264 09/17/2020 01:33 AM      No results found for: PH, PCO2, PO2  No components found for: [place]   Lab Results   Component Value Date/Time    CK 69 11/30/2021 11:19 AM                Total time: 90  Counseling / coordination time, spent as noted above: 75  > 50% counseling / coordination?: yes    Prolonged service was provided for  []30 min   []75 min in face to face time in the presence of the patient, spent as noted above. Time Start:   Time End:   Note: this can only be billed with 67262 (initial) or 75751 (follow up). If multiple start / stop times, list each separately.

## 2021-12-01 NOTE — DIALYSIS
Peritoneal Dialysis Disconnection / [phone]     Metrics   I-Drain: 1759 ml   Total UF: 1124 ml   Last Fill: 2000 ml   Last Manual Drain: 0 ml   Net UF:         883 ml   Avg Dwell Time: 1:06   Lost Dwell Time: 1:13   Alarms: None   Effluent: Clear, Yellow     Access   Type & Location:    Comments: Prior to disconnection, one-minute Alcavis scrub performed. Sterile mini cap applied and secured to abdomen. Dsg clean, dry and intact. Dsg date: 11/30/21                                         Safety:   Primary Nurse Rpt Pre: Phoebe Demarco RN   Primary Nurse Rpt Post: Phoebe Demarco RN     Comments:   Orders, consent, pt, and code status confirmed. Tx completed. Used cassette and bags discarded in red biohazard bag. Education and pre/post report given to primary RN.

## 2021-12-01 NOTE — PROGRESS NOTES
Problem: Mobility Impaired (Adult and Pediatric)  Goal: *Acute Goals and Plan of Care (Insert Text)  Description: FUNCTIONAL STATUS PRIOR TO ADMISSION: Mod I with use of SPC for household distances however states he has been unable to ambulate x10 days. Spends all day in recliner chair, only standing with assist of chair lift in order to urinate in urinal. Reports history of multiple falls. States he was cleared to be WBAT LLE 2 months ago by surgeon at [facility]. HOME SUPPORT PRIOR TO ADMISSION: The patient lived with girlfriend who works from home and assists at needed. Physical Therapy Goals  Initiated 12/1/2021  1. Patient will move from supine to sit and sit to supine , scoot up and down, and roll side to side in bed with minimal assistance/contact guard assist within 7 day(s). 2.  Patient will transfer from bed to chair and chair to bed with minimal assistance/contact guard assist using the least restrictive device within 7 day(s). 3.  Patient will perform sit to stand with minimal assistance/contact guard assist within 7 day(s). 4.  Patient will ambulate with minimal assistance/contact guard assist for 15 feet with the least restrictive device within 7 day(s). Outcome: Progressing Towards Goal   PHYSICAL THERAPY EVALUATION  Patient: Thania Thurman (49 y.o. male)  Date: 12/1/2021  Primary Diagnosis: SIRS (systemic inflammatory response syndrome) ([facility]) [R65.10]        Precautions:   Fall, Bed Alarm, WBAT, Skin (LLE per Pt, float heels at all times)    ASSESSMENT  Based on the objective data described below, the patient presents with hx of noncompliance, decreased insight, agitation, increased bilateral hip and bilateral shoulder pain, hypotension, impaired activity tolerance, impaired standing balance, altered gait pattern, increased weakness, and overall impaired functional mobility. Pt received seated on bedside commode c/o numbness in BLEs.  Pt largely dismissive of therapist's education regarding safety and not agreeable to use of RW during transfer. Overall, pt required maxAx2 during sit>>stand transfer and brief ambulation from BSC>>EOB with HHA. Gait unsteady overall with pt exhibiting increased trunk flexion, shuffled gait, and mild increase in trunk sway. Pt largely noncompliant with medical care and dismissive of therapy, will trial 3x/wk. Recommend additional rehab at discharge however pt likely not to be agreeable. He will need HHPT w/ 24hr assist if he does decline SNF. Current Level of Function Impacting Discharge (mobility/balance): maxAx2 for sit<>stand, maxAx2 for brief ambulation BSC<>EOB    Functional Outcome Measure: The patient scored 35/100 on the Barthel Index outcome measure which is indicative of significant impairment in ADLs and functional mobility. Other factors to consider for discharge: noncompliance, decreased insight, falls risk, caregiver burden     Patient will benefit from skilled therapy intervention to address the above noted impairments. PLAN :  Recommendations and Planned Interventions: bed mobility training, transfer training, gait training, therapeutic exercises, patient and family training/education, and therapeutic activities      Frequency/Duration: Patient will be followed by physical therapy:  3 times a week to address goals. Recommendation for discharge: (in order for the patient to meet his/her long term goals)  Therapy up to 5 days/week in SNF setting    This discharge recommendation:  Has been made in collaboration with the attending provider and/or case management    IF patient discharges home will need the following DME: to be determined (TBD) however would likely benefit from RW         SUBJECTIVE:   Patient stated This is how I do it.     OBJECTIVE DATA SUMMARY:   HISTORY:    Past Medical History:   Diagnosis Date    Allergic rhinitis 11/19/2009    Anemia     CAD (coronary artery disease)     Chronic kidney disease stage 4    Chronic obstructive pulmonary disease (HCC)     Congestive heart failure (HCC)     chronic left sided congestive heart failure    DM (diabetes mellitus) ([facility] 11/19/2009    type 2    GERD (gastroesophageal reflux disease)     HTN (hypertension), benign 11/19/2009    Hypercholesteremia 11/19/2009    LBBB (left bundle branch block)     Obesity 11/19/2009    Pulmonary HTN ([facility] 06/2020    mild     Tobacco abuse 11/19/2009    Weakness generalized      Past Surgical History:   Procedure Laterality Date    HX APPENDECTOMY  2004    IR INSERT NON TUNL CVC LESS THAN 5 YR  9/16/2020    IR INSERT TUNL CVC W/O PORT OVER 5 YR  9/23/2020    OR CARDIAC SURG PROCEDURE UNLIST  06/11/2020    echo ef 25-30% down from 40% on 04/19       Personal factors and/or comorbidities impacting plan of care: DM, noncompliance     Home Situation  Home Environment: Private residence  # Steps to Enter: 3  Rails to Enter: Yes  Wheelchair Ramp: Yes  One/Two Story Residence: One story  Living Alone: No  Support Systems: Spouse/Significant Other (girlfriend who works from home)  Patient Expects to be Discharged to[de-identified] [employer]  Current DME Used/Available at Home: Lift chair, [address] [facility], [place], straight  Tub or Shower Type: Shower (built in seat)    EXAMINATION/PRESENTATION/DECISION MAKING:   Critical Behavior:  Neurologic State: Alert, Irritable  Orientation Level: Oriented X4  Cognition: Follows commands  Safety/Judgement: Decreased insight into deficits, Decreased awareness of need for safety, Decreased awareness of need for assistance, Decreased awareness of environment, Fall prevention  Hearing:   Auditory  Auditory Impairment: None  Skin:  dressings clean, dry, intact  Edema: none noted   Range Of Motion:  AROM: Grossly decreased, non-functional (RUE and LLE, RLE decreased, LUE WFL)                       Strength:    Strength: Grossly decreased, non-functional                    Tone & Sensation:   Tone: Normal              Sensation: Impaired (BLEs)               Coordination:  Coordination: Generally decreased, functional  Vision:   Acuity: Impaired near vision; Impaired far vision  Corrective Lenses: Reading glasses  Functional Mobility:  Bed Mobility:        Sit to Supine: Moderate assistance; Assist x2; Bed Modified (x1 assist for each LE, HOB elevated)  Scooting: Contact guard assistance; Additional time (toward [facility])  Transfers:  Sit to Stand: Maximum assistance; Assist x2  Stand to Sit: Maximum assistance; Assist x2        Bed to Chair: Maximum assistance; Assist x2              Balance:   Sitting: Intact; Without support  Sitting - Static: Good (unsupported)  Sitting - Dynamic: Good (unsupported) (limited d/t p! level)  Standing: Impaired; With support (HHA)  Standing - Static: Fair; Constant support  Standing - Dynamic : Poor; Constant support  Ambulation/Gait Training:  Distance (ft): 2 Feet (ft)  Assistive Device: Gait belt (HHAx2)  Ambulation - Level of Assistance: Maximum assistance; Assist x2        Gait Abnormalities: Decreased step clearance; Shuffling gait (trunk flexion)        Base of Support: Widened     Speed/Nancy: Pace decreased (<100 feet/min); Shuffled  Step Length: Left shortened; Right shortened                  Functional Measure:  Barthel Index:    Bathin  Bladder: 10  Bowels: 10  Groomin  Dressin  Feeding: 10  Mobility: 0  Stairs: 0  Toilet Use: 0  Transfer (Bed to Chair and Back): 5  Total: 35/100       The Barthel ADL Index: Guidelines  1. The index should be used as a record of what a patient does, not as a record of what a patient could do. 2. The main aim is to establish degree of independence from any help, physical or verbal, however minor and for whatever reason. 3. The need for supervision renders the patient not independent. 4. A patient's performance should be established using the best available evidence.  Asking the patient, friends/relatives and nurses are the usual sources, but direct observation and common sense are also important. However direct testing is not needed. 5. Usually the patient's performance over the preceding 24-48 hours is important, but occasionally longer periods will be relevant. 6. Middle categories imply that the patient supplies over 50 per cent of the effort. 7. Use of aids to be independent is allowed. Score Interpretation (from [address] [facility])    Independent   60-79 Minimally independent   40-59 Partially dependent   20-39 Very dependent   <20 Totally dependent     -Tia Moscoso., Barthel, IRMA. (1965). Functional evaluation: the Barthel Index. [address] ([address] (1997). The Barthel activities of daily living index: self-reporting versus actual performance in the old (> or = 75 years). Journal [address] 45(7), [address] [facility], [place], Gertrude Gutierrez., Maty Vasquez. (1999). Measuring the change in disability after inpatient rehabilitation; comparison of the responsiveness of the Barthel Index and Functional [place]argent Measure. Journal of Neurology, Neurosurgery, and Psychiatry, 66(4), 055-001. Madisyn Campos, N.J.A, RONNY Patten, & Carolin Post, M.A. (2004) Assessment of post-stroke quality of life in cost-effectiveness studies: The usefulness of the Barthel Index and the EuroQoL-5D.  Quality of Life Research, 15, 699-33           Physical Therapy Evaluation Charge Determination   History Examination Presentation Decision-Making   MEDIUM  Complexity : 1-2 comorbidities / personal factors will impact the outcome/ POC  MEDIUM Complexity : 3 Standardized tests and measures addressing body structure, function, activity limitation and / or participation in recreation  MEDIUM Complexity : Evolving with changing characteristics  MEDIUM Complexity : FOTO score of 26-74      Based on the above components, the patient evaluation is determined to be of the following complexity level: MEDIUM    Pain Rating:  Reported pain in bilateral hips however did not quantify    Activity Tolerance:   Fair    After treatment patient left in no apparent distress:   Supine in bed, Heels elevated for pressure relief, Call bell within reach, Bed / chair alarm activated, and Side rails x 3    COMMUNICATION/EDUCATION:   The patients plan of care was discussed with: Occupational therapist and Registered nurse. Fall prevention education was provided and the patient/caregiver indicated understanding., Patient/family have participated as able in goal setting and plan of care. , and Patient/family agree to work toward stated goals and plan of care.     Thank you for this referral.  Asif Edmonds, PT, DPT    Time Calculation: 15 mins

## 2021-12-01 NOTE — DIALYSIS
Peritoneal Dialysis Initiation / [phone]           Orders   Therapy Time: 9 hrs   Cycles: 5   Fill Volume: 2,000 ml   Last Fill Volume: 2,000 ml   Total Volume: 12,000 ml   Solution: (1st bag) 1.5% Dexxtrose (2nd/3rd bags) 2.5% Dextrose Dianeal             Access   Type & Location: Left abdomen saravia PD cath   Comments: No s/sx of infection. Site cleansed with ExSept followed by gentamicin cream application & gauze dressing C/D/I.     Dsg change date:12/1/21                                   Labs   HBsAg (Antigen) / date: 12/1/21 Negative   HBsAb (Antibody) / date: 12/1/21 Susceptible   Source: Epic            Safety:   Time Out Done:   1630   Consent obtained/signed: Verified   Education: Access care   Primary Nurse Rpt Pre: Zay Haines RN   Primary Nurse Rpt Post: Zay Haines RN      Comments:   Pt orders, notes, labs, code status reviewed. Consent obtained. Time out completed. Cassette primed and tested. Prior to aseptic connection, two minute Alcavis scrub/soak performed. Lines and connections visible & secured. Remained present during initial drain followed by initiation of first fill.  Prior to departure, bed in lowest position, call bell and personal belongings within reach.      Start time: 12/01/21 @ [phone]  Estimated End Time: 12/2/21 @ 2403

## 2021-12-01 NOTE — CONSULTS
Vascular Surgery Consult Note  12/1/2021    Subjective:     Mr. Pallavi Delaney is a 27-year-old male with past medical history significant for insulin-dependent diabetes mellitus type 2, coronary artery disease, hypertension, hyperlipidemia, combined systolic and diastolic congestive heart failure, pulmonary hypertension, COPD, morbid obesity, end-stage renal failure on PD, anemia, and continued tobacco use. He suffers from chronic myalgias and pain.  He is followed by rheumatology. Jf Brown is a well-known patient of the practice and routinely is under the care of Dr. Ulysses Sharonda has a chronic left heel ulceration with calcaneus osteomyelitis and is status post multiple debridements and resection of the heel.  He was discharged from the wound care center at [facility] per his report.  Left BKA was recommended in [place] of 2021. He refused. He presents to the hospital with BUE, BLE, and BL hip pain. He reports he has \"bouts\" of acute pain and weakness two times per month that leave him chair bound. He takes prednisone for a few days and the symptoms resolve enough he returns to his poor baseline which is ambulatory with a cane. He is homebound at baseline by choice. He last took steroids on 11/11/2021. On this occasion his PCP stated he would no longer prescribe steroids and he has been chair bound for 10 days. He stands to urinate and uses a BSC to defecate and otherwise remains in his recliner due to the pain and weakness. He reports he father suffers from something similar and takes prednisone 10 mg oral daily. He has tried Plaquenil in the past which he self discontinued due to blurry vision and no improvement in his s/s. On arrival he had a WBC count of 18,000 which he states his chronic for him and per his report has been evaluated on multiple occasions w/o a dx. He was afebrile w/ a nl HR and RR on arrival. He suffers from chronic hypotension which is not unusual in ESRF.   He reports his girlfriend has been changing his heel dressing every other day and there has not been any changes to his chronic wound. He denies drainage. He reports his fingers had been necrotic for more than a year following a bout of sepsis. He has mutiple petechaie of the RUE. He states this is also chronic and exacerbated by scratching of it with a metal back scratcher. He is not interested in tx for the fingers or his heal.      Past medical history  Chronic left heel osteomyelitis   -s/p multiple debridement and resection of left heel   Chronic necrosis of right fingers    Chronic lymphedema w/ venous stasis   ESRF  -PD   Anemia of chronic renal disease  Insulin dependent diabetes mellitus type II  Severe obesity  Coronary artery disease  Chronic hypotension   Hx of hypertension  Hyperlipidemia   Combined systolic and diastolic CHF  -87-80%  LBBB  Pulmonary hypertension  COPD  Tobacco use   Remote hx of alcoholism  -abstinent     Past surgical history in addition to above   PD catheter placement   Appendectomy     Family History   Problem Relation Age of Onset    Hypertension Father     Heart Disease Father     Hypertension Mother       Social History     Tobacco Use    Smoking status: Current Every Day Smoker     Packs/day: 0.50     Years: 25.00     Pack years: 12.50     Types: Cigarettes    Smokeless tobacco: Former User     Quit date: 8/25/1995   Substance Use Topics    Alcohol use: Not Currently     Comment: pt quit  10/19       Prior to Admission medications    Medication Sig Start Date End Date Taking? Authorizing Provider   clopidogreL (Plavix) 75 mg tab Take 75 mg by mouth daily. Yes Provider, Historical   insulin glargine U-300 conc (Toujeo Max U-300 SoloStar) 300 unit/mL (3 mL) inpn 40 Units by SubCUTAneous route Every morning. Yes Provider, Historical   pregabalin (LYRICA) 100 mg capsule Take 100 mg by mouth two (2) times a day.    Yes Provider, Historical   HYDROcodone-acetaminophen (Norco) 5-325 mg per tablet Take 1 Tablet by mouth every six (6) hours as needed for Pain. Yes Provider, Historical   aspirin delayed-release 81 mg tablet Take 81 mg by mouth daily. Yes Provider, Historical   dulaglutide (Trulicity) 3.41 XT/3.3 mL sub-q pen 0.75 mg by SubCUTAneous route every Sunday. Yes Provider, Historical   bumetanide (BUMEX) 2 mg tablet Take 2 mg by mouth two (2) times a day. Yes Provider, Historical   omeprazole (PRILOSEC) 40 mg capsule Take 40 mg by mouth daily. Provider, Historical     No Known Allergies     Review of Systems   Constitutional: Positive for activity change and fatigue. Negative for chills and fever. HENT: Negative for congestion. Eyes: Negative for visual disturbance. Respiratory: Positive for shortness of breath (Chronic). Negative for cough and chest tightness. Cardiovascular: Positive for leg swelling (Chronic). Negative for chest pain. Gastrointestinal: Negative for abdominal distention, nausea and vomiting. Endocrine: Negative for polydipsia and polyuria. Genitourinary: Negative. Musculoskeletal: Positive for arthralgias, gait problem and myalgias. Skin: Positive for color change and wound (left heel, right 3rd and 4th finger chronic ). Neurological: Positive for weakness (BUE and BLEs ). Psychiatric/Behavioral: Positive for dysphoric mood (Depressed).      Objective:     Patient Vitals for the past 24 hrs:   BP Temp Pulse Resp SpO2 Height Weight   12/01/21 0803 (!) 97/34     6' 4\" (1.93 m) 153 kg (337 lb 4.9 oz)   12/01/21 0733  98.8 °F (37.1 °C) 86 18 94 %     12/01/21 0356 (!) 97/34 99.2 °F (37.3 °C) (!) 101 16 98 %     11/30/21 2030 104/66 99.7 °F (37.6 °C) 100 16 97 %     11/30/21 1824 (!) 159/64         11/30/21 1730 104/86 98.2 °F (36.8 °C) (!) 103 16 97 %     11/30/21 1645 100/67  68       11/30/21 1432 96/61         11/30/21 1300 100/72  Shelly Her      11/30/21 1200 (!) 109/46         11/30/21 1130 (!) 75/48  Jeaneth Roof      11/30/21 1100 92/66         11/30/21 1046 (!) 110/46         11/30/21 1036 97/64 98.3 °F (36.8 °C) 67 16 97 % 6' 4\" (1.93 m) 153 kg (337 lb 4.9 oz)        Physical Exam  Constitutional:       Comments: Severely obese CM with a flat affect who is discheved. HENT:      Head: Normocephalic. Nose: Nose normal.      Mouth/Throat:      Mouth: Mucous membranes are moist.   Eyes:      Pupils: Pupils are equal, round, and reactive to light. Cardiovascular:      Rate and Rhythm: Normal rate and regular rhythm. Comments: His right radial pulse is not palpable. His left radial pulse is intact. He does not have palpable pulses of the BLE below the femoral level. Pulmonary:      Effort: Pulmonary effort is normal. No respiratory distress. Comments: Poor inspiratory effort. Shallow respirations. Abdominal:      General: Abdomen is flat. There is no distension. Comments: Large abd pannus    Musculoskeletal:      Cervical back: Normal range of motion. Right lower leg: Edema present. Left lower leg: Edema present. Comments: Chronic venous stasis dermatitis of the BLEs     Skin:     Coloration: Skin is pale. Comments: Bulky dressing of the left heel. Refused to have dressing removed. Dry dressing of the right 3rd finger. Refused to have dressing removed. Necrosis of the tip of the 4th finger. Petechaie rash of the RUE with evidence of scratching. Neurological:      General: No focal deficit present. Mental Status: Mental status is at baseline.        Pertinent Test Results:   Recent Results (from the past 24 hour(s))   POC VENOUS BLOOD GAS    Collection Time: 11/30/21 11:18 AM   Result Value Ref Range    pH, venous (POC) 7.33 7.32 - 7.42      pCO2, venous (POC) 45.0 41 - 51 MMHG    pO2, venous (POC) 26 25 - 40 mmHg    HCO3, venous (POC) 23.5 23.0 - 28.0 MMOL/L    sO2, venous (POC) 43.3 (L) 65 - 88 %    Base deficit, venous (POC) 2.6 mmol/L Specimen type (POC) VENOUS BLOOD      Performed by Facundo Hemphill \"Jameson\"    CBC WITH AUTOMATED DIFF    Collection Time: 11/30/21 11:19 AM   Result Value Ref Range    WBC 18.0 (H) 4.1 - 11.1 K/uL    RBC 3.75 (L) 4.10 - 5.70 M/uL    HGB 10.3 (L) 12.1 - 17.0 g/dL    HCT 33.2 (L) 36.6 - 50.3 %    MCV 88.5 80.0 - 99.0 FL    MCH 27.5 26.0 - 34.0 PG    MCHC 31.0 30.0 - 36.5 g/dL    RDW 16.9 (H) 11.5 - 14.5 %    PLATELET 924 623 - 047 K/uL    MPV 9.9 8.9 - 12.9 FL    NRBC 0.0 0  WBC    ABSOLUTE NRBC 0.00 0.00 - 0.01 K/uL    NEUTROPHILS 92 (H) 32 - 75 %    BAND NEUTROPHILS 2 %    LYMPHOCYTES 1 (L) 12 - 49 %    MONOCYTES 3 (L) 5 - 13 %    EOSINOPHILS 2 0 - 7 %    BASOPHILS 0 0 - 1 %    IMMATURE GRANULOCYTES 0 0.0 - 0.5 %    ABS. NEUTROPHILS 16.9 (H) 1.8 - 8.0 K/UL    ABS. LYMPHOCYTES 0.2 (L) 0.8 - 3.5 K/UL    ABS. MONOCYTES 0.5 0.0 - 1.0 K/UL    ABS. EOSINOPHILS 0.4 0.0 - 0.4 K/UL    ABS. BASOPHILS 0.0 0.0 - 0.1 K/UL    ABS. IMM. GRANS. 0.0 0.00 - 0.04 K/UL    DF MANUAL      RBC COMMENTS ANISOCYTOSIS  1+        WBC COMMENTS TOXIC GRANULATION     METABOLIC PANEL, COMPREHENSIVE    Collection Time: 11/30/21 11:19 AM   Result Value Ref Range    Sodium 132 (L) 136 - 145 mmol/L    Potassium 2.9 (L) 3.5 - 5.1 mmol/L    Chloride 92 (L) 97 - 108 mmol/L    CO2 22 21 - 32 mmol/L    Anion gap 18 (H) 5 - 15 mmol/L    Glucose 211 (H) 65 - 100 mg/dL     (H) 6 - 20 MG/DL    Creatinine 8.52 (H) 0.70 - 1.30 MG/DL    BUN/Creatinine ratio 13 12 - 20      GFR est AA 8 (L) >60 ml/min/1.73m2    GFR est non-AA 7 (L) >60 ml/min/1.73m2    Calcium 8.1 (L) 8.5 - 10.1 MG/DL    Bilirubin, total 0.8 0.2 - 1.0 MG/DL    ALT (SGPT) 14 12 - 78 U/L    AST (SGOT) 12 (L) 15 - 37 U/L    Alk.  phosphatase 262 (H) 45 - 117 U/L    Protein, total 6.9 6.4 - 8.2 g/dL    Albumin 1.8 (L) 3.5 - 5.0 g/dL    Globulin 5.1 (H) 2.0 - 4.0 g/dL    A-G Ratio 0.4 (L) 1.1 - 2.2     CK    Collection Time: 11/30/21 11:19 AM   Result Value Ref Range    CK 69 39 - 308 U/L TSH 3RD GENERATION    Collection Time: 11/30/21 11:19 AM   Result Value Ref Range    TSH 1.03 0.36 - 3.74 uIU/mL   MAGNESIUM    Collection Time: 11/30/21 11:19 AM   Result Value Ref Range    Magnesium 2.3 1.6 - 2.4 mg/dL   NT-PRO BNP    Collection Time: 11/30/21 11:19 AM   Result Value Ref Range    NT pro-BNP >35,000 (H) <125 PG/ML   URINALYSIS W/ REFLEX CULTURE    Collection Time: 11/30/21  3:24 PM    Specimen: Urine   Result Value Ref Range    Color YELLOW/STRAW      Appearance CLOUDY (A) CLEAR      Specific gravity 1.016 1.003 - 1.030      pH (UA) 5.0 5.0 - 8.0      Protein 100 (A) NEG mg/dL    Glucose 100 (A) NEG mg/dL    Ketone Negative NEG mg/dL    Bilirubin Negative NEG      Blood TRACE (A) NEG      Urobilinogen 1.0 0.2 - 1.0 EU/dL    Nitrites Negative NEG      Leukocyte Esterase SMALL (A) NEG      WBC 10-20 0 - 4 /hpf    RBC 5-10 0 - 5 /hpf    Epithelial cells MODERATE (A) FEW /lpf    Bacteria 1+ (A) NEG /hpf    UA:UC IF INDICATED URINE CULTURE ORDERED (A) CNI      Mucus 1+ (A) NEG /lpf    Amorphous Crystals 2+ (A) NEG    Granular cast 0-2 (A) NEG /lpf   CULTURE, BLOOD, PAIRED    Collection Time: 11/30/21  4:32 PM    Specimen: Blood   Result Value Ref Range    Special Requests: NO SPECIAL REQUESTS      Culture result: NO GROWTH AFTER 15 HOURS     COVID-19 RAPID TEST    Collection Time: 11/30/21  4:32 PM   Result Value Ref Range    Specimen source Nasopharyngeal      COVID-19 rapid test Not detected NOTD     GLUCOSE, POC    Collection Time: 11/30/21  4:46 PM   Result Value Ref Range    Glucose (POC) 151 (H) 65 - 117 mg/dL    Performed by Melinda Marques MSN    GLUCOSE, POC    Collection Time: 11/30/21  9:25 PM   Result Value Ref Range    Glucose (POC) 141 (H) 65 - 117 mg/dL    Performed by Armin BUITRAGO (UCHE)    CBC WITH AUTOMATED DIFF    Collection Time: 12/01/21  2:28 AM   Result Value Ref Range    WBC 16.4 (H) 4.1 - 11.1 K/uL    RBC 3.60 (L) 4.10 - 5.70 M/uL    HGB 9.8 (L) 12.1 - 17.0 g/dL    HCT 31.4 (L) 36.6 - 50.3 %    MCV 87.2 80.0 - 99.0 FL    MCH 27.2 26.0 - 34.0 PG    MCHC 31.2 30.0 - 36.5 g/dL    RDW 17.0 (H) 11.5 - 14.5 %    PLATELET 926 402 - 736 K/uL    MPV 9.7 8.9 - 12.9 FL    NRBC 0.0 0  WBC    ABSOLUTE NRBC 0.00 0.00 - 0.01 K/uL    NEUTROPHILS 89 (H) 32 - 75 %    LYMPHOCYTES 4 (L) 12 - 49 %    MONOCYTES 5 5 - 13 %    EOSINOPHILS 1 0 - 7 %    BASOPHILS 0 0 - 1 %    IMMATURE GRANULOCYTES 1 (H) 0.0 - 0.5 %    ABS. NEUTROPHILS 14.5 (H) 1.8 - 8.0 K/UL    ABS. LYMPHOCYTES 0.7 (L) 0.8 - 3.5 K/UL    ABS. MONOCYTES 0.8 0.0 - 1.0 K/UL    ABS. EOSINOPHILS 0.2 0.0 - 0.4 K/UL    ABS. BASOPHILS 0.0 0.0 - 0.1 K/UL    ABS. IMM. GRANS. 0.2 (H) 0.00 - 0.04 K/UL    DF SMEAR SCANNED      RBC COMMENTS ANISOCYTOSIS  1+       METABOLIC PANEL, BASIC    Collection Time: 12/01/21  2:28 AM   Result Value Ref Range    Sodium 132 (L) 136 - 145 mmol/L    Potassium 3.1 (L) 3.5 - 5.1 mmol/L    Chloride 94 (L) 97 - 108 mmol/L    CO2 19 (L) 21 - 32 mmol/L    Anion gap 19 (H) 5 - 15 mmol/L    Glucose 169 (H) 65 - 100 mg/dL     (H) 6 - 20 MG/DL    Creatinine 8.73 (H) 0.70 - 1.30 MG/DL    BUN/Creatinine ratio 13 12 - 20      GFR est AA 8 (L) >60 ml/min/1.73m2    GFR est non-AA 6 (L) >60 ml/min/1.73m2    Calcium 8.1 (L) 8.5 - 10.1 MG/DL   HEP B SURFACE AG    Collection Time: 12/01/21  2:28 AM   Result Value Ref Range    Hepatitis B surface Ag 0.11 Index    Hep B surface Ag Interp. Negative NEG     HEP B SURFACE AB    Collection Time: 12/01/21  2:28 AM   Result Value Ref Range    Hepatitis B surface Ab <3.10 mIU/mL    Hep B surface Ab Interp.  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 He recognizes that he has life limiting conditions (systolic CHF w/ anasarca, pulm HTN, ESRF w/ hypotension) and request management of his pain and weakness only. He is not interest in additional treatment of his vascular disease and has no evidence of acute infection of his RUE or LLE. His request for this treatment approach is reasonable. Palliative care has been consulted by primary team and we agree. No plan for procedural intervention. Palliative wound care of right hand and left foot recommended. Float heels at all times. Compression with ACE wrap of BLE as tolerated. Follow up as needed. Leukocytosis   -chronic per his report. Last labs for review were in 01/2021 when patient had infection. Unable to determine if this is true. Exacerbated by intermittent steroid use ? ? Does have chronic dry gangrene of fingers and chronic osteomyelitis of heel.  -intermittent low grade fever that does not meet SIRS criteria  -intermittent tachycardia with significant systolic CHF and LBBB   -CXR unremarkable   -blood cx NGTD  -did improved overnight with antibxs  -unclear if infectious etiology      ESRF  -PD   Hyponatremia  Hypokalemia   -w/ diuresis. Oral supplementation ordered. Improved overnight. Anemia of chronic renal disease  -stable   -nephrology consulted     Insulin dependent diabetes mellitus type II w/ hyperglycemia   -basal and bolus insulin   Severe obesity    Coronary artery disease  Chronic hypotension   Hx of Hypertension  Hyperlipidemia   Acute on chronic combined systolic and diastolic CHF  -echo this am  -proBNP > 35,000   Anasarca  Pulmonary hypertension  -ASA, Plavix, IV diuresis, and midodrine. No longer taking statin due to myalgias.  -cardiology consulted    COPD  Tobacco use   -smoking cessation edu completed.    -Nicoderm patch     VTE Prophylaxis:  [facility]    Disposition:  Likely home. Vascular surgery signing off. We appreciate the opportunity to participate in the care of Mr. Kyle Graham. Follow up with Dr. Tomas Parker as needed. Please re-consult as needed.      Signed By: Jani Molina NP     December 1, 2021

## 2021-12-01 NOTE — PROGRESS NOTES
Problem: Self Care Deficits Care Plan (Adult)  Goal: *Acute Goals and Plan of Care (Insert Text)  Description:   FUNCTIONAL STATUS PRIOR TO ADMISSION: Independent with ADL until about 10 days ago. Since then he has been sitting/sleeping in recliner, using urinal for toileting needs and requiring significant assist from his girlfriend for bathing/dressing. Pt stated he was cleared for WBAT LLE ~2 months ago. HOME SUPPORT: Lived with girlfriend who has been providing increased assist with BADL since recent decline. Occupational Therapy Goals  Initiated 12/1/2021  1. Patient will perform UB sponge bathing with Min A within 7 days. 2.  Patient will perform LB sponge bathing with Mod A within 7 days. 3.  Patient will perform UB dressing with Min A within 7 day(s). 4.  Patient will perform LB dressing with Mod A using AE PRN within 7 days. 5.  Patient will perform BSC/toilet transfers with Min A using least restrictive device within 7 day(s). 6.  Patient will perform all aspects of toileting with Mod A within 7 day(s). Outcome: Not Met   OCCUPATIONAL THERAPY EVALUATION  Patient: Reji Mcfadden (86 y.o. male)  Date: 12/1/2021  Primary Diagnosis: SIRS (systemic inflammatory response syndrome) ([facility]) [R65.10]       Precautions:  Fall, Bed Alarm, WBAT, Skin (LLE per Pt, float heels at all times), WBAT LLE (per Pt)    ASSESSMENT  Based on the objective data described below, the patient presents with hypotension, significant pain levels (chronic- at R shoulder, lumbar area, hips and BLEs), impaired functional mobility, poor activity tolerance and increased dependence for BADL following admission for UTI, increasing p! and worsening functional status over past 10 days. 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 Pt declined L BKA which was recommended in [address]  Vascular has signed off this admission as Pt has declined intervention. Spoke with RN who requested therapy hold assessments this morning 2/2 Pts hypotension and pending pain medication order from MD. While this therapist was walking by this Pts room to tx a different Pt on the same hallway, Mr. Angely Pepper was observed sitting on [facility] next to his hospital bed. This therapist notified RN immediately who informed therapist Pt was not to be OOB by himself and requested this therapist and PT's assistance with safely returning Pt to bed. On arrival Pt was agitated, c/o BLE numbness and refusing RW for safe transfer, yet was ultimately agreeable to therapist placing gait belt for BSC<>bed transfer with x2 hand helt assist. Pt required Max Assist x2 with sit<>stand, Total A for bowel hygiene following BM completion on [facility] and Max Assist x2 with stand pivot transfer BSC<>bed. Once seated, Pt scooted toward [address] [facility] with CGA and additional time 2/2 p! Pt initially refused therapist' assist to return supine, however then accepted x1 assist for each LE from OT/PT d/t high pain levels and grossly decreased core and BLE strength. Once return supine, Pt was not receptive to therapist education/instruction on importance of using call button to notify staff of toileting needs and to avoid transferring even to EOB w/o first calling for assistance d/t high fall risk. At onset of session Pt verbalized, \"I'm not worth a sack of horse sh*t. \" Suspect depression given multiple co-morbidities and current medical condition. Will continue to follow 3x/week on trial basis in order to facilitate Pts safety and independence with BADL and related mobility, pending his willingness to participate. Recommend SNF level rehab for safest return to PLOF, however anticipate Pt will decline. If he returns home, he will require 24/7 assist and the DME listed below.      Current Level of Function Impacting Discharge (ADLs/self-care): Requires Setup up to Total A with ADL, Mod Assist x2 bed mobility (x1 assist for each LE), and Max Assist x2 for safe stand-pivot transfers to [facility]. Functional Outcome Measure: The patient scored Total: 35/100 on the Barthel Index outcome measure which is indicative of being 65% dependent in basic self-care. Other factors to consider for discharge: High fall risk. Chronic hypotension. Multiple co-morbidities. Impulsive. Patient will benefit from skilled therapy intervention to address the above noted impairments. PLAN :  Recommendations and Planned Interventions: self care training, functional mobility training, balance training, therapeutic activities, endurance activities, patient education and home safety training    Frequency/Duration: Patient will be followed by occupational therapy Trial basis - 3 times a week to address goals. Recommendation for discharge: (in order for the patient to meet his/her long term goals)  Therapy up to 5 days/week in SNF setting    This discharge recommendation:  Has not yet been discussed the attending provider and/or case management    IF patient discharges home will need the following DME: AE: long handled bathing, AE: long handled dressing, bedside commode, hospital bed and walker: rolling       SUBJECTIVE:   Patient stated [address]ro[address] got myself here [bedside commode]. . I don't need that walker. \"    OBJECTIVE DATA SUMMARY:   HISTORY:   Past Medical History:   Diagnosis Date    Allergic rhinitis 11/19/2009    Anemia     CAD (coronary artery disease)     Chronic kidney disease     stage 4    Chronic obstructive pulmonary disease (HCC)     Congestive heart failure (HCC)     chronic left sided congestive heart failure    DM (diabetes mellitus) ([facility] 11/19/2009    type 2    GERD (gastroesophageal reflux disease)     HTN (hypertension), benign 11/19/2009    Hypercholesteremia 11/19/2009    LBBB (left bundle branch block)     Obesity 11/19/2009    Pulmonary HTN ([address] 06/2020    mild     Tobacco abuse 11/19/2009    Weakness generalized      Past Surgical History:   Procedure Laterality Date    HX APPENDECTOMY  2004    IR INSERT NON TUNL CVC LESS THAN 5 YR  9/16/2020    IR INSERT TUNL CVC W/O PORT OVER 5 YR  9/23/2020    IN CARDIAC SURG PROCEDURE UNLIST  06/11/2020    echo ef 25-30% down from 40% on 04/19       Expanded or extensive additional review of patient history: Combined systolic and diastolic congestive heart failure, pulmonary hypertension, COPD, morbid obesity, ESRF on PD, anemia. Chronic L heel ulceration with calcaneus osteomyelitis and is s/p multiple debridements and resection of L  heel. Pt declined L BKA which was recommended in [address]. Home Situation  Home Environment: Private residence  # Steps to Enter: 3  Rails to Enter: Yes  Wheelchair Ramp: Yes  One/Two Story Residence: One story  Living Alone: No  Support Systems: Spouse/Significant Other (girlfriend who works from home)  Patient Expects to be Discharged toVF Cor[de-identified]ration  Current DME Used/Available at [facility]: Lift chair, Cane, straight  Tub or Shower Type: Shower (built in seat)    Hand dominance: Right, however now uses L hand given R hand necrosis and chronic R shoulder p! EXAMINATION OF PERFORMANCE DEFICITS:  Cognitive/Behavioral Status:  Neurologic State: Alert; Irritable  Orientation Level: Oriented X4  Cognition: Decreased command following; Impulsive; Impaired decision making        Safety/Judgement: Decreased insight into deficits; Decreased awareness of need for safety; Decreased awareness of need for assistance; Decreased awareness of environment; Fall prevention    Skin: L heel and R 3rd digit ace wrappings both intact. Edema: Moderate edema BLEs observed    Hearing: Auditory  Auditory Impairment: None    Vision/Perceptual:                           Acuity: Impaired near vision;  Impaired far vision    Corrective Lenses: Reading glasses    Range of Motion:  AROM: Grossly decreased, non-functional (RUE and LLE, RLE decreased, LUE WFL)                         Strength:  Strength: Grossly decreased, non-functional                Coordination:  Coordination: Generally decreased, functional  Fine Motor Skills-Upper: Left Intact; Right Impaired (2/2 ischemia)    Gross Motor Skills-Upper: Left Intact; Right Impaired    Tone & Sensation:  Tone: Normal  Sensation: Impaired (BLEs)                      Balance:  Sitting: Intact; Without support  Sitting - Static: Good (unsupported)  Sitting - Dynamic: Good (unsupported) (limited d/t p! level)  Standing: Impaired; With support (HHA)  Standing - Static: Fair; Constant support  Standing - Dynamic : Poor; Constant support    Functional Mobility and Transfers for ADLs:  Bed Mobility:  Sit to Supine: Moderate assistance; Assist x2; Bed Modified (x1 assist for each LE, HOB elevated)  Scooting: Contact guard assistance; Additional time (toward [address])    Transfers:  Sit to Stand: Maximum assistance; Assist x2  Stand to Sit: Maximum assistance; Assist x2  Bed to Chair: Maximum assistance; Assist x2  Assistive Device :  (b/l HHA - refused RW)    ADL Assessment:  Feeding: Setup    Oral Facial Hygiene/Grooming: Setup    Bathing: Maximum assistance    Upper Body Dressing: Moderate assistance (d/t impaired R shoulder AROM)    Lower Body Dressing: Total assistance    Toileting: Total assistance (d/t p! level, grossly dec BLE strength and body habitus)       ADL Intervention and task modifications:     Facilitated Pts safety with performing stand pivot transfer from BSC<>bed after Pt impulsively transferred himself from bed<>BSC prior to OT visit using verbal cues. Educated Pt on importance of requesting staff assist prior to mobilizing even to EOB for urinal use d/t chronic hypotension and risk for falling. Bowel hygiene: Total A (squatting in front of BSC)  LB dressing: Total A (to don underpants)    Cognitive Retraining  Safety/Judgement: Decreased insight into deficits;  Decreased awareness of need for safety; Decreased awareness of need for assistance; Decreased awareness of environment; Fall prevention     Functional Measure:    Barthel Index:  Bathin  Bladder: 10  Bowels: 10  Groomin  Dressin  Feeding: 10  Mobility: 0  Stairs: 0  Toilet Use: 0  Transfer (Bed to Chair and Back): 5  Total: 35/100      The Barthel ADL Index: Guidelines  1. The index should be used as a record of what a patient does, not as a record of what a patient could do. 2. The main aim is to establish degree of independence from any help, physical or verbal, however minor and for whatever reason. 3. The need for supervision renders the patient not independent. 4. A patient's performance should be established using the best available evidence. Asking the patient, friends/relatives and nurses are the usual sources, but direct observation and common sense are also important. However direct testing is not needed. 5. Usually the patient's performance over the preceding 24-48 hours is important, but occasionally longer periods will be relevant. 6. Middle categories imply that the patient supplies over 50 per cent of the effort. 7. Use of aids to be independent is allowed. Score Interpretation (from [address] [address])    Independent   60-79 Minimally independent   40-59 Partially dependent   20-39 Very dependent   <20 Totally dependent     -Tia Moscoso., Barthel, D.W. (1965). Functional evaluation: the Barthel Index. [facility] ([address] (1997). The Barthel activities of daily living index: self-reporting versus actual performance in the old (> or = 75 years). Journal of [address] [place] [address] 45(7), [facility], J.JILYAF, Armando Wilkes.[place] (1999). Measuring the change in disability after inpatient rehabilitation; comparison of the responsiveness of the Barthel Index and Functional [place] Measure.  Journal of Neurology, Neurosurgery, and Psychiatry, 66(4), [phone]. EdMICH Givens, RONNY Patten, & Papa Larkin M.A. (2004) Assessment of post-stroke quality of life in cost-effectiveness studies: The usefulness of the Barthel Index and the EuroQoL-5D. Quality of Life Research, 15, 400-91     Occupational Therapy Evaluation Charge Determination   History Examination Decision-Making   LOW Complexity : Brief history review  MEDIUM Complexity : 3-5 performance deficits relating to physical, cognitive , or psychosocial skils that result in activity limitations and / or participation restrictions MEDIUM Complexity : Patient may present with comorbidities that affect occupational performnce. Miniml to moderate modification of tasks or assistance (eg, physical or verbal ) with assesment(s) is necessary to enable patient to complete evaluation       Based on the above components, the patient evaluation is determined to be of the following complexity level: MEDIUM  Pain Rating:  C/o significant R shoulder, lumber, b/l hip and BLE p! Pt did not rate yet this does significantly interfere with ADL and related mobility. Per RN, Pt is awaiting pain medication order from MD.     Activity Tolerance:   Poor and limited d/t pain level. After treatment patient left in no apparent distress:    Supine in bed, Heels elevated for pressure relief, Call bell within reach, Bed / chair alarm activated, and Side rails x 3    COMMUNICATION/EDUCATION:   The patients plan of care was discussed with: Physical therapist, Registered nurse, and Patient . Patient understands intent and goals of therapy, but is neutral about his/her participation.     Thank you for this referral.  Jacey Neal OTR/L  Time Calculation: 21 mins

## 2021-12-01 NOTE — DIALYSIS
Peritoneal Dialysis Initiation / [phone]           Orders   Therapy Time: 9 hrs   Cycles: 5   Fill Volume: 2,000 ml   Last Fill Volume: 2,000 ml   Total Volume: 12,000 ml   Solution: (1st bag) 1.5% Dexxtrose (2nd/3rd bags) 2.5% Dextrose Dianeal             Access   Type & Location: Left abdomen saravia PD cath   Comments: No s/sx of infection. Site cleansed with ExSept followed by gentamicin cream application & gauze dressing C/D/I.     Dsg change date: 11/30/21                                   Labs   HBsAg (Antigen) / date: Pending   HBsAb (Antibody) / date: Pending   Source: [facility]            Safety:   Time Out Done:   2155   Consent obtained/signed: Obtained   Education: Access care   Primary Nurse Rpt Pre: Jorge Alberto Mixon RN   Primary Nurse Rpt Post: Jorge Alberto Mixon RN      Comments:   Pt orders, notes, labs, code status reviewed. Consent obtained. Time out completed. Cassette primed and tested. Prior to aseptic connection, one minute Alcavis scrub performed. Lines and connections visible & secured. Remained present during initial drain followed by initiation of first fill. Per pt report currently has fluid dwelling from last nights treatment.  Prior to departure, bed in lowest position, call bell and personal belongings within reach.      Start time: 11/30/21 @ 2200  Estimated End Time: 12/1/21 @ 0700

## 2021-12-01 NOTE — PROGRESS NOTES
NAME: Mayelin Núñez        :  1970        MRN:  [id number]                   Assessment   :                                               Plan:  ESRD- CCPD - Dr. Citlaly Charles  PVD  Anemia  Hypotension - chronic  Volume overload Continue CCPD with 2.5% bags.      H/H not at goal.  Start MARINO.     Replete K po.             Subjective:     Chief Complaint:  \" I feel the same. \"  No N/V. No dyspnea. No pain. Review of Systems:    Symptom Y/N Comments  Symptom Y/N Comments   Fever/Chills    Chest Pain     Poor Appetite    Edema     Cough    Abdominal Pain     Sputum    Joint Pain     SOB/GAVIN    Pruritis/Rash     Nausea/vomit    Tolerating PT/OT     Diarrhea    Tolerating Diet     Constipation    Other       Could not obtain due to:      Objective:     VITALS:   Last 24hrs VS reviewed since prior progress note.  Most recent are:  Visit Vitals  BP (!) 104/49   Pulse 86   Temp 98.8 °F (37.1 °C)   Resp 18   Ht 6' 4\" (1.93 m)   Wt 153 kg (337 lb 4.9 oz)   SpO2 94%   BMI 41.06 kg/m²       Intake/Output Summary (Last 24 hours) at 2021 1034  Last data filed at 2021 8016  Gross per 24 hour   Intake    Output 1324 ml   Net -1324 ml      Telemetry Reviewed:     PHYSICAL EXAM:  General: NAD  Chronic edema      Lab Data Reviewed: (see below)    Medications Reviewed: (see below)    PMH/SH reviewed - no change compared to H&P  ________________________________________________________________________  Care Plan discussed with:  Patient     Family      RN     Care Manager                    Consultant:          Comments   >50% of visit spent in counseling and coordination of care       ________________________________________________________________________  Jared Guerrero MD     Procedures: see electronic medical records for all procedures/Xrays and details which  were not copied into this note but were reviewed prior to creation of Plan. LABS:  Recent Labs     12/01/21 0228 11/30/21  1119   WBC 16.4* 18.0*   HGB 9.8* 10.3*   HCT 31.4* 33.2*    253     Recent Labs     12/01/21 0228 11/30/21  1119   * 132*   K 3.1* 2.9*   CL 94* 92*   CO2 19* 22   * 110*   CREA 8.73* 8.52*   * 211*   CA 8.1* 8.1*   MG  --  2.3     Recent Labs     11/30/21  1119   *   TP 6.9   ALB 1.8*   GLOB 5.1*     No results for input(s): INR, PTP, APTT, INREXT in the last 72 hours. No results for input(s): FE, TIBC, PSAT, FERR in the last 72 hours. Lab Results   Component Value Date/Time    Folate 9.6 09/18/2020 04:00 AM      No results for input(s): PH, PCO2, PO2 in the last 72 hours.   Recent Labs     11/30/21  1119   CPK 69     No components found for: [place]  Lab Results   Component Value Date/Time    Color YELLOW/STRAW 11/30/2021 03:24 PM    Appearance CLOUDY (A) 11/30/2021 03:24 PM    Specific gravity 1.016 11/30/2021 03:24 PM    pH (UA) 5.0 11/30/2021 03:24 PM    Protein 100 (A) 11/30/2021 03:24 PM    Glucose 100 (A) 11/30/2021 03:24 PM    Ketone Negative 11/30/2021 03:24 PM    Bilirubin Negative 11/30/2021 03:24 PM    Urobilinogen 1.0 11/30/2021 03:24 PM    Nitrites Negative 11/30/2021 03:24 PM    Leukocyte Esterase SMALL (A) 11/30/2021 03:24 PM    Epithelial cells MODERATE (A) 11/30/2021 03:24 PM    Bacteria 1+ (A) 11/30/2021 03:24 PM    WBC 10-20 11/30/2021 03:24 PM    RBC 5-10 11/30/2021 03:24 PM       MEDICATIONS:  Current Facility-Administered Medications   Medication Dose Route Frequency    cefTRIAXone (ROCEPHIN) 1 g in 0.9% sodium chloride (MBP/ADV) 50 mL MBP  1 g IntraVENous Q24H    predniSONE (DELTASONE) tablet 20 mg  20 mg Oral DAILY WITH BREAKFAST    ondansetron (ZOFRAN) injection 4 mg  4 mg IntraVENous Q4H PRN    sodium chloride (NS) flush 5-40 mL  5-40 mL IntraVENous Q8H    sodium chloride (NS) flush 5-40 mL  5-40 mL IntraVENous PRN    acetaminophen (TYLENOL) tablet 650 mg  650 mg Oral Q6H PRN    Or    acetaminophen (TYLENOL) suppository 650 mg  650 mg Rectal Q6H PRN    polyethylene glycol (MIRALAX) packet 17 g  17 g Oral DAILY PRN    ondansetron (ZOFRAN ODT) tablet 4 mg  4 mg Oral Q8H PRN    Or    ondansetron (ZOFRAN) injection 4 mg  4 mg IntraVENous Q6H PRN    heparin (porcine) injection 7,500 Units  7,500 Units SubCUTAneous Q8H    clopidogreL (PLAVIX) tablet 75 mg  75 mg Oral DAILY    aspirin delayed-release tablet 81 mg  81 mg Oral DAILY    insulin lispro (HUMALOG) injection   SubCUTAneous AC&HS    glucose chewable tablet 16 g  4 Tablet Oral PRN    dextrose (D50W) injection syrg 12.5-25 g  12.5-25 g IntraVENous PRN    glucagon (GLUCAGEN) injection 1 mg  1 mg IntraMUSCular PRN    insulin glargine (LANTUS) injection 25 Units  25 Units SubCUTAneous DAILY    midodrine (PROAMATINE) tablet 5 mg  5 mg Oral TID WITH MEALS    nicotine (NICODERM CQ) 21 mg/24 hr patch 1 Patch  1 Patch TransDERmal DAILY    bumetanide (BUMEX) injection 2 mg  2 mg IntraVENous BID    HYDROmorphone (DILAUDID) injection 1 mg  1 mg IntraVENous Q4H PRN    peritoneal dialysis DEXTROSE 1.5% (2.5 mEq/L low calcium) solution 6,000 mL  6,000 mL IntraPERitoneal DAILY    peritoneal dialysis DEXTROSE 2.5% (2.5 mEq/L low calcium) solution 6,000 mL  6,000 mL IntraPERitoneal DAILY    peritoneal dialysis DEXTROSE 2.5% (2.5 mEq/L low calcium) solution 6,000 mL  6,000 mL IntraPERitoneal DAILY    gentamicin (GARAMYCIN) 0.1 % cream   Topical DAILY PRN

## 2021-12-01 NOTE — PROGRESS NOTES
Hospitalist Progress Note    NAME: Kimmy Godinez   :  1970   MRN:  [id number]       Assessment / Plan:  Chronic generalized weakness  Chronic hypotension  Leukocytosis, ? Due to stress induced vs underlining infection  Possible UTI  Unclear etiology, however pt has had multiple joint pain and myalgias for years. UA suggestive of UTI, will start empiric IV rocephin, f/u with Ucx  covid negative   CXR neg  He reports prednisone takes care of his pain and he \"just needs some relief from pain\". Will start low dose prednisone  Also will ask palliative to weight in on goc/code status and perhaps with pain control moving forward. Pt is realistic about his chronic co morbidities and he just wants to focus on pain control  He follows with Dr. Rick Holly -rheumatology, will request records  PT/OT, likely needs IPR at discharge     T2DM  Cont reduced dose of lantus  SSI     Anasarca/acute on chronic systolic HF  ESRD/Hypokalemia,  nephrology following for PD, replete with Cl  CAD/mild to mod AS  Has been off all antihypertensives due to hypotension. He is still taking bumex, will start IV bumex 2mg BID  Cont' midodrine  Check Echo  Probnp>35K  Cardiology consulted  Strict I&Os     PAD  Gangrenous/necrosis fingers  Cont' plavix/asa  Vascular evaluated, appreciate recs. No surgical intervention  Wound care     Gout  Stable.     Hx of L heel OM  BKA recommended, pt declined.     Code Status: Full  Surrogate Decision Maker: pt's girlfriend  DVT Prophylaxis: heparin  GI Prophylaxis: not indicated  Baseline: from home. Subjective:     Chief Complaint / Reason for Physician Visit  Pt awake, feels weak. Discussed with RN events overnight.      Review of Systems:  Symptom Y/N Comments  Symptom Y/N Comments   Fever/Chills    Chest Pain     Poor Appetite    Edema     Cough    Abdominal Pain     Sputum    Joint Pain     SOB/GAVIN n   Pruritis/Rash     Nausea/vomit    Tolerating PT/OT     Diarrhea    Tolerating Diet Constipation    Other       Could NOT obtain due to:      Objective:     VITALS:   Last 24hrs VS reviewed since prior progress note. Most recent are:  Patient Vitals for the past 24 hrs:   Temp Pulse Resp BP SpO2   12/01/21 0803    (!) 97/34    12/01/21 0733 98.8 °F (37.1 °C) 86 18  94 %   12/01/21 0356 99.2 °F (37.3 °C) (!) 101 16 (!) 97/34 98 %   11/30/21 2030 99.7 °F (37.6 °C) 100 16 104/66 97 %   11/30/21 1824    (!) 159/64    11/30/21 1730 98.2 °F (36.8 °C) (!) 103 16 104/86 97 %   11/30/21 1645  68  100/67    11/30/21 1432    96/61    11/30/21 1300    100/72    11/30/21 1200    (!) 109/46    11/30/21 1130    (!) 75/48    11/30/21 1100    92/66    11/30/21 1046    (!) 110/46    11/30/21 1036 98.3 °F (36.8 °C) 67 16 97/64 97 %       Intake/Output Summary (Last 24 hours) at 12/1/2021 0844  Last data filed at 12/1/2021 7487  Gross per 24 hour   Intake    Output 1324 ml   Net -1324 ml        I had a face to face encounter and independently examined this patient on 12/1/2021, as outlined below:  PHYSICAL EXAM:  General: WD, WN. Alert, cooperative, no acute distress    EENT:  EOMI. Anicteric sclerae. MMM  Resp:  CTA bilaterally, no wheezing or rales. No accessory muscle use  CV:  Regular  rhythm,  No edema  GI:  Soft, Non distended, Non tender. +Bowel sounds  Neurologic:  Alert and oriented X 3, normal speech,   Psych:   Good insight. Not anxious nor agitated  Skin:  No rashes.   No jaundice    Reviewed most current lab test results and cultures  YES  Reviewed most current radiology test results   YES  Review and summation of old records today    NO  Reviewed patient's current orders and MAR    YES  PMH/SH reviewed - no change compared to H&P  ________________________________________________________________________  Care Plan discussed with:    Comments   Patient x    Family      RN x    Care Manager     Consultant  x vascular                     Multidiciplinary team rounds were held today with , nursing, pharmacist and clinical coordinator. Patient's plan of care was discussed; medications were reviewed and discharge planning was addressed. ________________________________________________________________________  Total NON critical care TIME:  35   Minutes    Total CRITICAL CARE TIME Spent:   Minutes non procedure based      Comments   >50% of visit spent in counseling and coordination of care     ________________________________________________________________________  Harish Arroyo MD     Procedures: see electronic medical records for all procedures/Xrays and details which were not copied into this note but were reviewed prior to creation of Plan. LABS:  I reviewed today's most current labs and imaging studies.   Pertinent labs include:  Recent Labs     12/01/21 0228 11/30/21  1119   WBC 16.4* 18.0*   HGB 9.8* 10.3*   HCT 31.4* 33.2*    253     Recent Labs     12/01/21 0228 11/30/21  1119   * 132*   K 3.1* 2.9*   CL 94* 92*   CO2 19* 22   * 211*   * 110*   CREA 8.73* 8.52*   CA 8.1* 8.1*   MG  --  2.3   ALB  --  1.8*   TBILI  --  0.8   ALT  --  14       Signed: Harish Arroyo MD

## 2021-12-02 PROBLEM — M25.552 PAIN OF BOTH HIP JOINTS: Status: ACTIVE | Noted: 2021-12-02

## 2021-12-02 PROBLEM — M79.10 MYALGIA: Status: ACTIVE | Noted: 2021-12-02

## 2021-12-02 PROBLEM — M25.551 PAIN OF BOTH HIP JOINTS: Status: ACTIVE | Noted: 2021-12-02

## 2021-12-02 PROBLEM — Z51.5 PALLIATIVE CARE ENCOUNTER: Status: ACTIVE | Noted: 2021-12-02

## 2021-12-02 PROBLEM — R52 ENCOUNTER FOR PAIN MANAGEMENT: Status: ACTIVE | Noted: 2021-12-02

## 2021-12-02 LAB
ANION GAP SERPL CALC-SCNC: 19 MMOL/L (ref 5–15)
BASOPHILS # BLD: 0 K/UL (ref 0–0.1)
BASOPHILS NFR BLD: 0 % (ref 0–1)
BUN SERPL-MCNC: 113 MG/DL (ref 6–20)
BUN/CREAT SERPL: 13 (ref 12–20)
CALCIUM SERPL-MCNC: 8.4 MG/DL (ref 8.5–10.1)
CHLORIDE SERPL-SCNC: 95 MMOL/L (ref 97–108)
CO2 SERPL-SCNC: 18 MMOL/L (ref 21–32)
CREAT SERPL-MCNC: 8.52 MG/DL (ref 0.7–1.3)
DIFFERENTIAL METHOD BLD: ABNORMAL
EOSINOPHIL # BLD: 0 K/UL (ref 0–0.4)
EOSINOPHIL NFR BLD: 0 % (ref 0–7)
ERYTHROCYTE [DISTWIDTH] IN BLOOD BY AUTOMATED COUNT: 17 % (ref 11.5–14.5)
GLUCOSE BLD STRIP.AUTO-MCNC: 168 MG/DL (ref 65–117)
GLUCOSE BLD STRIP.AUTO-MCNC: 176 MG/DL (ref 65–117)
GLUCOSE BLD STRIP.AUTO-MCNC: 209 MG/DL (ref 65–117)
GLUCOSE BLD STRIP.AUTO-MCNC: 240 MG/DL (ref 65–117)
GLUCOSE SERPL-MCNC: 168 MG/DL (ref 65–100)
HCT VFR BLD AUTO: 31.4 % (ref 36.6–50.3)
HGB BLD-MCNC: 9.8 G/DL (ref 12.1–17)
IMM GRANULOCYTES # BLD AUTO: 0.1 K/UL (ref 0–0.04)
IMM GRANULOCYTES NFR BLD AUTO: 1 % (ref 0–0.5)
LYMPHOCYTES # BLD: 0.8 K/UL (ref 0.8–3.5)
LYMPHOCYTES NFR BLD: 6 % (ref 12–49)
MCH RBC QN AUTO: 27.1 PG (ref 26–34)
MCHC RBC AUTO-ENTMCNC: 31.2 G/DL (ref 30–36.5)
MCV RBC AUTO: 86.7 FL (ref 80–99)
MONOCYTES # BLD: 0.9 K/UL (ref 0–1)
MONOCYTES NFR BLD: 6 % (ref 5–13)
NEUTS SEG # BLD: 12.3 K/UL (ref 1.8–8)
NEUTS SEG NFR BLD: 87 % (ref 32–75)
NRBC # BLD: 0 K/UL (ref 0–0.01)
NRBC BLD-RTO: 0 PER 100 WBC
PLATELET # BLD AUTO: 277 K/UL (ref 150–400)
PMV BLD AUTO: 9.7 FL (ref 8.9–12.9)
POTASSIUM SERPL-SCNC: 3.3 MMOL/L (ref 3.5–5.1)
RBC # BLD AUTO: 3.62 M/UL (ref 4.1–5.7)
SERVICE CMNT-IMP: ABNORMAL
SODIUM SERPL-SCNC: 132 MMOL/L (ref 136–145)
WBC # BLD AUTO: 14.3 K/UL (ref 4.1–11.1)

## 2021-12-02 PROCEDURE — 82962 GLUCOSE BLOOD TEST: CPT

## 2021-12-02 PROCEDURE — 74011636637 HC RX REV CODE- 636/637: Performed by: INTERNAL MEDICINE

## 2021-12-02 PROCEDURE — 74011250636 HC RX REV CODE- 250/636: Performed by: INTERNAL MEDICINE

## 2021-12-02 PROCEDURE — 74011000250 HC RX REV CODE- 250: Performed by: INTERNAL MEDICINE

## 2021-12-02 PROCEDURE — 90945 DIALYSIS ONE EVALUATION: CPT

## 2021-12-02 PROCEDURE — 99233 SBSQ HOSP IP/OBS HIGH 50: CPT | Performed by: INTERNAL MEDICINE

## 2021-12-02 PROCEDURE — 36415 COLL VENOUS BLD VENIPUNCTURE: CPT

## 2021-12-02 PROCEDURE — 65660000000 HC RM CCU STEPDOWN

## 2021-12-02 PROCEDURE — 74011250637 HC RX REV CODE- 250/637: Performed by: INTERNAL MEDICINE

## 2021-12-02 PROCEDURE — 97535 SELF CARE MNGMENT TRAINING: CPT

## 2021-12-02 PROCEDURE — 99233 SBSQ HOSP IP/OBS HIGH 50: CPT | Performed by: NURSE PRACTITIONER

## 2021-12-02 PROCEDURE — 77030040393 HC DRSG OPTIFOAM GENT MDII -B

## 2021-12-02 PROCEDURE — A4726 DIALYS SOL FLD VOL > 5999CC: HCPCS | Performed by: INTERNAL MEDICINE

## 2021-12-02 PROCEDURE — 99356 PR PROLONGED SVC I/P OR OBS SETTING 1ST HOUR: CPT | Performed by: NURSE PRACTITIONER

## 2021-12-02 PROCEDURE — 85025 COMPLETE CBC W/AUTO DIFF WBC: CPT

## 2021-12-02 PROCEDURE — 97116 GAIT TRAINING THERAPY: CPT

## 2021-12-02 PROCEDURE — 80048 BASIC METABOLIC PNL TOTAL CA: CPT

## 2021-12-02 RX ORDER — OXYCODONE HYDROCHLORIDE 5 MG/1
5 TABLET ORAL
Status: DISCONTINUED | OUTPATIENT
Start: 2021-12-02 | End: 2021-12-03 | Stop reason: HOSPADM

## 2021-12-02 RX ORDER — INSULIN GLARGINE 100 [IU]/ML
30 INJECTION, SOLUTION SUBCUTANEOUS DAILY
Status: DISCONTINUED | OUTPATIENT
Start: 2021-12-02 | End: 2021-12-03 | Stop reason: HOSPADM

## 2021-12-02 RX ORDER — POTASSIUM CHLORIDE 20 MEQ/1
40 TABLET, EXTENDED RELEASE ORAL
Status: COMPLETED | OUTPATIENT
Start: 2021-12-02 | End: 2021-12-02

## 2021-12-02 RX ADMIN — INSULIN LISPRO 2 UNITS: 100 INJECTION, SOLUTION INTRAVENOUS; SUBCUTANEOUS at 21:14

## 2021-12-02 RX ADMIN — INSULIN LISPRO 3 UNITS: 100 INJECTION, SOLUTION INTRAVENOUS; SUBCUTANEOUS at 16:52

## 2021-12-02 RX ADMIN — Medication 5 ML: at 23:37

## 2021-12-02 RX ADMIN — ASPIRIN 81 MG: 81 TABLET, COATED ORAL at 09:56

## 2021-12-02 RX ADMIN — POTASSIUM CHLORIDE 40 MEQ: 20 TABLET, EXTENDED RELEASE ORAL at 09:56

## 2021-12-02 RX ADMIN — HEPARIN SODIUM 7500 UNITS: 5000 INJECTION INTRAVENOUS; SUBCUTANEOUS at 07:25

## 2021-12-02 RX ADMIN — MIDODRINE HYDROCHLORIDE 5 MG: 5 TABLET ORAL at 16:52

## 2021-12-02 RX ADMIN — HEPARIN SODIUM: 1000 INJECTION INTRAVENOUS; SUBCUTANEOUS at 17:25

## 2021-12-02 RX ADMIN — HEPARIN SODIUM 7500 UNITS: 5000 INJECTION INTRAVENOUS; SUBCUTANEOUS at 15:43

## 2021-12-02 RX ADMIN — MIDODRINE HYDROCHLORIDE 5 MG: 5 TABLET ORAL at 09:57

## 2021-12-02 RX ADMIN — INSULIN LISPRO 2 UNITS: 100 INJECTION, SOLUTION INTRAVENOUS; SUBCUTANEOUS at 10:15

## 2021-12-02 RX ADMIN — MIDODRINE HYDROCHLORIDE 5 MG: 5 TABLET ORAL at 13:16

## 2021-12-02 RX ADMIN — INSULIN LISPRO 2 UNITS: 100 INJECTION, SOLUTION INTRAVENOUS; SUBCUTANEOUS at 13:13

## 2021-12-02 RX ADMIN — HEPARIN SODIUM: 1000 INJECTION INTRAVENOUS; SUBCUTANEOUS at 17:29

## 2021-12-02 RX ADMIN — CLOPIDOGREL BISULFATE 75 MG: 75 TABLET ORAL at 09:57

## 2021-12-02 RX ADMIN — Medication 5 ML: at 06:34

## 2021-12-02 RX ADMIN — HEPARIN SODIUM: 1000 INJECTION INTRAVENOUS; SUBCUTANEOUS at 17:28

## 2021-12-02 RX ADMIN — PREDNISONE 20 MG: 20 TABLET ORAL at 09:57

## 2021-12-02 RX ADMIN — GENTAMICIN SULFATE: 1 CREAM TOPICAL at 17:29

## 2021-12-02 RX ADMIN — BUMETANIDE 2 MG: 0.25 INJECTION INTRAMUSCULAR; INTRAVENOUS at 10:29

## 2021-12-02 RX ADMIN — Medication 10 ML: at 18:23

## 2021-12-02 RX ADMIN — BUMETANIDE 2 MG: 0.25 INJECTION INTRAMUSCULAR; INTRAVENOUS at 18:22

## 2021-12-02 RX ADMIN — INSULIN GLARGINE 30 UNITS: 100 INJECTION, SOLUTION SUBCUTANEOUS at 09:59

## 2021-12-02 NOTE — PROGRESS NOTES
Transition of Care Plan:    RUR: 15% \"mod risk\"  Disposition: Home with follow-up appts  Follow up appointments:  PCP & specialists as needed  DME needed: recs for RW- pt declined. Transportation at Discharge: [address] or means to access home: Yes       IM Medicare Letter: Needed at d/c  Is patient a BCPI-A Bundle: No   If yes, was Bundle Letter given?:  N/A  Is patient a [place] and connected with the [place]? No  If yes, was [employer] transfer form completed and VA notified? N/A  Caregiver Contact: Girlfri[employer]elvin- Enrique Leahy, [phone]  Discharge Caregiver contacted prior to discharge? CM reviewed pt's chart. CM completed initial assessment by bedside phone. Pt confirmed demographic information is up to date. Reports he lives with his GF in single level/ [facility] house. Reports being mostly independent w/ use of a cane at baseline; per vascular NP \"pt has been chair bound for 10 days; reports standing to urinate and uses a BSC to defecate/ otherwise remains in his recliner due to the pain and weakness. \" Discussed recs for a RW; pt delined reporting he had one from previous admission & gave it away since he \"fell flat on [his] face. \" CM discussed recs for rehab vs SNF; pt declined both reporting his wishes to go home with support from his GF. Pt has a son Zuleyma Francis) that lives in [place], [place]; limited support. Reports he drives at baseline but has been unable to d/t pain; GF assists. Pt receives home CCPD w/ Dr. Kirill Mcelroy at [facility]. Pt reports his wife will transport him home at discharge. Reports no further questions or concerns for CM at this time. Unit CM to follow.     Reason for Admission:  SIRS (systemic inflammatory response syndrome)                    RUR Score:   15%               PCP: First and Last name:   Gema Hong PA-C   Name of Practice: [facility]   Are you a current patient: Yes/No: Yes   Approximate date of last visit: Within 1 year   Can you participate in a virtual visit if needed:     Do you (patient/family) have any concerns for transition/discharge? Pt reports no concerns for discharge; declining HH, rehab, OP rehab              Plan for utilizing home health:   REcs for [facility] vs SNF- declining both    Current Advanced Directive/Advance Care Plan:  Full Code  Mitch (ACP) Conversation  Date of Conversation: 12/1/2021  Conducted with: Patient with [address] Decision Maker:     Primary Decision Maker: Selma Hinkle - [phone]  Click here to complete [address] including selection of the Healthcare Decision Maker Relationship (ie \"Primary\")      Content/Action Overview:   DECLINED ACP conversation - will revisit periodically   Reviewed DNR/DNI and patient elects Full Code (Attempt Resuscitation)    Length of Voluntary ACP Conversation in minutes:  <16 minutes (Non-Billable)    [facility] Management Interventions  PCP Verified by CM: Yes  Palliative Care Criteria Met (RRAT>21 & CHF Dx)?: Yes  Palliative Consult Recommended?: Yes (goals of care; code status)  Mode of Transport at Discharge:  Other (see comment) (GF)  Transition of Care Consult (CM Consult): Discharge Planning (pt declines SNF, HH, OP rehab)  Discharge Durable Medical Equipment: No (declined CM to order RW- reports he gave previous one awy)  Physical Therapy Consult: Yes  Occupational Therapy Consult: Yes  Speech Therapy Consult: No  Support Systems: Spouse/Significant Other, Child(claudio)  Confirm Follow Up Transport: Family (pt unable to drive at this time d/t pain)  The Plan for Transition of Care is Related to the Following Treatment Goals : Home with follow-up appts  The Patient and/or Patient Representative was Provided with a Choice of Provider and Agrees with the Discharge Plan?: Yes  Discharge Location  Discharge Placement: Home    CHANDAN Franco Management

## 2021-12-02 NOTE — PROGRESS NOTES
Patient is very irritated because he wants to go home. He is refusing, to put the telemetry monitor on, to turn and to let us look at its foot dressing. Doctors notified.

## 2021-12-02 NOTE — PROGRESS NOTES
Palliative Medicine Consult  Geovanny: [phone] ([phone])    Patient Name: Dominga Bhat  YOB: 1970    Date of Initial Consult: 12/1/21  Reason for Consult:   Care decisions  Requesting Provider: Abraham Merino MD   Primary Care Physician: Marshall Lindsey PA-C     SUMMARY:   Dominga Bhat is a 49 y.o. with a past history of ESRD on CCPD, DM2,HTN, HLD, anemia secondary to CKD, obesity, gout, chronic systolic CHF with EF 77%, pulmonary HTN, active tobacco use, who was admitted on 11/30/2021 from home with a diagnosis of ambulatory dysfunction, generalized weakness, hypokalemia. Current medical issues leading to Palliative Medicine involvement include: Goals of care, multiple advanced co morbidities . HPI:  11/30:  Patient presented to the ER with c/o myalgias and arthralgias in both hips, BLE and BUE associated with generalized weakness with recurrent flares of similar symptoms almost monthly for a while. His symptoms always improve after a course of Medrol Dosepak. He was referred to a rheumatologist, was for rheumatoid arthritis, and told him he did not have this. He was still started on hydroxychloroquine, which he took for a month, however because it did not relieve his symptoms, he stopped taking the medication. when he has these flairs he is able to get around with his walker or crutches, however, this time he has not been able to get up and has spent the last 10 days in a recliner; his GF has been coming by every day to help set up his nightly CCPD. associated symptom is poor appetite. Also c/o multiple wounds: black area to tip of R 2nd finger, half of 3rd finger on R hand is black, L foot wounds with heel amputation.   he is followed by outpatient wound clinic at [facility].          reviewed and reveals pt is filling 30 day supply of opioids from 2 different providers since Sept 2021, with the last fills as follows:  11/29: Shadia Gails 5/325mg #90 tab/30 days supply [facility]  11/15: pregabalin 100mg #60 tab/30 days [facility]  11/15: Tyl #2 #90 tab/30 days supply     Psychosocial history: Single, has a girlfriend, he has 1 child, son Rubén Max. 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       PALLIATIVE DIAGNOSES:   1. Arthralgias   2. Myalgias   3. Poor appetite  4. Generalized weakness  5. Pitting BLE edema/anasarca  6. Leukocytosis (18.0 on adm which pt reports is chronic)  7. Anemia of chronic disease (hgb 10.3 on adm)  8. Hyponatremia (132 on adm)  9. Hypokalemia (2.9 on adm)  10. ESRD on CCPD (Cr 8.52 on adm)  11. Fluid overload (pro-BNP >35,000)  12. Chronic hypotension   13. PAD gangrenous/necrotic fingers  14. Left heel/calcaneus osteomyelitis (BKA recommended Jan 2021)  15. Care decisions     PLAN:   1. Prior to visit, I completed a  review of patient's medical records, including medical documentation, vital signs, MARs, and results of various labs and other diagnostics. I also spoke with patient's attending  and RN [facility]. I also called PCP's office 6 times this am, no one answers and there is no option to leave a message (phones are down?)   1. PAIN:  Patient looks great this am, sitting on side of bed, moving without grimacing, states he feels so much better and feels ready to go home. I was hoping to get in touch with PCP to discuss what to discharge pt with. 1. Continue Prednisone 20mg daily  2. GOALS: pt has made it clear that he does not want many interventions that he needs to get better (refuses recommended amputations of gangrenous wounds, refuses AICD); we talked about importance of electing a mPOA, determining code status as it doesn't make sense for us to do CPR if he's not going to do what's recommended to prevent cardiac arrest, and discuss the level of care he wants (comfort care, limited or full medical interventions).   He asked me to come back when is GF comes in as he wants to have her present for discussions. RN will call me when she arrives. 3. Initial consult note routed to primary continuity provider and/or primary health care team members  4. Communicated plan of care with: Lucretia Platt 192 Team     GOALS OF CARE / TREATMENT PREFERENCES:     GOALS OF CARE:  Patient/Health Care Proxy Stated Goals: Prolong life    TREATMENT PREFERENCES:   Code Status: Full Code    Patient and family's personal goals include: returning home    Important upcoming milestones or family events: none    The patient identifies the following as important for living well: reduced pain      Advance Care Planning:  [x] [facility] Interdisciplinary Team has updated the ACP Navigator with Health Care Decision Maker and Patient Capacity      Primary Decision Maker: Alena Balderas  [phone]  Advance Care Planning 12/1/2021   Patient's Healthcare Decision Maker is: [facility] Directive None   Patient Would Like to Complete Advance Directive Yes               Other:    As far as possible, the palliative care team has discussed with patient / health care proxy about goals of care / treatment preferences for patient.      HISTORY:     History obtained from: chart, patient    CHIEF COMPLAINT: pain       The patient is:   [x] Verbal and participatory  [] Non-participatory due to:      Clinical Pain Assessment (nonverbal scale for severity on nonverbal patients):   Clinical Pain Assessment  Severity: 3  Location: both hips and all 4 extremities  Character: throbbing, aching  Duration: ~6 years  Effect: mostly immobile  Factors: movement, only prednisone has helped with his pain  Frequency: constant when not taking prednisone     Activity (Movement): Lying quietly, normal position    Duration: for how long has pt been experiencing pain (e.g., 2 days, 1 month, years)  Frequency: how often pain is an issue (e.g., several times per day, once every few days, constant) FUNCTIONAL ASSESSMENT:     Palliative Performance Scale (PPS):  PPS: 30       PSYCHOSOCIAL/SPIRITUAL SCREENING:     Palliative IDT has assessed this patient for cultural preferences / practices and a referral made as appropriate to needs (Cultural Services, Patient Advocacy, Ethics, etc.)    Any spiritual / Caodaism concerns:  [] Yes /  [x] No    Caregiver Burnout:  [] Yes /  [x] No /  [] No Caregiver Present      Anticipatory grief assessment:   [x] Normal  / [] Maladaptive       ESAS Anxiety: Anxiety: 0    ESAS Depression: Depression: 0        REVIEW OF SYSTEMS:     Positive and pertinent negative findings in ROS are noted above in HPI. The following systems were [x] reviewed / [] unable to be reviewed as noted in HPI  Other findings are noted below. Systems: constitutional, ears/nose/mouth/throat, respiratory, gastrointestinal, genitourinary, musculoskeletal, integumentary, neurologic, psychiatric, endocrine. Positive findings noted below. Modified ESAS Completed by: provider   Fatigue: 3 Drowsiness: 0   Depression: 0 Pain: 3   Anxiety: 0 Nausea: 0   Anorexia: 0 Dyspnea: 0     Constipation: No              PHYSICAL EXAM:     From RN flowsheet:  Wt Readings from Last 3 Encounters:   12/01/21 337 lb 4.9 oz (153 kg)   01/07/21 298 lb 11.6 oz (135.5 kg)   12/12/20 283 lb (128.4 kg)     Blood pressure (!) 97/54, pulse 96, temperature 97.7 °F (36.5 °C), resp. rate (!) 95, height 6' 4\" (1.93 m), weight 337 lb 4.9 oz (153 kg), SpO2 92 %.     Pain Scale 1: Numeric (0 - 10)  Pain Intensity 1: 0     Pain Location 1: Back  Pain Orientation 1: Mid  Pain Description 1: Aching  Pain Intervention(s) 1: Medication (see MAR)  Last bowel movement, if known:     Constitutional: WD, WN, AAOx4, sitting on side of bed  Eyes: pupils equal, anicteric  Musculoskeletal: no deformity, no tenderness to palpation  Skin: warm, dry, large BLE edema, clean dressing around R 2nd finger, half of 3rd finger on R hand is black, L foot wounds with heel amputation. Neurologic: following commands, moving all extremities  Psychiatric: full affect, no hallucinations          HISTORY:     Principal Problem:    SIRS (systemic inflammatory response syndrome) ([address] (11/30/2021)    Active Problems:    Tobacco abuse (11/19/2009)      DM (diabetes mellitus) ([address] (11/19/2009)      Hypercholesteremia (11/19/2009)      Stage 5 chronic kidney disease not on chronic dialysis ([address] (5/3/2899)      Diastolic CHF, chronic ([address] (12/1/2021)      Myalgia (12/2/2021)      Pain of both hip joints (12/2/2021)      Encounter for pain management (12/2/2021)      Palliative care encounter (12/2/2021)      Past Medical History:   Diagnosis Date    Allergic rhinitis 11/19/2009    Anemia     CAD (coronary artery disease)     Chronic kidney disease     stage 4    Chronic obstructive pulmonary disease (HCC)     Congestive heart failure (HCC)     chronic left sided congestive heart failure    DM (diabetes mellitus) ([address] 11/19/2009    type 2    GERD (gastroesophageal reflux disease)     HTN (hypertension), benign 11/19/2009    Hypercholesteremia 11/19/2009    LBBB (left bundle branch block)     Obesity 11/19/2009    Pulmonary HTN ([address] 06/2020    mild     Tobacco abuse 11/19/2009    Weakness generalized       Past Surgical History:   Procedure Laterality Date    HX APPENDECTOMY  2004    IR INSERT NON TUNL CVC LESS THAN 5 YR  9/16/2020    IR INSERT TUNL CVC W/O PORT OVER 5 YR  9/23/2020    RI CARDIAC SURG PROCEDURE UNLIST  06/11/2020    echo ef 25-30% down from 40% on 04/19      Family History   Problem Relation Age of Onset    Hypertension Father     Heart Disease Father     Hypertension Mother       History reviewed, no pertinent family history.   Social History     Tobacco Use    Smoking status: Current Every Day Smoker     Packs/day: 0.50     Years: 25.00     Pack years: 12.50     Types: Cigarettes    Smokeless tobacco: Former User     Quit date: 8/25/1995 Substance Use Topics    Alcohol use: Not Currently     Comment: pt quit  10/19     No Known Allergies   Current Facility-Administered Medications   Medication Dose Route Frequency    oxyCODONE IR (ROXICODONE) tablet 5 mg  5 mg Oral Q6H PRN    insulin glargine (LANTUS) injection 30 Units  30 Units SubCUTAneous DAILY    predniSONE (DELTASONE) tablet 20 mg  20 mg Oral DAILY WITH BREAKFAST    ondansetron (ZOFRAN) injection 4 mg  4 mg IntraVENous Q4H PRN    sodium chloride (NS) flush 5-40 mL  5-40 mL IntraVENous Q8H    sodium chloride (NS) flush 5-40 mL  5-40 mL IntraVENous PRN    acetaminophen (TYLENOL) tablet 650 mg  650 mg Oral Q6H PRN    Or    acetaminophen (TYLENOL) suppository 650 mg  650 mg Rectal Q6H PRN    polyethylene glycol (MIRALAX) packet 17 g  17 g Oral DAILY PRN    ondansetron (ZOFRAN ODT) tablet 4 mg  4 mg Oral Q8H PRN    Or    ondansetron (ZOFRAN) injection 4 mg  4 mg IntraVENous Q6H PRN    heparin (porcine) injection 7,500 Units  7,500 Units SubCUTAneous Q8H    clopidogreL (PLAVIX) tablet 75 mg  75 mg Oral DAILY    aspirin delayed-release tablet 81 mg  81 mg Oral DAILY    insulin lispro (HUMALOG) injection   SubCUTAneous AC&HS    glucose chewable tablet 16 g  4 Tablet Oral PRN    dextrose (D50W) injection syrg 12.5-25 g  12.5-25 g IntraVENous PRN    glucagon (GLUCAGEN) injection 1 mg  1 mg IntraMUSCular PRN    midodrine (PROAMATINE) tablet 5 mg  5 mg Oral TID WITH MEALS    nicotine (NICODERM CQ) 21 mg/24 hr patch 1 Patch  1 Patch TransDERmal DAILY    bumetanide (BUMEX) injection 2 mg  2 mg IntraVENous BID    peritoneal dialysis DEXTROSE 1.5% (2.5 mEq/L low calcium) solution 6,000 mL  6,000 mL IntraPERitoneal DAILY    peritoneal dialysis DEXTROSE 2.5% (2.5 mEq/L low calcium) solution 6,000 mL  6,000 mL IntraPERitoneal DAILY    peritoneal dialysis DEXTROSE 2.5% (2.5 mEq/L low calcium) solution 6,000 mL  6,000 mL IntraPERitoneal DAILY    gentamicin (GARAMYCIN) 0.1 % cream Topical DAILY PRN          LAB AND IMAGING FINDINGS:     Lab Results   Component Value Date/Time    WBC 14.3 (H) 12/02/2021 06:36 AM    HGB 9.8 (L) 12/02/2021 06:36 AM    PLATELET 285 97/26/4967 06:36 AM     Lab Results   Component Value Date/Time    Sodium 132 (L) 12/02/2021 06:36 AM    Potassium 3.3 (L) 12/02/2021 06:36 AM    Chloride 95 (L) 12/02/2021 06:36 AM    CO2 18 (L) 12/02/2021 06:36 AM     (H) 12/02/2021 06:36 AM    Creatinine 8.52 (H) 12/02/2021 06:36 AM    Calcium 8.4 (L) 12/02/2021 06:36 AM    Magnesium 2.3 11/30/2021 11:19 AM    Phosphorus 3.8 09/21/2020 12:52 AM      Lab Results   Component Value Date/Time    Alk. phosphatase 262 (H) 11/30/2021 11:19 AM    Protein, total 6.9 11/30/2021 11:19 AM    Albumin 1.8 (L) 11/30/2021 11:19 AM    Globulin 5.1 (H) 11/30/2021 11:19 AM     No results found for: INR, PTMR, PTP, PT1, PT2, APTT, INREXT, INREXT   Lab Results   Component Value Date/Time    Iron 45 09/17/2020 01:33 AM    TIBC 152 (L) 09/17/2020 01:33 AM    Iron % saturation 30 09/17/2020 01:33 AM    Ferritin 264 09/17/2020 01:33 AM      No results found for: PH, PCO2, PO2  No components found for: [place]   Lab Results   Component Value Date/Time    CK 69 11/30/2021 11:19 AM                Total time: 75  Counseling / coordination time, spent as noted above: 65  > 50% counseling / coordination?: yes    Prolonged service was provided for  []30 min   []75 min in face to face time in the presence of the patient, spent as noted above. Time Start:   Time End:   Note: this can only be billed with 98262 (initial) or 68120 (follow up). If multiple start / stop times, list each separately.

## 2021-12-02 NOTE — PROGRESS NOTES
Hospitalist Progress Note    NAME: Reji Mcfadden   :  1970   MRN:  [id number]       Assessment / Plan:  Chronic generalized weakness  Chronic hypotension  Leukocytosis, ? Due to stress induced, trending down  R/o UTI  Unclear etiology, however pt has had multiple joint pain and myalgias for years. UA suggestive of UTI but Ucx with no significant growth. Will stop IV abx  covid negative   CXR neg  He reports prednisone takes care of his pain and he \"just needs some relief from pain\". Cont' low dose prednisone  Also will ask palliative to weight in on goc/code status and perhaps with pain control moving forward. Pt is realistic about his chronic co morbidities and he just wants to focus on pain control  He follows with Dr. Gulshan Heard -rheumatology, records is requested. Will have pt f/u in 1 week. PT/OT, recommended SNF     T2DM  Cont lantus, titrate prn  SSI     Anasarca/acute on chronic systolic HF  ESRD/Hypokalemia,  nephrology following for PD, replete with KCl  CAD/mild to mod AS  Has been off all antihypertensives due to hypotension. He is still taking bumex,  Echo with EF 25-30%  Probnp>35K  Replete KCl  Cont' IV bumex  Cont' midodrine  Cardiology following. Strict I&Os     PAD  Gangrenous/necrosis fingers  Cont' plavix/asa  Vascular evaluated, appreciate recs. Pt is not interested in any procedure  Wound care     Gout  Stable.     Hx of L heel OM  BKA recommended, pt declined.     KENTRELL:  pending clinical improvement      Code Status: Full  Surrogate Decision Maker: pt's girlfriend  DVT Prophylaxis: heparin  GI Prophylaxis: not indicated  Baseline: from home. Subjective:     Chief Complaint / Reason for Physician Visit  Pt awake, he feels much better today after first dose of prednisone. Discussed with RN events overnight.      Review of Systems:  Symptom Y/N Comments  Symptom Y/N Comments   Fever/Chills    Chest Pain n    Poor Appetite    Edema     Cough    Abdominal Pain Sputum    Joint Pain     SOB/GAVIN n   Pruritis/Rash     Nausea/vomit    Tolerating PT/OT     Diarrhea    Tolerating Diet     Constipation    Other       Could NOT obtain due to:      Objective:     VITALS:   Last 24hrs VS reviewed since prior progress note. Most recent are:  Patient Vitals for the past 24 hrs:   Temp Pulse Resp BP SpO2   12/02/21 0833   20     12/02/21 0358 98.7 °F (37.1 °C) 88 17 (!) 94/53 98 %   12/02/21 0000 98.9 °F (37.2 °C) 92 16 112/80 90 %   12/01/21 1455 98.9 °F (37.2 °C) 70 18 108/82 94 %   12/01/21 1101 98 °F (36.7 °C) (!) 113 18 112/64 90 %   12/01/21 0923    (!) 104/49        Intake/Output Summary (Last 24 hours) at 12/2/2021 0838  Last data filed at 12/2/2021 0305  Gross per 24 hour   Intake    Output 568 ml   Net -568 ml        I had a face to face encounter and independently examined this patient on 12/2/2021, as outlined below:  PHYSICAL EXAM:  General: WD, WN. Alert, cooperative, no acute distress    EENT:  EOMI. Anicteric sclerae. MMM  Resp:  CTA bilaterally, no wheezing or rales. No accessory muscle use  CV:  Regular  rhythm,  ++ b/l LE edema  GI:  Soft, Non distended, Non tender. +Bowel sounds  Neurologic:  Alert and oriented X 3, normal speech  Psych:   Not anxious nor agitated  Skin:  No rashes. No jaundice    Reviewed most current lab test results and cultures  YES  Reviewed most current radiology test results   YES  Review and summation of old records today    NO  Reviewed patient's current orders and MAR    YES  PMH/SH reviewed - no change compared to H&P  ________________________________________________________________________  Care Plan discussed with:    Comments   Patient x    Family      RN x    Care Manager     Consultant  x vascular                     Multidiciplinary team rounds were held today with , nursing, pharmacist and clinical coordinator.   Patient's plan of care was discussed; medications were reviewed and discharge planning was addressed. ________________________________________________________________________  Total NON critical care TIME:  35   Minutes    Total CRITICAL CARE TIME Spent:   Minutes non procedure based      Comments   >50% of visit spent in counseling and coordination of care     ________________________________________________________________________  Tai Strickland MD     Procedures: see electronic medical records for all procedures/Xrays and details which were not copied into this note but were reviewed prior to creation of Plan. LABS:  I reviewed today's most current labs and imaging studies.   Pertinent labs include:  Recent Labs     12/02/21  0636 12/01/21 0228 11/30/21  1119   WBC 14.3* 16.4* 18.0*   HGB 9.8* 9.8* 10.3*   HCT 31.4* 31.4* 33.2*    275 253     Recent Labs     12/02/21  0636 12/01/21 0228 11/30/21  1119   * 132* 132*   K 3.3* 3.1* 2.9*   CL 95* 94* 92*   CO2 18* 19* 22   * 169* 211*   * 116* 110*   CREA 8.52* 8.73* 8.52*   CA 8.4* 8.1* 8.1*   MG  --   --  2.3   ALB  --   --  1.8*   TBILI  --   --  0.8   ALT  --   --  14       Signed: Tai Strickland MD

## 2021-12-02 NOTE — PROGRESS NOTES
[address] [place] [address] [address] [facility]  [phone]      Cardiology Progress Note      12/2/2021 9:49 AM    Admit Date: 11/30/2021    Admit Diagnosis:   SIRS (systemic inflammatory response syndrome) ([facility] [R65.10]    Subjective:     Camillo Holstein States \"I am feeling so much better today, send me home!, I need my prednisone to keep me moving\". BP low normal 90's. Refusing telemetry. HR 70-90's p/t DC of tele.    I/O -1.8L  K remains low, 3.3, replace please     Visit Vitals  BP (!) 94/53   Pulse 99   Temp 97.6 °F (36.4 °C)   Resp 20   Ht 6' 4\" (1.93 m)   Wt 153 kg (337 lb 4.9 oz)   SpO2 92%   BMI 41.06 kg/m²       Current Facility-Administered Medications   Medication Dose Route Frequency    oxyCODONE IR (ROXICODONE) tablet 5 mg  5 mg Oral Q6H PRN    insulin glargine (LANTUS) injection 30 Units  30 Units SubCUTAneous DAILY    potassium chloride (K-DUR, KLOR-CON M20) SR tablet 40 mEq  40 mEq Oral NOW    predniSONE (DELTASONE) tablet 20 mg  20 mg Oral DAILY WITH BREAKFAST    ondansetron (ZOFRAN) injection 4 mg  4 mg IntraVENous Q4H PRN    sodium chloride (NS) flush 5-40 mL  5-40 mL IntraVENous Q8H    sodium chloride (NS) flush 5-40 mL  5-40 mL IntraVENous PRN    acetaminophen (TYLENOL) tablet 650 mg  650 mg Oral Q6H PRN    Or    acetaminophen (TYLENOL) suppository 650 mg  650 mg Rectal Q6H PRN    polyethylene glycol (MIRALAX) packet 17 g  17 g Oral DAILY PRN    ondansetron (ZOFRAN ODT) tablet 4 mg  4 mg Oral Q8H PRN    Or    ondansetron (ZOFRAN) injection 4 mg  4 mg IntraVENous Q6H PRN    heparin (porcine) injection 7,500 Units  7,500 Units SubCUTAneous Q8H    clopidogreL (PLAVIX) tablet 75 mg  75 mg Oral DAILY    aspirin delayed-release tablet 81 mg  81 mg Oral DAILY    insulin lispro (HUMALOG) injection   SubCUTAneous AC&HS    glucose chewable tablet 16 g  4 Tablet Oral PRN    dextrose (D50W) injection syrg 12.5-25 g  12.5-25 g IntraVENous PRN    glucagon (GLUCAGEN) injection 1 mg  1 mg IntraMUSCular PRN    midodrine (PROAMATINE) tablet 5 mg  5 mg Oral TID WITH MEALS    nicotine (NICODERM CQ) 21 mg/24 hr patch 1 Patch  1 Patch TransDERmal DAILY    bumetanide (BUMEX) injection 2 mg  2 mg IntraVENous BID    peritoneal dialysis DEXTROSE 1.5% (2.5 mEq/L low calcium) solution 6,000 mL  6,000 mL IntraPERitoneal DAILY    peritoneal dialysis DEXTROSE 2.5% (2.5 mEq/L low calcium) solution 6,000 mL  6,000 mL IntraPERitoneal DAILY    peritoneal dialysis DEXTROSE 2.5% (2.5 mEq/L low calcium) solution 6,000 mL  6,000 mL IntraPERitoneal DAILY    gentamicin (GARAMYCIN) 0.1 % cream   Topical DAILY PRN       Objective:      Physical Assessment:   General Appearance:  alert, cooperative, obese  male; appears older than stated age   Eyes: sclera anicteric  Mouth/Throat: moist mucous membranes; oral pharynx clear  Neck: supple; no JVD or bruit  Pulmonary:  clear to auscultation bilaterally; good effort  Cardiovascular: regular rate and rhythm; no murmur, click, rub, or gallop  Abdomen: soft, non-tender, non-distended; bowel sounds normal  Musculoskeletal: no swelling or deformity; moves all extremities  Extremities: 1-2+ edema right LE, 2-3+ edema LLE  Skin: dry, cracked on lower extremities, necrotic areas   Neuro: grossly normal  Psych: flat affect          Data Review:   Recent Labs     12/02/21  0636 12/01/21 0228 11/30/21  1119   WBC 14.3* 16.4* 18.0*   HGB 9.8* 9.8* 10.3*   HCT 31.4* 31.4* 33.2*    275 253     Recent Labs     12/02/21  0636 12/01/21  0228 11/30/21  1119   * 132* 132*   K 3.3* 3.1* 2.9*   CL 95* 94* 92*   CO2 18* 19* 22   * 169* 211*   * 116* 110*   CREA 8.52* 8.73* 8.52*   CA 8.4* 8.1* 8.1*   MG  --   --  2.3   ALB  --   --  1.8*   TBILI  --   --  0.8   ALT  --   --  14       Recent Labs     11/30/21  1119   CPK 69         Intake/Output Summary (Last 24 hours) at 12/2/2021 0949  Last data filed at 12/2/2021 0918  Gross per 24 hour Intake 120 ml   Output 718 ml   Net -598 ml      Cardiographics     Telemetry: not on tele. Echocardiogram: 9/17/2020  LVEF 55-60%, mod. Concentric hypertrophy. Mild/mod AS,mild/mod PHTN  12/2/2021:   · MV: Mitral valve leaflet calcification. Mild mitral annular calcification. · AV: Probably trileaflet aortic valve. Aortic valve leaflet calcification present. Aortic valve mean gradient is 13.4 mmHg. Aortic valve area is 1.2 cm2. Mild aortic valve stenosis is present. · LV: Estimated LVEF is 25 - 30%. Normal wall thickness. Mildly dilated left ventricle. Severely and globally reduced systolic function. · LA: Dilated left atrium. · PA: Mild pulmonary hypertension. Pulmonary arterial systolic pressure is 38 mmHg. · TV: Mild tricuspid valve regurgitation is present. · RV: Dilated right ventricle. Reduced systolic function. CXRAY: \"Inspiration is shallow heart size is prominent no acute infiltrate. \"        Assessment:     Principal Problem:    SIRS (systemic inflammatory response syndrome) ([address] (11/30/2021)    Active Problems:    Tobacco abuse (11/19/2009)      DM (diabetes mellitus) ([address] (11/19/2009)      Hypercholesteremia (11/19/2009)      Stage 5 chronic kidney disease not on chronic dialysis ([address] (8/8/2474)      Diastolic CHF, chronic ([address] (12/1/2021)        Plan:   Alexandr Reynolds is a 49 y.o. male with a PMHx of ESRD on CCPD, DM2,HTN, HLD, anemia secondary to CKD, obesity, gout, chronic systolic CHF with EF 50%, pulmonary HTN, active tobacco useadmitted for SIRS (systemic inflammatory response syndrome) ([address] [R65.10].    Generalized weakness  Leukocytosis, ?  Due to stress induced vs underlining infection  Unclear etiology, however pt has had multiple joint pain and myalgias   Seen by rheumatology as outpatient  Cxs pending as per internal medicine-so far no growth two days   Covid negative   CXR neg     DM II:  Manage as per attending  A1C 6.1      ESRD/Hypokalemia:  Nephrology following    Anasarca/acute on chronic  DHF:   Obtain ECHO-EF 25-30%, mild MR/AR  Continue to monitor telemetry-patient refusing   Prior ECHO as above  Continue IV Bumex, I/O -1.8L   ESRD on peritoneal dialysis  NT pro BNP greater than 35K  Patient unable to tolerate Entresto or Coreg due to hypotension. On ASA  He follows with Dr Perico Arredondo  Records requested from office   Strict I&Os, daily weights, labs     PAD  Gangrenous/necrosis fingers  Continue plavix/asa  Wound care   Followed by vascular surgery-noted patient asking for no interventions at this time     Tobacco abuse:  Patient reports down to 1/2 PPD  On nicotine patch      Chronic hypotension:  Currently off anti-hypertensives  Continue IV Bumex, monitor  Also on midodrine 5 mg TID     Chronic anemia due to renal failure:  H/H at baseline, monitor  On epo as per nephrology     Hypokalemia:  K 3.3, replace with 40 mEq oral K     Continue diuresis as tolerated. Should follow with Dr. Kumar Rutherford as outpatient. Patient states he has an appointment scheduled and his girlfriend monitors to ensure he attends them. No further acute cardiac work-up at this time, please call us if needed.        Farzana Cuevas NP  [facility]     Patient seen and examined by me with the above nurse practitioner. I personally performed all components of the history, physical, and medical decision making and agree with the assessment and plan with minor modifications as noted. Today the patient presents with improved pain, stating that he needs prednisone to feel good, and improved lower extremity edema. Approximately 2 L negative. LVEF 25 to 30%, which is consistent with his reports of his prior ejection fraction. General PE  Gen:  NAD  Mental Status - Alert. General Appearance - Not in acute distress. HEENT:  PERRL, no carotid bruits or JVD  Chest and Lung Exam   Inspection: Accessory muscles - No use of accessory muscles in breathing.    Auscultation:   Breath sounds: - Normal.   Cardiovascular   Inspection: Jugular vein - Bilateral - Inspection Normal.   Palpation/Percussion:   Apical Impulse: - Normal.   Auscultation: Rhythm - Regular. Heart Sounds - S1 WNL and S2 WNL. No S3 or S4. Murmurs & Other Heart Sounds: Auscultation of the heart reveals - No Murmurs. Peripheral Vascular   Upper Extremity: Inspection - Bilateral - No Cyanotic nailbeds or Digital clubbing. Lower Extremity:   Palpation: Edema - Bilateral -1-2+ woody edema with stasis dermatitis improved. Abdomen:   Soft, non-tender, bowel sounds are active. Neuro: A&O times 3, CN and motor grossly WNL    Heart failure is improved. Unable to tolerate carvedilol or Entresto, other heart failure medications due to hypotension. Continue midodrine for this as needed. I will defer volume status to nephrology by dialysis and diuretics. Stable for discharge from a cardiac standpoint to follow-up with his primary cardiologist Dr. Mert Mejia within a couple weeks. Thanks for the consult. We appreciate the opportunity to participate in this patient's care.

## 2021-12-02 NOTE — PROGRESS NOTES
Spiritual Care Assessment/Progress Note  [facility]      NAME: Alexandr Reynolds      MRN: [id number]  AGE: 49 y.o.  SEX: male  Sikhism Affiliation: No preference   Language: English     12/2/2021     Total Time (in minutes): 20     Spiritual Assessment begun in [facility] 2 MED TELE through conversation with:         [x]Patient        [] Family    [] Friend(s)        Reason for Consult: Palliative Care, Initial/Spiritual Assessment     Spiritual beliefs: (Please include comment if needed)     [] Identifies with a sangeeta tradition:         [] Supported by a sangeeta community:            [x] Claims no spiritual orientation:           [] Seeking spiritual identity:                [] Adheres to an individual form of spirituality:           [] Not able to assess:                           Identified resources for coping:      [] Prayer                               [] Music                  [] Guided Imagery     [x] Family/friends                 [] Pet visits     [] Devotional reading                         [] Unknown     [] Other:                                             Interventions offered during this visit: (See comments for more details)    Patient Interventions: Initial/Spiritual assessment, patient floor, Affirmation of emotions/emotional suffering, Normalization of emotional/spiritual concerns           Plan of Care:     [] Support spiritual and/or cultural needs    [] Support AMD and/or advance care planning process      [] Support grieving process   [] Coordinate Rites and/or Rituals    [] Coordination with community clergy   [x] No spiritual needs identified at this time   [] Detailed Plan of Care below (See Comments)  [] Make referral to Music Therapy  [] Make referral to Pet Therapy     [] Make referral to Addiction services  [] Make referral to [facility]  [] Make referral to Spiritual Care Partner  [] No future visits requested        [] Contact Spiritual Care for further referrals Comments: Initial spiritual assessment with palliative pt in 2176. Pt awake and alert, attempting to get back into bed. Was able to achieve this but it appeared to be a struggle. Pt opened up to  and spoke of health history and current health issues. Shared that the only relief he gets is from Prednisone.  provided safe space for pt to process. Spoke about AMD and  encouraged pt to complete form. Pt is waiting for girlfriend to arrive and discuss. Pt appeared to be in positive spirits despite frustrations. Visit was interrupted by Occupational and Physical Therapy arriving to attempt to work on pt. Pt hesitant at first but conceded. Stepped away and informed pt that a  would probably be the one to assist pt with filling out of Advance Medical Directive should he desire to complete it. Contact Spiritual Care for any further referrals.   eLon Morgan M.Div, [facility]   Paging Service [phone])

## 2021-12-02 NOTE — PROGRESS NOTES
Problem: Self Care Deficits Care Plan (Adult)  Goal: *Acute Goals and Plan of Care (Insert Text)  Description:   FUNCTIONAL STATUS PRIOR TO ADMISSION: Independent with ADL until about 10 days ago. Since then he has been sitting/sleeping in recliner, using urinal for toileting needs and requiring significant assist from his girlfriend for bathing/dressing. Pt stated he was cleared for WBAT LLE ~2 months ago. HOME SUPPORT: Lived with girlfriend who has been providing increased assist with BADL since recent decline. Occupational Therapy Goals  PATIENT DID NOT MEET GOALS, is non-compliant, uninterested in participating in therapy, therefore DC/C OT    Initiated 12/1/2021   1. Patient will perform UB sponge bathing with Min A within 7 days. 2.  Patient will perform LB sponge bathing with Mod A within 7 days. 3.  Patient will perform UB dressing with Min A within 7 day(s). 4.  Patient will perform LB dressing with Mod A using AE PRN within 7 days. 5.  Patient will perform BSC/toilet transfers with Min A using least restrictive device within 7 day(s). (HHA of 2 staff)  6. Patient will perform all aspects of toileting with Mod A within 7 day(s). Outcome: Not Met     OCCUPATIONAL THERAPY TREATMENT/DISCHARGE  Patient: Marielos Kaminski (50 y.o. male)  Date: 12/2/2021  Diagnosis: SIRS (systemic inflammatory response syndrome) ([facility]) [R65.10]   SIRS (systemic inflammatory response syndrome) ([facility])       Precautions: Fall, Bed Alarm, WBAT, Skin (LLE per Pt, float heels at all times)  Chart, occupational therapy assessment, plan of care, and goals were reviewed. ASSESSMENT  Patient continues with skilled OT services and has progressed towards goals yet did NOT meet aANY goals due to his medical non-compliance and lack of cooperation/participation with therapy. After much encouragement, he did agree to amb to bathroom requiring Cinthia to CGA of 2 for HHA for safety.  He exhibited one slight LOB posterior with first stand from bed and side stepped to [facility] with mi nA x 2. He is refusing to participate in bathing or dressing or grooming tasks despite encouragement even stating he wanted to cleanse his periarea. He will wait for girlfriend to come A him. He is refusing any additional HH or SNF level therapy as well as any additional acute care therapy. Therefore, D/C OT per his wishes. He is a HIGH fall risk and non-compliant. He is not receptive to any recs or suggestions by therapist.  Current Level of Function (ADLs/self-care): refusing, see below    Other factors to consider for discharge: n/a, states he has all DME he needs          PLAN :  Rationale for discharge: Other: patient preference  Recommend with staff:   Recommendation for discharge: (in order for the patient to meet his/her long term goals)  No skilled occupational therapy/ follow up rehabilitation needs identified at this time. This discharge recommendation:  Has not yet been discussed the attending provider and/or case management    IF patient discharges home will need the following DME:none       SUBJECTIVE:   Patient stated I'm an asshole.     OBJECTIVE DATA SUMMARY:   Cognitive/Behavioral Status:  Neurologic State: Alert; Lethargic  Orientation Level: Oriented X4  Cognition: Follows commands             Functional Mobility and Transfers for ADLs:  Bed Mobility:  Sit to Supine: Stand-by assistance (with gait belt as leg )  Scooting: Supervision    Transfers:  Sit to Stand: Contact guard assistance; Assist x1; Assist x2  Functional Transfers  Bathroom Mobility: Contact guard assistance; Minimum assistance (min-CGA x 2 due to refusal to use AD and his size)  Toilet Transfer : Contact guard assistance; Assist x1; Adaptive equipment  Bed to Chair:  (refused to get in chair)    Balance:  Sitting: Intact  Standing: Impaired;  With support  Standing - Static: Fair; Constant support (unsteadiess, LOB posterior slightly, min A to recover)  Standing - Dynamic : Fair; Constant support    ADL Intervention:       Grooming  Grooming Assistance:  (refused to perform at sink (debo wait on girlfriend))  Position Performed: Standing  Washing Face:  (s/u to wash face in supine)                                  Therapeutic Exercises:       Pain:  Did not rate    Activity Tolerance:   Fair and requires frequent rest breaks    After treatment patient left in no apparent distress:   Supine in bed    COMMUNICATION/COLLABORATION:   The patients plan of care was discussed with: Physical therapist and Registered nurse.      Bernadine Kwong OTR/L  Time Calculation: 11 mins

## 2021-12-02 NOTE — DIALYSIS
Spoke with patient and primary nurse. Neither are aware of any planned procedure today. Explained he would need to have his peritoneal fluid drained before any procedure and to call Nora Olmstead to have this done should the need arise.

## 2021-12-02 NOTE — PROGRESS NOTES
NAME: Kyle Baron        :  1970        MRN:  [id number]                   Assessment   :                                               Plan:  ESRD- CCPD - Dr. Shemar Mejía  PVD  Anemia  Hypotension - chronic  Volume overload Continue CCPD with 2.5% bags.      H/H not at goal.  Continue  MARINO.     Replete K po.             Subjective:     Chief Complaint:  \" I don't why they keep saying that I need to lose my foot. Traci Graven \"  No N/V. No dyspnea. No pain. Review of Systems:    Symptom Y/N Comments  Symptom Y/N Comments   Fever/Chills    Chest Pain     Poor Appetite    Edema     Cough    Abdominal Pain     Sputum    Joint Pain     SOB/GAVIN    Pruritis/Rash     Nausea/vomit    Tolerating PT/OT     Diarrhea    Tolerating Diet     Constipation    Other       Could not obtain due to:      Objective:     VITALS:   Last 24hrs VS reviewed since prior progress note.  Most recent are:  Visit Vitals  BP (!) 94/53   Pulse 88   Temp 98.7 °F (37.1 °C)   Resp 20   Ht 6' 4\" (1.93 m)   Wt 153 kg (337 lb 4.9 oz)   SpO2 98%   BMI 41.06 kg/m²       Intake/Output Summary (Last 24 hours) at 2021 0849  Last data filed at 2021 0305  Gross per 24 hour   Intake    Output 568 ml   Net -568 ml      Telemetry Reviewed:     PHYSICAL EXAM:  General: NAD  Chronic edema      Lab Data Reviewed: (see below)    Medications Reviewed: (see below)    PMH/SH reviewed - no change compared to H&P  ________________________________________________________________________  Care Plan discussed with:  Patient     Family      RN     Care Manager                    Consultant:          Comments   >50% of visit spent in counseling and coordination of care       ________________________________________________________________________  Rio Hopkins MD     Procedures: see electronic medical records for all procedures/Xrays and details which  were not copied into this note but were reviewed prior to creation of Plan. LABS:  Recent Labs     12/02/21  0636 12/01/21 0228   WBC 14.3* 16.4*   HGB 9.8* 9.8*   HCT 31.4* 31.4*    275     Recent Labs     12/02/21  0636 12/01/21 0228 11/30/21  1119   * 132* 132*   K 3.3* 3.1* 2.9*   CL 95* 94* 92*   CO2 18* 19* 22   * 116* 110*   CREA 8.52* 8.73* 8.52*   * 169* 211*   CA 8.4* 8.1* 8.1*   MG  --   --  2.3     Recent Labs     11/30/21  1119   *   TP 6.9   ALB 1.8*   GLOB 5.1*     No results for input(s): INR, PTP, APTT, INREXT, INREXT in the last 72 hours. No results for input(s): FE, TIBC, PSAT, FERR in the last 72 hours. Lab Results   Component Value Date/Time    Folate 9.6 09/18/2020 04:00 AM      No results for input(s): PH, PCO2, PO2 in the last 72 hours.   Recent Labs     11/30/21  1119   CPK 69     No components found for: [place]  Lab Results   Component Value Date/Time    Color YELLOW/STRAW 11/30/2021 03:24 PM    Appearance CLOUDY (A) 11/30/2021 03:24 PM    Specific gravity 1.016 11/30/2021 03:24 PM    pH (UA) 5.0 11/30/2021 03:24 PM    Protein 100 (A) 11/30/2021 03:24 PM    Glucose 100 (A) 11/30/2021 03:24 PM    Ketone Negative 11/30/2021 03:24 PM    Bilirubin Negative 11/30/2021 03:24 PM    Urobilinogen 1.0 11/30/2021 03:24 PM    Nitrites Negative 11/30/2021 03:24 PM    Leukocyte Esterase SMALL (A) 11/30/2021 03:24 PM    Epithelial cells MODERATE (A) 11/30/2021 03:24 PM    Bacteria 1+ (A) 11/30/2021 03:24 PM    WBC 10-20 11/30/2021 03:24 PM    RBC 5-10 11/30/2021 03:24 PM       MEDICATIONS:  Current Facility-Administered Medications   Medication Dose Route Frequency    oxyCODONE IR (ROXICODONE) tablet 5 mg  5 mg Oral Q6H PRN    insulin glargine (LANTUS) injection 30 Units  30 Units SubCUTAneous DAILY    potassium chloride (K-DUR, KLOR-CON M20) SR tablet 40 mEq  40 mEq Oral NOW    predniSONE (DELTASONE) tablet 20 mg  20 mg Oral DAILY WITH BREAKFAST    ondansetron [facility]) injection 4 mg  4 mg IntraVENous Q4H PRN    sodium chloride (NS) flush 5-40 mL  5-40 mL IntraVENous Q8H    sodium chloride (NS) flush 5-40 mL  5-40 mL IntraVENous PRN    acetaminophen (TYLENOL) tablet 650 mg  650 mg Oral Q6H PRN    Or    acetaminophen (TYLENOL) suppository 650 mg  650 mg Rectal Q6H PRN    polyethylene glycol (MIRALAX) packet 17 g  17 g Oral DAILY PRN    ondansetron (ZOFRAN ODT) tablet 4 mg  4 mg Oral Q8H PRN    Or    ondansetron (ZOFRAN) injection 4 mg  4 mg IntraVENous Q6H PRN    heparin (porcine) injection 7,500 Units  7,500 Units SubCUTAneous Q8H    clopidogreL (PLAVIX) tablet 75 mg  75 mg Oral DAILY    aspirin delayed-release tablet 81 mg  81 mg Oral DAILY    insulin lispro (HUMALOG) injection   SubCUTAneous AC&HS    glucose chewable tablet 16 g  4 Tablet Oral PRN    dextrose (D50W) injection syrg 12.5-25 g  12.5-25 g IntraVENous PRN    glucagon (GLUCAGEN) injection 1 mg  1 mg IntraMUSCular PRN    midodrine (PROAMATINE) tablet 5 mg  5 mg Oral TID WITH MEALS    nicotine (NICODERM CQ) 21 mg/24 hr patch 1 Patch  1 Patch TransDERmal DAILY    bumetanide (BUMEX) injection 2 mg  2 mg IntraVENous BID    peritoneal dialysis DEXTROSE 1.5% (2.5 mEq/L low calcium) solution 6,000 mL  6,000 mL IntraPERitoneal DAILY    peritoneal dialysis DEXTROSE 2.5% (2.5 mEq/L low calcium) solution 6,000 mL  6,000 mL IntraPERitoneal DAILY    peritoneal dialysis DEXTROSE 2.5% (2.5 mEq/L low calcium) solution 6,000 mL  6,000 mL IntraPERitoneal DAILY    gentamicin (GARAMYCIN) 0.1 % cream   Topical DAILY PRN

## 2021-12-02 NOTE — DIALYSIS
[facility]              [phone]     Orders  Therapy Time: 9 hrs   Cycles: 5   Fill Volume: 2000 ml   Last Fill Volume: 2000 ml   Total Volume: 12,000   Solution: Dextrose Dianeal 1.5% bag 1 bag 2 and 3 2.5% all with heparin additive as ordered. Comments:   Pt orders, notes, labs, code status and consent reviewed. Time out complete. PD site care: 12/2/21   Prior to connection, one minute Alcavis scrub performed followed by one minute soak per policy. Start time: 0214  Estimated End Time: 9681 12/3/21  Remained present during initial drain followed by initiation of first fill. Prior to departure, bed in lowest position, call bell and personal belongings within reach. Education pre/post with patient and primary nurse, AKASH Hicks.

## 2021-12-02 NOTE — ACP (ADVANCE CARE PLANNING)
Advance Care Planning     Advance Care Planning (ACP) Physician/NP/PA Conversation      Date of Conversation: 11/30/2021  Conducted with: Patient with Decision Making Capacity and [address] Decision Maker:     Primary Decision Maker: Laya Al - Girlfriend - [phone]    Secondary Decision Maker: Soha Painter - Friend - [phone]  Click here to complete [address] including selection of the Healthcare Decision Maker Relationship (ie \"Primary\")          Care Preferences:    Hospitalization: \"If your health worsens and it becomes clear that your chance of recovery is unlikely, what would be your preference regarding hospitalization? \"  The patient would prefer comfort-focused treatment without hospitalization. Ventilation: \"If you were unable to breathe on your own and your chance of recovery was unlikely, what would be your preference about the use of a ventilator (breathing machine) if it was available to you? \"   The patient would NOT desire the use of a ventilator. Resuscitation: \"In the event your heart stopped as a result of an underlying serious health condition, would you want attempts to be made to restart your heart, or would you prefer a natural death? \"   No, do NOT attempt to resuscitate. Additional topics discussed: treatment goals, benefit/burden of treatment options, artificial nutrition, ventilation preferences, hospitalization preferences, resuscitation preferences, end of life care preferences (vegetative state/imminent death) and hospice care    Conversation Outcomes / Follow-Up Plan:     Reviewed DNR/DNI and patient elects DNR order - completed portable DNR form & placed order     Length of Voluntary ACP Conversation in minutes:  30 minutes    Ale Lizarraga NP     Per my discussion with pt and GF:    1) DNR  2) comfort care  3) no feeding tube    We completed an AMD and POST form.     Pt wants comfort care but also wants to continue CCPD at this time.  I spoke with Medical Director of Nyasia Marc MD, who reports that pt would have to stop dialysis in order to qualify for Hospice. Given that pt is homebound, I will refer him to [facility] with hopes that they have staffing to take him. ADDENDUM: spoke with  with [facility], she will review his chart.

## 2021-12-02 NOTE — INTERDISCIPLINARY ROUNDS
Interdisciplinary Rounds were completed on 12/02/21 for this patient. Rounds included nursing, clinical care leader, pharmacy, and case management. Plan of care discussed. See clinical pathway and/or care plan for interventions and desired outcomes.

## 2021-12-02 NOTE — DIALYSIS
Peritoneal Dialysis Disconnection / [phone]     Metrics   I-Drain: 2288 ml   Total UF: 80 ml   Last Fill: 2000 ml   Last Manual Drain: 0 ml   Net UF:         368 ml   Avg Dwell Time: 1:16   Lost Dwell Time: 0:40   Alarms: None   Effluent: Clear, Yellow     Access   Type & Location:    Comments: Prior to disconnection, one-minute Alcavis scrub performed. Sterile mini cap applied and secured to abdomen. Dsg clean, dry and intact. Dsg date: 12/1/21                                         Safety:   Primary Nurse Rpt Pre: Aston Segundo RN   Primary Nurse Rpt Post: Aston Segundo RN     Comments:   Orders, consent, pt, and code status confirmed. Tx completed. Used cassette and bags discarded in red biohazard bag. Education and pre/post report given to primary RN.

## 2021-12-02 NOTE — PROGRESS NOTES
Problem: Mobility Impaired (Adult and Pediatric)  Goal: *Acute Goals and Plan of Care (Insert Text)  Description: FUNCTIONAL STATUS PRIOR TO ADMISSION: Mod I with use of SPC for household distances however states he has been unable to ambulate x10 days. Spends all day in recliner chair, only standing with assist of chair lift in order to urinate in urinal. Reports history of multiple falls. States he was cleared to be WBAT LLE 2 months ago by surgeon at [facility]. HOME SUPPORT PRIOR TO ADMISSION: The patient lived with girlfriend who works from home and assists at needed. Physical Therapy Goals  Initiated 12/1/2021  1. Patient will move from supine to sit and sit to supine , scoot up and down, and roll side to side in bed with minimal assistance/contact guard assist within 7 day(s). 2.  Patient will transfer from bed to chair and chair to bed with minimal assistance/contact guard assist using the least restrictive device within 7 day(s). 3.  Patient will perform sit to stand with minimal assistance/contact guard assist within 7 day(s). 4.  Patient will ambulate with minimal assistance/contact guard assist for 15 feet with the least restrictive device within 7 day(s). Outcome: Progressing Towards Goal   PHYSICAL THERAPY TREATMENT/DISCHARGE  Patient: Linnea Perry (89 y.o. male)  Date: 12/2/2021  Diagnosis: SIRS (systemic inflammatory response syndrome) ([facility]) [R65.10]   SIRS (systemic inflammatory response syndrome) ([facility])       Precautions: Fall, Bed Alarm, WBAT, Skin (LLE per Pt, float heels at all times)  Chart, physical therapy assessment, plan of care and goals were reviewed. ASSESSMENT  Patient continues with skilled PT services and has not progressed towards goals, remaining limited by medical non-compliance, poor insight, lack of cooperation/participation with therapy.  Pt required max encouragement/education for minimal participation with therapy, largely dismissive of any education or safety cueing provided to him by therapist. Pt eventually agreeable to ambulation to bathroom, ambulating 10ft x2 w/ HHAx2 and CGAx2. Gait shuffled with mild increase in trunk sway. Pt experienced 1 minor LOB, requiring CGAx2 for balance check. Pt is a large falls risk however adamantly refuses further participation with therapy or any rehab/HHPT. Pt requesting therapy order be completed. DC PT. Other factors to consider for discharge: non compliant, refusing participation with therapy         PLAN :  Patient will be discharged from acute skilled physical therapy at this time. Rationale for discharge:  Patient not participating in therapy    Recommendation for discharge: (in order for the patient to meet his/her long term goals)  No skilled physical therapy/ follow up rehabilitation needs identified at this time. This discharge recommendation:  Has been made in collaboration with the attending provider and/or case management    IF patient discharges home will need the following DME: patient owns DME required for discharge       SUBJECTIVE:   Patient stated I'm a pain, I know I'm a jerk, this is all a big waste of time.     OBJECTIVE DATA SUMMARY:   Critical Behavior:  Neurologic State: Alert, Lethargic  Orientation Level: Oriented X4  Cognition: Follows commands  Safety/Judgement: Decreased insight into deficits, Decreased awareness of need for safety, Decreased awareness of need for assistance, Decreased awareness of environment, Fall prevention  Functional Mobility Training:  Bed Mobility:        Sit to Supine: Stand-by assistance (with gait belt as leg )  Scooting: Supervision        Transfers:  Sit to Stand: Contact guard assistance; Assist x1; Assist x2  Stand to Sit: Contact guard assistance; Assist x1        Bed to Chair:  (refused to get in chair)                    Balance:  Sitting: Intact  Standing: Impaired;  With support  Standing - Static: Fair; Constant support (unsteadiess, LOB posterior slightly, min A to recover)  Standing - Dynamic : Fair; Constant support  Ambulation/Gait Training:  Distance (ft): 20 Feet (ft) (10ft x2 w seated rest break b/t)  Assistive Device: Gait belt (HHA x2)  Ambulation - Level of Assistance: Contact guard assistance; Assist x2        Gait Abnormalities: Decreased step clearance; Shuffling gait; Trunk sway increased        Base of Support: Widened     Speed/Nancy: Accelerated  Step Length: Left shortened; Right shortened                   Pain Rating:  Did not report pain    Activity Tolerance:   Fair    After treatment patient left in no apparent distress:   Supine in bed, Call bell within reach, and Side rails x 3    COMMUNICATION/COLLABORATION:   The patients plan of care was discussed with: Occupational therapist and Registered nurse.      Tulio Sarabia PT, DPT   Time Calculation: 14 mins

## 2021-12-03 VITALS
HEIGHT: 76 IN | WEIGHT: 315 LBS | DIASTOLIC BLOOD PRESSURE: 90 MMHG | BODY MASS INDEX: 38.36 KG/M2 | HEART RATE: 86 BPM | OXYGEN SATURATION: 100 % | SYSTOLIC BLOOD PRESSURE: 149 MMHG | RESPIRATION RATE: 20 BRPM | TEMPERATURE: 98.2 F

## 2021-12-03 LAB
ANION GAP SERPL CALC-SCNC: 17 MMOL/L (ref 5–15)
BACTERIA SPEC CULT: NORMAL
BASOPHILS # BLD: 0 K/UL (ref 0–0.1)
BASOPHILS NFR BLD: 0 % (ref 0–1)
BUN SERPL-MCNC: 112 MG/DL (ref 6–20)
BUN/CREAT SERPL: 13 (ref 12–20)
CALCIUM SERPL-MCNC: 8.4 MG/DL (ref 8.5–10.1)
CC UR VC: NORMAL
CHLORIDE SERPL-SCNC: 96 MMOL/L (ref 97–108)
CO2 SERPL-SCNC: 19 MMOL/L (ref 21–32)
CREAT SERPL-MCNC: 8.51 MG/DL (ref 0.7–1.3)
DIFFERENTIAL METHOD BLD: ABNORMAL
EOSINOPHIL # BLD: 0.1 K/UL (ref 0–0.4)
EOSINOPHIL NFR BLD: 1 % (ref 0–7)
ERYTHROCYTE [DISTWIDTH] IN BLOOD BY AUTOMATED COUNT: 16.8 % (ref 11.5–14.5)
GLUCOSE BLD STRIP.AUTO-MCNC: 106 MG/DL (ref 65–117)
GLUCOSE BLD STRIP.AUTO-MCNC: 124 MG/DL (ref 65–117)
GLUCOSE SERPL-MCNC: 110 MG/DL (ref 65–100)
HCT VFR BLD AUTO: 35.1 % (ref 36.6–50.3)
HGB BLD-MCNC: 10.7 G/DL (ref 12.1–17)
IMM GRANULOCYTES # BLD AUTO: 0.1 K/UL (ref 0–0.04)
IMM GRANULOCYTES NFR BLD AUTO: 1 % (ref 0–0.5)
LYMPHOCYTES # BLD: 1.2 K/UL (ref 0.8–3.5)
LYMPHOCYTES NFR BLD: 10 % (ref 12–49)
MCH RBC QN AUTO: 27 PG (ref 26–34)
MCHC RBC AUTO-ENTMCNC: 30.5 G/DL (ref 30–36.5)
MCV RBC AUTO: 88.6 FL (ref 80–99)
MONOCYTES # BLD: 0.8 K/UL (ref 0–1)
MONOCYTES NFR BLD: 7 % (ref 5–13)
NEUTS SEG # BLD: 10 K/UL (ref 1.8–8)
NEUTS SEG NFR BLD: 81 % (ref 32–75)
NRBC # BLD: 0 K/UL (ref 0–0.01)
NRBC BLD-RTO: 0 PER 100 WBC
PLATELET # BLD AUTO: 354 K/UL (ref 150–400)
PMV BLD AUTO: 9.8 FL (ref 8.9–12.9)
POTASSIUM SERPL-SCNC: 3.5 MMOL/L (ref 3.5–5.1)
RBC # BLD AUTO: 3.96 M/UL (ref 4.1–5.7)
SERVICE CMNT-IMP: ABNORMAL
SERVICE CMNT-IMP: NORMAL
SERVICE CMNT-IMP: NORMAL
SODIUM SERPL-SCNC: 132 MMOL/L (ref 136–145)
WBC # BLD AUTO: 12.3 K/UL (ref 4.1–11.1)

## 2021-12-03 PROCEDURE — 82962 GLUCOSE BLOOD TEST: CPT

## 2021-12-03 PROCEDURE — 74011636637 HC RX REV CODE- 636/637: Performed by: INTERNAL MEDICINE

## 2021-12-03 PROCEDURE — 80048 BASIC METABOLIC PNL TOTAL CA: CPT

## 2021-12-03 PROCEDURE — 36415 COLL VENOUS BLD VENIPUNCTURE: CPT

## 2021-12-03 PROCEDURE — 74011000250 HC RX REV CODE- 250: Performed by: INTERNAL MEDICINE

## 2021-12-03 PROCEDURE — 74011250636 HC RX REV CODE- 250/636: Performed by: INTERNAL MEDICINE

## 2021-12-03 PROCEDURE — 74011250637 HC RX REV CODE- 250/637: Performed by: INTERNAL MEDICINE

## 2021-12-03 PROCEDURE — 85025 COMPLETE CBC W/AUTO DIFF WBC: CPT

## 2021-12-03 RX ORDER — MIDODRINE HYDROCHLORIDE 5 MG/1
5 TABLET ORAL
Qty: 90 TABLET | Refills: 0 | Status: SHIPPED | OUTPATIENT
Start: 2021-12-03 | End: 2022-01-02

## 2021-12-03 RX ORDER — PREDNISONE 10 MG/1
10 TABLET ORAL DAILY
Qty: 30 TABLET | Refills: 0 | Status: SHIPPED | OUTPATIENT
Start: 2021-12-03

## 2021-12-03 RX ADMIN — CLOPIDOGREL BISULFATE 75 MG: 75 TABLET ORAL at 09:48

## 2021-12-03 RX ADMIN — ASPIRIN 81 MG: 81 TABLET, COATED ORAL at 09:48

## 2021-12-03 RX ADMIN — BUMETANIDE 2 MG: 0.25 INJECTION INTRAMUSCULAR; INTRAVENOUS at 09:10

## 2021-12-03 RX ADMIN — PREDNISONE 20 MG: 20 TABLET ORAL at 09:48

## 2021-12-03 RX ADMIN — INSULIN GLARGINE 30 UNITS: 100 INJECTION, SOLUTION SUBCUTANEOUS at 09:48

## 2021-12-03 RX ADMIN — Medication 5 ML: at 07:49

## 2021-12-03 RX ADMIN — HEPARIN SODIUM 7500 UNITS: 5000 INJECTION INTRAVENOUS; SUBCUTANEOUS at 00:34

## 2021-12-03 RX ADMIN — HEPARIN SODIUM 7500 UNITS: 5000 INJECTION INTRAVENOUS; SUBCUTANEOUS at 09:52

## 2021-12-03 RX ADMIN — MIDODRINE HYDROCHLORIDE 5 MG: 5 TABLET ORAL at 09:48

## 2021-12-03 NOTE — DISCHARGE SUMMARY
Discharge Summary      Name: Thania Thurman  [id number]  YOB: 1970 (Age: 49 y.o.)   Date of Admission: 11/30/2021  Date of Discharge: 12/3/2021  Attending Physician: Jemma Spring MD    Discharge Diagnosis:   Chronic generalized weakness with ? AI disease  Chronic hypotension  Leukocytosis  Anasarca/acute on chronic systolic HF  ESRD/Hypokalemia  CAD/mild to mod AS  L heal OM  PAD  Gangrenous/necrosis fingers    Consultations:  IP CONSULT TO HOSPITALIST  IP CONSULT TO VASCULAR SURGERY  IP CONSULT TO NEPHROLOGY  IP CONSULT TO PALLIATIVE CARE - PROVIDER  IP CONSULT TO CARDIOLOGY      Brief Admission History/Reason for Admission Per Frank Gallardo MD:   Thania Thurman is a 49 y.o.  male with PMHx significant for CAD, systolic HF, gout, chornic pain, T2DM, PAD, tobacco use, presents to the ER c/o generalized weakness and myalgias. He states he has had ongoing myalgias and generalized weakness, started 4 years ago. Pt takes medrol pack monthly and he would feel better after a few doses, however symptoms reoccurred after he gets off med. Per pt, he has been evaluated by rheumatologist outpt with extensive workup without a diagnosis. was told he did not have RA however was started on plaquenil. Pt states the med did nothing for him so he discontinued it. Pt was told his PCP recommended no further steroids moving forward. Pt states he came to the ER today due to severe weakness, unable to get out of chair. He denies any fever, chills, cp. No n/v/d.     We were asked to admit for work up and evaluation of the above problems. Brief Hospital Course by Main Problems:   Chronic generalized weakness with ? AI disease  Chronic hypotension  Leukocytosis,  Due to stress induced, trending down  UTI ruled out  Unclear etiology, however pt has had multiple joint pain and myalgias for years. UA suggestive of UTI but Ucx with no significant growth. Will stop IV abx.   His rapid Covid was negative. CXR neg  He reports prednisone takes care of his pain  and he \"just needs some relief from pain\". states his PCP denied him from further prednisone. He has been evaluated at rheumatology with Dr Hermelindo Urbano for possible AI disease. He mentioned possible RA and was placed on plaquenil, however he took himself off as it was not effective in controlling his symptoms. He was started on low dose prednisone 20mg daily inpt. His symptoms significantly improved. Cont' low dose prednisone at discharge. Encourage patient to f/u with his rheumatologist in 1 week for re-evaluation and further discussion/ treatment plan now that he is off plaquenil. Pt has been evaluated by palliative team, pt is clear about his goc. Does not wish to have any surgical interventions, escalating care other than continuing his dialysis. I spoke to Brady Baeza, palliative team NP, she will try to get pt an appnt with Dr Archie Lennox primary care moving forwards. Pt understands his risks/benefits in regards to chronic steroids use. PT/OT evalauted, recommended SNF however pt declined. He also declined HHPTOT.     T2DM, resume home meds.     Anasarca/acute on chronic systolic HF  ESRD/Hypokalemia,  nephrology following for PD, repleted with KCl  CAD/mild to mod AS  Has been off all antihypertensives due to hypotension.  He is still taking bumex,  Echo with EF 25-30%. Probnp>35K  He was diuresed with IV bumex. He was started on midodrine for hypotension. Unable to start BB or Entresto due to hypotension. He will need to f/u with Dr Mike Davis in 2 weeks.       PAD  Gangrenous/necrosis fingers  Cont' plavix/asa  Vascular evaluated, appreciate recs. Pt's goals are clear. He is not interested in any procedure.     Gout  Stable.     Hx of L heel OM  BKA recommended, pt declined.       Discharge Exam:  Patient seen and examined by me on discharge day.   Pertinent Findings:  Visit Vitals  BP (!) 149/90 (BP 1 Location: Left arm, BP Patient Position: At rest)   Pulse 86   Temp 98.2 °F (36.8 °C)   Resp 20   Ht 6' 4\" (1.93 m)   Wt 153 kg (337 lb 4.9 oz)   SpO2 100%   BMI 41.06 kg/m²     Gen:    Not in distress  Chest: Clear lungs  CVS:   Regular rhythm. No edema  Abd:  Soft, not distended, not tender    Discharge/Recent Laboratory Results:  Recent Labs     12/03/21  0749 12/01/21  0228 11/30/21  1119   *   < > 132*   K 3.5   < > 2.9*   CL 96*   < > 92*   CO2 19*   < > 22   *   < > 110*   *   < > 211*   CA 8.4*   < > 8.1*   MG  --   --  2.3    < > = values in this interval not displayed. Recent Labs     12/03/21  0749   HGB 10.7*   HCT 35.1*   WBC 12.3*          Discharge Medications:  Current Discharge Medication List      START taking these medications    Details   midodrine (PROAMATINE) 5 mg tablet Take 1 Tablet by mouth three (3) times daily (with meals) for 30 days. Qty: 90 Tablet, Refills: 0  Start date: 12/3/2021, End date: 1/2/2022      predniSONE (DELTASONE) 10 mg tablet Take 10 mg by mouth daily. Qty: 30 Tablet, Refills: 0  Start date: 12/3/2021         CONTINUE these medications which have NOT CHANGED    Details   clopidogreL (Plavix) 75 mg tab Take 75 mg by mouth daily. insulin glargine U-300 conc (Toujeo Max U-300 SoloStar) 300 unit/mL (3 mL) inpn 40 Units by SubCUTAneous route Every morning. pregabalin (LYRICA) 100 mg capsule Take 100 mg by mouth two (2) times a day. HYDROcodone-acetaminophen (Norco) 5-325 mg per tablet Take 1 Tablet by mouth every six (6) hours as needed for Pain. aspirin delayed-release 81 mg tablet Take 81 mg by mouth daily. dulaglutide (Trulicity) 0.84 EJ/2.2 mL sub-q pen 0.75 mg by SubCUTAneous route every Sunday. bumetanide (BUMEX) 2 mg tablet Take 2 mg by mouth two (2) times a day. omeprazole (PRILOSEC) 40 mg capsule Take 40 mg by mouth daily.              DISPOSITION:    Home with Family: x   Home with HH/PT/OT/RN: SNF/LTC:    JOSE M:    OTHER:          Follow up with:   PCP : Bennie Crawford PA-C  Follow-up Information     Follow up With Specialties Details Why Contact Info    Ej Duckworth MD Rheumatology In 4 days  [address] [facility] [id number]      Kenny Gonzalez MD Family Medicine, Hospitalist   [address] [facility]      Ricco Hoyt Physician Assistant   [address] [facility] [address] [facility], [facility] Vascular Surgery, Cardiology In 2 weeks  [address] [facility]  [phone]              Total time in minutes spent coordinating this discharge (includes going over instructions, follow-up, prescriptions, and preparing report for sign off to her PCP) :  35 minutes

## 2021-12-03 NOTE — PROGRESS NOTES
No further needs identified at this time. Patient is ready to d/c on a CM standpoint. RN aware. Transition of Care Plan:     RUR:   15% \"mod risk\"  Disposition: Home with follow-up appts  Follow up appointments:  PCP & specialists as needed  DME needed: recs for RW- pt declined. Transportation at Discharge: [address] or means to access home: Yes       IM Medicare Letter: Needed at d/c  Is patient a BCPI-A Bundle: No              If yes, was Bundle Letter given?:  N/A  Is patient a [place] and connected with the [facility]? No  If yes, was [employer] transfer form completed and VA notified? N/A  Caregiver Contact: Girlfriend- Oleg Askew, [phone]  Discharge Caregiver contacted prior to discharge?               CM acknowledged d/c orders. CM met with pt at the bedside. Pt has declined SNF, HH, or outpatient therapy. He reports his girlfriend will transport him home at d/c. Pt states his gf makes all his follow up appointments and he wants her to make them. Medicare pt has received, reviewed, and signed 2nd  letter informing them of their right to appeal the discharge. Signed copy has been placed on pt bedside chart. Care Management Interventions  PCP Verified by CM: Yes  Palliative Care Criteria Met (RRAT>21 & CHF Dx)?: Yes  Palliative Consult Recommended?: Yes (goals of care; code status)  Mode of Transport at Discharge:  Other (see comment) (GF)  Transition of Care Consult (CM Consult): Discharge Planning (pt declines SNF, HH, OP rehab)  Discharge Durable Medical Equipment: No (declined CM to order RW- reports he gave previous one awy)  Physical Therapy Consult: Yes  Occupational Therapy Consult: Yes  Speech Therapy Consult: No  Support Systems: Spouse/Significant Other, Child(claudio)  Confirm Follow Up Transport: Family (pt unable to drive at this time d/t pain)  The Plan for Transition of Care is Related to the Following Treatment Goals : Home with follow-up appts  The Patient and/or Patient Representative was Provided with a Choice of Provider and Agrees with the Discharge Plan?: Yes  Discharge Location  Discharge Placement: [address], MSW  Care Manager [facility]  [phone]

## 2021-12-03 NOTE — PROGRESS NOTES
Bedside and Verbal shift change report given to Jennifer (oncoming nurse) by Braulio Merritt (offgoing nurse). Report included the following information SBAR, Kardex, Intake/Output, MAR and Recent Results.

## 2021-12-03 NOTE — DIALYSIS
Peritoneal Dialysis Disconnection / [phone]     Metrics   I-Drain: 2982 ml   Total UF: 1068ml   Last Fill: 2000 ml   Last Manual Drain: 0 ml   Net UF:         2050 ml   Avg Dwell Time: 1:08   Lost Dwell Time: 0:32   Alarms: None   Effluent: Clear, Yellow     Access   Type & Location:    Comments: Prior to disconnection, one-minute Alcavis scrub performed. Sterile mini cap applied and secured to abdomen. Dsg clean, dry and intact. Dsg date: 12/2/21                                         Safety:   Primary Nurse Rpt Pre: YARELIS Braxton RN   Primary Nurse Rpt Post: YARELIS Braxton RN     Comments:   Orders, consent, pt, and code status confirmed. Tx completed. Used cassette and bags discarded in red biohazard bag. Education and pre/post report given to primary RN.

## 2021-12-03 NOTE — PROGRESS NOTES
NAME: Eric Valdez        :  1970        MRN:  [id number]                   Assessment   :                                               Plan:  ESRD- CCPD - Dr. Dimitri Gomez  PVD  Anemia  Hypotension - chronic  Volume overload Continue CCPD with 2.5% bags.      H/H not at goal.  Continue  MARINO.     Replete K po. I reviewed notes that indicate that he wants comfort care but would like to continue PD which cannot be done - to my understanding. I agree with DC and suspect that he well return soon. I d/w his outpatient PD RN.            Subjective:     Chief Complaint:  \" I'm going home! \"  No N/V. No dyspnea. No pain. Review of Systems:    Symptom Y/N Comments  Symptom Y/N Comments   Fever/Chills    Chest Pain     Poor Appetite    Edema     Cough    Abdominal Pain     Sputum    Joint Pain     SOB/GAVIN    Pruritis/Rash     Nausea/vomit    Tolerating PT/OT     Diarrhea    Tolerating Diet     Constipation    Other       Could not obtain due to:      Objective:     VITALS:   Last 24hrs VS reviewed since prior progress note.  Most recent are:  Visit Vitals  BP (!) 149/90 (BP 1 Location: Left arm, BP Patient Position: At rest)   Pulse 86   Temp 98.2 °F (36.8 °C)   Resp 20   Ht 6' 4\" (1.93 m)   Wt 153 kg (337 lb 4.9 oz)   SpO2 100%   BMI 41.06 kg/m²       Intake/Output Summary (Last 24 hours) at 12/3/2021 1101  Last data filed at 12/3/2021 8007  Gross per 24 hour   Intake 240 ml   Output 2820 ml   Net -2580 ml      Telemetry Reviewed:     PHYSICAL EXAM:  General: NAD  Chronic edema      Lab Data Reviewed: (see below)    Medications Reviewed: (see below)    PMH/SH reviewed - no change compared to H&P  ________________________________________________________________________  Care Plan discussed with:  Patient     Family      RN     Care Manager                    Consultant:          Comments   >50% of visit spent in counseling and coordination of care       ________________________________________________________________________  Lili Barrow MD     Procedures: see electronic medical records for all procedures/Xrays and details which  were not copied into this note but were reviewed prior to creation of Plan. LABS:  Recent Labs     12/03/21  0749 12/02/21  0636   WBC 12.3* 14.3*   HGB 10.7* 9.8*   HCT 35.1* 31.4*    277     Recent Labs     12/03/21  0749 12/02/21  0636 12/01/21 0228 11/30/21  1119 11/30/21  1119   * 132* 132*   < > 132*   K 3.5 3.3* 3.1*   < > 2.9*   CL 96* 95* 94*   < > 92*   CO2 19* 18* 19*   < > 22   * 113* 116*   < > 110*   CREA 8.51* 8.52* 8.73*   < > 8.52*   * 168* 169*   < > 211*   CA 8.4* 8.4* 8.1*   < > 8.1*   MG  --   --   --   --  2.3    < > = values in this interval not displayed. Recent Labs     11/30/21  1119   *   TP 6.9   ALB 1.8*   GLOB 5.1*     No results for input(s): INR, PTP, APTT, INREXT, INREXT in the last 72 hours. No results for input(s): FE, TIBC, PSAT, FERR in the last 72 hours. Lab Results   Component Value Date/Time    Folate 9.6 09/18/2020 04:00 AM      No results for input(s): PH, PCO2, PO2 in the last 72 hours.   Recent Labs     11/30/21  1119   CPK 69     No components found for: [place]  Lab Results   Component Value Date/Time    Color YELLOW/STRAW 11/30/2021 03:24 PM    Appearance CLOUDY (A) 11/30/2021 03:24 PM    Specific gravity 1.016 11/30/2021 03:24 PM    pH (UA) 5.0 11/30/2021 03:24 PM    Protein 100 (A) 11/30/2021 03:24 PM    Glucose 100 (A) 11/30/2021 03:24 PM    Ketone Negative 11/30/2021 03:24 PM    Bilirubin Negative 11/30/2021 03:24 PM    Urobilinogen 1.0 11/30/2021 03:24 PM    Nitrites Negative 11/30/2021 03:24 PM    Leukocyte Esterase SMALL (A) 11/30/2021 03:24 PM    Epithelial cells MODERATE (A) 11/30/2021 03:24 PM    Bacteria 1+ (A) 11/30/2021 03:24 PM    WBC 10-20 11/30/2021 03:24 PM    RBC 5-10 11/30/2021 03:24 PM       MEDICATIONS:  Current Facility-Administered Medications   Medication Dose Route Frequency    oxyCODONE IR (ROXICODONE) tablet 5 mg  5 mg Oral Q6H PRN    insulin glargine (LANTUS) injection 30 Units  30 Units SubCUTAneous DAILY    peritoneal dialysis DEXTROSE 1.5% (2.5 mEq/L low calcium) 6,000 mL with heparin (porcine) 3,000 Units   IntraPERitoneal DAILY    peritoneal dialysis DEXTROSE 2.5% (2.5 mEq/L low calcium) 6,000 mL with heparin (porcine) 3,000 Units   IntraPERitoneal DIALYSIS CONTINUOUS    peritoneal dialysis DEXTROSE 2.5% (2.5 mEq/L low calcium) 6,000 mL with heparin (porcine) 3,000 Units   IntraPERitoneal DAILY    predniSONE (DELTASONE) tablet 20 mg  20 mg Oral DAILY WITH BREAKFAST    ondansetron (ZOFRAN) injection 4 mg  4 mg IntraVENous Q4H PRN    sodium chloride (NS) flush 5-40 mL  5-40 mL IntraVENous Q8H    sodium chloride (NS) flush 5-40 mL  5-40 mL IntraVENous PRN    acetaminophen (TYLENOL) tablet 650 mg  650 mg Oral Q6H PRN    Or    acetaminophen (TYLENOL) suppository 650 mg  650 mg Rectal Q6H PRN    polyethylene glycol (MIRALAX) packet 17 g  17 g Oral DAILY PRN    ondansetron (ZOFRAN ODT) tablet 4 mg  4 mg Oral Q8H PRN    Or    ondansetron (ZOFRAN) injection 4 mg  4 mg IntraVENous Q6H PRN    heparin (porcine) injection 7,500 Units  7,500 Units SubCUTAneous Q8H    clopidogreL (PLAVIX) tablet 75 mg  75 mg Oral DAILY    aspirin delayed-release tablet 81 mg  81 mg Oral DAILY    insulin lispro (HUMALOG) injection   SubCUTAneous AC&HS    glucose chewable tablet 16 g  4 Tablet Oral PRN    dextrose (D50W) injection syrg 12.5-25 g  12.5-25 g IntraVENous PRN    glucagon (GLUCAGEN) injection 1 mg  1 mg IntraMUSCular PRN    midodrine (PROAMATINE) tablet 5 mg  5 mg Oral TID WITH MEALS    nicotine (NICODERM CQ) 21 mg/24 hr patch 1 Patch  1 Patch TransDERmal DAILY    bumetanide (BUMEX) injection 2 mg  2 mg IntraVENous BID    gentamicin (GARAMYCIN) 0.1 % cream   Topical DAILY PRN

## 2021-12-03 NOTE — PROGRESS NOTES
DISCHARGE NOTE FROM [facility]-ER NURSE    Patient determined to be stable for discharge by attending provider. I have reviewed the discharge instructions and follow-up appointments with the patient. They verbalized understanding and all questions were answered to their satisfaction. No complaints or further questions were expressed. Medications sent to pharmacy. Appropriate educational materials and medication side effect teaching were provided. PIV were removed prior to discharge. All personal items collected during admission were returned to the patient prior to discharge.       Bird Calvo RN

## 2021-12-03 NOTE — PROGRESS NOTES
Pharmacist Discharge Medication Reconciliation    Significant PMH:   Past Medical History:   Diagnosis Date    Allergic rhinitis 11/19/2009    Anemia     CAD (coronary artery disease)     Chronic kidney disease     stage 4    Chronic obstructive pulmonary disease (HCC)     Congestive heart failure (HCC)     chronic left sided congestive heart failure    DM (diabetes mellitus) ([facility] 11/19/2009    type 2    GERD (gastroesophageal reflux disease)     HTN (hypertension), benign 11/19/2009    Hypercholesteremia 11/19/2009    LBBB (left bundle branch block)     Obesity 11/19/2009    Pulmonary HTN ([facility] 06/2020    mild     Tobacco abuse 11/19/2009    Weakness generalized      Encounter Diagnoses:   Encounter Diagnoses   Name Primary? Ambulatory dysfunction Yes    Generalized weakness     Hypokalemia     Acute kidney injury superimposed on chronic kidney disease (HCC)     SIRS (systemic inflammatory response syndrome) (HCC)     PAD (peripheral artery disease) ([facility])     Diastolic CHF, chronic (HCC)     Stage 5 chronic kidney disease not on chronic dialysis (HCC)     HTN (hypertension), benign     Obesity, morbid (HCC)     Bilateral edema of lower extremity     Encounter for pain management     Myalgia     Pain of both hip joints     Palliative care encounter     Weakness generalized     Chronic obstructive pulmonary disease, unspecified COPD type ([facility]     Hypercholesteremia      Allergies: Patient has no known allergies. Discharge Medications:   Current Discharge Medication List        START taking these medications    Details   midodrine (PROAMATINE) 5 mg tablet Take 1 Tablet by mouth three (3) times daily (with meals) for 30 days. Qty: 90 Tablet, Refills: 0  Start date: 12/3/2021, End date: 1/2/2022      predniSONE (DELTASONE) 10 mg tablet Take 10 mg by mouth daily.   Qty: 30 Tablet, Refills: 0  Start date: 12/3/2021           CONTINUE these medications which have NOT CHANGED    Details   clopidogreL (Plavix) 75 mg tab Take 75 mg by mouth daily. insulin glargine U-300 conc (Toujeo Max U-300 SoloStar) 300 unit/mL (3 mL) inpn 40 Units by SubCUTAneous route Every morning. pregabalin (LYRICA) 100 mg capsule Take 100 mg by mouth two (2) times a day. HYDROcodone-acetaminophen (Norco) 5-325 mg per tablet Take 1 Tablet by mouth every six (6) hours as needed for Pain. aspirin delayed-release 81 mg tablet Take 81 mg by mouth daily. dulaglutide (Trulicity) 6.23 DE/0.9 mL sub-q pen 0.75 mg by SubCUTAneous route every Sunday. bumetanide (BUMEX) 2 mg tablet Take 2 mg by mouth two (2) times a day. omeprazole (PRILOSEC) 40 mg capsule Take 40 mg by mouth daily. The patient's chart, MAR and AVS were reviewed by Dayana Gonzalez [facility].     Discharging Provider: Sharri Boxer MD    Thank you,     Dayana Gonzalez, [facility]

## 2021-12-05 LAB
BACTERIA SPEC CULT: NORMAL
SERVICE CMNT-IMP: NORMAL

## 2021-12-09 ENCOUNTER — TELEPHONE (OUTPATIENT)
Dept: FAMILY MEDICINE CLINIC | Age: 51
End: 2021-12-09

## 2021-12-09 NOTE — TELEPHONE ENCOUNTER
[facility] Primary Care [facility] Supportive [facility]   Phone:  [phone]      Fax:  [phone] [facility], [address] [facility], [address]    Name:  Ignacio Padilla  YOB: 1970    Patient was referred to [facility] by Guilherme Rock NP who is on the inpatient Palliative Medicine team at [address] [place] [address]. Patient was hospitalized at [address] [place] [address] 11/30/21-12/3/21 due to ambulatory dysfunction, generalized weakness, hypokalemia. Patient has a past history of ESRD on CCPD, DM2,HTN, HLD, anemia secondary to CKD, obesity, gout, chronic systolic CHF with EF 49%, pulmonary HTN, active tobacco use. Ms. Phillip Hurtado referral states patient has multiple chronic illnesses that he doesn't want treated. He is homebound. [facility] is not a candidate for Hospice because he wants to continue home dialysis for now. [facility] has bad pain issues that are only well managed with prednisone. Nurse called patient to discuss [facility]PC services. No answer, left message requesting call back.       Eulalia Marinelli, NEGRAN, RN-BC, [facility]  Referral Navigator, Home Based Primary Care & Supportive Services

## 2021-12-14 ENCOUNTER — TELEPHONE (OUTPATIENT)
Dept: FAMILY MEDICINE CLINIC | Age: 51
End: 2021-12-14

## 2022-01-03 ENCOUNTER — TELEPHONE (OUTPATIENT)
Dept: FAMILY MEDICINE CLINIC | Age: 52
End: 2022-01-03

## 2022-01-03 NOTE — TELEPHONE ENCOUNTER
Home Based Primary Care [facility] Supportive [facility]   Phone:  [phone]      Fax:  [phone] [place] [address], [address]    Name:  Jonathan Ruffin  YOB: 1970      Returned call to  Holly French who called [address] [place] [address] office on 12/31/21 and left message requesting call back to discuss HBPC. Patient was referred to [facility] by Thaddeus Elam NP  (inpatient Palliative Medicine team at [facility]).  Patient was hospitalized at [facility] 11/30/21-12/3/21 due to ambulatory dysfunction, generalized weakness, hypokalemia.   Patient has a past history of ESRD on CCPD, DM2,HTN, HLD, anemia secondary to CKD, obesity, gout, chronic systolic CHF with EF 39%, pulmonary HTN, active tobacco use. Ms. Brenden Geronimo referral states \"patient has multiple chronic illnesses that he doesn't want treated. [facility] is homebound. [facility] is not a candidate for Hospice because he wants to continue home dialysis for now. [facility] has bad pain issues that are only well managed with prednisone. \"  This nurse called patient on 12/9/21 and 12/14 21 and left messages for patient and he did not return the calls.     Romulo Juarez states that patient just recently told her about the messages regarding HBPC and she is calling to let this nurse know that patient is currently in the ICU at [facility]. She reports that patient is in complete renal failure, his heart is \"functioning at 25%\", and his fingers are black from lack of circulation. She says patient was told that he qualifies for hospice, but Romulo Juarez says [facility] Palacios wants to live\". This nurse explained [facility] services and the differences between [address] [place] [address] and Hospice. Romulo Juarez says she will call this nurse back when she has more information about patient's discharge plan. She also says that she and Mr. Poly Milan were  on 12/2/21.       Jess Brown, NEGRAN, RN-BC, [facility]  Referral Navigator, Home Based Primary Care & Supportive Services

## 2022-03-18 PROBLEM — E66.01 OBESITY, MORBID (HCC): Status: ACTIVE | Noted: 2020-08-19

## 2022-03-18 PROBLEM — N17.9 ACUTE ON CHRONIC RENAL FAILURE (HCC): Status: ACTIVE | Noted: 2020-09-16

## 2022-03-18 PROBLEM — L08.9 DIABETIC FOOT INFECTION (HCC): Status: ACTIVE | Noted: 2020-12-07

## 2022-03-18 PROBLEM — N18.9 ACUTE ON CHRONIC RENAL FAILURE (HCC): Status: ACTIVE | Noted: 2020-09-16

## 2022-03-18 PROBLEM — N18.5 STAGE 5 CHRONIC KIDNEY DISEASE NOT ON CHRONIC DIALYSIS (HCC): Status: ACTIVE | Noted: 2020-09-02

## 2022-03-18 PROBLEM — E11.628 DIABETIC FOOT INFECTION (HCC): Status: ACTIVE | Noted: 2020-12-07

## 2022-03-19 PROBLEM — Z51.5 PALLIATIVE CARE ENCOUNTER: Status: ACTIVE | Noted: 2021-12-02

## 2022-03-19 PROBLEM — R65.10 SIRS (SYSTEMIC INFLAMMATORY RESPONSE SYNDROME) (HCC): Status: ACTIVE | Noted: 2021-11-30

## 2022-03-19 PROBLEM — M25.551 PAIN OF BOTH HIP JOINTS: Status: ACTIVE | Noted: 2021-12-02

## 2022-03-19 PROBLEM — M25.552 PAIN OF BOTH HIP JOINTS: Status: ACTIVE | Noted: 2021-12-02

## 2022-03-19 PROBLEM — R52 ENCOUNTER FOR PAIN MANAGEMENT: Status: ACTIVE | Noted: 2021-12-02

## 2022-03-19 PROBLEM — I50.32 DIASTOLIC CHF, CHRONIC (HCC): Status: ACTIVE | Noted: 2021-12-01

## 2022-03-19 PROBLEM — M79.10 MYALGIA: Status: ACTIVE | Noted: 2021-12-02

## 2023-05-12 RX ORDER — HYDROCODONE BITARTRATE AND ACETAMINOPHEN 5; 325 MG/1; MG/1
1 TABLET ORAL EVERY 6 HOURS PRN
COMMUNITY

## 2023-05-12 RX ORDER — BUMETANIDE 2 MG/1
TABLET ORAL 2 TIMES DAILY
COMMUNITY

## 2023-05-12 RX ORDER — DULAGLUTIDE 0.75 MG/.5ML
INJECTION, SOLUTION SUBCUTANEOUS
COMMUNITY

## 2023-05-12 RX ORDER — PREGABALIN 100 MG/1
CAPSULE ORAL 2 TIMES DAILY
COMMUNITY

## 2023-05-12 RX ORDER — OMEPRAZOLE 40 MG/1
40 CAPSULE, DELAYED RELEASE ORAL DAILY
COMMUNITY

## 2023-05-12 RX ORDER — INSULIN GLARGINE 300 U/ML
INJECTION, SOLUTION SUBCUTANEOUS EVERY MORNING
COMMUNITY

## 2023-05-12 RX ORDER — CLOPIDOGREL BISULFATE 75 MG/1
75 TABLET ORAL DAILY
COMMUNITY

## 2023-05-12 RX ORDER — PREDNISONE 10 MG/1
10 TABLET ORAL DAILY
COMMUNITY
Start: 2021-12-03

## 2023-05-12 RX ORDER — ASPIRIN 81 MG/1
81 TABLET ORAL DAILY
COMMUNITY

## (undated) DEVICE — STRAP,POSITIONING,KNEE/BODY,FOAM,4X60": Brand: MEDLINE

## (undated) DEVICE — YANKAUER,TAPERED BULBOUS TIP,W/O VENT: Brand: MEDLINE

## (undated) DEVICE — SPONGE GZ W4XL4IN COT RADPQ HIGHLY ABSRB

## (undated) DEVICE — SUTURE VCRL SZ 4-0 L27IN ABSRB UD L26MM SH 1/2 CIR J415H

## (undated) DEVICE — HANDLE LT SNAP ON ULT DURABLE LENS FOR TRUMPF ALC DISPOSABLE

## (undated) DEVICE — SUT CHRMC 3-0 27IN SH BRN --

## (undated) DEVICE — DRAPE,REIN 53X77,STERILE: Brand: MEDLINE

## (undated) DEVICE — SYR 10ML LUER LOK 1/5ML GRAD --

## (undated) DEVICE — DRAPE,EXTREMITY,89X128,STERILE: Brand: MEDLINE

## (undated) DEVICE — TUBING, SUCTION, 1/4" X 12', STRAIGHT: Brand: MEDLINE

## (undated) DEVICE — SPONGE GZ W4XL4IN COT 12 PLY TYP VII WVN C FLD DSGN

## (undated) DEVICE — SUTURE PROL 2-0 L48IN NONABSORBABLE BLU SH L26MM 1/2 CIR 8533H

## (undated) DEVICE — LAPAROSCOPIC TROCAR SLEEVE/SINGLE USE: Brand: KII® OPTICAL ACCESS SYSTEM

## (undated) DEVICE — REM POLYHESIVE ADULT PATIENT RETURN ELECTRODE: Brand: VALLEYLAB

## (undated) DEVICE — AIR SHEET,LAT,COMFORT GLIDE, BLEND 40X80: Brand: MEDLINE

## (undated) DEVICE — PAD,ABDOMINAL,5"X9",ST,LF,25/BX: Brand: MEDLINE INDUSTRIES, INC.

## (undated) DEVICE — BLADE ASSEMB CLP HAIR FINE --

## (undated) DEVICE — SURGICAL PROCEDURE PACK BASIN MAJ SET CUST NO CAUT

## (undated) DEVICE — KIT,1200CC CANISTER,3/16"X6' TUBING: Brand: MEDLINE INDUSTRIES, INC.

## (undated) DEVICE — SOLUTION SURG PREP 26 CC PURPREP

## (undated) DEVICE — INFECTION CONTROL KIT SYS

## (undated) DEVICE — STRIP,CLOSURE,WOUND,MEDI-STRIP,1/2X4: Brand: MEDLINE

## (undated) DEVICE — MASTISOL ADHESIVE LIQ 2/3ML

## (undated) DEVICE — TROCAR: Brand: KII FIOS FIRST ENTRY

## (undated) DEVICE — DBD-PACK,LAPAROTOMY,2 REINFORCED GOWNS: Brand: MEDLINE

## (undated) DEVICE — SOLUTION IV 1000ML 0.9% SOD CHL

## (undated) DEVICE — GARMENT,MEDLINE,DVT,INT,CALF,MED, GEN2: Brand: MEDLINE

## (undated) DEVICE — BANDAGE,GAUZE,BULKEE II,4.5"X4.1YD,STRL: Brand: MEDLINE

## (undated) DEVICE — THIS ADAPTER IS A DOUBLE SEALING FEMALE LUER LOCK ADAPTER WITH A 2-PIECE, COMBINATION COMPRESSION FIT/BARBED CATHETER CONNECTOR. THE ADAPTER IS USED TO CONNECT THE PD CATHETER TO A SOLUTION TRANSFER SET WITH LOCKING CONNECTOR.: Brand: LOCKING TITANIUM ADAPTER FOR PERITONEAL DIALYSIS CATHETER

## (undated) DEVICE — SUT PROL 4-0 36IN RB1 DA BLU --

## (undated) DEVICE — SOLUTION LACTATED RINGERS INJECTION USP

## (undated) DEVICE — SUTURE MCRYL SZ 4-0 L27IN ABSRB UD L19MM PS-2 1/2 CIR PRIM Y426H

## (undated) DEVICE — DECANTER BAG 9": Brand: MEDLINE INDUSTRIES, INC.

## (undated) DEVICE — ELECTRODE ES SHFT L29CM HK OD5MM ENDOPATH PRB + II

## (undated) DEVICE — ROCKER SWITCH PENCIL HOLSTER: Brand: VALLEYLAB

## (undated) DEVICE — SPONGE LAP 18X18IN STRL -- 5/PK

## (undated) DEVICE — KIT,ANTI FOG,W/SPONGE & FLUID,SOFT PACK: Brand: MEDLINE

## (undated) DEVICE — Device

## (undated) DEVICE — BNDG ELAS HK LOOP 4X5YD NS -- MATRIX

## (undated) DEVICE — NEEDLE HYPO 18GA L1.5IN PNK S STL HUB POLYPR SHLD REG BVL

## (undated) DEVICE — STERILE POLYISOPRENE POWDER-FREE SURGICAL GLOVES WITH EMOLLIENT COATING: Brand: PROTEXIS

## (undated) DEVICE — 3M™ IOBAN™ 2 ANTIMICROBIAL INCISE DRAPE 6651EZ: Brand: IOBAN™ 2

## (undated) DEVICE — TRAY PREP DRY W/ PREM GLV 2 APPL 6 SPNG 2 UNDPD 1 OVERWRAP

## (undated) DEVICE — SWAB CULT DBL W/O CHAR RAYON TIP AMIES GEL CLMN FOR COLL

## (undated) DEVICE — TOWEL SURG W17XL27IN STD BLU COT NONFENESTRATED PREWASHED

## (undated) DEVICE — TROCAR: Brand: KII® SLEEVE

## (undated) DEVICE — STERILE POLYISOPRENE POWDER-FREE SURGICAL GLOVES: Brand: PROTEXIS

## (undated) DEVICE — HANDLE PRB DIA5MM HND CTRL PSTL GRP ENDOPATH PRB + II

## (undated) DEVICE — GAUZE,SPONGE,FLUFF,6"X6.75",STRL,5/TRAY: Brand: MEDLINE

## (undated) DEVICE — SYR 20ML LL STRL LF --

## (undated) DEVICE — PENCIL SMK EVAC 10 FT BLADE ELECTRD ROCKER FOR TELSCP